# Patient Record
Sex: FEMALE | Race: WHITE | NOT HISPANIC OR LATINO | URBAN - METROPOLITAN AREA
[De-identification: names, ages, dates, MRNs, and addresses within clinical notes are randomized per-mention and may not be internally consistent; named-entity substitution may affect disease eponyms.]

---

## 2017-12-19 ENCOUNTER — APPOINTMENT (RX ONLY)
Dept: URBAN - METROPOLITAN AREA CLINIC 20 | Facility: CLINIC | Age: 77
Setting detail: DERMATOLOGY
End: 2017-12-19

## 2017-12-19 DIAGNOSIS — D18.0 HEMANGIOMA: ICD-10-CM

## 2017-12-19 DIAGNOSIS — L81.4 OTHER MELANIN HYPERPIGMENTATION: ICD-10-CM

## 2017-12-19 DIAGNOSIS — L57.8 OTHER SKIN CHANGES DUE TO CHRONIC EXPOSURE TO NONIONIZING RADIATION: ICD-10-CM

## 2017-12-19 DIAGNOSIS — L82.1 OTHER SEBORRHEIC KERATOSIS: ICD-10-CM

## 2017-12-19 DIAGNOSIS — Z85.828 PERSONAL HISTORY OF OTHER MALIGNANT NEOPLASM OF SKIN: ICD-10-CM

## 2017-12-19 DIAGNOSIS — L57.0 ACTINIC KERATOSIS: ICD-10-CM

## 2017-12-19 DIAGNOSIS — D22 MELANOCYTIC NEVI: ICD-10-CM

## 2017-12-19 PROBLEM — D18.01 HEMANGIOMA OF SKIN AND SUBCUTANEOUS TISSUE: Status: ACTIVE | Noted: 2017-12-19

## 2017-12-19 PROBLEM — D22.5 MELANOCYTIC NEVI OF TRUNK: Status: ACTIVE | Noted: 2017-12-19

## 2017-12-19 PROCEDURE — 17003 DESTRUCT PREMALG LES 2-14: CPT

## 2017-12-19 PROCEDURE — ? LIQUID NITROGEN

## 2017-12-19 PROCEDURE — 17000 DESTRUCT PREMALG LESION: CPT

## 2017-12-19 PROCEDURE — ? COUNSELING

## 2017-12-19 PROCEDURE — 99214 OFFICE O/P EST MOD 30 MIN: CPT | Mod: 25

## 2017-12-19 ASSESSMENT — LOCATION DETAILED DESCRIPTION DERM
LOCATION DETAILED: LEFT RADIAL DORSAL HAND
LOCATION DETAILED: RIGHT SUPERIOR UPPER BACK
LOCATION DETAILED: RIGHT INFERIOR FOREHEAD
LOCATION DETAILED: LEFT SUPERIOR UPPER BACK
LOCATION DETAILED: RIGHT INFERIOR CENTRAL MALAR CHEEK
LOCATION DETAILED: RIGHT LATERAL FOREHEAD
LOCATION DETAILED: LEFT CENTRAL EYEBROW
LOCATION DETAILED: LEFT INFERIOR FOREHEAD
LOCATION DETAILED: RIGHT LATERAL BUCCAL CHEEK
LOCATION DETAILED: RIGHT INFERIOR MEDIAL UPPER BACK
LOCATION DETAILED: RIGHT INFERIOR ANTERIOR NECK
LOCATION DETAILED: LEFT LATERAL EYEBROW
LOCATION DETAILED: RIGHT RADIAL DORSAL HAND
LOCATION DETAILED: RIGHT UPPER CUTANEOUS LIP
LOCATION DETAILED: RIGHT SUPERIOR LATERAL MALAR CHEEK
LOCATION DETAILED: RIGHT INFERIOR MEDIAL FOREHEAD
LOCATION DETAILED: LEFT CENTRAL MALAR CHEEK
LOCATION DETAILED: RIGHT CENTRAL MALAR CHEEK

## 2017-12-19 ASSESSMENT — LOCATION ZONE DERM
LOCATION ZONE: FACE
LOCATION ZONE: LIP
LOCATION ZONE: HAND
LOCATION ZONE: NECK
LOCATION ZONE: TRUNK

## 2017-12-19 ASSESSMENT — LOCATION SIMPLE DESCRIPTION DERM
LOCATION SIMPLE: RIGHT FOREHEAD
LOCATION SIMPLE: LEFT HAND
LOCATION SIMPLE: RIGHT UPPER BACK
LOCATION SIMPLE: LEFT UPPER BACK
LOCATION SIMPLE: RIGHT CHEEK
LOCATION SIMPLE: LEFT EYEBROW
LOCATION SIMPLE: RIGHT ANTERIOR NECK
LOCATION SIMPLE: RIGHT LIP
LOCATION SIMPLE: RIGHT HAND
LOCATION SIMPLE: LEFT FOREHEAD
LOCATION SIMPLE: LEFT CHEEK

## 2018-06-27 ENCOUNTER — APPOINTMENT (RX ONLY)
Dept: URBAN - METROPOLITAN AREA CLINIC 20 | Facility: CLINIC | Age: 78
Setting detail: DERMATOLOGY
End: 2018-06-27

## 2018-06-27 DIAGNOSIS — Z85.828 PERSONAL HISTORY OF OTHER MALIGNANT NEOPLASM OF SKIN: ICD-10-CM

## 2018-06-27 DIAGNOSIS — L57.0 ACTINIC KERATOSIS: ICD-10-CM

## 2018-06-27 DIAGNOSIS — L57.8 OTHER SKIN CHANGES DUE TO CHRONIC EXPOSURE TO NONIONIZING RADIATION: ICD-10-CM

## 2018-06-27 PROCEDURE — 17003 DESTRUCT PREMALG LES 2-14: CPT

## 2018-06-27 PROCEDURE — ? LIQUID NITROGEN

## 2018-06-27 PROCEDURE — 99213 OFFICE O/P EST LOW 20 MIN: CPT | Mod: 25

## 2018-06-27 PROCEDURE — ? COUNSELING

## 2018-06-27 PROCEDURE — 17000 DESTRUCT PREMALG LESION: CPT

## 2018-06-27 ASSESSMENT — LOCATION SIMPLE DESCRIPTION DERM
LOCATION SIMPLE: LEFT CHEEK
LOCATION SIMPLE: RIGHT FOREHEAD
LOCATION SIMPLE: LEFT EAR
LOCATION SIMPLE: RIGHT CHEEK

## 2018-06-27 ASSESSMENT — LOCATION ZONE DERM
LOCATION ZONE: EAR
LOCATION ZONE: FACE

## 2018-06-27 ASSESSMENT — LOCATION DETAILED DESCRIPTION DERM
LOCATION DETAILED: LEFT SUPERIOR HELIX
LOCATION DETAILED: RIGHT SUPERIOR FOREHEAD
LOCATION DETAILED: RIGHT FOREHEAD
LOCATION DETAILED: LEFT SUPERIOR MEDIAL BUCCAL CHEEK
LOCATION DETAILED: RIGHT CENTRAL MALAR CHEEK

## 2018-12-10 ENCOUNTER — APPOINTMENT (RX ONLY)
Dept: URBAN - METROPOLITAN AREA CLINIC 20 | Facility: CLINIC | Age: 78
Setting detail: DERMATOLOGY
End: 2018-12-10

## 2018-12-10 DIAGNOSIS — Z85.828 PERSONAL HISTORY OF OTHER MALIGNANT NEOPLASM OF SKIN: ICD-10-CM

## 2018-12-10 DIAGNOSIS — D22 MELANOCYTIC NEVI: ICD-10-CM

## 2018-12-10 DIAGNOSIS — L57.0 ACTINIC KERATOSIS: ICD-10-CM

## 2018-12-10 DIAGNOSIS — D18.0 HEMANGIOMA: ICD-10-CM

## 2018-12-10 DIAGNOSIS — L57.8 OTHER SKIN CHANGES DUE TO CHRONIC EXPOSURE TO NONIONIZING RADIATION: ICD-10-CM

## 2018-12-10 DIAGNOSIS — L82.1 OTHER SEBORRHEIC KERATOSIS: ICD-10-CM

## 2018-12-10 DIAGNOSIS — L81.4 OTHER MELANIN HYPERPIGMENTATION: ICD-10-CM

## 2018-12-10 PROBLEM — D18.01 HEMANGIOMA OF SKIN AND SUBCUTANEOUS TISSUE: Status: ACTIVE | Noted: 2018-12-10

## 2018-12-10 PROBLEM — D22.5 MELANOCYTIC NEVI OF TRUNK: Status: ACTIVE | Noted: 2018-12-10

## 2018-12-10 PROCEDURE — 17003 DESTRUCT PREMALG LES 2-14: CPT

## 2018-12-10 PROCEDURE — 17000 DESTRUCT PREMALG LESION: CPT

## 2018-12-10 PROCEDURE — 99214 OFFICE O/P EST MOD 30 MIN: CPT | Mod: 25

## 2018-12-10 PROCEDURE — ? COUNSELING

## 2018-12-10 PROCEDURE — ? LIQUID NITROGEN

## 2018-12-10 ASSESSMENT — LOCATION SIMPLE DESCRIPTION DERM
LOCATION SIMPLE: LEFT EAR
LOCATION SIMPLE: RIGHT CHEEK
LOCATION SIMPLE: LEFT CHEEK
LOCATION SIMPLE: LEFT EYEBROW
LOCATION SIMPLE: LEFT HAND
LOCATION SIMPLE: RIGHT HAND
LOCATION SIMPLE: RIGHT ANTERIOR NECK
LOCATION SIMPLE: RIGHT UPPER BACK
LOCATION SIMPLE: LEFT UPPER BACK

## 2018-12-10 ASSESSMENT — LOCATION ZONE DERM
LOCATION ZONE: TRUNK
LOCATION ZONE: HAND
LOCATION ZONE: EAR
LOCATION ZONE: FACE
LOCATION ZONE: NECK

## 2018-12-10 ASSESSMENT — LOCATION DETAILED DESCRIPTION DERM
LOCATION DETAILED: LEFT CENTRAL EYEBROW
LOCATION DETAILED: LEFT MEDIAL MALAR CHEEK
LOCATION DETAILED: RIGHT SUPERIOR UPPER BACK
LOCATION DETAILED: RIGHT RADIAL DORSAL HAND
LOCATION DETAILED: RIGHT INFERIOR CENTRAL MALAR CHEEK
LOCATION DETAILED: LEFT RADIAL DORSAL HAND
LOCATION DETAILED: LEFT SUPERIOR UPPER BACK
LOCATION DETAILED: LEFT LATERAL EYEBROW
LOCATION DETAILED: LEFT SUPERIOR HELIX
LOCATION DETAILED: RIGHT INFERIOR MEDIAL UPPER BACK
LOCATION DETAILED: RIGHT CENTRAL MALAR CHEEK
LOCATION DETAILED: RIGHT INFERIOR ANTERIOR NECK
LOCATION DETAILED: RIGHT LATERAL BUCCAL CHEEK

## 2019-02-07 ENCOUNTER — APPOINTMENT (RX ONLY)
Dept: URBAN - METROPOLITAN AREA CLINIC 20 | Facility: CLINIC | Age: 79
Setting detail: DERMATOLOGY
End: 2019-02-07

## 2019-02-07 DIAGNOSIS — L57.0 ACTINIC KERATOSIS: ICD-10-CM | Status: INADEQUATELY CONTROLLED

## 2019-02-07 PROCEDURE — ? FOLLOW UP ORDERS

## 2019-02-07 PROCEDURE — 99213 OFFICE O/P EST LOW 20 MIN: CPT

## 2019-02-07 PROCEDURE — ? COUNSELING

## 2019-02-07 ASSESSMENT — LOCATION SIMPLE DESCRIPTION DERM: LOCATION SIMPLE: INFERIOR FOREHEAD

## 2019-02-07 ASSESSMENT — LOCATION DETAILED DESCRIPTION DERM: LOCATION DETAILED: INFERIOR MID FOREHEAD

## 2019-02-07 ASSESSMENT — LOCATION ZONE DERM: LOCATION ZONE: FACE

## 2019-02-07 NOTE — PROCEDURE: FOLLOW UP ORDERS
Follow-Up (Free Text): Red light therapy, consent signed. \\n\\nAuthorization started \\n\\nInstructions partially explained, further guidance will be given once approved and scheduled.
Follow-Up Preamble: The following orders were made during the visit:
Detail Level: Zone

## 2019-03-18 ENCOUNTER — RX ONLY (OUTPATIENT)
Age: 79
Setting detail: RX ONLY
End: 2019-03-18

## 2019-03-18 RX ORDER — ACYCLOVIR 400 MG/1
400MG TABLET ORAL BID
Qty: 30 | Refills: 0 | Status: ERX | COMMUNITY
Start: 2019-03-18

## 2019-04-08 ENCOUNTER — APPOINTMENT (RX ONLY)
Dept: URBAN - METROPOLITAN AREA CLINIC 36 | Facility: CLINIC | Age: 79
Setting detail: DERMATOLOGY
End: 2019-04-08

## 2019-04-08 DIAGNOSIS — L57.0 ACTINIC KERATOSIS: ICD-10-CM

## 2019-04-08 PROCEDURE — 96574 DBRDMT PRMLG LES W/PDT: CPT

## 2019-04-08 PROCEDURE — ? PHOTODYNAMIC THERAPY COUNSELING

## 2019-04-08 PROCEDURE — ? PDT: RED

## 2019-04-08 ASSESSMENT — LOCATION SIMPLE DESCRIPTION DERM: LOCATION SIMPLE: SUPERIOR FOREHEAD

## 2019-04-08 ASSESSMENT — LOCATION ZONE DERM: LOCATION ZONE: FACE

## 2019-04-08 ASSESSMENT — LOCATION DETAILED DESCRIPTION DERM: LOCATION DETAILED: SUPERIOR MID FOREHEAD

## 2019-04-08 NOTE — PROCEDURE: PDT: RED
Consent: Written consent obtained.  The risks were reviewed with the patient including but not limited to: pigmentary changes, pain, blistering, scabbing, redness, and the remote possibility of scarring.
Number Of Kerasticks/Tubes Of Metvixia/Tubes Of Ameluz Used: 1
Photosensitizer: Ameluz
Application Method: under occlusion
Debridement Text (Will Only Render In Visit Note If You Select Debridement Option Under Who Performed The Pdt Field): Prior to application of the photodynamic medication the hyperkeratotic lesions were curetted to make them more amenable to therapy.
Post-Care Instructions: I reviewed with the patient in detail post-care instructions. Patient is to avoid sunlight for the next 2 days, and wear sun protection. Patients may expect sunburn like redness, discomfort and scabbing.
Anesthesia Type: 1% lidocaine with epinephrine
Incubation Time (Set To 00:00:00 If Not Wanted): 01:00:00
Ndc# (Optional): [id number]
Illumination Time: 7 min 07 sec
Detail Level: Zone
Lot # (Optional): [id number]
Expiration Date (Optional): 06.2020
Who Performed The Pdt?: Performed by MD YADI, ARON or PADDY with Pre-Procedure Debridement of Hyperkeratotic Lesions ([id number])
Total Number Of Aks Treated (Optional To Report): 0

## 2019-05-07 ENCOUNTER — APPOINTMENT (RX ONLY)
Dept: URBAN - METROPOLITAN AREA CLINIC 20 | Facility: CLINIC | Age: 79
Setting detail: DERMATOLOGY
End: 2019-05-07

## 2019-05-07 DIAGNOSIS — L57.0 ACTINIC KERATOSIS: ICD-10-CM | Status: INADEQUATELY CONTROLLED

## 2019-05-07 PROCEDURE — ? ADDITIONAL NOTES

## 2019-05-07 PROCEDURE — ? PRESCRIPTION

## 2019-05-07 PROCEDURE — 99214 OFFICE O/P EST MOD 30 MIN: CPT

## 2019-05-07 PROCEDURE — ? COUNSELING

## 2019-05-07 RX ORDER — FLUOROURACIL 50 MG/G
CREAM TOPICAL
Qty: 1 | Refills: 1 | Status: ERX | COMMUNITY
Start: 2019-05-07

## 2019-05-07 RX ADMIN — FLUOROURACIL PEA SIZE AMOUNT: 50 CREAM TOPICAL at 00:00

## 2019-05-07 ASSESSMENT — LOCATION ZONE DERM: LOCATION ZONE: FACE

## 2019-05-07 ASSESSMENT — LOCATION SIMPLE DESCRIPTION DERM
LOCATION SIMPLE: RIGHT FOREHEAD
LOCATION SIMPLE: LEFT TEMPLE
LOCATION SIMPLE: RIGHT TEMPLE

## 2019-05-07 ASSESSMENT — LOCATION DETAILED DESCRIPTION DERM
LOCATION DETAILED: LEFT MID TEMPLE
LOCATION DETAILED: RIGHT MEDIAL TEMPLE
LOCATION DETAILED: RIGHT INFERIOR MEDIAL FOREHEAD

## 2019-05-07 NOTE — PROCEDURE: ADDITIONAL NOTES
Additional Notes: Patient directed to apply 5FU to recurring AK’s\\n5/8/19 Start\\nApply once a day at bedtime 5 days a week (mon-fri) for 4 weeks\\n6/5/19 Stop\\nPatient to RTC in 1 months following treatment.
Detail Level: Simple

## 2019-05-07 NOTE — HPI: SKIN LESION
Is This A New Presentation, Or A Follow-Up?: Skin Lesion
What Type Of Note Output Would You Prefer (Optional)?: Bullet Format
How Severe Is Your Skin Lesion?: mild
Has Your Skin Lesion Been Treated?: been treated
When Was It Treated?: 4/8/2019

## 2019-07-23 ENCOUNTER — APPOINTMENT (RX ONLY)
Dept: URBAN - METROPOLITAN AREA CLINIC 20 | Facility: CLINIC | Age: 79
Setting detail: DERMATOLOGY
End: 2019-07-23

## 2019-07-23 DIAGNOSIS — L57.8 OTHER SKIN CHANGES DUE TO CHRONIC EXPOSURE TO NONIONIZING RADIATION: ICD-10-CM

## 2019-07-23 DIAGNOSIS — L57.0 ACTINIC KERATOSIS: ICD-10-CM

## 2019-07-23 PROCEDURE — 17000 DESTRUCT PREMALG LESION: CPT

## 2019-07-23 PROCEDURE — ? LIQUID NITROGEN

## 2019-07-23 PROCEDURE — ? COUNSELING

## 2019-07-23 PROCEDURE — 99213 OFFICE O/P EST LOW 20 MIN: CPT | Mod: 25

## 2019-07-23 ASSESSMENT — LOCATION DETAILED DESCRIPTION DERM
LOCATION DETAILED: INFERIOR MID FOREHEAD
LOCATION DETAILED: LEFT MEDIAL TEMPLE
LOCATION DETAILED: LEFT CENTRAL MALAR CHEEK
LOCATION DETAILED: RIGHT MEDIAL TEMPLE
LOCATION DETAILED: RIGHT SUPERIOR LATERAL MALAR CHEEK

## 2019-07-23 ASSESSMENT — LOCATION SIMPLE DESCRIPTION DERM
LOCATION SIMPLE: LEFT CHEEK
LOCATION SIMPLE: LEFT TEMPLE
LOCATION SIMPLE: RIGHT CHEEK
LOCATION SIMPLE: RIGHT TEMPLE
LOCATION SIMPLE: INFERIOR FOREHEAD

## 2019-07-23 ASSESSMENT — LOCATION ZONE DERM: LOCATION ZONE: FACE

## 2019-12-10 ENCOUNTER — APPOINTMENT (RX ONLY)
Dept: URBAN - METROPOLITAN AREA CLINIC 20 | Facility: CLINIC | Age: 79
Setting detail: DERMATOLOGY
End: 2019-12-10

## 2019-12-10 DIAGNOSIS — L82.1 OTHER SEBORRHEIC KERATOSIS: ICD-10-CM

## 2019-12-10 DIAGNOSIS — L57.0 ACTINIC KERATOSIS: ICD-10-CM

## 2019-12-10 DIAGNOSIS — L57.8 OTHER SKIN CHANGES DUE TO CHRONIC EXPOSURE TO NONIONIZING RADIATION: ICD-10-CM

## 2019-12-10 PROBLEM — D48.5 NEOPLASM OF UNCERTAIN BEHAVIOR OF SKIN: Status: ACTIVE | Noted: 2019-12-10

## 2019-12-10 PROCEDURE — 11103 TANGNTL BX SKIN EA SEP/ADDL: CPT

## 2019-12-10 PROCEDURE — ? COUNSELING

## 2019-12-10 PROCEDURE — ? LIQUID NITROGEN

## 2019-12-10 PROCEDURE — ? ADDITIONAL NOTES

## 2019-12-10 PROCEDURE — 17000 DESTRUCT PREMALG LESION: CPT | Mod: 59

## 2019-12-10 PROCEDURE — 17003 DESTRUCT PREMALG LES 2-14: CPT

## 2019-12-10 PROCEDURE — ? BIOPSY BY SHAVE METHOD

## 2019-12-10 PROCEDURE — 11102 TANGNTL BX SKIN SINGLE LES: CPT

## 2019-12-10 PROCEDURE — 99213 OFFICE O/P EST LOW 20 MIN: CPT | Mod: 25

## 2019-12-10 ASSESSMENT — LOCATION SIMPLE DESCRIPTION DERM
LOCATION SIMPLE: RIGHT CHEEK
LOCATION SIMPLE: RIGHT FOREHEAD
LOCATION SIMPLE: LEFT FOREHEAD
LOCATION SIMPLE: LEFT CHEEK

## 2019-12-10 ASSESSMENT — LOCATION DETAILED DESCRIPTION DERM
LOCATION DETAILED: RIGHT INFERIOR CENTRAL MALAR CHEEK
LOCATION DETAILED: LEFT CENTRAL MALAR CHEEK
LOCATION DETAILED: LEFT INFERIOR LATERAL FOREHEAD
LOCATION DETAILED: LEFT SUPERIOR MEDIAL FOREHEAD
LOCATION DETAILED: RIGHT FOREHEAD

## 2019-12-10 ASSESSMENT — LOCATION ZONE DERM: LOCATION ZONE: FACE

## 2019-12-10 NOTE — PROCEDURE: BIOPSY BY SHAVE METHOD
Lab: [id number]
Hide Anticipated Plan (Based On Presumed Biopsy Results)?: No
Billing Type: Third-Party Bill
Detail Level: Detailed
Biopsy Method: Personna blade
Hemostasis: Drysol and Electrocautery
Curettage Text: The wound bed was treated with curettage after the biopsy was performed.
Additional Anesthesia Volume In Cc (Will Not Render If 0): 0
Wound Care: Aquaphor
Cryotherapy Text: The wound bed was treated with cryotherapy after the biopsy was performed.
Post-Care Instructions: I reviewed with the patient in detail post-care instructions. Patient is to keep the biopsy site dry overnight, and then apply bacitracin twice daily until healed. Patient may apply hydrogen peroxide soaks to remove any crusting.
Anesthesia Type: 1% lidocaine with 1:100,000 epinephrine and a 1:6 solution of 8.4% sodium bicarbonate
Lab Facility: 
Type Of Destruction Used: Curettage
Consent: Written consent was obtained and risks were reviewed including but not limited to scarring, infection, bleeding, scabbing, incomplete removal, nerve damage and allergy to anesthesia.
Electrodesiccation And Curettage Text: The wound bed was treated with electrodesiccation and curettage after the biopsy was performed.
Anesthesia Volume In Cc: 0.2
Depth Of Biopsy: dermis
Electrodesiccation Text: The wound bed was treated with electrodesiccation after the biopsy was performed.
Notification Instructions: Patient will be notified of biopsy results. However, patient instructed to call the office if not contacted within 2 weeks.
Biopsy Type: H and E
Silver Nitrate Text: The wound bed was treated with silver nitrate after the biopsy was performed.
Was A Bandage Applied: Yes
Dressing: Band-Aid

## 2020-02-25 ENCOUNTER — RX ONLY (OUTPATIENT)
Age: 80
Setting detail: RX ONLY
End: 2020-02-25

## 2020-02-25 ENCOUNTER — APPOINTMENT (RX ONLY)
Dept: URBAN - METROPOLITAN AREA CLINIC 20 | Facility: CLINIC | Age: 80
Setting detail: DERMATOLOGY
End: 2020-02-25

## 2020-02-25 DIAGNOSIS — L57.0 ACTINIC KERATOSIS: ICD-10-CM

## 2020-02-25 PROCEDURE — ? PHOTODYNAMIC THERAPY COUNSELING

## 2020-02-25 PROCEDURE — ? PDT: RED

## 2020-02-25 PROCEDURE — 96567 PDT DSTR PRMLG LES SKN: CPT

## 2020-02-25 ASSESSMENT — LOCATION ZONE DERM: LOCATION ZONE: FACE

## 2020-02-25 ASSESSMENT — LOCATION SIMPLE DESCRIPTION DERM: LOCATION SIMPLE: SUPERIOR FOREHEAD

## 2020-02-25 ASSESSMENT — LOCATION DETAILED DESCRIPTION DERM: LOCATION DETAILED: SUPERIOR MID FOREHEAD

## 2020-02-25 NOTE — PROCEDURE: PDT: RED
Photosensitizer: Ameluz
Application Method: without occlusion
Detail Level: Zone
Ndc# (Optional): [id number]
Number Of Kerasticks/Tubes Of Metvixia/Tubes Of Ameluz Used: 1
Expiration Date (Optional): 04.2021
Total Number Of Aks Treated (Optional To Report): 0
Post-Care Instructions: I reviewed with the patient in detail post-care instructions. Patient is to avoid sunlight for the next 2 days, and wear sun protection. Patients may expect sunburn like redness, discomfort and scabbing.
Lot # (Optional): [id number]
Debridement Text (Will Only Render In Visit Note If You Select Debridement Option Under Who Performed The Pdt Field): Prior to application of the photodynamic medication the hyperkeratotic lesions were curetted to make them more amenable to therapy.
Consent: Written consent obtained.  The risks were reviewed with the patient including but not limited to: pigmentary changes, pain, blistering, scabbing, redness, and the remote possibility of scarring.
Illumination Time: 7 min 07 sec
Who Performed The Pdt?: Performed by Nurse, MA or Aesthetician ([id number])
Anesthesia Type: 1% lidocaine with epinephrine
Incubation Time (Set To 00:00:00 If Not Wanted): 01:30:00

## 2020-02-25 NOTE — HPI: PHOTODYNAMIC THERAPY (PDT)
Have You Had Previous Treatments With Pdt Before?: has had previous treatments
When Outside In The Sun, Do You...: mostly burns, rarely tans
Additional History: Patient was given one tablet of Acyclovir prior to treatment due to history of cold sores.

## 2020-06-18 ENCOUNTER — HOSPITAL ENCOUNTER (OUTPATIENT)
Dept: RADIOLOGY | Facility: MEDICAL CENTER | Age: 80
End: 2020-06-18
Attending: FAMILY MEDICINE
Payer: MEDICARE

## 2020-06-18 DIAGNOSIS — J01.90 ACUTE SINUSITIS, RECURRENCE NOT SPECIFIED, UNSPECIFIED LOCATION: ICD-10-CM

## 2020-06-18 PROCEDURE — 70486 CT MAXILLOFACIAL W/O DYE: CPT

## 2020-07-21 ENCOUNTER — APPOINTMENT (RX ONLY)
Dept: URBAN - METROPOLITAN AREA CLINIC 20 | Facility: CLINIC | Age: 80
Setting detail: DERMATOLOGY
End: 2020-07-21

## 2020-07-21 DIAGNOSIS — L57.0 ACTINIC KERATOSIS: ICD-10-CM | Status: INADEQUATELY CONTROLLED

## 2020-07-21 DIAGNOSIS — Z09 ENCOUNTER FOR FOLLOW-UP EXAMINATION AFTER COMPLETED TREATMENT FOR CONDITIONS OTHER THAN MALIGNANT NEOPLASM: ICD-10-CM

## 2020-07-21 DIAGNOSIS — L57.8 OTHER SKIN CHANGES DUE TO CHRONIC EXPOSURE TO NONIONIZING RADIATION: ICD-10-CM

## 2020-07-21 PROCEDURE — ? COUNSELING

## 2020-07-21 PROCEDURE — 17003 DESTRUCT PREMALG LES 2-14: CPT

## 2020-07-21 PROCEDURE — ? LIQUID NITROGEN

## 2020-07-21 PROCEDURE — 17000 DESTRUCT PREMALG LESION: CPT

## 2020-07-21 PROCEDURE — 99213 OFFICE O/P EST LOW 20 MIN: CPT | Mod: 25

## 2020-07-21 PROCEDURE — ? ADDITIONAL NOTES

## 2020-07-21 ASSESSMENT — LOCATION SIMPLE DESCRIPTION DERM
LOCATION SIMPLE: LEFT TEMPLE
LOCATION SIMPLE: LEFT CHEEK
LOCATION SIMPLE: LEFT ZYGOMA
LOCATION SIMPLE: RIGHT CHEEK

## 2020-07-21 ASSESSMENT — LOCATION DETAILED DESCRIPTION DERM
LOCATION DETAILED: LEFT CENTRAL MALAR CHEEK
LOCATION DETAILED: RIGHT INFERIOR CENTRAL MALAR CHEEK
LOCATION DETAILED: LEFT CENTRAL TEMPLE
LOCATION DETAILED: LEFT CENTRAL ZYGOMA

## 2020-07-21 ASSESSMENT — LOCATION ZONE DERM: LOCATION ZONE: FACE

## 2020-07-30 ENCOUNTER — APPOINTMENT (OUTPATIENT)
Dept: RADIOLOGY | Facility: MEDICAL CENTER | Age: 80
End: 2020-07-30
Attending: EMERGENCY MEDICINE
Payer: MEDICARE

## 2020-07-30 ENCOUNTER — HOSPITAL ENCOUNTER (EMERGENCY)
Facility: MEDICAL CENTER | Age: 80
End: 2020-07-30
Attending: EMERGENCY MEDICINE | Admitting: EMERGENCY MEDICINE
Payer: MEDICARE

## 2020-07-30 VITALS
RESPIRATION RATE: 16 BRPM | HEART RATE: 61 BPM | SYSTOLIC BLOOD PRESSURE: 179 MMHG | OXYGEN SATURATION: 97 % | DIASTOLIC BLOOD PRESSURE: 86 MMHG | WEIGHT: 106.04 LBS | BODY MASS INDEX: 20.02 KG/M2 | TEMPERATURE: 97.7 F | HEIGHT: 61 IN

## 2020-07-30 DIAGNOSIS — I10 ESSENTIAL HYPERTENSION: ICD-10-CM

## 2020-07-30 DIAGNOSIS — E87.1 HYPONATREMIA: ICD-10-CM

## 2020-07-30 LAB
ALBUMIN SERPL BCP-MCNC: 4.6 G/DL (ref 3.2–4.9)
ALBUMIN/GLOB SERPL: 1.6 G/DL
ALP SERPL-CCNC: 52 U/L (ref 30–99)
ALT SERPL-CCNC: 21 U/L (ref 2–50)
ANION GAP SERPL CALC-SCNC: 10 MMOL/L (ref 7–16)
APPEARANCE UR: CLEAR
AST SERPL-CCNC: 34 U/L (ref 12–45)
BASOPHILS # BLD AUTO: 0.6 % (ref 0–1.8)
BASOPHILS # BLD: 0.03 K/UL (ref 0–0.12)
BILIRUB SERPL-MCNC: 0.3 MG/DL (ref 0.1–1.5)
BILIRUB UR QL STRIP.AUTO: NEGATIVE
BUN SERPL-MCNC: 16 MG/DL (ref 8–22)
CALCIUM SERPL-MCNC: 9.6 MG/DL (ref 8.4–10.2)
CHLORIDE SERPL-SCNC: 87 MMOL/L (ref 96–112)
CO2 SERPL-SCNC: 28 MMOL/L (ref 20–33)
COLOR UR: YELLOW
CREAT SERPL-MCNC: 0.69 MG/DL (ref 0.5–1.4)
EKG IMPRESSION: NORMAL
EOSINOPHIL # BLD AUTO: 0.06 K/UL (ref 0–0.51)
EOSINOPHIL NFR BLD: 1.1 % (ref 0–6.9)
ERYTHROCYTE [DISTWIDTH] IN BLOOD BY AUTOMATED COUNT: 45.5 FL (ref 35.9–50)
GLOBULIN SER CALC-MCNC: 2.9 G/DL (ref 1.9–3.5)
GLUCOSE SERPL-MCNC: 101 MG/DL (ref 65–99)
GLUCOSE UR STRIP.AUTO-MCNC: NEGATIVE MG/DL
HCT VFR BLD AUTO: 38.5 % (ref 37–47)
HGB BLD-MCNC: 13.2 G/DL (ref 12–16)
IMM GRANULOCYTES # BLD AUTO: 0.02 K/UL (ref 0–0.11)
IMM GRANULOCYTES NFR BLD AUTO: 0.4 % (ref 0–0.9)
KETONES UR STRIP.AUTO-MCNC: NEGATIVE MG/DL
LEUKOCYTE ESTERASE UR QL STRIP.AUTO: NEGATIVE
LYMPHOCYTES # BLD AUTO: 2.44 K/UL (ref 1–4.8)
LYMPHOCYTES NFR BLD: 45.4 % (ref 22–41)
MCH RBC QN AUTO: 33.8 PG (ref 27–33)
MCHC RBC AUTO-ENTMCNC: 34.3 G/DL (ref 33.6–35)
MCV RBC AUTO: 98.7 FL (ref 81.4–97.8)
MICRO URNS: NORMAL
MONOCYTES # BLD AUTO: 0.7 K/UL (ref 0–0.85)
MONOCYTES NFR BLD AUTO: 13 % (ref 0–13.4)
NEUTROPHILS # BLD AUTO: 2.12 K/UL (ref 2–7.15)
NEUTROPHILS NFR BLD: 39.5 % (ref 44–72)
NITRITE UR QL STRIP.AUTO: NEGATIVE
NRBC # BLD AUTO: 0 K/UL
NRBC BLD-RTO: 0 /100 WBC
PH UR STRIP.AUTO: 6 [PH] (ref 5–8)
PLATELET # BLD AUTO: 258 K/UL (ref 164–446)
PMV BLD AUTO: 8.6 FL (ref 9–12.9)
POTASSIUM SERPL-SCNC: 4.2 MMOL/L (ref 3.6–5.5)
PROT SERPL-MCNC: 7.5 G/DL (ref 6–8.2)
PROT UR QL STRIP: NEGATIVE MG/DL
RBC # BLD AUTO: 3.9 M/UL (ref 4.2–5.4)
RBC UR QL AUTO: NEGATIVE
SODIUM SERPL-SCNC: 125 MMOL/L (ref 135–145)
SP GR UR STRIP.AUTO: 1.01
TROPONIN T SERPL-MCNC: 9 NG/L (ref 6–19)
WBC # BLD AUTO: 5.4 K/UL (ref 4.8–10.8)

## 2020-07-30 PROCEDURE — 80053 COMPREHEN METABOLIC PANEL: CPT

## 2020-07-30 PROCEDURE — 99283 EMERGENCY DEPT VISIT LOW MDM: CPT

## 2020-07-30 PROCEDURE — 36415 COLL VENOUS BLD VENIPUNCTURE: CPT

## 2020-07-30 PROCEDURE — 84484 ASSAY OF TROPONIN QUANT: CPT

## 2020-07-30 PROCEDURE — 93005 ELECTROCARDIOGRAM TRACING: CPT | Performed by: EMERGENCY MEDICINE

## 2020-07-30 PROCEDURE — 85025 COMPLETE CBC W/AUTO DIFF WBC: CPT

## 2020-07-30 PROCEDURE — 70450 CT HEAD/BRAIN W/O DYE: CPT

## 2020-07-30 PROCEDURE — 81003 URINALYSIS AUTO W/O SCOPE: CPT

## 2020-07-30 ASSESSMENT — ENCOUNTER SYMPTOMS
CHILLS: 0
BLURRED VISION: 0
FEVER: 0
VOMITING: 0
NAUSEA: 0
SHORTNESS OF BREATH: 0

## 2020-07-31 NOTE — ED NOTES
Assumed patient care. Pt assesement done.  Plan of care reviewed with patient. IV established. Blood sent to lab. Urine collected and sent to lab.

## 2020-07-31 NOTE — ED PROVIDER NOTES
"ED Provider Note    Primary care provider: KIRAN Hairston  Means of arrival: private vehicle  History obtained from: patient  History limited by: none    CHIEF COMPLAINT  Chief Complaint   Patient presents with   • Hypertension     Reports \"I take high blood pressure medication so I monitor my blood pressure every so often. I took my blood pressure tonight and it got as high as 190/70\". Denies CP, SOB, h/a, or vision changes.   • Lightheadedness     Reports feeling lightheaded over the past week. Denies numness/tingling or unilateral weakness.       HPI  Alyssa Juan is a 79 y.o. female who presents to the Emergency Department for hypertension.  Patient reports that she takes her blood pressure regularly every night at 7:30 PM she has a history of hypertension for which she is on lisinopril.  She has been taking her lisinopril as prescribed but when she took her blood pressure tonight it was in the 160 systolic.  This made her anxious and she kept taking it and her blood pressure increased and got as high as 190 systolic and therefore she came in for further evaluation.  She reports that she has had mild lightheadedness over the last week but denies any other symptoms.  Denies headache, visual disturbances, chest pain, flank pain or difficulty urinating.  She has not had any fevers or recent illness.  No sick contacts that she knows of.    REVIEW OF SYSTEMS  Review of Systems   Constitutional: Negative for chills and fever.   Eyes: Negative for blurred vision.   Respiratory: Negative for shortness of breath.    Cardiovascular: Negative for chest pain.   Gastrointestinal: Negative for nausea and vomiting.   Neurological:        + lightheadedness   All other systems reviewed and are negative.      PAST MEDICAL HISTORY   has a past medical history of Cold (4/24/15), Colitis, and Pain (4/24/15).    SURGICAL HISTORY   has a past surgical history that includes rotator cuff repair (2014); hysterectomy, " "vaginal (1978); other (1975); cataract extraction with iol (2013); mammoplasty augmentation (2001); and shoulder arthroplasty total (Right, 5/5/2015).    SOCIAL HISTORY  Social History     Tobacco Use   • Smoking status: Never Smoker   • Smokeless tobacco: Never Used   Substance Use Topics   • Alcohol use: Yes     Alcohol/week: 3.5 oz     Types: 7 Standard drinks or equivalent per week     Comment: \"a glass of wine every night\"   • Drug use: No      Social History     Substance and Sexual Activity   Drug Use No       FAMILY HISTORY  Family History   Problem Relation Age of Onset   • Diabetes Other    • Hypertension Other    • Stroke Other    • Cancer Other        CURRENT MEDICATIONS  Home Medications    Lisinopril, levothyroxine         ALLERGIES  Allergies   Allergen Reactions   • Codeine    • Sulfa Drugs        PHYSICAL EXAM  VITAL SIGNS: BP (!) 184/107   Pulse 76   Temp 36.5 °C (97.7 °F) (Temporal)   Resp 16   Ht 1.549 m (5' 1\")   Wt 48.1 kg (106 lb 0.7 oz)   SpO2 96%   BMI 20.04 kg/m²   Vitals reviewed by myself.  Physical Exam   Constitutional: She is oriented to person, place, and time and well-developed, well-nourished, and in no distress. No distress.   Eyes: Pupils are equal, round, and reactive to light.   Neck: Neck supple.   Cardiovascular: Normal rate and regular rhythm.   Pulmonary/Chest: Effort normal and breath sounds normal. No respiratory distress. She has no wheezes. She has no rales.   Musculoskeletal: Normal range of motion.         General: No edema.      Comments: Patient ambulates with a narrow-based steady gait   Neurological: She is alert and oriented to person, place, and time. No cranial nerve deficit.   Skin: Skin is warm.         DIAGNOSTIC STUDIES /  LABS  Labs Reviewed   CBC WITH DIFFERENTIAL - Abnormal; Notable for the following components:       Result Value    RBC 3.90 (*)     MCV 98.7 (*)     MCH 33.8 (*)     MPV 8.6 (*)     Neutrophils-Polys 39.50 (*)     Lymphocytes 45.40 " (*)     All other components within normal limits   COMP METABOLIC PANEL - Abnormal; Notable for the following components:    Sodium 125 (*)     Chloride 87 (*)     Glucose 101 (*)     All other components within normal limits   TROPONIN   URINALYSIS,CULTURE IF INDICATED   ESTIMATED GFR       All labs reviewed by me.    EKG Interpretation:  Interpreted by myself    12 Lead EKG interpreted by me to show:  EKG at 2240: Normal sinus rhythm, heart rate 61, normal axis, normal intervals, , QRS 80, QTc 444, no acute ST-T segment changes, no dynamic changes when compared to prior EKG from April 2015  My impression of this EKG: Does not indicate ischemia or arrhythmia at this time.    RADIOLOGY  CT-HEAD W/O   Final Result         1.  No acute intracranial abnormality is identified, there are nonspecific white matter changes, commonly associated with small vessel ischemic disease.  Associated mild cerebral atrophy is noted.   2.  Atherosclerosis.        The radiologist's interpretation of all radiological studies have been reviewed by me.        COURSE & MEDICAL DECISION MAKING  Nursing notes, VS, PMSFHx reviewed in chart.    Patient is a 79-year-old female who comes in for evaluation of hypertension and lightheadedness.  Differential diagnosis includes essential hypertension, hypertensive urgency, anxiety, acute coronary syndrome, arrhythmia, electrolyte disturbance, acute intracranial abnormality, urinary tract infection.  Diagnostic work-up includes labs, CT of the head, EKG and urinalysis.      Patient's initial vitals notable for hypertension with systolic blood pressure in the 180s.  She is currently asymptomatic with this blood pressure and therefore I will not aggressively treat it.  EKG returns demonstrates no evidence of acute arrhythmia or ischemia.  Labs returned and troponin is within normal limits as is patient's creatinine.  Urinalysis demonstrates no signs of infection.  CT of the head demonstrates no  acute intracranial abnormalities. Blood pressure improves to 120 systolic without intervention.  Of note patient's labs did demonstrate hyponatremia with a sodium of 125.  Patient is asymptomatic from this however I did notify her of this lab finding and the need to have it rechecked in the next couple of weeks by her primary care physician.  I also advised her of symptoms of hyponatremia to be monitoring for and to return to the emergency department if she has any acute or worsening symptoms.    I had a long discussion with patient regarding asymptomatic essential hypertension.  I advised her we did not aggressively change her medications from the emergency department but rather have her keep a blood pressure diary in order to monitor blood pressure trends.  Advised to take her blood pressure 3 times a day and to follow-up with her primary care physician in the next week to discuss potential medication changes.  Patient is agreeable to this plan and is given strict return precautions.  She is now discharged in stable condition.    FINAL IMPRESSION  1. Essential hypertension    2. Hyponatremia

## 2020-08-06 ENCOUNTER — HOSPITAL ENCOUNTER (OUTPATIENT)
Dept: LAB | Facility: MEDICAL CENTER | Age: 80
End: 2020-08-06
Attending: FAMILY MEDICINE
Payer: MEDICARE

## 2020-08-06 LAB
ALBUMIN SERPL BCP-MCNC: 4.3 G/DL (ref 3.2–4.9)
ALBUMIN/GLOB SERPL: 1.7 G/DL
ALP SERPL-CCNC: 47 U/L (ref 30–99)
ALT SERPL-CCNC: 20 U/L (ref 2–50)
ANION GAP SERPL CALC-SCNC: 10 MMOL/L (ref 7–16)
AST SERPL-CCNC: 25 U/L (ref 12–45)
BILIRUB SERPL-MCNC: 0.3 MG/DL (ref 0.1–1.5)
BUN SERPL-MCNC: 14 MG/DL (ref 8–22)
CALCIUM SERPL-MCNC: 9.4 MG/DL (ref 8.5–10.5)
CHLORIDE SERPL-SCNC: 93 MMOL/L (ref 96–112)
CO2 SERPL-SCNC: 27 MMOL/L (ref 20–33)
CREAT SERPL-MCNC: 0.68 MG/DL (ref 0.5–1.4)
GLOBULIN SER CALC-MCNC: 2.5 G/DL (ref 1.9–3.5)
GLUCOSE SERPL-MCNC: 92 MG/DL (ref 65–99)
POTASSIUM SERPL-SCNC: 5.1 MMOL/L (ref 3.6–5.5)
PROT SERPL-MCNC: 6.8 G/DL (ref 6–8.2)
SODIUM SERPL-SCNC: 130 MMOL/L (ref 135–145)

## 2020-08-06 PROCEDURE — 84443 ASSAY THYROID STIM HORMONE: CPT

## 2020-08-06 PROCEDURE — 82024 ASSAY OF ACTH: CPT

## 2020-08-06 PROCEDURE — 36415 COLL VENOUS BLD VENIPUNCTURE: CPT

## 2020-08-06 PROCEDURE — 80053 COMPREHEN METABOLIC PANEL: CPT

## 2020-08-06 PROCEDURE — 82088 ASSAY OF ALDOSTERONE: CPT

## 2020-08-07 LAB — TSH SERPL DL<=0.005 MIU/L-ACNC: 1.12 UIU/ML (ref 0.38–5.33)

## 2020-08-08 LAB
ACTH PLAS-MCNC: 13.8 PG/ML (ref 7.2–63.3)
ALDOST SERPL-MCNC: 10.8 NG/DL

## 2020-11-10 ENCOUNTER — APPOINTMENT (RX ONLY)
Dept: URBAN - METROPOLITAN AREA CLINIC 20 | Facility: CLINIC | Age: 80
Setting detail: DERMATOLOGY
End: 2020-11-10

## 2020-11-10 DIAGNOSIS — L57.0 ACTINIC KERATOSIS: ICD-10-CM

## 2020-11-10 DIAGNOSIS — L85.3 XEROSIS CUTIS: ICD-10-CM

## 2020-11-10 DIAGNOSIS — L82.1 OTHER SEBORRHEIC KERATOSIS: ICD-10-CM

## 2020-11-10 PROCEDURE — 17003 DESTRUCT PREMALG LES 2-14: CPT

## 2020-11-10 PROCEDURE — ? LIQUID NITROGEN

## 2020-11-10 PROCEDURE — 17000 DESTRUCT PREMALG LESION: CPT

## 2020-11-10 PROCEDURE — ? ADDITIONAL NOTES

## 2020-11-10 PROCEDURE — ? COUNSELING

## 2020-11-10 PROCEDURE — 99213 OFFICE O/P EST LOW 20 MIN: CPT | Mod: 25

## 2020-11-10 ASSESSMENT — LOCATION DETAILED DESCRIPTION DERM
LOCATION DETAILED: INFERIOR THORACIC SPINE
LOCATION DETAILED: LEFT FOREHEAD
LOCATION DETAILED: RIGHT DISTAL PRETIBIAL REGION
LOCATION DETAILED: LEFT SUPERIOR CENTRAL MALAR CHEEK
LOCATION DETAILED: RIGHT INFERIOR LATERAL FOREHEAD
LOCATION DETAILED: LEFT CENTRAL TEMPLE

## 2020-11-10 ASSESSMENT — LOCATION ZONE DERM
LOCATION ZONE: TRUNK
LOCATION ZONE: FACE
LOCATION ZONE: LEG

## 2020-11-10 ASSESSMENT — LOCATION SIMPLE DESCRIPTION DERM
LOCATION SIMPLE: LEFT TEMPLE
LOCATION SIMPLE: UPPER BACK
LOCATION SIMPLE: RIGHT FOREHEAD
LOCATION SIMPLE: LEFT CHEEK
LOCATION SIMPLE: LEFT FOREHEAD
LOCATION SIMPLE: RIGHT PRETIBIAL REGION

## 2021-01-14 DIAGNOSIS — Z23 NEED FOR VACCINATION: ICD-10-CM

## 2021-01-19 ENCOUNTER — HOSPITAL ENCOUNTER (OUTPATIENT)
Dept: LAB | Facility: MEDICAL CENTER | Age: 81
End: 2021-01-19
Attending: FAMILY MEDICINE
Payer: MEDICARE

## 2021-01-19 LAB
25(OH)D3 SERPL-MCNC: 51 NG/ML (ref 30–100)
ALBUMIN SERPL BCP-MCNC: 4 G/DL (ref 3.2–4.9)
ALBUMIN/GLOB SERPL: 1.4 G/DL
ALP SERPL-CCNC: 45 U/L (ref 30–99)
ALT SERPL-CCNC: 21 U/L (ref 2–50)
ANION GAP SERPL CALC-SCNC: 9 MMOL/L (ref 7–16)
AST SERPL-CCNC: 24 U/L (ref 12–45)
BASOPHILS # BLD AUTO: 0.7 % (ref 0–1.8)
BASOPHILS # BLD: 0.03 K/UL (ref 0–0.12)
BILIRUB SERPL-MCNC: 0.4 MG/DL (ref 0.1–1.5)
BUN SERPL-MCNC: 18 MG/DL (ref 8–22)
CALCIUM SERPL-MCNC: 9.4 MG/DL (ref 8.5–10.5)
CHLORIDE SERPL-SCNC: 94 MMOL/L (ref 96–112)
CHOLEST SERPL-MCNC: 241 MG/DL (ref 100–199)
CO2 SERPL-SCNC: 27 MMOL/L (ref 20–33)
CREAT SERPL-MCNC: 0.67 MG/DL (ref 0.5–1.4)
EOSINOPHIL # BLD AUTO: 0.07 K/UL (ref 0–0.51)
EOSINOPHIL NFR BLD: 1.6 % (ref 0–6.9)
ERYTHROCYTE [DISTWIDTH] IN BLOOD BY AUTOMATED COUNT: 48.7 FL (ref 35.9–50)
FASTING STATUS PATIENT QL REPORTED: NORMAL
GLOBULIN SER CALC-MCNC: 2.8 G/DL (ref 1.9–3.5)
GLUCOSE SERPL-MCNC: 102 MG/DL (ref 65–99)
HCT VFR BLD AUTO: 38.6 % (ref 37–47)
HDLC SERPL-MCNC: 147 MG/DL
HGB BLD-MCNC: 12.9 G/DL (ref 12–16)
IMM GRANULOCYTES # BLD AUTO: 0.01 K/UL (ref 0–0.11)
IMM GRANULOCYTES NFR BLD AUTO: 0.2 % (ref 0–0.9)
LDLC SERPL CALC-MCNC: 81 MG/DL
LYMPHOCYTES # BLD AUTO: 2.28 K/UL (ref 1–4.8)
LYMPHOCYTES NFR BLD: 52.5 % (ref 22–41)
MCH RBC QN AUTO: 34 PG (ref 27–33)
MCHC RBC AUTO-ENTMCNC: 33.4 G/DL (ref 33.6–35)
MCV RBC AUTO: 101.8 FL (ref 81.4–97.8)
MONOCYTES # BLD AUTO: 0.6 K/UL (ref 0–0.85)
MONOCYTES NFR BLD AUTO: 13.8 % (ref 0–13.4)
NEUTROPHILS # BLD AUTO: 1.35 K/UL (ref 2–7.15)
NEUTROPHILS NFR BLD: 31.2 % (ref 44–72)
NRBC # BLD AUTO: 0 K/UL
NRBC BLD-RTO: 0 /100 WBC
PLATELET # BLD AUTO: 198 K/UL (ref 164–446)
PMV BLD AUTO: 10.4 FL (ref 9–12.9)
POTASSIUM SERPL-SCNC: 4.3 MMOL/L (ref 3.6–5.5)
PROT SERPL-MCNC: 6.8 G/DL (ref 6–8.2)
RBC # BLD AUTO: 3.79 M/UL (ref 4.2–5.4)
SODIUM SERPL-SCNC: 130 MMOL/L (ref 135–145)
TRIGL SERPL-MCNC: 65 MG/DL (ref 0–149)
TSH SERPL DL<=0.005 MIU/L-ACNC: 2.67 UIU/ML (ref 0.38–5.33)
VIT B12 SERPL-MCNC: 1814 PG/ML (ref 211–911)
WBC # BLD AUTO: 4.3 K/UL (ref 4.8–10.8)

## 2021-01-19 PROCEDURE — 82306 VITAMIN D 25 HYDROXY: CPT | Mod: GA

## 2021-01-19 PROCEDURE — 80061 LIPID PANEL: CPT

## 2021-01-19 PROCEDURE — 85025 COMPLETE CBC W/AUTO DIFF WBC: CPT

## 2021-01-19 PROCEDURE — 80053 COMPREHEN METABOLIC PANEL: CPT

## 2021-01-19 PROCEDURE — 82607 VITAMIN B-12: CPT

## 2021-01-19 PROCEDURE — 36415 COLL VENOUS BLD VENIPUNCTURE: CPT

## 2021-01-19 PROCEDURE — 84443 ASSAY THYROID STIM HORMONE: CPT

## 2021-05-19 ENCOUNTER — APPOINTMENT (RX ONLY)
Dept: URBAN - METROPOLITAN AREA CLINIC 20 | Facility: CLINIC | Age: 81
Setting detail: DERMATOLOGY
End: 2021-05-19

## 2021-05-19 DIAGNOSIS — L81.4 OTHER MELANIN HYPERPIGMENTATION: ICD-10-CM

## 2021-05-19 DIAGNOSIS — D22 MELANOCYTIC NEVI: ICD-10-CM

## 2021-05-19 DIAGNOSIS — Z85.828 PERSONAL HISTORY OF OTHER MALIGNANT NEOPLASM OF SKIN: ICD-10-CM

## 2021-05-19 DIAGNOSIS — L57.0 ACTINIC KERATOSIS: ICD-10-CM

## 2021-05-19 DIAGNOSIS — D18.0 HEMANGIOMA: ICD-10-CM

## 2021-05-19 DIAGNOSIS — L82.1 OTHER SEBORRHEIC KERATOSIS: ICD-10-CM

## 2021-05-19 DIAGNOSIS — L57.8 OTHER SKIN CHANGES DUE TO CHRONIC EXPOSURE TO NONIONIZING RADIATION: ICD-10-CM

## 2021-05-19 PROBLEM — D18.01 HEMANGIOMA OF SKIN AND SUBCUTANEOUS TISSUE: Status: ACTIVE | Noted: 2021-05-19

## 2021-05-19 PROBLEM — D22.5 MELANOCYTIC NEVI OF TRUNK: Status: ACTIVE | Noted: 2021-05-19

## 2021-05-19 PROCEDURE — 99213 OFFICE O/P EST LOW 20 MIN: CPT | Mod: 25

## 2021-05-19 PROCEDURE — 17000 DESTRUCT PREMALG LESION: CPT

## 2021-05-19 PROCEDURE — 17003 DESTRUCT PREMALG LES 2-14: CPT

## 2021-05-19 PROCEDURE — ? LIQUID NITROGEN

## 2021-05-19 PROCEDURE — ? COUNSELING

## 2021-05-19 ASSESSMENT — LOCATION ZONE DERM
LOCATION ZONE: FACE
LOCATION ZONE: LEG
LOCATION ZONE: SCALP
LOCATION ZONE: TRUNK
LOCATION ZONE: ARM

## 2021-05-19 ASSESSMENT — LOCATION SIMPLE DESCRIPTION DERM
LOCATION SIMPLE: RIGHT SHOULDER
LOCATION SIMPLE: RIGHT FOREARM
LOCATION SIMPLE: RIGHT CLAVICULAR SKIN
LOCATION SIMPLE: LEFT FOREARM
LOCATION SIMPLE: RIGHT UPPER BACK
LOCATION SIMPLE: RIGHT FOREHEAD
LOCATION SIMPLE: CHEST
LOCATION SIMPLE: RIGHT CHEEK
LOCATION SIMPLE: LEFT UPPER ARM
LOCATION SIMPLE: RIGHT EYEBROW
LOCATION SIMPLE: LEFT THIGH
LOCATION SIMPLE: RIGHT UPPER ARM
LOCATION SIMPLE: POSTERIOR SCALP
LOCATION SIMPLE: LEFT CHEEK
LOCATION SIMPLE: RIGHT THIGH
LOCATION SIMPLE: UPPER BACK

## 2021-05-19 ASSESSMENT — LOCATION DETAILED DESCRIPTION DERM
LOCATION DETAILED: RIGHT LATERAL EYEBROW
LOCATION DETAILED: RIGHT CLAVICULAR SKIN
LOCATION DETAILED: LEFT INFERIOR CENTRAL MALAR CHEEK
LOCATION DETAILED: RIGHT DISTAL DORSAL FOREARM
LOCATION DETAILED: LEFT LATERAL SUPERIOR CHEST
LOCATION DETAILED: RIGHT ANTERIOR DISTAL THIGH
LOCATION DETAILED: LEFT ANTERIOR DISTAL THIGH
LOCATION DETAILED: LEFT ANTERIOR PROXIMAL UPPER ARM
LOCATION DETAILED: RIGHT SUPERIOR MEDIAL UPPER BACK
LOCATION DETAILED: RIGHT CENTRAL EYEBROW
LOCATION DETAILED: INFERIOR THORACIC SPINE
LOCATION DETAILED: LEFT SUPERIOR OCCIPITAL SCALP
LOCATION DETAILED: RIGHT ANTERIOR SHOULDER
LOCATION DETAILED: RIGHT LATERAL FOREHEAD
LOCATION DETAILED: LEFT MEDIAL SUPERIOR CHEST
LOCATION DETAILED: RIGHT INFERIOR UPPER BACK
LOCATION DETAILED: LEFT DISTAL DORSAL FOREARM
LOCATION DETAILED: RIGHT CENTRAL MALAR CHEEK
LOCATION DETAILED: RIGHT MID-UPPER BACK
LOCATION DETAILED: RIGHT ANTERIOR PROXIMAL UPPER ARM
LOCATION DETAILED: RIGHT ANTERIOR PROXIMAL THIGH

## 2021-05-19 NOTE — PROCEDURE: MIPS QUALITY
Quality 111:Pneumonia Vaccination Status For Older Adults: Pneumococcal Vaccination Previously Received
Quality 402: Tobacco Use And Help With Quitting Among Adolescents: Patient screened for tobacco and never smoked
Detail Level: Detailed
Quality 226: Preventive Care And Screening: Tobacco Use: Screening And Cessation Intervention: Patient screened for tobacco use and is an ex/non-smoker
Quality 431: Preventive Care And Screening: Unhealthy Alcohol Use - Screening: Patient screened for unhealthy alcohol use using a single question and scores less than 2 times per year
Quality 130: Documentation Of Current Medications In The Medical Record: Current Medications Documented

## 2021-11-16 ENCOUNTER — APPOINTMENT (RX ONLY)
Dept: URBAN - METROPOLITAN AREA CLINIC 20 | Facility: CLINIC | Age: 81
Setting detail: DERMATOLOGY
End: 2021-11-16

## 2021-11-16 DIAGNOSIS — L57.0 ACTINIC KERATOSIS: ICD-10-CM | Status: INADEQUATELY CONTROLLED

## 2021-11-16 DIAGNOSIS — L82.1 OTHER SEBORRHEIC KERATOSIS: ICD-10-CM

## 2021-11-16 PROBLEM — D48.5 NEOPLASM OF UNCERTAIN BEHAVIOR OF SKIN: Status: ACTIVE | Noted: 2021-11-16

## 2021-11-16 PROCEDURE — ? COUNSELING

## 2021-11-16 PROCEDURE — 11103 TANGNTL BX SKIN EA SEP/ADDL: CPT

## 2021-11-16 PROCEDURE — ? BIOPSY BY SHAVE METHOD

## 2021-11-16 PROCEDURE — ? PRESCRIPTION

## 2021-11-16 PROCEDURE — 99214 OFFICE O/P EST MOD 30 MIN: CPT | Mod: 25

## 2021-11-16 PROCEDURE — 11102 TANGNTL BX SKIN SINGLE LES: CPT

## 2021-11-16 RX ORDER — FLUOROURACIL 5 MG/G
CREAM TOPICAL
Qty: 40 | Refills: 3 | Status: ERX | COMMUNITY
Start: 2021-11-16

## 2021-11-16 RX ADMIN — FLUOROURACIL: 5 CREAM TOPICAL at 00:00

## 2021-11-16 ASSESSMENT — LOCATION DETAILED DESCRIPTION DERM
LOCATION DETAILED: LEFT INFERIOR LATERAL MIDBACK
LOCATION DETAILED: RIGHT LATERAL FOREHEAD
LOCATION DETAILED: RIGHT LATERAL BUCCAL CHEEK

## 2021-11-16 ASSESSMENT — LOCATION SIMPLE DESCRIPTION DERM
LOCATION SIMPLE: RIGHT CHEEK
LOCATION SIMPLE: RIGHT FOREHEAD
LOCATION SIMPLE: LEFT LOWER BACK

## 2021-11-16 ASSESSMENT — LOCATION ZONE DERM
LOCATION ZONE: TRUNK
LOCATION ZONE: FACE

## 2022-01-25 ENCOUNTER — APPOINTMENT (RX ONLY)
Dept: URBAN - METROPOLITAN AREA CLINIC 20 | Facility: CLINIC | Age: 82
Setting detail: DERMATOLOGY
End: 2022-01-25

## 2022-01-25 DIAGNOSIS — Z09 ENCOUNTER FOR FOLLOW-UP EXAMINATION AFTER COMPLETED TREATMENT FOR CONDITIONS OTHER THAN MALIGNANT NEOPLASM: ICD-10-CM

## 2022-01-25 DIAGNOSIS — L21.8 OTHER SEBORRHEIC DERMATITIS: ICD-10-CM

## 2022-01-25 DIAGNOSIS — L57.0 ACTINIC KERATOSIS: ICD-10-CM

## 2022-01-25 PROCEDURE — ? MEDICATION COUNSELING

## 2022-01-25 PROCEDURE — ? COUNSELING

## 2022-01-25 PROCEDURE — 99213 OFFICE O/P EST LOW 20 MIN: CPT

## 2022-01-25 PROCEDURE — ? PRESCRIPTION

## 2022-01-25 RX ORDER — KETOCONAZOLE 20 MG/G
CREAM TOPICAL
Qty: 30 | Refills: 3 | Status: ERX | COMMUNITY
Start: 2022-01-25

## 2022-01-25 RX ORDER — TRIAMCINOLONE ACETONIDE 1 MG/G
CREAM TOPICAL
Qty: 30 | Refills: 0 | Status: ERX | COMMUNITY
Start: 2022-01-25

## 2022-01-25 RX ADMIN — KETOCONAZOLE: 20 CREAM TOPICAL at 00:00

## 2022-01-25 RX ADMIN — TRIAMCINOLONE ACETONIDE: 1 CREAM TOPICAL at 00:00

## 2022-01-25 ASSESSMENT — LOCATION SIMPLE DESCRIPTION DERM
LOCATION SIMPLE: RIGHT CHEEK
LOCATION SIMPLE: RIGHT FOREHEAD
LOCATION SIMPLE: POSTERIOR NECK
LOCATION SIMPLE: FRONTAL SCALP

## 2022-01-25 ASSESSMENT — LOCATION ZONE DERM
LOCATION ZONE: FACE
LOCATION ZONE: SCALP
LOCATION ZONE: NECK

## 2022-01-25 ASSESSMENT — LOCATION DETAILED DESCRIPTION DERM
LOCATION DETAILED: RIGHT LATERAL MALAR CHEEK
LOCATION DETAILED: MEDIAL FRONTAL SCALP
LOCATION DETAILED: RIGHT FOREHEAD
LOCATION DETAILED: LEFT INFERIOR POSTERIOR NECK

## 2022-01-25 NOTE — PROCEDURE: MEDICATION COUNSELING
Taltz Pregnancy And Lactation Text: The risk during pregnancy and breastfeeding is uncertain with this medication.
Prednisone Counseling:  I discussed with the patient the risks of prolonged use of prednisone including but not limited to weight gain, insomnia, osteoporosis, mood changes, diabetes, susceptibility to infection, glaucoma and high blood pressure.  In cases where prednisone use is prolonged, patients should be monitored with blood pressure checks, serum glucose levels and an eye exam.  Additionally, the patient may need to be placed on GI prophylaxis, PCP prophylaxis, and calcium and vitamin D supplementation and/or a bisphosphonate.  The patient verbalized understanding of the proper use and the possible adverse effects of prednisone.  All of the patient's questions and concerns were addressed.
Azithromycin Counseling:  I discussed with the patient the risks of azithromycin including but not limited to GI upset, allergic reaction, drug rash, diarrhea, and yeast infections.
5-Fu Counseling: 5-Fluorouracil Counseling:  I discussed with the patient the risks of 5-fluorouracil including but not limited to erythema, scaling, itching, weeping, crusting, and pain.
Cosentyx Pregnancy And Lactation Text: This medication is Pregnancy Category B and is considered safe during pregnancy. It is unknown if this medication is excreted in breast milk.
Oral Minoxidil Pregnancy And Lactation Text: This medication should only be used when clearly needed if you are pregnant, attempting to become pregnant or breast feeding.
5-Fu Pregnancy And Lactation Text: This medication is Pregnancy Category X and contraindicated in pregnancy and in women who may become pregnant. It is unknown if this medication is excreted in breast milk.
Thalidomide Pregnancy And Lactation Text: This medication is Pregnancy Category X and is absolutely contraindicated during pregnancy. It is unknown if it is excreted in breast milk.
Otezla Counseling: The side effects of Otezla were discussed with the patient, including but not limited to worsening or new depression, weight loss, diarrhea, nausea, upper respiratory tract infection, and headache. Patient instructed to call the office should any adverse effect occur.  The patient verbalized understanding of the proper use and possible adverse effects of Otezla.  All the patient's questions and concerns were addressed.
Finasteride Male Counseling: Finasteride Counseling:  I discussed with the patient the risks of use of finasteride including but not limited to decreased libido, decreased ejaculate volume, gynecomastia, and depression. Women should not handle medication.  All of the patient's questions and concerns were addressed.
Zyclara Counseling:  I discussed with the patient the risks of imiquimod including but not limited to erythema, scaling, itching, weeping, crusting, and pain.  Patient understands that the inflammatory response to imiquimod is variable from person to person and was educated regarded proper titration schedule.  If flu-like symptoms develop, patient knows to discontinue the medication and contact us.
Protopic Counseling: Patient may experience a mild burning sensation during topical application. Protopic is not approved in children less than 2 years of age. There have been case reports of hematologic and skin malignancies in patients using topical calcineurin inhibitors although causality is questionable.
Quinolones Pregnancy And Lactation Text: This medication is Pregnancy Category C and it isn't know if it is safe during pregnancy. It is also excreted in breast milk.
Azithromycin Pregnancy And Lactation Text: This medication is considered safe during pregnancy and is also secreted in breast milk.
Protopic Pregnancy And Lactation Text: This medication is Pregnancy Category C. It is unknown if this medication is excreted in breast milk when applied topically.
Prednisone Pregnancy And Lactation Text: This medication is Pregnancy Category C and it isn't know if it is safe during pregnancy. This medication is excreted in breast milk.
Cimetidine Counseling:  I discussed with the patient the risks of Cimetidine including but not limited to gynecomastia, headache, diarrhea, nausea, drowsiness, arrhythmias, pancreatitis, skin rashes, psychosis, bone marrow suppression and kidney toxicity.
Tremfya Counseling: I discussed with the patient the risks of guselkumab including but not limited to immunosuppression, serious infections, worsening of inflammatory bowel disease and drug reactions.  The patient understands that monitoring is required including a PPD at baseline and must alert us or the primary physician if symptoms of infection or other concerning signs are noted.
Dupixent Counseling: I discussed with the patient the risks of dupilumab including but not limited to eye infection and irritation, cold sores, injection site reactions, worsening of asthma, allergic reactions and increased risk of parasitic infection.  Live vaccines should be avoided while taking dupilumab. Dupilumab will also interact with certain medications such as warfarin and cyclosporine. The patient understands that monitoring is required and they must alert us or the primary physician if symptoms of infection or other concerning signs are noted.
Tranexamic Acid Counseling:  Patient advised of the small risk of bleeding problems with tranexamic acid. They were also instructed to call if they developed any nausea, vomiting or diarrhea. All of the patient's questions and concerns were addressed.
Rifampin Counseling: I discussed with the patient the risks of rifampin including but not limited to liver damage, kidney damage, red-orange body fluids, nausea/vomiting and severe allergy.
Finasteride Pregnancy And Lactation Text: This medication is absolutely contraindicated during pregnancy. It is unknown if it is excreted in breast milk.
Zyclara Pregnancy And Lactation Text: This medication is Pregnancy Category C. It is unknown if this medication is excreted in breast milk.
Gabapentin Counseling: I discussed with the patient the risks of gabapentin including but not limited to dizziness, somnolence, fatigue and ataxia.
Cimetidine Pregnancy And Lactation Text: This medication is Pregnancy Category B and is considered safe during pregnancy. It is also excreted in breast milk and breast feeding isn't recommended.
Dupixent Pregnancy And Lactation Text: This medication likely crosses the placenta but the risk for the fetus is uncertain. This medication is excreted in breast milk.
Rhofade Counseling: Rhofade is a topical medication which can decrease superficial blood flow where applied. Side effects are uncommon and include stinging, redness and allergic reactions.
Drysol Counseling:  I discussed with the patient the risks of drysol/aluminum chloride including but not limited to skin rash, itching, irritation, burning.
Otezla Pregnancy And Lactation Text: This medication is Pregnancy Category C and it isn't known if it is safe during pregnancy. It is unknown if it is excreted in breast milk.
Oxybutynin Counseling:  I discussed with the patient the risks of oxybutynin including but not limited to skin rash, drowsiness, dry mouth, difficulty urinating, and blurred vision.
Bactrim Counseling:  I discussed with the patient the risks of sulfa antibiotics including but not limited to GI upset, allergic reaction, drug rash, diarrhea, dizziness, photosensitivity, and yeast infections.  Rarely, more serious reactions can occur including but not limited to aplastic anemia, agranulocytosis, methemoglobinemia, blood dyscrasias, liver or kidney failure, lung infiltrates or desquamative/blistering drug rashes.
Tranexamic Acid Pregnancy And Lactation Text: It is unknown if this medication is safe during pregnancy or breast feeding.
Rifampin Pregnancy And Lactation Text: This medication is Pregnancy Category C and it isn't know if it is safe during pregnancy. It is also excreted in breast milk and should not be used if you are breast feeding.
Rhofade Pregnancy And Lactation Text: This medication has not been assigned a Pregnancy Risk Category. It is unknown if the medication is excreted in breast milk.
Valtrex Counseling: I discussed with the patient the risks of valacyclovir including but not limited to kidney damage, nausea, vomiting and severe allergy.  The patient understands that if the infection seems to be worsening or is not improving, they are to call.
Xeljanz Counseling: I discussed with the patient the risks of Xeljanz therapy including increased risk of infection, liver issues, headache, diarrhea, or cold symptoms. Live vaccines should be avoided. They were instructed to call if they have any problems.
Acitretin Counseling:  I discussed with the patient the risks of acitretin including but not limited to hair loss, dry lips/skin/eyes, liver damage, hyperlipidemia, depression/suicidal ideation, photosensitivity.  Serious rare side effects can include but are not limited to pancreatitis, pseudotumor cerebri, bony changes, clot formation/stroke/heart attack.  Patient understands that alcohol is contraindicated since it can result in liver toxicity and significantly prolong the elimination of the drug by many years.
Gabapentin Pregnancy And Lactation Text: This medication is Pregnancy Category C and isn't considered safe during pregnancy. It is excreted in breast milk.
Opioid Counseling: I discussed with the patient the potential side effects of opioids including but not limited to addiction, altered mental status, and depression. I stressed avoiding alcohol, benzodiazepines, muscle relaxants and sleep aids unless specifically okayed by a physician. The patient verbalized understanding of the proper use and possible adverse effects of opioids. All of the patient's questions and concerns were addressed. They were instructed to flush the remaining pills down the toilet if they did not need them for pain.
Drysol Pregnancy And Lactation Text: This medication is considered safe during pregnancy and breast feeding.
Elidel Counseling: Patient may experience a mild burning sensation during topical application. Elidel is not approved in children less than 2 years of age. There have been case reports of hematologic and skin malignancies in patients using topical calcineurin inhibitors although causality is questionable.
Bactrim Pregnancy And Lactation Text: This medication is Pregnancy Category D and is known to cause fetal risk.  It is also excreted in breast milk.
Oxybutynin Pregnancy And Lactation Text: This medication is Pregnancy Category B and is considered safe during pregnancy. It is unknown if it is excreted in breast milk.
Sarecycline Counseling: Patient advised regarding possible photosensitivity and discoloration of the teeth, skin, lips, tongue and gums.  Patient instructed to avoid sunlight, if possible.  When exposed to sunlight, patients should wear protective clothing, sunglasses, and sunscreen.  The patient was instructed to call the office immediately if the following severe adverse effects occur:  hearing changes, easy bruising/bleeding, severe headache, or vision changes.  The patient verbalized understanding of the proper use and possible adverse effects of sarecycline.  All of the patient's questions and concerns were addressed.
Doxepin Counseling:  Patient advised that the medication is sedating and not to drive a car after taking this medication. Patient informed of potential adverse effects including but not limited to dry mouth, urinary retention, and blurry vision.  The patient verbalized understanding of the proper use and possible adverse effects of doxepin.  All of the patient's questions and concerns were addressed.
Enbrel Counseling:  I discussed with the patient the risks of etanercept including but not limited to myelosuppression, immunosuppression, autoimmune hepatitis, demyelinating diseases, lymphoma, and infections.  The patient understands that monitoring is required including a PPD at baseline and must alert us or the primary physician if symptoms of infection or other concerning signs are noted.
Glycopyrrolate Counseling:  I discussed with the patient the risks of glycopyrrolate including but not limited to skin rash, drowsiness, dry mouth, difficulty urinating, and blurred vision.
Solaraze Counseling:  I discussed with the patient the risks of Solaraze including but not limited to erythema, scaling, itching, weeping, crusting, and pain.
Valtrex Pregnancy And Lactation Text: this medication is Pregnancy Category B and is considered safe during pregnancy. This medication is not directly found in breast milk but it's metabolite acyclovir is present.
Acitretin Pregnancy And Lactation Text: This medication is Pregnancy Category X and should not be given to women who are pregnant or may become pregnant in the future. This medication is excreted in breast milk.
Tetracycline Counseling: Patient counseled regarding possible photosensitivity and increased risk for sunburn.  Patient instructed to avoid sunlight, if possible.  When exposed to sunlight, patients should wear protective clothing, sunglasses, and sunscreen.  The patient was instructed to call the office immediately if the following severe adverse effects occur:  hearing changes, easy bruising/bleeding, severe headache, or vision changes.  The patient verbalized understanding of the proper use and possible adverse effects of tetracycline.  All of the patient's questions and concerns were addressed. Patient understands to avoid pregnancy while on therapy due to potential birth defects.
Opioid Pregnancy And Lactation Text: These medications can lead to premature delivery and should be avoided during pregnancy. These medications are also present in breast milk in small amounts.
Xelgretaz Pregnancy And Lactation Text: This medication is Pregnancy Category D and is not considered safe during pregnancy.  The risk during breast feeding is also uncertain.
Xolair Counseling:  Patient informed of potential adverse effects including but not limited to fever, muscle aches, rash and allergic reactions.  The patient verbalized understanding of the proper use and possible adverse effects of Xolair.  All of the patient's questions and concerns were addressed.
Bexarotene Counseling:  I discussed with the patient the risks of bexarotene including but not limited to hair loss, dry lips/skin/eyes, liver abnormalities, hyperlipidemia, pancreatitis, depression/suicidal ideation, photosensitivity, drug rash/allergic reactions, hypothyroidism, anemia, leukopenia, infection, cataracts, and teratogenicity.  Patient understands that they will need regular blood tests to check lipid profile, liver function tests, white blood cell count, thyroid function tests and pregnancy test if applicable.
Sarecycline Pregnancy And Lactation Text: This medication is Pregnancy Category D and not consider safe during pregnancy. It is also excreted in breast milk.
Doxepin Pregnancy And Lactation Text: This medication is Pregnancy Category C and it isn't known if it is safe during pregnancy. It is also excreted in breast milk and breast feeding isn't recommended.
Infliximab Counseling:  I discussed with the patient the risks of infliximab including but not limited to myelosuppression, immunosuppression, autoimmune hepatitis, demyelinating diseases, lymphoma, and serious infections.  The patient understands that monitoring is required including a PPD at baseline and must alert us or the primary physician if symptoms of infection or other concerning signs are noted.
Cephalexin Counseling: I counseled the patient regarding use of cephalexin as an antibiotic for prophylactic and/or therapeutic purposes. Cephalexin (commonly prescribed under brand name Keflex) is a cephalosporin antibiotic which is active against numerous classes of bacteria, including most skin bacteria. Side effects may include nausea, diarrhea, gastrointestinal upset, rash, hives, yeast infections, and in rare cases, hepatitis, kidney disease, seizures, fever, confusion, neurologic symptoms, and others. Patients with severe allergies to penicillin medications are cautioned that there is about a 10% incidence of cross-reactivity with cephalosporins. When possible, patients with penicillin allergies should use alternatives to cephalosporins for antibiotic therapy.
Propranolol Counseling:  I discussed with the patient the risks of propranolol including but not limited to low heart rate, low blood pressure, low blood sugar, restlessness and increased cold sensitivity. They should call the office if they experience any of these side effects.
Propranolol Pregnancy And Lactation Text: This medication is Pregnancy Category C and it isn't known if it is safe during pregnancy. It is excreted in breast milk.
Glycopyrrolate Pregnancy And Lactation Text: This medication is Pregnancy Category B and is considered safe during pregnancy. It is unknown if it is excreted breast milk.
Solaraze Pregnancy And Lactation Text: This medication is Pregnancy Category B and is considered safe. There is some data to suggest avoiding during the third trimester. It is unknown if this medication is excreted in breast milk.
Use Enhanced Medication Counseling?: No
Hydroxyzine Counseling: Patient advised that the medication is sedating and not to drive a car after taking this medication.  Patient informed of potential adverse effects including but not limited to dry mouth, urinary retention, and blurry vision.  The patient verbalized understanding of the proper use and possible adverse effects of hydroxyzine.  All of the patient's questions and concerns were addressed.
Topical Retinoid counseling:  Patient advised to apply a pea-sized amount only at bedtime and wait 30 minutes after washing their face before applying.  If too drying, patient may add a non-comedogenic moisturizer. The patient verbalized understanding of the proper use and possible adverse effects of retinoids.  All of the patient's questions and concerns were addressed.
Humira Counseling:  I discussed with the patient the risks of adalimumab including but not limited to myelosuppression, immunosuppression, autoimmune hepatitis, demyelinating diseases, lymphoma, and serious infections.  The patient understands that monitoring is required including a PPD at baseline and must alert us or the primary physician if symptoms of infection or other concerning signs are noted.
Bexarotene Pregnancy And Lactation Text: This medication is Pregnancy Category X and should not be given to women who are pregnant or may become pregnant. This medication should not be used if you are breast feeding.
Cephalexin Pregnancy And Lactation Text: This medication is Pregnancy Category B and considered safe during pregnancy.  It is also excreted in breast milk but can be used safely for shorter doses.
Xolair Pregnancy And Lactation Text: This medication is Pregnancy Category B and is considered safe during pregnancy. This medication is excreted in breast milk.
Eucrisa Counseling: Patient may experience a mild burning sensation during topical application. Eucrisa is not approved in children less than 2 years of age.
Eucrisa Pregnancy And Lactation Text: This medication has not been assigned a Pregnancy Risk Category but animal studies failed to show danger with the topical medication. It is unknown if the medication is excreted in breast milk.
Hydroxychloroquine Counseling:  I discussed with the patient that a baseline ophthalmologic exam is needed at the start of therapy and every year thereafter while on therapy. A CBC may also be warranted for monitoring.  The side effects of this medication were discussed with the patient, including but not limited to agranulocytosis, aplastic anemia, seizures, rashes, retinopathy, and liver toxicity. Patient instructed to call the office should any adverse effect occur.  The patient verbalized understanding of the proper use and possible adverse effects of Plaquenil.  All the patient's questions and concerns were addressed.
Clindamycin Pregnancy And Lactation Text: This medication can be used in pregnancy if certain situations. Clindamycin is also present in breast milk.
Arava Counseling:  Patient counseled regarding adverse effects of Arava including but not limited to nausea, vomiting, abnormalities in liver function tests. Patients may develop mouth sores, rash, diarrhea, and abnormalities in blood counts. The patient understands that monitoring is required including LFTs and blood counts.  There is a rare possibility of scarring of the liver and lung problems that can occur when taking methotrexate. Persistent nausea, loss of appetite, pale stools, dark urine, cough, and shortness of breath should be reported immediately. Patient advised to discontinue Arava treatment and consult with a physician prior to attempting conception. The patient will have to undergo a treatment to eliminate Arava from the body prior to conception.
Rituxan Counseling:  I discussed with the patient the risks of Rituxan infusions. Side effects can include infusion reactions, severe drug rashes including mucocutaneous reactions, reactivation of latent hepatitis and other infections and rarely progressive multifocal leukoencephalopathy.  All of the patient's questions and concerns were addressed.
Hydroxychloroquine Pregnancy And Lactation Text: This medication has been shown to cause fetal harm but it isn't assigned a Pregnancy Risk Category. There are small amounts excreted in breast milk.
Clindamycin Counseling: I counseled the patient regarding use of clindamycin as an antibiotic for prophylactic and/or therapeutic purposes. Clindamycin is active against numerous classes of bacteria, including skin bacteria. Side effects may include nausea, diarrhea, gastrointestinal upset, rash, hives, yeast infections, and in rare cases, colitis.
Isotretinoin Counseling: Patient should get monthly blood tests, not donate blood, not drive at night if vision affected, not share medication, and not undergo elective surgery for 6 months after tx completed. Side effects reviewed, pt to contact office should one occur.
Hydroxyzine Pregnancy And Lactation Text: This medication is not safe during pregnancy and should not be taken. It is also excreted in breast milk and breast feeding isn't recommended.
Isotretinoin Pregnancy And Lactation Text: This medication is Pregnancy Category X and is considered extremely dangerous during pregnancy. It is unknown if it is excreted in breast milk.
Doxycycline Counseling:  Patient counseled regarding possible photosensitivity and increased risk for sunburn.  Patient instructed to avoid sunlight, if possible.  When exposed to sunlight, patients should wear protective clothing, sunglasses, and sunscreen.  The patient was instructed to call the office immediately if the following severe adverse effects occur:  hearing changes, easy bruising/bleeding, severe headache, or vision changes.  The patient verbalized understanding of the proper use and possible adverse effects of doxycycline.  All of the patient's questions and concerns were addressed.
Fluconazole Counseling:  Patient counseled regarding adverse effects of fluconazole including but not limited to headache, diarrhea, nausea, upset stomach, liver function test abnormalities, taste disturbance, and stomach pain.  There is a rare possibility of liver failure that can occur when taking fluconazole.  The patient understands that monitoring of LFTs and kidney function test may be required, especially at baseline. The patient verbalized understanding of the proper use and possible adverse effects of fluconazole.  All of the patient's questions and concerns were addressed.
Albendazole Counseling:  I discussed with the patient the risks of albendazole including but not limited to cytopenia, kidney damage, nausea/vomiting and severe allergy.  The patient understands that this medication is being used in an off-label manner.
Azathioprine Counseling:  I discussed with the patient the risks of azathioprine including but not limited to myelosuppression, immunosuppression, hepatotoxicity, lymphoma, and infections.  The patient understands that monitoring is required including baseline LFTs, Creatinine, possible TPMP genotyping and weekly CBCs for the first month and then every 2 weeks thereafter.  The patient verbalized understanding of the proper use and possible adverse effects of azathioprine.  All of the patient's questions and concerns were addressed.
Ilumya Counseling: I discussed with the patient the risks of tildrakizumab including but not limited to immunosuppression, malignancy, posterior leukoencephalopathy syndrome, and serious infections.  The patient understands that monitoring is required including a PPD at baseline and must alert us or the primary physician if symptoms of infection or other concerning signs are noted.
Libtayo Counseling- I discussed with the patient the risks of Libtayo including but not limited to nausea, vomiting, diarrhea, and bone or muscle pain.  The patient verbalized understanding of the proper use and possible adverse effects of Libtayo.  All of the patient's questions and concerns were addressed.
Clofazimine Counseling:  I discussed with the patient the risks of clofazimine including but not limited to skin and eye pigmentation, liver damage, nausea/vomiting, gastrointestinal bleeding and allergy.
Tazorac Counseling:  Patient advised that medication is irritating and drying.  Patient may need to apply sparingly and wash off after an hour before eventually leaving it on overnight.  The patient verbalized understanding of the proper use and possible adverse effects of tazorac.  All of the patient's questions and concerns were addressed.
Rituxan Pregnancy And Lactation Text: This medication is Pregnancy Category C and it isn't know if it is safe during pregnancy. It is unknown if this medication is excreted in breast milk but similar antibodies are known to be excreted.
Hydroquinone Counseling:  Patient advised that medication may result in skin irritation, lightening (hypopigmentation), dryness, and burning.  In the event of skin irritation, the patient was advised to reduce the amount of the drug applied or use it less frequently.  Rarely, spots that are treated with hydroquinone can become darker (pseudoochronosis).  Should this occur, patient instructed to stop medication and call the office. The patient verbalized understanding of the proper use and possible adverse effects of hydroquinone.  All of the patient's questions and concerns were addressed.
Tazorac Pregnancy And Lactation Text: This medication is not safe during pregnancy. It is unknown if this medication is excreted in breast milk.
Doxycycline Pregnancy And Lactation Text: This medication is Pregnancy Category D and not consider safe during pregnancy. It is also excreted in breast milk but is considered safe for shorter treatment courses.
High Dose Vitamin A Counseling: Side effects reviewed, pt to contact office should one occur.
Albendazole Pregnancy And Lactation Text: This medication is Pregnancy Category C and it isn't known if it is safe during pregnancy. It is also excreted in breast milk.
Siliq Counseling:  I discussed with the patient the risks of Siliq including but not limited to new or worsening depression, suicidal thoughts and behavior, immunosuppression, malignancy, posterior leukoencephalopathy syndrome, and serious infections.  The patient understands that monitoring is required including a PPD at baseline and must alert us or the primary physician if symptoms of infection or other concerning signs are noted. There is also a special program designed to monitor depression which is required with Siliq.
Libtayo Pregnancy And Lactation Text: This medication is contraindicated in pregnancy and when breast feeding.
Azathioprine Pregnancy And Lactation Text: This medication is Pregnancy Category D and isn't considered safe during pregnancy. It is unknown if this medication is excreted in breast milk.
Niacinamide Counseling: I recommended taking niacin or niacinamide, also know as vitamin B3, twice daily. Recent evidence suggests that taking vitamin B3 (500 mg twice daily) can reduce the risk of actinic keratoses and non-melanoma skin cancers. Side effects of vitamin B3 include flushing and headache.
Griseofulvin Counseling:  I discussed with the patient the risks of griseofulvin including but not limited to photosensitivity, cytopenia, liver damage, nausea/vomiting and severe allergy.  The patient understands that this medication is best absorbed when taken with a fatty meal (e.g., ice cream or french fries).
Cellcept Counseling:  I discussed with the patient the risks of mycophenolate mofetil including but not limited to infection/immunosuppression, GI upset, hypokalemia, hypercholesterolemia, bone marrow suppression, lymphoproliferative disorders, malignancy, GI ulceration/bleed/perforation, colitis, interstitial lung disease, kidney failure, progressive multifocal leukoencephalopathy, and birth defects.  The patient understands that monitoring is required including a baseline creatinine and regular CBC testing. In addition, patient must alert us immediately if symptoms of infection or other concerning signs are noted.
Ivermectin Counseling:  Patient instructed to take medication on an empty stomach with a full glass of water.  Patient informed of potential adverse effects including but not limited to nausea, diarrhea, dizziness, itching, and swelling of the extremities or lymph nodes.  The patient verbalized understanding of the proper use and possible adverse effects of ivermectin.  All of the patient's questions and concerns were addressed.
Colchicine Counseling:  Patient counseled regarding adverse effects including but not limited to stomach upset (nausea, vomiting, stomach pain, or diarrhea).  Patient instructed to limit alcohol consumption while taking this medication.  Colchicine may reduce blood counts especially with prolonged use.  The patient understands that monitoring of kidney function and blood counts may be required, especially at baseline. The patient verbalized understanding of the proper use and possible adverse effects of colchicine.  All of the patient's questions and concerns were addressed.
High Dose Vitamin A Pregnancy And Lactation Text: High dose vitamin A therapy is contraindicated during pregnancy and breast feeding.
Imiquimod Counseling:  I discussed with the patient the risks of imiquimod including but not limited to erythema, scaling, itching, weeping, crusting, and pain.  Patient understands that the inflammatory response to imiquimod is variable from person to person and was educated regarded proper titration schedule.  If flu-like symptoms develop, patient knows to discontinue the medication and contact us.
Topical Clindamycin Counseling: Patient counseled that this medication may cause skin irritation or allergic reactions.  In the event of skin irritation, the patient was advised to reduce the amount of the drug applied or use it less frequently.   The patient verbalized understanding of the proper use and possible adverse effects of clindamycin.  All of the patient's questions and concerns were addressed.
Erythromycin Counseling:  I discussed with the patient the risks of erythromycin including but not limited to GI upset, allergic reaction, drug rash, diarrhea, increase in liver enzymes, and yeast infections.
Birth Control Pills Counseling: Birth Control Pill Counseling: I discussed with the patient the potential side effects of OCPs including but not limited to increased risk of stroke, heart attack, thrombophlebitis, deep venous thrombosis, hepatic adenomas, breast changes, GI upset, headaches, and depression.  The patient verbalized understanding of the proper use and possible adverse effects of OCPs. All of the patient's questions and concerns were addressed.
Simponi Counseling:  I discussed with the patient the risks of golimumab including but not limited to myelosuppression, immunosuppression, autoimmune hepatitis, demyelinating diseases, lymphoma, and serious infections.  The patient understands that monitoring is required including a PPD at baseline and must alert us or the primary physician if symptoms of infection or other concerning signs are noted.
Erythromycin Pregnancy And Lactation Text: This medication is Pregnancy Category B and is considered safe during pregnancy. It is also excreted in breast milk.
Griseofulvin Pregnancy And Lactation Text: This medication is Pregnancy Category X and is known to cause serious birth defects. It is unknown if this medication is excreted in breast milk but breast feeding should be avoided.
Niacinamide Pregnancy And Lactation Text: These medications are considered safe during pregnancy.
Nsaids Counseling: NSAID Counseling: I discussed with the patient that NSAIDs should be taken with food. Prolonged use of NSAIDs can result in the development of stomach ulcers.  Patient advised to stop taking NSAIDs if abdominal pain occurs.  The patient verbalized understanding of the proper use and possible adverse effects of NSAIDs.  All of the patient's questions and concerns were addressed.
Dapsone Counseling: I discussed with the patient the risks of dapsone including but not limited to hemolytic anemia, agranulocytosis, rashes, methemoglobinemia, kidney failure, peripheral neuropathy, headaches, GI upset, and liver toxicity.  Patients who start dapsone require monitoring including baseline LFTs and weekly CBCs for the first month, then every month thereafter.  The patient verbalized understanding of the proper use and possible adverse effects of dapsone.  All of the patient's questions and concerns were addressed.
Minoxidil Counseling: Minoxidil is a topical medication which can increase blood flow where it is applied. It is uncertain how this medication increases hair growth. Side effects are uncommon and include stinging and allergic reactions.
Topical Sulfur Applications Counseling: Topical Sulfur Counseling: Patient counseled that this medication may cause skin irritation or allergic reactions.  In the event of skin irritation, the patient was advised to reduce the amount of the drug applied or use it less frequently.   The patient verbalized understanding of the proper use and possible adverse effects of topical sulfur application.  All of the patient's questions and concerns were addressed.
Benzoyl Peroxide Counseling: Patient counseled that medicine may cause skin irritation and bleach clothing.  In the event of skin irritation, the patient was advised to reduce the amount of the drug applied or use it less frequently.   The patient verbalized understanding of the proper use and possible adverse effects of benzoyl peroxide.  All of the patient's questions and concerns were addressed.
Birth Control Pills Pregnancy And Lactation Text: This medication should be avoided if pregnant and for the first 30 days post-partum.
Cyclophosphamide Counseling:  I discussed with the patient the risks of cyclophosphamide including but not limited to hair loss, hormonal abnormalities, decreased fertility, abdominal pain, diarrhea, nausea and vomiting, bone marrow suppression and infection. The patient understands that monitoring is required while taking this medication.
Cyclophosphamide Pregnancy And Lactation Text: This medication is Pregnancy Category D and it isn't considered safe during pregnancy. This medication is excreted in breast milk.
Itraconazole Counseling:  I discussed with the patient the risks of itraconazole including but not limited to liver damage, nausea/vomiting, neuropathy, and severe allergy.  The patient understands that this medication is best absorbed when taken with acidic beverages such as non-diet cola or ginger ale.  The patient understands that monitoring is required including baseline LFTs and repeat LFTs at intervals.  The patient understands that they are to contact us or the primary physician if concerning signs are noted.
Detail Level: Simple
Metronidazole Counseling:  I discussed with the patient the risks of metronidazole including but not limited to seizures, nausea/vomiting, a metallic taste in the mouth, nausea/vomiting and severe allergy.
Spironolactone Counseling: Patient advised regarding risks of diarrhea, abdominal pain, hyperkalemia, birth defects (for female patients), liver toxicity and renal toxicity. The patient may need blood work to monitor liver and kidney function and potassium levels while on therapy. The patient verbalized understanding of the proper use and possible adverse effects of spironolactone.  All of the patient's questions and concerns were addressed.
Nsaids Pregnancy And Lactation Text: These medications are considered safe up to 30 weeks gestation. It is excreted in breast milk.
Topical Sulfur Applications Pregnancy And Lactation Text: This medication is Pregnancy Category C and has an unknown safety profile during pregnancy. It is unknown if this topical medication is excreted in breast milk.
Dapsone Pregnancy And Lactation Text: This medication is Pregnancy Category C and is not considered safe during pregnancy or breast feeding.
Skyrizi Counseling: I discussed with the patient the risks of risankizumab-rzaa including but not limited to immunosuppression, and serious infections.  The patient understands that monitoring is required including a PPD at baseline and must alert us or the primary physician if symptoms of infection or other concerning signs are noted.
Benzoyl Peroxide Pregnancy And Lactation Text: This medication is Pregnancy Category C. It is unknown if benzoyl peroxide is excreted in breast milk.
Mirvaso Counseling: Mirvaso is a topical medication which can decrease superficial blood flow where applied. Side effects are uncommon and include stinging, redness and allergic reactions.
Wartpeel Counseling:  I discussed with the patient the risks of Wartpeel including but not limited to erythema, scaling, itching, weeping, crusting, and pain.
Carac Counseling:  I discussed with the patient the risks of Carac including but not limited to erythema, scaling, itching, weeping, crusting, and pain.
Metronidazole Pregnancy And Lactation Text: This medication is Pregnancy Category B and considered safe during pregnancy.  It is also excreted in breast milk.
Cyclosporine Counseling:  I discussed with the patient the risks of cyclosporine including but not limited to hypertension, gingival hyperplasia,myelosuppression, immunosuppression, liver damage, kidney damage, neurotoxicity, lymphoma, and serious infections. The patient understands that monitoring is required including baseline blood pressure, CBC, CMP, lipid panel and uric acid, and then 1-2 times monthly CMP and blood pressure.
Spironolactone Pregnancy And Lactation Text: This medication can cause feminization of the male fetus and should be avoided during pregnancy. The active metabolite is also found in breast milk.
Odomzo Counseling- I discussed with the patient the risks of Odomzo including but not limited to nausea, vomiting, diarrhea, constipation, weight loss, changes in the sense of taste, decreased appetite, muscle spasms, and hair loss.  The patient verbalized understanding of the proper use and possible adverse effects of Odomzo.  All of the patient's questions and concerns were addressed.
Dutasteride Male Counseling: Dustasteride Counseling:  I discussed with the patient the risks of use of dutasteride including but not limited to decreased libido, decreased ejaculate volume, and gynecomastia. Women who can become pregnant should not handle medication.  All of the patient's questions and concerns were addressed.
Minocycline Counseling: Patient advised regarding possible photosensitivity and discoloration of the teeth, skin, lips, tongue and gums.  Patient instructed to avoid sunlight, if possible.  When exposed to sunlight, patients should wear protective clothing, sunglasses, and sunscreen.  The patient was instructed to call the office immediately if the following severe adverse effects occur:  hearing changes, easy bruising/bleeding, severe headache, or vision changes.  The patient verbalized understanding of the proper use and possible adverse effects of minocycline.  All of the patient's questions and concerns were addressed.
Stelara Counseling:  I discussed with the patient the risks of ustekinumab including but not limited to immunosuppression, malignancy, posterior leukoencephalopathy syndrome, and serious infections.  The patient understands that monitoring is required including a PPD at baseline and must alert us or the primary physician if symptoms of infection or other concerning signs are noted.
Dutasteride Pregnancy And Lactation Text: This medication is absolutely contraindicated in women, especially during pregnancy and breast feeding. Feminization of male fetuses is possible if taking while pregnant.
Ketoconazole Counseling:   Patient counseled regarding improving absorption with orange juice.  Adverse effects include but are not limited to breast enlargement, headache, diarrhea, nausea, upset stomach, liver function test abnormalities, taste disturbance, and stomach pain.  There is a rare possibility of liver failure that can occur when taking ketoconazole. The patient understands that monitoring of LFTs may be required, especially at baseline. The patient verbalized understanding of the proper use and possible adverse effects of ketoconazole.  All of the patient's questions and concerns were addressed.
Calcipotriene Counseling:  I discussed with the patient the risks of calcipotriene including but not limited to erythema, scaling, itching, and irritation.
Terbinafine Counseling: Patient counseling regarding adverse effects of terbinafine including but not limited to headache, diarrhea, rash, upset stomach, liver function test abnormalities, itching, taste/smell disturbance, nausea, abdominal pain, and flatulence.  There is a rare possibility of liver failure that can occur when taking terbinafine.  The patient understands that a baseline LFT and kidney function test may be required. The patient verbalized understanding of the proper use and possible adverse effects of terbinafine.  All of the patient's questions and concerns were addressed.
SSKI Counseling:  I discussed with the patient the risks of SSKI including but not limited to thyroid abnormalities, metallic taste, GI upset, fever, headache, acne, arthralgias, paraesthesias, lymphadenopathy, easy bleeding, arrhythmias, and allergic reaction.
Cimzia Pregnancy And Lactation Text: This medication crosses the placenta but can be considered safe in certain situations. Cimzia may be excreted in breast milk.
Ketoconazole Pregnancy And Lactation Text: This medication is Pregnancy Category C and it isn't know if it is safe during pregnancy. It is also excreted in breast milk and breast feeding isn't recommended.
Cimzia Counseling:  I discussed with the patient the risks of Cimzia including but not limited to immunosuppression, allergic reactions and infections.  The patient understands that monitoring is required including a PPD at baseline and must alert us or the primary physician if symptoms of infection or other concerning signs are noted.
Sski Pregnancy And Lactation Text: This medication is Pregnancy Category D and isn't considered safe during pregnancy. It is excreted in breast milk.
Methotrexate Counseling:  Patient counseled regarding adverse effects of methotrexate including but not limited to nausea, vomiting, abnormalities in liver function tests. Patients may develop mouth sores, rash, diarrhea, and abnormalities in blood counts. The patient understands that monitoring is required including LFT's and blood counts.  There is a rare possibility of scarring of the liver and lung problems that can occur when taking methotrexate. Persistent nausea, loss of appetite, pale stools, dark urine, cough, and shortness of breath should be reported immediately. Patient advised to discontinue methotrexate treatment at least three months before attempting to become pregnant.  I discussed the need for folate supplements while taking methotrexate.  These supplements can decrease side effects during methotrexate treatment. The patient verbalized understanding of the proper use and possible adverse effects of methotrexate.  All of the patient's questions and concerns were addressed.
Erivedge Counseling- I discussed with the patient the risks of Erivedge including but not limited to nausea, vomiting, diarrhea, constipation, weight loss, changes in the sense of taste, decreased appetite, muscle spasms, and hair loss.  The patient verbalized understanding of the proper use and possible adverse effects of Erivedge.  All of the patient's questions and concerns were addressed.
Winlevi Counseling:  I discussed with the patient the risks of topical clascoterone including but not limited to erythema, scaling, itching, and stinging. Patient voiced their understanding.
Picato Counseling:  I discussed with the patient the risks of Picato including but not limited to erythema, scaling, itching, weeping, crusting, and pain.
Quinolones Counseling:  I discussed with the patient the risks of fluoroquinolones including but not limited to GI upset, allergic reaction, drug rash, diarrhea, dizziness, photosensitivity, yeast infections, liver function test abnormalities, tendonitis/tendon rupture.
Calcipotriene Pregnancy And Lactation Text: This medication has not been proven safe during pregnancy. It is unknown if this medication is excreted in breast milk.
Oral Minoxidil Counseling- I discussed with the patient the risks of oral minoxidil including but not limited to shortness of breath, swelling of the feet or ankles, dizziness, lightheadedness, unwanted hair growth and allergic reaction.  The patient verbalized understanding of the proper use and possible adverse effects of oral minoxidil.  All of the patient's questions and concerns were addressed.
Cosentyx Counseling:  I discussed with the patient the risks of Cosentyx including but not limited to worsening of Crohn's disease, immunosuppression, allergic reactions and infections.  The patient understands that monitoring is required including a PPD at baseline and must alert us or the primary physician if symptoms of infection or other concerning signs are noted.
Winlevi Pregnancy And Lactation Text: This medication is considered safe during pregnancy and breastfeeding.
Methotrexate Pregnancy And Lactation Text: This medication is Pregnancy Category X and is known to cause fetal harm. This medication is excreted in breast milk.
Thalidomide Counseling: I discussed with the patient the risks of thalidomide including but not limited to birth defects, anxiety, weakness, chest pain, dizziness, cough and severe allergy.
Taltz Counseling: I discussed with the patient the risks of ixekizumab including but not limited to immunosuppression, serious infections, worsening of inflammatory bowel disease and drug reactions.  The patient understands that monitoring is required including a PPD at baseline and must alert us or the primary physician if symptoms of infection or other concerning signs are noted.

## 2022-02-02 ENCOUNTER — HOSPITAL ENCOUNTER (OUTPATIENT)
Dept: LAB | Facility: MEDICAL CENTER | Age: 82
End: 2022-02-02
Attending: FAMILY MEDICINE
Payer: MEDICARE

## 2022-02-02 LAB
ALBUMIN SERPL BCP-MCNC: 4.3 G/DL (ref 3.2–4.9)
ALBUMIN/GLOB SERPL: 1.8 G/DL
ALP SERPL-CCNC: 63 U/L (ref 30–99)
ALT SERPL-CCNC: 19 U/L (ref 2–50)
ANION GAP SERPL CALC-SCNC: 10 MMOL/L (ref 7–16)
AST SERPL-CCNC: 29 U/L (ref 12–45)
BASOPHILS # BLD AUTO: 0.9 % (ref 0–1.8)
BASOPHILS # BLD: 0.03 K/UL (ref 0–0.12)
BILIRUB SERPL-MCNC: 0.3 MG/DL (ref 0.1–1.5)
BUN SERPL-MCNC: 18 MG/DL (ref 8–22)
CALCIUM SERPL-MCNC: 9.3 MG/DL (ref 8.5–10.5)
CHLORIDE SERPL-SCNC: 92 MMOL/L (ref 96–112)
CHOLEST SERPL-MCNC: 242 MG/DL (ref 100–199)
CO2 SERPL-SCNC: 29 MMOL/L (ref 20–33)
CREAT SERPL-MCNC: 0.74 MG/DL (ref 0.5–1.4)
EOSINOPHIL # BLD AUTO: 0.04 K/UL (ref 0–0.51)
EOSINOPHIL NFR BLD: 1.2 % (ref 0–6.9)
ERYTHROCYTE [DISTWIDTH] IN BLOOD BY AUTOMATED COUNT: 45.2 FL (ref 35.9–50)
EST. AVERAGE GLUCOSE BLD GHB EST-MCNC: 111 MG/DL
FASTING STATUS PATIENT QL REPORTED: NORMAL
GLOBULIN SER CALC-MCNC: 2.4 G/DL (ref 1.9–3.5)
GLUCOSE SERPL-MCNC: 87 MG/DL (ref 65–99)
HBA1C MFR BLD: 5.5 % (ref 4–5.6)
HCT VFR BLD AUTO: 38 % (ref 37–47)
HDLC SERPL-MCNC: 134 MG/DL
HGB BLD-MCNC: 12.7 G/DL (ref 12–16)
IMM GRANULOCYTES # BLD AUTO: 0.01 K/UL (ref 0–0.11)
IMM GRANULOCYTES NFR BLD AUTO: 0.3 % (ref 0–0.9)
LDLC SERPL CALC-MCNC: 98 MG/DL
LYMPHOCYTES # BLD AUTO: 1.46 K/UL (ref 1–4.8)
LYMPHOCYTES NFR BLD: 43.7 % (ref 22–41)
MCH RBC QN AUTO: 33.3 PG (ref 27–33)
MCHC RBC AUTO-ENTMCNC: 33.4 G/DL (ref 33.6–35)
MCV RBC AUTO: 99.7 FL (ref 81.4–97.8)
MONOCYTES # BLD AUTO: 0.53 K/UL (ref 0–0.85)
MONOCYTES NFR BLD AUTO: 15.9 % (ref 0–13.4)
NEUTROPHILS # BLD AUTO: 1.27 K/UL (ref 2–7.15)
NEUTROPHILS NFR BLD: 38 % (ref 44–72)
NRBC # BLD AUTO: 0 K/UL
NRBC BLD-RTO: 0 /100 WBC
PLATELET # BLD AUTO: 234 K/UL (ref 164–446)
PMV BLD AUTO: 10.9 FL (ref 9–12.9)
POTASSIUM SERPL-SCNC: 4.4 MMOL/L (ref 3.6–5.5)
PROT SERPL-MCNC: 6.7 G/DL (ref 6–8.2)
RBC # BLD AUTO: 3.81 M/UL (ref 4.2–5.4)
SODIUM SERPL-SCNC: 131 MMOL/L (ref 135–145)
T3FREE SERPL-MCNC: 2.11 PG/ML (ref 2–4.4)
T4 FREE SERPL-MCNC: 1.13 NG/DL (ref 0.93–1.7)
TRIGL SERPL-MCNC: 51 MG/DL (ref 0–149)
TSH SERPL DL<=0.005 MIU/L-ACNC: 3.19 UIU/ML (ref 0.38–5.33)
WBC # BLD AUTO: 3.3 K/UL (ref 4.8–10.8)

## 2022-02-02 PROCEDURE — 84443 ASSAY THYROID STIM HORMONE: CPT

## 2022-02-02 PROCEDURE — 84439 ASSAY OF FREE THYROXINE: CPT

## 2022-02-02 PROCEDURE — 83036 HEMOGLOBIN GLYCOSYLATED A1C: CPT | Mod: GA

## 2022-02-02 PROCEDURE — 80053 COMPREHEN METABOLIC PANEL: CPT

## 2022-02-02 PROCEDURE — 84481 FREE ASSAY (FT-3): CPT

## 2022-02-02 PROCEDURE — 80061 LIPID PANEL: CPT

## 2022-02-02 PROCEDURE — 36415 COLL VENOUS BLD VENIPUNCTURE: CPT

## 2022-02-02 PROCEDURE — 85025 COMPLETE CBC W/AUTO DIFF WBC: CPT

## 2022-03-08 ENCOUNTER — APPOINTMENT (RX ONLY)
Dept: URBAN - METROPOLITAN AREA CLINIC 20 | Facility: CLINIC | Age: 82
Setting detail: DERMATOLOGY
End: 2022-03-08

## 2022-03-08 DIAGNOSIS — L21.8 OTHER SEBORRHEIC DERMATITIS: ICD-10-CM | Status: WELL CONTROLLED

## 2022-03-08 DIAGNOSIS — L21.8 OTHER SEBORRHEIC DERMATITIS: ICD-10-CM

## 2022-03-08 PROCEDURE — 99213 OFFICE O/P EST LOW 20 MIN: CPT

## 2022-03-08 PROCEDURE — ? PRESCRIPTION

## 2022-03-08 PROCEDURE — ? COUNSELING

## 2022-03-08 PROCEDURE — ? ADDITIONAL NOTES

## 2022-03-08 RX ORDER — FLUOCINOLONE ACETONIDE 0.11 MG/ML
OIL TOPICAL
Qty: 1 | Refills: 5 | Status: ERX | COMMUNITY
Start: 2022-03-08

## 2022-03-08 RX ORDER — FLUOCINOLONE ACETONIDE 0.11 MG/ML
OIL TOPICAL
Qty: 1 | Refills: 5 | Status: ERX

## 2022-03-08 RX ADMIN — FLUOCINOLONE ACETONIDE: 0.11 OIL TOPICAL at 00:00

## 2022-03-08 ASSESSMENT — LOCATION DETAILED DESCRIPTION DERM
LOCATION DETAILED: MEDIAL FRONTAL SCALP
LOCATION DETAILED: MEDIAL FRONTAL SCALP

## 2022-03-08 ASSESSMENT — LOCATION SIMPLE DESCRIPTION DERM
LOCATION SIMPLE: FRONTAL SCALP
LOCATION SIMPLE: FRONTAL SCALP

## 2022-03-08 ASSESSMENT — LOCATION ZONE DERM
LOCATION ZONE: SCALP
LOCATION ZONE: SCALP

## 2022-03-08 NOTE — PROCEDURE: MIPS QUALITY
Quality 402: Tobacco Use And Help With Quitting Among Adolescents: Patient screened for tobacco and never smoked
Detail Level: Detailed
Quality 111:Pneumonia Vaccination Status For Older Adults: Pneumococcal Vaccination Previously Received
Quality 130: Documentation Of Current Medications In The Medical Record: Current Medications Documented
Quality 431: Preventive Care And Screening: Unhealthy Alcohol Use - Screening: Patient screened for unhealthy alcohol use using a single question and scores less than 2 times per year
Quality 226: Preventive Care And Screening: Tobacco Use: Screening And Cessation Intervention: Patient screened for tobacco use and is an ex/non-smoker

## 2022-03-08 NOTE — PROCEDURE: ADDITIONAL NOTES
Render Risk Assessment In Note?: no
Additional Notes: Pt had hair spray in noticed white flakes to frontal area.
Detail Level: Simple

## 2022-03-27 ENCOUNTER — HOSPITAL ENCOUNTER (OUTPATIENT)
Dept: RADIOLOGY | Facility: MEDICAL CENTER | Age: 82
End: 2022-03-27
Attending: INTERNAL MEDICINE
Payer: MEDICARE

## 2022-03-27 DIAGNOSIS — K59.00 CONSTIPATION, UNSPECIFIED CONSTIPATION TYPE: ICD-10-CM

## 2022-03-27 PROCEDURE — 74018 RADEX ABDOMEN 1 VIEW: CPT

## 2022-05-24 ENCOUNTER — APPOINTMENT (RX ONLY)
Dept: URBAN - METROPOLITAN AREA CLINIC 20 | Facility: CLINIC | Age: 82
Setting detail: DERMATOLOGY
End: 2022-05-24

## 2022-05-24 DIAGNOSIS — L57.8 OTHER SKIN CHANGES DUE TO CHRONIC EXPOSURE TO NONIONIZING RADIATION: ICD-10-CM

## 2022-05-24 DIAGNOSIS — D18.0 HEMANGIOMA: ICD-10-CM

## 2022-05-24 DIAGNOSIS — L81.4 OTHER MELANIN HYPERPIGMENTATION: ICD-10-CM

## 2022-05-24 DIAGNOSIS — L21.8 OTHER SEBORRHEIC DERMATITIS: ICD-10-CM

## 2022-05-24 DIAGNOSIS — Z85.828 PERSONAL HISTORY OF OTHER MALIGNANT NEOPLASM OF SKIN: ICD-10-CM

## 2022-05-24 DIAGNOSIS — L57.0 ACTINIC KERATOSIS: ICD-10-CM

## 2022-05-24 DIAGNOSIS — D22 MELANOCYTIC NEVI: ICD-10-CM

## 2022-05-24 DIAGNOSIS — L82.1 OTHER SEBORRHEIC KERATOSIS: ICD-10-CM

## 2022-05-24 PROBLEM — D22.9 MELANOCYTIC NEVI, UNSPECIFIED: Status: ACTIVE | Noted: 2022-05-24

## 2022-05-24 PROBLEM — D48.5 NEOPLASM OF UNCERTAIN BEHAVIOR OF SKIN: Status: ACTIVE | Noted: 2022-05-24

## 2022-05-24 PROBLEM — D18.01 HEMANGIOMA OF SKIN AND SUBCUTANEOUS TISSUE: Status: ACTIVE | Noted: 2022-05-24

## 2022-05-24 PROBLEM — D22.5 MELANOCYTIC NEVI OF TRUNK: Status: ACTIVE | Noted: 2022-05-24

## 2022-05-24 PROCEDURE — 99213 OFFICE O/P EST LOW 20 MIN: CPT | Mod: 25

## 2022-05-24 PROCEDURE — 11102 TANGNTL BX SKIN SINGLE LES: CPT

## 2022-05-24 PROCEDURE — 17000 DESTRUCT PREMALG LESION: CPT | Mod: 59

## 2022-05-24 PROCEDURE — ? BIOPSY BY SHAVE METHOD

## 2022-05-24 PROCEDURE — ? LIQUID NITROGEN

## 2022-05-24 PROCEDURE — ? COUNSELING

## 2022-05-24 ASSESSMENT — LOCATION DETAILED DESCRIPTION DERM
LOCATION DETAILED: LEFT DISTAL DORSAL FOREARM
LOCATION DETAILED: LEFT MEDIAL SUPERIOR CHEST
LOCATION DETAILED: INFERIOR THORACIC SPINE
LOCATION DETAILED: RIGHT MID-UPPER BACK
LOCATION DETAILED: LEFT ANTERIOR DISTAL THIGH
LOCATION DETAILED: RIGHT INFERIOR UPPER BACK
LOCATION DETAILED: RIGHT FOREHEAD
LOCATION DETAILED: RIGHT CENTRAL MALAR CHEEK
LOCATION DETAILED: RIGHT ANTERIOR PROXIMAL UPPER ARM
LOCATION DETAILED: RIGHT SUPERIOR MEDIAL UPPER BACK
LOCATION DETAILED: MEDIAL FRONTAL SCALP
LOCATION DETAILED: RIGHT DISTAL DORSAL FOREARM
LOCATION DETAILED: LEFT INFERIOR CENTRAL MALAR CHEEK
LOCATION DETAILED: LEFT LATERAL SUPERIOR CHEST
LOCATION DETAILED: RIGHT CLAVICULAR SKIN
LOCATION DETAILED: LEFT ANTERIOR PROXIMAL UPPER ARM
LOCATION DETAILED: RIGHT ANTERIOR SHOULDER
LOCATION DETAILED: RIGHT ANTERIOR DISTAL THIGH
LOCATION DETAILED: RIGHT ANTERIOR PROXIMAL THIGH

## 2022-05-24 ASSESSMENT — LOCATION SIMPLE DESCRIPTION DERM
LOCATION SIMPLE: LEFT UPPER ARM
LOCATION SIMPLE: RIGHT FOREHEAD
LOCATION SIMPLE: RIGHT CHEEK
LOCATION SIMPLE: RIGHT FOREARM
LOCATION SIMPLE: RIGHT THIGH
LOCATION SIMPLE: LEFT CHEEK
LOCATION SIMPLE: CHEST
LOCATION SIMPLE: RIGHT UPPER BACK
LOCATION SIMPLE: RIGHT UPPER ARM
LOCATION SIMPLE: RIGHT CLAVICULAR SKIN
LOCATION SIMPLE: FRONTAL SCALP
LOCATION SIMPLE: UPPER BACK
LOCATION SIMPLE: RIGHT SHOULDER
LOCATION SIMPLE: LEFT FOREARM
LOCATION SIMPLE: LEFT THIGH

## 2022-05-24 ASSESSMENT — LOCATION ZONE DERM
LOCATION ZONE: FACE
LOCATION ZONE: ARM
LOCATION ZONE: TRUNK
LOCATION ZONE: LEG
LOCATION ZONE: SCALP

## 2022-08-13 ENCOUNTER — HOSPITAL ENCOUNTER (OUTPATIENT)
Dept: LAB | Facility: MEDICAL CENTER | Age: 82
End: 2022-08-13
Attending: FAMILY MEDICINE
Payer: MEDICARE

## 2022-08-13 LAB
25(OH)D3 SERPL-MCNC: 80 NG/ML (ref 30–100)
ALBUMIN SERPL BCP-MCNC: 4.5 G/DL (ref 3.2–4.9)
ALBUMIN/GLOB SERPL: 1.9 G/DL
ALP SERPL-CCNC: 50 U/L (ref 30–99)
ALT SERPL-CCNC: 16 U/L (ref 2–50)
ANION GAP SERPL CALC-SCNC: 8 MMOL/L (ref 7–16)
AST SERPL-CCNC: 24 U/L (ref 12–45)
BASOPHILS # BLD AUTO: 0.7 % (ref 0–1.8)
BASOPHILS # BLD: 0.02 K/UL (ref 0–0.12)
BILIRUB SERPL-MCNC: 0.4 MG/DL (ref 0.1–1.5)
BUN SERPL-MCNC: 13 MG/DL (ref 8–22)
CALCIUM SERPL-MCNC: 9.4 MG/DL (ref 8.5–10.5)
CHLORIDE SERPL-SCNC: 92 MMOL/L (ref 96–112)
CHOLEST SERPL-MCNC: 262 MG/DL (ref 100–199)
CO2 SERPL-SCNC: 28 MMOL/L (ref 20–33)
CREAT SERPL-MCNC: 0.67 MG/DL (ref 0.5–1.4)
EOSINOPHIL # BLD AUTO: 0.05 K/UL (ref 0–0.51)
EOSINOPHIL NFR BLD: 1.7 % (ref 0–6.9)
ERYTHROCYTE [DISTWIDTH] IN BLOOD BY AUTOMATED COUNT: 46.8 FL (ref 35.9–50)
EST. AVERAGE GLUCOSE BLD GHB EST-MCNC: 100 MG/DL
FASTING STATUS PATIENT QL REPORTED: NORMAL
FERRITIN SERPL-MCNC: 67.7 NG/ML (ref 10–291)
GFR SERPLBLD CREATININE-BSD FMLA CKD-EPI: 87 ML/MIN/1.73 M 2
GLOBULIN SER CALC-MCNC: 2.4 G/DL (ref 1.9–3.5)
GLUCOSE SERPL-MCNC: 92 MG/DL (ref 65–99)
HBA1C MFR BLD: 5.1 % (ref 4–5.6)
HCT VFR BLD AUTO: 38.2 % (ref 37–47)
HDLC SERPL-MCNC: 145 MG/DL
HGB BLD-MCNC: 13 G/DL (ref 12–16)
IMM GRANULOCYTES # BLD AUTO: 0.01 K/UL (ref 0–0.11)
IMM GRANULOCYTES NFR BLD AUTO: 0.3 % (ref 0–0.9)
LDLC SERPL CALC-MCNC: 107 MG/DL
LYMPHOCYTES # BLD AUTO: 1.29 K/UL (ref 1–4.8)
LYMPHOCYTES NFR BLD: 44.9 % (ref 22–41)
MCH RBC QN AUTO: 33.9 PG (ref 27–33)
MCHC RBC AUTO-ENTMCNC: 34 G/DL (ref 33.6–35)
MCV RBC AUTO: 99.7 FL (ref 81.4–97.8)
MONOCYTES # BLD AUTO: 0.45 K/UL (ref 0–0.85)
MONOCYTES NFR BLD AUTO: 15.7 % (ref 0–13.4)
NEUTROPHILS # BLD AUTO: 1.05 K/UL (ref 2–7.15)
NEUTROPHILS NFR BLD: 36.7 % (ref 44–72)
NRBC # BLD AUTO: 0 K/UL
NRBC BLD-RTO: 0 /100 WBC
NT-PROBNP SERPL IA-MCNC: 434 PG/ML (ref 0–125)
PLATELET # BLD AUTO: 263 K/UL (ref 164–446)
PMV BLD AUTO: 9.5 FL (ref 9–12.9)
POTASSIUM SERPL-SCNC: 4.7 MMOL/L (ref 3.6–5.5)
PROT SERPL-MCNC: 6.9 G/DL (ref 6–8.2)
RBC # BLD AUTO: 3.83 M/UL (ref 4.2–5.4)
SODIUM SERPL-SCNC: 128 MMOL/L (ref 135–145)
TRIGL SERPL-MCNC: 49 MG/DL (ref 0–149)
TSH SERPL DL<=0.005 MIU/L-ACNC: 1.12 UIU/ML (ref 0.38–5.33)
VIT B12 SERPL-MCNC: 1563 PG/ML (ref 211–911)
WBC # BLD AUTO: 2.9 K/UL (ref 4.8–10.8)

## 2022-08-13 PROCEDURE — 85025 COMPLETE CBC W/AUTO DIFF WBC: CPT

## 2022-08-13 PROCEDURE — 80053 COMPREHEN METABOLIC PANEL: CPT

## 2022-08-13 PROCEDURE — 80061 LIPID PANEL: CPT

## 2022-08-13 PROCEDURE — 82728 ASSAY OF FERRITIN: CPT | Mod: GA

## 2022-08-13 PROCEDURE — 83036 HEMOGLOBIN GLYCOSYLATED A1C: CPT | Mod: GA

## 2022-08-13 PROCEDURE — 82607 VITAMIN B-12: CPT

## 2022-08-13 PROCEDURE — 83880 ASSAY OF NATRIURETIC PEPTIDE: CPT | Mod: GA

## 2022-08-13 PROCEDURE — 82306 VITAMIN D 25 HYDROXY: CPT | Mod: GA

## 2022-08-13 PROCEDURE — 36415 COLL VENOUS BLD VENIPUNCTURE: CPT

## 2022-08-13 PROCEDURE — 84443 ASSAY THYROID STIM HORMONE: CPT

## 2022-08-26 ENCOUNTER — HOSPITAL ENCOUNTER (OUTPATIENT)
Dept: LAB | Facility: MEDICAL CENTER | Age: 82
End: 2022-08-26
Attending: FAMILY MEDICINE
Payer: MEDICARE

## 2022-08-26 LAB
BASOPHILS # BLD AUTO: 0 % (ref 0–1.8)
BASOPHILS # BLD: 0 K/UL (ref 0–0.12)
EOSINOPHIL # BLD AUTO: 0.03 K/UL (ref 0–0.51)
EOSINOPHIL NFR BLD: 0.9 % (ref 0–6.9)
ERYTHROCYTE [DISTWIDTH] IN BLOOD BY AUTOMATED COUNT: 46.5 FL (ref 35.9–50)
HCT VFR BLD AUTO: 37.6 % (ref 37–47)
HGB BLD-MCNC: 12.6 G/DL (ref 12–16)
LYMPHOCYTES # BLD AUTO: 1.43 K/UL (ref 1–4.8)
LYMPHOCYTES NFR BLD: 44.7 % (ref 22–41)
MANUAL DIFF BLD: ABNORMAL
MCH RBC QN AUTO: 33.4 PG (ref 27–33)
MCHC RBC AUTO-ENTMCNC: 33.5 G/DL (ref 33.6–35)
MCV RBC AUTO: 99.7 FL (ref 81.4–97.8)
MONOCYTES # BLD AUTO: 0.51 K/UL (ref 0–0.85)
MONOCYTES NFR BLD AUTO: 15.8 % (ref 0–13.4)
NEUTROPHILS # BLD AUTO: 1.24 K/UL (ref 2–7.15)
NEUTROPHILS NFR BLD: 38.6 % (ref 44–72)
PLATELET # BLD AUTO: 220 K/UL (ref 164–446)
PMV BLD AUTO: 10.3 FL (ref 9–12.9)
RBC # BLD AUTO: 3.77 M/UL (ref 4.2–5.4)
WBC # BLD AUTO: 3.2 K/UL (ref 4.8–10.8)

## 2022-08-26 PROCEDURE — 85007 BL SMEAR W/DIFF WBC COUNT: CPT

## 2022-08-26 PROCEDURE — 85027 COMPLETE CBC AUTOMATED: CPT

## 2022-08-26 PROCEDURE — 36415 COLL VENOUS BLD VENIPUNCTURE: CPT

## 2022-09-20 ENCOUNTER — APPOINTMENT (RX ONLY)
Dept: URBAN - METROPOLITAN AREA CLINIC 20 | Facility: CLINIC | Age: 82
Setting detail: DERMATOLOGY
End: 2022-09-20

## 2022-09-20 DIAGNOSIS — L57.0 ACTINIC KERATOSIS: ICD-10-CM | Status: RESOLVED

## 2022-09-20 PROCEDURE — ? COUNSELING

## 2022-09-20 PROCEDURE — 99213 OFFICE O/P EST LOW 20 MIN: CPT

## 2022-09-20 ASSESSMENT — LOCATION ZONE DERM: LOCATION ZONE: FACE

## 2022-09-20 ASSESSMENT — LOCATION SIMPLE DESCRIPTION DERM: LOCATION SIMPLE: RIGHT FOREHEAD

## 2022-09-20 ASSESSMENT — LOCATION DETAILED DESCRIPTION DERM: LOCATION DETAILED: RIGHT FOREHEAD

## 2022-10-12 ENCOUNTER — HOSPITAL ENCOUNTER (OUTPATIENT)
Facility: MEDICAL CENTER | Age: 82
End: 2022-10-12
Attending: INTERNAL MEDICINE
Payer: MEDICARE

## 2022-10-12 PROCEDURE — 83993 ASSAY FOR CALPROTECTIN FECAL: CPT

## 2022-10-12 PROCEDURE — 83630 LACTOFERRIN FECAL (QUAL): CPT

## 2022-10-12 PROCEDURE — 82653 EL-1 FECAL QUANTITATIVE: CPT

## 2022-10-16 LAB — LACTOFERRIN STL QL IA: POSITIVE

## 2022-10-18 LAB
CALPROTECTIN STL-MCNT: 82 UG/G
ELASTASE PANC STL-MCNT: 271 UG/G

## 2022-11-09 ENCOUNTER — PATIENT MESSAGE (OUTPATIENT)
Dept: HEALTH INFORMATION MANAGEMENT | Facility: OTHER | Age: 82
End: 2022-11-09

## 2022-11-14 ENCOUNTER — HOSPITAL ENCOUNTER (OUTPATIENT)
Dept: RADIOLOGY | Facility: MEDICAL CENTER | Age: 82
End: 2022-11-14
Attending: FAMILY MEDICINE
Payer: MEDICARE

## 2022-11-14 DIAGNOSIS — M54.50 LOW BACK PAIN, UNSPECIFIED BACK PAIN LATERALITY, UNSPECIFIED CHRONICITY, UNSPECIFIED WHETHER SCIATICA PRESENT: ICD-10-CM

## 2022-11-14 PROCEDURE — 72070 X-RAY EXAM THORAC SPINE 2VWS: CPT

## 2022-11-14 PROCEDURE — 72100 X-RAY EXAM L-S SPINE 2/3 VWS: CPT

## 2022-12-20 ENCOUNTER — HOSPITAL ENCOUNTER (OUTPATIENT)
Facility: MEDICAL CENTER | Age: 82
End: 2022-12-20
Attending: PHYSICIAN ASSISTANT
Payer: MEDICARE

## 2022-12-20 PROCEDURE — 87329 GIARDIA AG IA: CPT

## 2022-12-20 PROCEDURE — 87045 FECES CULTURE AEROBIC BACT: CPT

## 2022-12-20 PROCEDURE — 87328 CRYPTOSPORIDIUM AG IA: CPT

## 2022-12-20 PROCEDURE — 87899 AGENT NOS ASSAY W/OPTIC: CPT | Mod: 91

## 2022-12-21 LAB
G LAMBLIA+C PARVUM AG STL QL RAPID: NORMAL
SIGNIFICANT IND 70042: NORMAL
SITE SITE: NORMAL
SOURCE SOURCE: NORMAL

## 2022-12-22 ENCOUNTER — HOSPITAL ENCOUNTER (OUTPATIENT)
Facility: MEDICAL CENTER | Age: 82
End: 2022-12-22
Attending: PHYSICIAN ASSISTANT
Payer: MEDICARE

## 2022-12-22 LAB
E COLI SXT1+2 STL IA: NORMAL
SIGNIFICANT IND 70042: NORMAL
SITE SITE: NORMAL
SOURCE SOURCE: NORMAL

## 2022-12-23 LAB
BACTERIA STL CULT: NORMAL
C JEJUNI+C COLI AG STL QL: NORMAL
E COLI SXT1+2 STL IA: NORMAL
SIGNIFICANT IND 70042: NORMAL
SITE SITE: NORMAL
SOURCE SOURCE: NORMAL

## 2023-01-12 ENCOUNTER — HOSPITAL ENCOUNTER (OUTPATIENT)
Facility: MEDICAL CENTER | Age: 83
End: 2023-01-12
Attending: FAMILY MEDICINE
Payer: MEDICARE

## 2023-01-12 LAB — H PYLORI AG STL QL IA: NOT DETECTED

## 2023-01-12 PROCEDURE — 87338 HPYLORI STOOL AG IA: CPT

## 2023-01-31 ENCOUNTER — APPOINTMENT (OUTPATIENT)
Dept: RADIOLOGY | Facility: MEDICAL CENTER | Age: 83
DRG: 522 | End: 2023-01-31
Attending: EMERGENCY MEDICINE
Payer: MEDICARE

## 2023-01-31 ENCOUNTER — APPOINTMENT (OUTPATIENT)
Dept: RADIOLOGY | Facility: MEDICAL CENTER | Age: 83
DRG: 522 | End: 2023-01-31
Attending: FAMILY MEDICINE
Payer: MEDICARE

## 2023-01-31 ENCOUNTER — APPOINTMENT (OUTPATIENT)
Dept: RADIOLOGY | Facility: MEDICAL CENTER | Age: 83
DRG: 522 | End: 2023-01-31
Attending: ORTHOPAEDIC SURGERY
Payer: MEDICARE

## 2023-01-31 ENCOUNTER — ANESTHESIA (OUTPATIENT)
Dept: SURGERY | Facility: MEDICAL CENTER | Age: 83
DRG: 522 | End: 2023-01-31
Payer: MEDICARE

## 2023-01-31 ENCOUNTER — HOSPITAL ENCOUNTER (INPATIENT)
Facility: MEDICAL CENTER | Age: 83
LOS: 1 days | DRG: 522 | End: 2023-02-01
Attending: EMERGENCY MEDICINE | Admitting: FAMILY MEDICINE
Payer: MEDICARE

## 2023-01-31 ENCOUNTER — ANESTHESIA EVENT (OUTPATIENT)
Dept: SURGERY | Facility: MEDICAL CENTER | Age: 83
DRG: 522 | End: 2023-01-31
Payer: MEDICARE

## 2023-01-31 DIAGNOSIS — S72.001A CLOSED FRACTURE OF RIGHT HIP, INITIAL ENCOUNTER (HCC): ICD-10-CM
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
## 2023-01-31 PROBLEM — E03.9 HYPOTHYROIDISM: Chronic | Status: ACTIVE | Noted: 2023-01-31

## 2023-01-31 PROBLEM — E87.1 HYPONATREMIA: Status: ACTIVE | Noted: 2023-01-31

## 2023-01-31 PROBLEM — K21.9 GASTROESOPHAGEAL REFLUX DISEASE: Chronic | Status: ACTIVE | Noted: 2023-01-31

## 2023-01-31 PROBLEM — I10 HTN (HYPERTENSION): Chronic | Status: ACTIVE | Noted: 2023-01-31
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
## 2023-01-31 LAB
ALBUMIN SERPL BCP-MCNC: 4.1 G/DL (ref 3.2–4.9)
ALBUMIN/GLOB SERPL: 1.5 G/DL
ALP SERPL-CCNC: 53 U/L (ref 30–99)
ALT SERPL-CCNC: 20 U/L (ref 2–50)
ANION GAP SERPL CALC-SCNC: 11 MMOL/L (ref 7–16)
APTT PPP: 25.6 SEC (ref 24.7–36)
AST SERPL-CCNC: 34 U/L (ref 12–45)
BASOPHILS # BLD AUTO: 0.4 % (ref 0–1.8)
BASOPHILS # BLD: 0.03 K/UL (ref 0–0.12)
BILIRUB SERPL-MCNC: 0.3 MG/DL (ref 0.1–1.5)
BUN SERPL-MCNC: 17 MG/DL (ref 8–22)
CALCIUM ALBUM COR SERPL-MCNC: 9 MG/DL (ref 8.5–10.5)
CALCIUM SERPL-MCNC: 9.1 MG/DL (ref 8.4–10.2)
CHLORIDE SERPL-SCNC: 95 MMOL/L (ref 96–112)
CO2 SERPL-SCNC: 26 MMOL/L (ref 20–33)
CREAT SERPL-MCNC: 0.69 MG/DL (ref 0.5–1.4)
EKG IMPRESSION: NORMAL
EOSINOPHIL # BLD AUTO: 0.04 K/UL (ref 0–0.51)
EOSINOPHIL NFR BLD: 0.5 % (ref 0–6.9)
ERYTHROCYTE [DISTWIDTH] IN BLOOD BY AUTOMATED COUNT: 46.1 FL (ref 35.9–50)
GFR SERPLBLD CREATININE-BSD FMLA CKD-EPI: 86 ML/MIN/1.73 M 2
GLOBULIN SER CALC-MCNC: 2.7 G/DL (ref 1.9–3.5)
GLUCOSE SERPL-MCNC: 95 MG/DL (ref 65–99)
HCT VFR BLD AUTO: 35.7 % (ref 37–47)
HGB BLD-MCNC: 12.2 G/DL (ref 12–16)
IMM GRANULOCYTES # BLD AUTO: 0.03 K/UL (ref 0–0.11)
IMM GRANULOCYTES NFR BLD AUTO: 0.4 % (ref 0–0.9)
INR PPP: 0.96 (ref 0.87–1.13)
LYMPHOCYTES # BLD AUTO: 1.06 K/UL (ref 1–4.8)
LYMPHOCYTES NFR BLD: 14.3 % (ref 22–41)
MCH RBC QN AUTO: 34.2 PG (ref 27–33)
MCHC RBC AUTO-ENTMCNC: 34.2 G/DL (ref 33.6–35)
MCV RBC AUTO: 100 FL (ref 81.4–97.8)
MONOCYTES # BLD AUTO: 0.5 K/UL (ref 0–0.85)
MONOCYTES NFR BLD AUTO: 6.8 % (ref 0–13.4)
NEUTROPHILS # BLD AUTO: 5.74 K/UL (ref 2–7.15)
NEUTROPHILS NFR BLD: 77.6 % (ref 44–72)
NRBC # BLD AUTO: 0 K/UL
NRBC BLD-RTO: 0 /100 WBC
PATHOLOGY CONSULT NOTE: NORMAL
PLATELET # BLD AUTO: 151 K/UL (ref 164–446)
PMV BLD AUTO: 9.7 FL (ref 9–12.9)
POTASSIUM SERPL-SCNC: 4.4 MMOL/L (ref 3.6–5.5)
PROT SERPL-MCNC: 6.8 G/DL (ref 6–8.2)
PROTHROMBIN TIME: 12.7 SEC (ref 12–14.6)
RBC # BLD AUTO: 3.57 M/UL (ref 4.2–5.4)
SODIUM SERPL-SCNC: 132 MMOL/L (ref 135–145)
WBC # BLD AUTO: 7.4 K/UL (ref 4.8–10.8)

## 2023-01-31 PROCEDURE — 0SRR0J9 REPLACEMENT OF RIGHT HIP JOINT, FEMORAL SURFACE WITH SYNTHETIC SUBSTITUTE, CEMENTED, OPEN APPROACH: ICD-10-PCS | Performed by: ORTHOPAEDIC SURGERY

## 2023-01-31 PROCEDURE — 700105 HCHG RX REV CODE 258: Performed by: ORTHOPAEDIC SURGERY

## 2023-01-31 PROCEDURE — 96374 THER/PROPH/DIAG INJ IV PUSH: CPT

## 2023-01-31 PROCEDURE — 700111 HCHG RX REV CODE 636 W/ 250 OVERRIDE (IP): Performed by: EMERGENCY MEDICINE

## 2023-01-31 PROCEDURE — 96376 TX/PRO/DX INJ SAME DRUG ADON: CPT

## 2023-01-31 PROCEDURE — 160042 HCHG SURGERY MINUTES - EA ADDL 1 MIN LEVEL 5: Performed by: ORTHOPAEDIC SURGERY

## 2023-01-31 PROCEDURE — 73552 X-RAY EXAM OF FEMUR 2/>: CPT | Mod: RT

## 2023-01-31 PROCEDURE — 85730 THROMBOPLASTIN TIME PARTIAL: CPT

## 2023-01-31 PROCEDURE — 160035 HCHG PACU - 1ST 60 MINS PHASE I: Performed by: ORTHOPAEDIC SURGERY

## 2023-01-31 PROCEDURE — 160009 HCHG ANES TIME/MIN: Performed by: ORTHOPAEDIC SURGERY

## 2023-01-31 PROCEDURE — 36415 COLL VENOUS BLD VENIPUNCTURE: CPT

## 2023-01-31 PROCEDURE — 700111 HCHG RX REV CODE 636 W/ 250 OVERRIDE (IP): Performed by: ANESTHESIOLOGY

## 2023-01-31 PROCEDURE — A9270 NON-COVERED ITEM OR SERVICE: HCPCS | Performed by: FAMILY MEDICINE

## 2023-01-31 PROCEDURE — 700101 HCHG RX REV CODE 250: Performed by: ANESTHESIOLOGY

## 2023-01-31 PROCEDURE — 99285 EMERGENCY DEPT VISIT HI MDM: CPT

## 2023-01-31 PROCEDURE — 88305 TISSUE EXAM BY PATHOLOGIST: CPT

## 2023-01-31 PROCEDURE — 770006 HCHG ROOM/CARE - MED/SURG/GYN SEMI*

## 2023-01-31 PROCEDURE — 88311 DECALCIFY TISSUE: CPT

## 2023-01-31 PROCEDURE — 160048 HCHG OR STATISTICAL LEVEL 1-5: Performed by: ORTHOPAEDIC SURGERY

## 2023-01-31 PROCEDURE — 502000 HCHG MISC OR IMPLANTS RC 0278: Performed by: ORTHOPAEDIC SURGERY

## 2023-01-31 PROCEDURE — 160002 HCHG RECOVERY MINUTES (STAT): Performed by: ORTHOPAEDIC SURGERY

## 2023-01-31 PROCEDURE — 96375 TX/PRO/DX INJ NEW DRUG ADDON: CPT

## 2023-01-31 PROCEDURE — 700102 HCHG RX REV CODE 250 W/ 637 OVERRIDE(OP): Performed by: FAMILY MEDICINE

## 2023-01-31 PROCEDURE — C1713 ANCHOR/SCREW BN/BN,TIS/BN: HCPCS | Performed by: ORTHOPAEDIC SURGERY

## 2023-01-31 PROCEDURE — 502240 HCHG MISC OR SUPPLY RC 0272: Performed by: ORTHOPAEDIC SURGERY

## 2023-01-31 PROCEDURE — 700101 HCHG RX REV CODE 250: Performed by: ORTHOPAEDIC SURGERY

## 2023-01-31 PROCEDURE — 73501 X-RAY EXAM HIP UNI 1 VIEW: CPT | Mod: RT

## 2023-01-31 PROCEDURE — 80053 COMPREHEN METABOLIC PANEL: CPT

## 2023-01-31 PROCEDURE — C1776 JOINT DEVICE (IMPLANTABLE): HCPCS | Performed by: ORTHOPAEDIC SURGERY

## 2023-01-31 PROCEDURE — 71045 X-RAY EXAM CHEST 1 VIEW: CPT

## 2023-01-31 PROCEDURE — 72170 X-RAY EXAM OF PELVIS: CPT

## 2023-01-31 PROCEDURE — 700111 HCHG RX REV CODE 636 W/ 250 OVERRIDE (IP): Performed by: ORTHOPAEDIC SURGERY

## 2023-01-31 PROCEDURE — 99100 ANES PT EXTEME AGE<1 YR&>70: CPT | Performed by: ANESTHESIOLOGY

## 2023-01-31 PROCEDURE — 160031 HCHG SURGERY MINUTES - 1ST 30 MINS LEVEL 5: Performed by: ORTHOPAEDIC SURGERY

## 2023-01-31 PROCEDURE — 85610 PROTHROMBIN TIME: CPT

## 2023-01-31 PROCEDURE — 01210 ANES OPEN PX HIP JOINT NOS: CPT | Performed by: ANESTHESIOLOGY

## 2023-01-31 PROCEDURE — 85025 COMPLETE CBC W/AUTO DIFF WBC: CPT

## 2023-01-31 PROCEDURE — 93005 ELECTROCARDIOGRAM TRACING: CPT | Performed by: EMERGENCY MEDICINE

## 2023-01-31 PROCEDURE — 99222 1ST HOSP IP/OBS MODERATE 55: CPT | Mod: AI | Performed by: FAMILY MEDICINE

## 2023-01-31 DEVICE — IMPLANTABLE DEVICE: Type: IMPLANTABLE DEVICE | Site: HIP | Status: FUNCTIONAL

## 2023-01-31 DEVICE — BONE CEMENT SIMPLEX FULL DOSE - (10EA/PK): Type: IMPLANTABLE DEVICE | Site: HIP | Status: FUNCTIONAL

## 2023-01-31 RX ORDER — LEVOTHYROXINE SODIUM 0.05 MG/1
50 TABLET ORAL
COMMUNITY

## 2023-01-31 RX ORDER — CEFAZOLIN SODIUM 1 G/3ML
INJECTION, POWDER, FOR SOLUTION INTRAMUSCULAR; INTRAVENOUS PRN
Status: DISCONTINUED | OUTPATIENT
Start: 2023-01-31 | End: 2023-01-31 | Stop reason: SURG

## 2023-01-31 RX ORDER — OXYCODONE HYDROCHLORIDE 10 MG/1
10 TABLET ORAL
Status: DISCONTINUED | OUTPATIENT
Start: 2023-01-31 | End: 2023-02-01 | Stop reason: HOSPADM

## 2023-01-31 RX ORDER — ACETAMINOPHEN 325 MG/1
650 TABLET ORAL EVERY 6 HOURS PRN
Status: DISCONTINUED | OUTPATIENT
Start: 2023-01-31 | End: 2023-02-01 | Stop reason: HOSPADM

## 2023-01-31 RX ORDER — LIDOCAINE HYDROCHLORIDE 20 MG/ML
INJECTION, SOLUTION EPIDURAL; INFILTRATION; INTRACAUDAL; PERINEURAL PRN
Status: DISCONTINUED | OUTPATIENT
Start: 2023-01-31 | End: 2023-02-01 | Stop reason: HOSPADM

## 2023-01-31 RX ORDER — HYDROMORPHONE HYDROCHLORIDE 1 MG/ML
0.4 INJECTION, SOLUTION INTRAMUSCULAR; INTRAVENOUS; SUBCUTANEOUS
Status: DISCONTINUED | OUTPATIENT
Start: 2023-01-31 | End: 2023-01-31 | Stop reason: HOSPADM

## 2023-01-31 RX ORDER — HYDROMORPHONE HYDROCHLORIDE 1 MG/ML
0.1 INJECTION, SOLUTION INTRAMUSCULAR; INTRAVENOUS; SUBCUTANEOUS
Status: DISCONTINUED | OUTPATIENT
Start: 2023-01-31 | End: 2023-01-31 | Stop reason: HOSPADM

## 2023-01-31 RX ORDER — ENOXAPARIN SODIUM 100 MG/ML
40 INJECTION SUBCUTANEOUS DAILY
Status: DISCONTINUED | OUTPATIENT
Start: 2023-02-01 | End: 2023-02-01 | Stop reason: HOSPADM

## 2023-01-31 RX ORDER — MORPHINE SULFATE 4 MG/ML
1 INJECTION INTRAVENOUS ONCE
Status: COMPLETED | OUTPATIENT
Start: 2023-01-31 | End: 2023-01-31

## 2023-01-31 RX ORDER — LISINOPRIL 20 MG/1
20 TABLET ORAL DAILY
Status: DISCONTINUED | OUTPATIENT
Start: 2023-01-31 | End: 2023-02-01 | Stop reason: HOSPADM

## 2023-01-31 RX ORDER — ONDANSETRON 2 MG/ML
4 INJECTION INTRAMUSCULAR; INTRAVENOUS EVERY 4 HOURS PRN
Status: DISCONTINUED | OUTPATIENT
Start: 2023-01-31 | End: 2023-02-01 | Stop reason: HOSPADM

## 2023-01-31 RX ORDER — LABETALOL HYDROCHLORIDE 5 MG/ML
10 INJECTION, SOLUTION INTRAVENOUS EVERY 4 HOURS PRN
Status: DISCONTINUED | OUTPATIENT
Start: 2023-01-31 | End: 2023-02-01 | Stop reason: HOSPADM

## 2023-01-31 RX ORDER — PANTOPRAZOLE SODIUM 40 MG/1
40 TABLET, DELAYED RELEASE ORAL DAILY
COMMUNITY

## 2023-01-31 RX ORDER — LISINOPRIL 20 MG/1
20 TABLET ORAL DAILY
COMMUNITY

## 2023-01-31 RX ORDER — LIDOCAINE HYDROCHLORIDE 40 MG/ML
SOLUTION TOPICAL PRN
Status: DISCONTINUED | OUTPATIENT
Start: 2023-01-31 | End: 2023-02-01 | Stop reason: HOSPADM

## 2023-01-31 RX ORDER — SUCRALFATE 1 G/1
1 TABLET ORAL 2 TIMES DAILY
Status: DISCONTINUED | OUTPATIENT
Start: 2023-01-31 | End: 2023-02-01 | Stop reason: HOSPADM

## 2023-01-31 RX ORDER — OXYCODONE HCL 5 MG/5 ML
5 SOLUTION, ORAL ORAL
Status: DISCONTINUED | OUTPATIENT
Start: 2023-01-31 | End: 2023-01-31 | Stop reason: HOSPADM

## 2023-01-31 RX ORDER — HYDROMORPHONE HYDROCHLORIDE 1 MG/ML
0.2 INJECTION, SOLUTION INTRAMUSCULAR; INTRAVENOUS; SUBCUTANEOUS
Status: DISCONTINUED | OUTPATIENT
Start: 2023-01-31 | End: 2023-01-31 | Stop reason: HOSPADM

## 2023-01-31 RX ORDER — AMOXICILLIN 250 MG
2 CAPSULE ORAL 2 TIMES DAILY
Status: DISCONTINUED | OUTPATIENT
Start: 2023-01-31 | End: 2023-02-01 | Stop reason: HOSPADM

## 2023-01-31 RX ORDER — SODIUM CHLORIDE, SODIUM LACTATE, POTASSIUM CHLORIDE, CALCIUM CHLORIDE 600; 310; 30; 20 MG/100ML; MG/100ML; MG/100ML; MG/100ML
INJECTION, SOLUTION INTRAVENOUS CONTINUOUS
Status: DISCONTINUED | OUTPATIENT
Start: 2023-01-31 | End: 2023-01-31 | Stop reason: HOSPADM

## 2023-01-31 RX ORDER — NAPROXEN SODIUM 220 MG
220 TABLET ORAL 2 TIMES DAILY PRN
Status: ON HOLD | COMMUNITY
End: 2023-02-07

## 2023-01-31 RX ORDER — SODIUM CHLORIDE, SODIUM LACTATE, POTASSIUM CHLORIDE, CALCIUM CHLORIDE 600; 310; 30; 20 MG/100ML; MG/100ML; MG/100ML; MG/100ML
INJECTION, SOLUTION INTRAVENOUS CONTINUOUS
Status: ACTIVE | OUTPATIENT
Start: 2023-01-31 | End: 2023-01-31

## 2023-01-31 RX ORDER — HALOPERIDOL 5 MG/ML
1 INJECTION INTRAMUSCULAR
Status: DISCONTINUED | OUTPATIENT
Start: 2023-01-31 | End: 2023-01-31 | Stop reason: HOSPADM

## 2023-01-31 RX ORDER — POLYETHYLENE GLYCOL 3350 17 G/17G
1 POWDER, FOR SOLUTION ORAL
Status: DISCONTINUED | OUTPATIENT
Start: 2023-01-31 | End: 2023-02-01 | Stop reason: HOSPADM

## 2023-01-31 RX ORDER — HYDROMORPHONE HYDROCHLORIDE 2 MG/ML
INJECTION, SOLUTION INTRAMUSCULAR; INTRAVENOUS; SUBCUTANEOUS PRN
Status: DISCONTINUED | OUTPATIENT
Start: 2023-01-31 | End: 2023-01-31 | Stop reason: SURG

## 2023-01-31 RX ORDER — HYDROCODONE BITARTRATE AND ACETAMINOPHEN 5; 325 MG/1; MG/1
1 TABLET ORAL 2 TIMES DAILY
Status: ON HOLD | COMMUNITY
End: 2023-02-07

## 2023-01-31 RX ORDER — OXYCODONE HCL 5 MG/5 ML
10 SOLUTION, ORAL ORAL
Status: DISCONTINUED | OUTPATIENT
Start: 2023-01-31 | End: 2023-01-31 | Stop reason: HOSPADM

## 2023-01-31 RX ORDER — BUPIVACAINE HYDROCHLORIDE AND EPINEPHRINE 2.5; 5 MG/ML; UG/ML
INJECTION, SOLUTION EPIDURAL; INFILTRATION; INTRACAUDAL; PERINEURAL
Status: DISCONTINUED | OUTPATIENT
Start: 2023-01-31 | End: 2023-01-31 | Stop reason: HOSPADM

## 2023-01-31 RX ORDER — DEXAMETHASONE SODIUM PHOSPHATE 4 MG/ML
INJECTION, SOLUTION INTRA-ARTICULAR; INTRALESIONAL; INTRAMUSCULAR; INTRAVENOUS; SOFT TISSUE PRN
Status: DISCONTINUED | OUTPATIENT
Start: 2023-01-31 | End: 2023-02-01 | Stop reason: HOSPADM

## 2023-01-31 RX ORDER — OMEPRAZOLE 20 MG/1
20 CAPSULE, DELAYED RELEASE ORAL DAILY
Status: DISCONTINUED | OUTPATIENT
Start: 2023-01-31 | End: 2023-02-01 | Stop reason: HOSPADM

## 2023-01-31 RX ORDER — OXYCODONE HYDROCHLORIDE 5 MG/1
5 TABLET ORAL
Status: DISCONTINUED | OUTPATIENT
Start: 2023-01-31 | End: 2023-02-01 | Stop reason: HOSPADM

## 2023-01-31 RX ORDER — PANTOPRAZOLE SODIUM 40 MG/1
40 TABLET, DELAYED RELEASE ORAL DAILY
Status: DISCONTINUED | OUTPATIENT
Start: 2023-02-01 | End: 2023-01-31

## 2023-01-31 RX ORDER — ONDANSETRON 2 MG/ML
4 INJECTION INTRAMUSCULAR; INTRAVENOUS
Status: DISCONTINUED | OUTPATIENT
Start: 2023-01-31 | End: 2023-01-31 | Stop reason: HOSPADM

## 2023-01-31 RX ORDER — LEVOTHYROXINE SODIUM 0.05 MG/1
50 TABLET ORAL
Status: DISCONTINUED | OUTPATIENT
Start: 2023-02-01 | End: 2023-02-01 | Stop reason: HOSPADM

## 2023-01-31 RX ORDER — MAGNESIUM SULFATE HEPTAHYDRATE 40 MG/ML
INJECTION, SOLUTION INTRAVENOUS PRN
Status: DISCONTINUED | OUTPATIENT
Start: 2023-01-31 | End: 2023-01-31 | Stop reason: SURG

## 2023-01-31 RX ORDER — ONDANSETRON 2 MG/ML
INJECTION INTRAMUSCULAR; INTRAVENOUS PRN
Status: DISCONTINUED | OUTPATIENT
Start: 2023-01-31 | End: 2023-01-31 | Stop reason: SURG

## 2023-01-31 RX ORDER — ONDANSETRON 4 MG/1
4 TABLET, ORALLY DISINTEGRATING ORAL EVERY 4 HOURS PRN
Status: DISCONTINUED | OUTPATIENT
Start: 2023-01-31 | End: 2023-02-01 | Stop reason: HOSPADM

## 2023-01-31 RX ORDER — BISACODYL 10 MG
10 SUPPOSITORY, RECTAL RECTAL
Status: DISCONTINUED | OUTPATIENT
Start: 2023-01-31 | End: 2023-02-01 | Stop reason: HOSPADM

## 2023-01-31 RX ORDER — MORPHINE SULFATE 4 MG/ML
4 INJECTION INTRAVENOUS
Status: DISCONTINUED | OUTPATIENT
Start: 2023-01-31 | End: 2023-02-01 | Stop reason: HOSPADM

## 2023-01-31 RX ORDER — ROCURONIUM BROMIDE 10 MG/ML
INJECTION, SOLUTION INTRAVENOUS PRN
Status: DISCONTINUED | OUTPATIENT
Start: 2023-01-31 | End: 2023-01-31 | Stop reason: SURG

## 2023-01-31 RX ORDER — ONDANSETRON 2 MG/ML
4 INJECTION INTRAMUSCULAR; INTRAVENOUS ONCE
Status: COMPLETED | OUTPATIENT
Start: 2023-01-31 | End: 2023-01-31

## 2023-01-31 RX ORDER — SUCRALFATE 1 G/1
1 TABLET ORAL 2 TIMES DAILY
Status: ON HOLD | COMMUNITY
End: 2023-02-07

## 2023-01-31 RX ADMIN — PROPOFOL 60 MG: 10 INJECTION, EMULSION INTRAVENOUS at 17:50

## 2023-01-31 RX ADMIN — SUGAMMADEX 200 MG: 100 INJECTION, SOLUTION INTRAVENOUS at 19:04

## 2023-01-31 RX ADMIN — EPHEDRINE SULFATE 5 MG: 50 INJECTION, SOLUTION INTRAVENOUS at 17:58

## 2023-01-31 RX ADMIN — LIDOCAINE HYDROCHLORIDE 3 ML: 40 SOLUTION TOPICAL at 17:52

## 2023-01-31 RX ADMIN — DEXAMETHASONE SODIUM PHOSPHATE 4 MG: 4 INJECTION, SOLUTION INTRA-ARTICULAR; INTRALESIONAL; INTRAMUSCULAR; INTRAVENOUS; SOFT TISSUE at 18:01

## 2023-01-31 RX ADMIN — HYDROMORPHONE HYDROCHLORIDE 0.5 MG: 2 INJECTION INTRAMUSCULAR; INTRAVENOUS; SUBCUTANEOUS at 18:18

## 2023-01-31 RX ADMIN — CEFAZOLIN 1.5 G: 330 INJECTION, POWDER, FOR SOLUTION INTRAMUSCULAR; INTRAVENOUS at 17:54

## 2023-01-31 RX ADMIN — OXYCODONE HYDROCHLORIDE 10 MG: 10 TABLET ORAL at 14:27

## 2023-01-31 RX ADMIN — EPHEDRINE SULFATE 5 MG: 50 INJECTION, SOLUTION INTRAVENOUS at 19:10

## 2023-01-31 RX ADMIN — SODIUM CHLORIDE, POTASSIUM CHLORIDE, SODIUM LACTATE AND CALCIUM CHLORIDE: 600; 310; 30; 20 INJECTION, SOLUTION INTRAVENOUS at 17:43

## 2023-01-31 RX ADMIN — MAGNESIUM SULFATE HEPTAHYDRATE 2 G: 40 INJECTION, SOLUTION INTRAVENOUS at 18:25

## 2023-01-31 RX ADMIN — LIDOCAINE HYDROCHLORIDE 20 MG: 20 INJECTION, SOLUTION EPIDURAL; INFILTRATION; INTRACAUDAL at 17:50

## 2023-01-31 RX ADMIN — CEFAZOLIN 2 G: 2 INJECTION, POWDER, FOR SOLUTION INTRAMUSCULAR; INTRAVENOUS at 21:25

## 2023-01-31 RX ADMIN — EPHEDRINE SULFATE 5 MG: 50 INJECTION, SOLUTION INTRAVENOUS at 18:41

## 2023-01-31 RX ADMIN — ONDANSETRON 4 MG: 2 INJECTION INTRAMUSCULAR; INTRAVENOUS at 19:04

## 2023-01-31 RX ADMIN — ROCURONIUM BROMIDE 50 MG: 10 INJECTION, SOLUTION INTRAVENOUS at 17:50

## 2023-01-31 RX ADMIN — MORPHINE SULFATE 1 MG: 4 INJECTION INTRAVENOUS at 11:49

## 2023-01-31 RX ADMIN — FENTANYL CITRATE 50 MCG: 50 INJECTION, SOLUTION INTRAMUSCULAR; INTRAVENOUS at 17:45

## 2023-01-31 RX ADMIN — LISINOPRIL 20 MG: 20 TABLET ORAL at 14:27

## 2023-01-31 RX ADMIN — OMEPRAZOLE 20 MG: 20 CAPSULE, DELAYED RELEASE ORAL at 14:27

## 2023-01-31 RX ADMIN — FENTANYL CITRATE 50 MCG: 50 INJECTION, SOLUTION INTRAMUSCULAR; INTRAVENOUS at 18:08

## 2023-01-31 RX ADMIN — ONDANSETRON 4 MG: 2 INJECTION INTRAMUSCULAR; INTRAVENOUS at 11:49

## 2023-01-31 RX ADMIN — MORPHINE SULFATE 1 MG: 4 INJECTION INTRAVENOUS at 13:08

## 2023-01-31 RX ADMIN — EPHEDRINE SULFATE 10 MG: 50 INJECTION, SOLUTION INTRAVENOUS at 18:53

## 2023-01-31 ASSESSMENT — COGNITIVE AND FUNCTIONAL STATUS - GENERAL
CLIMB 3 TO 5 STEPS WITH RAILING: TOTAL
DRESSING REGULAR LOWER BODY CLOTHING: TOTAL
DAILY ACTIVITIY SCORE: 17
SUGGESTED CMS G CODE MODIFIER DAILY ACTIVITY: CK
MOBILITY SCORE: 7
MOVING TO AND FROM BED TO CHAIR: UNABLE
DRESSING REGULAR UPPER BODY CLOTHING: A LITTLE
SUGGESTED CMS G CODE MODIFIER MOBILITY: CM
MOVING FROM LYING ON BACK TO SITTING ON SIDE OF FLAT BED: UNABLE
TURNING FROM BACK TO SIDE WHILE IN FLAT BAD: A LOT
WALKING IN HOSPITAL ROOM: TOTAL
STANDING UP FROM CHAIR USING ARMS: TOTAL
TOILETING: A LITTLE
HELP NEEDED FOR BATHING: A LOT

## 2023-01-31 ASSESSMENT — FIBROSIS 4 INDEX
FIB4 SCORE: 2.26
FIB4 SCORE: 4.18

## 2023-01-31 ASSESSMENT — LIFESTYLE VARIABLES
ALCOHOL_USE: YES
HAVE YOU EVER FELT YOU SHOULD CUT DOWN ON YOUR DRINKING: NO
CONSUMPTION TOTAL: NEGATIVE
TOTAL SCORE: 0
TOTAL SCORE: 0
EVER HAD A DRINK FIRST THING IN THE MORNING TO STEADY YOUR NERVES TO GET RID OF A HANGOVER: NO
EVER FELT BAD OR GUILTY ABOUT YOUR DRINKING: NO
AVERAGE NUMBER OF DAYS PER WEEK YOU HAVE A DRINK CONTAINING ALCOHOL: 3
HOW MANY TIMES IN THE PAST YEAR HAVE YOU HAD 5 OR MORE DRINKS IN A DAY: 0
HAVE PEOPLE ANNOYED YOU BY CRITICIZING YOUR DRINKING: NO
TOTAL SCORE: 0
ON A TYPICAL DAY WHEN YOU DRINK ALCOHOL HOW MANY DRINKS DO YOU HAVE: 0.5

## 2023-01-31 ASSESSMENT — PAIN DESCRIPTION - DESCRIPTORS: DESCRIPTORS: SHARP;THROBBING

## 2023-01-31 ASSESSMENT — PATIENT HEALTH QUESTIONNAIRE - PHQ9
SUM OF ALL RESPONSES TO PHQ9 QUESTIONS 1 AND 2: 0
1. LITTLE INTEREST OR PLEASURE IN DOING THINGS: NOT AT ALL
2. FEELING DOWN, DEPRESSED, IRRITABLE, OR HOPELESS: NOT AT ALL

## 2023-01-31 ASSESSMENT — ENCOUNTER SYMPTOMS
COUGH: 0
NAUSEA: 0
FEVER: 0
SHORTNESS OF BREATH: 0
VOMITING: 0

## 2023-01-31 ASSESSMENT — PAIN DESCRIPTION - PAIN TYPE: TYPE: ACUTE PAIN

## 2023-01-31 NOTE — ASSESSMENT & PLAN NOTE
- XR with minimally displaced transcervical right femoral neck fracture  - Dr Rasmussen planning on OR today  - PT/OT consult ordered  - Lovenox to start tomorrow

## 2023-01-31 NOTE — ED NOTES
Med rec updated and complete, per pt   Allergies reviewed, per pt  Interviewed pt with daughter at bedside with permission from pt.  Called Mirta @ [phone] to verify all strengths of pts medications

## 2023-01-31 NOTE — H&P
Hospital Medicine History & Physical Note    Date of Service  1/31/2023    Primary Care Physician  HAWK Hairston.    Consultants  Dr Rasmussen - Ortho    Specialist Names: Rasmussen    Code Status  Prior    Chief Complaint  Chief Complaint   Patient presents with    GLF    Hip Pain       History of Presenting Illness  Alyssa Juan is a 82 y.o. female who presented 1/31/2023 with hip pain after a slip and fall. This is a female with a history of hypothyroidism, GERD and hypertension who presented to hospital with a GLF after slipping when coming back inside from taking out trash.She was found to have a right femoral neck fracture in the OR, and Dr Rasmussen is planning OR later today. She denies hitting anything else during her fall and has no other pain. Of note, pt states she tripped and fell about a week ago trying to help her  ambulate to the bathroom in the middle of the night, and hit her head at that time. She denies subsequent headaches or neuro changes.    I discussed the plan of care with patient and family.    Review of Systems  Review of Systems   Constitutional:  Negative for fever and malaise/fatigue.   Respiratory:  Negative for cough and shortness of breath.    Cardiovascular:  Negative for chest pain and leg swelling.   Gastrointestinal:  Negative for nausea and vomiting.   Musculoskeletal:  Positive for joint pain.   All other systems reviewed and are negative.    Past Medical History   has a past medical history of Cold (4/24/15), Colitis, and Pain (4/24/15).    Surgical History   has a past surgical history that includes rotator cuff repair (2014); hysterectomy, vaginal (1978); other (1975); cataract extraction with iol (2013); mammoplasty augmentation (2001); and shoulder arthroplasty total (Right, 5/5/2015).     Family History  family history includes Cancer in an other family member; Diabetes in an other family member; Hypertension in an other family member; Stroke in an other family  member.   Family history reviewed with patient. There is no family history that is pertinent to the chief complaint.     Social History   reports that she has never smoked. She has never used smokeless tobacco. She reports current alcohol use of about 3.5 oz per week. She reports that she does not use drugs.    Allergies  Allergies   Allergen Reactions    Codeine Vomiting and Nausea    Sulfa Drugs Hives       Medications  Prior to Admission Medications   Prescriptions Last Dose Informant Patient Reported? Taking?   B Complex Vitamins (VITAMIN B COMPLEX PO) 1/30/2023 at 1000 Patient Yes No   Sig: Take 1 Tablet by mouth every day.   Cholecalciferol (D3 PO) 1/30/2023 at 1000 Patient Yes Yes   Sig: Take 1 Capsule by mouth every day.   FIBER PO 1/30/2023 at 1400 Patient Yes Yes   Sig: Take 1 Scoop by mouth every day.   HYDROcodone-acetaminophen (NORCO) 5-325 MG Tab per tablet 1/30/2023 at 2000 Patient's Home Pharmacy Yes Yes   Sig: Take 1 Tablet by mouth 2 times a day.   Multiple Vitamins-Minerals (MULTIVITAMIN PO) 1/30/2023 at 1000 Patient Yes No   Sig: Take 1 Tablet by mouth every day.   Omega-3 Fatty Acids (FISH OIL PO) 1/30/2023 at 1000 Patient Yes No   Sig: Take 1 Capsule by mouth every day.   TURMERIC PO 1/30/2023 at 1000 Patient Yes No   Sig: Take 1 Capsule by mouth every day.   levothyroxine (SYNTHROID) 50 MCG Tab 1/31/2023 at 0600 Patient's Home Pharmacy Yes Yes   Sig: Take 50 mcg by mouth every morning on an empty stomach.   lisinopril (PRINIVIL) 20 MG Tab 1/30/2023 at 1000 Patient's Home Pharmacy Yes Yes   Sig: Take 20 mg by mouth every day.   naproxen (ALEVE) 220 MG tablet 1/30/2023 at 1500 Patient Yes Yes   Sig: Take 220 mg by mouth 2 times a day as needed. Indications: Pain   pantoprazole (PROTONIX) 40 MG Tablet Delayed Response 1/31/2023 at 0800 Patient's Home Pharmacy Yes Yes   Sig: Take 40 mg by mouth every day.   polyethylene glycol/lytes (MIRALAX) PACK 1/30/2023 at 1500 Patient Yes No   Sig: Take 17 g  by mouth every day.   sucralfate (CARAFATE) 1 GM Tab 1/30/2023 at 2000 Patient's Home Pharmacy Yes Yes   Sig: Take 1 g by mouth 2 times a day. Pt reports she takes twice a day, not 4 times a day      Facility-Administered Medications: None       Physical Exam  Temp:  [36.3 °C (97.4 °F)] 36.3 °C (97.4 °F)  Pulse:  [59-69] 59  Resp:  [18-20] 18  BP: (121-164)/(69-79) 121/69  SpO2:  [94 %-95 %] 95 %  Blood Pressure : 121/69   Temperature: 36.3 °C (97.4 °F)   Pulse: (!) 59   Respiration: 18   Pulse Oximetry: 95 %       Physical Exam    Laboratory:  Recent Labs     01/31/23  1140   WBC 7.4   RBC 3.57*   HEMOGLOBIN 12.2   HEMATOCRIT 35.7*   .0*   MCH 34.2*   MCHC 34.2   RDW 46.1   PLATELETCT 151*   MPV 9.7     Recent Labs     01/31/23  1140   SODIUM 132*   POTASSIUM 4.4   CHLORIDE 95*   CO2 26   GLUCOSE 95   BUN 17   CREATININE 0.69   CALCIUM 9.1     Recent Labs     01/31/23  1140   ALTSGPT 20   ASTSGOT 34   ALKPHOSPHAT 53   TBILIRUBIN 0.3   GLUCOSE 95     Recent Labs     01/31/23  1140   APTT 25.6   INR 0.96     No results for input(s): NTPROBNP in the last 72 hours.      No results for input(s): TROPONINT in the last 72 hours.    Imaging:  DX-FEMUR-2+ RIGHT   Final Result         Minimally displaced transcervical right femoral neck fracture.      DX-PELVIS-1 OR 2 VIEWS   Final Result         Minimally displaced transcervical fracture right femoral neck.      DX-CHEST-PORTABLE (1 VIEW)   Final Result         No acute cardiopulmonary process.          X-Ray:  I have personally reviewed the images and compared with prior images. and My impression is: No infiltrate on CXR  EKG:  I have personally reviewed the images and compared with prior images. and My impression is: NSR, no ischemic changes    Assessment/Plan:  Justification for Admission Status  I anticipate this patient will require at least two midnights for appropriate medical management, necessitating inpatient admission because pt will require operative  intervention for her hip fracture, and likely SNF placement     Patient will need a  bed on EMERGENCY service .  The need is secondary to femoral fracture.    * Closed displaced fracture of right femoral neck (HCC)- (present on admission)  Assessment & Plan  - XR with minimally displaced transcervical right femoral neck fracture  - Dr Rasmussen planning on OR today  - PT/OT consult ordered  - Lovenox to start tomorrow     Fall  Assessment & Plan  - Pt states she also fell a week ago and hit her head - she denies headache or neuro symptoms, but given the likelihood of pt needing anticoagulation after her hip surgery, will perform CT brain    Hyponatremia  Assessment & Plan  - Mild  - Monitor     Fall from ground level  Assessment & Plan  - Pt denies syncope or hitting anything other than her hip    Hypothyroidism  Assessment & Plan  - Continue home Synthroid   - Normal TSH in August     Gastroesophageal reflux disease  Assessment & Plan  - Resume home PPI and Carafate     HTN (hypertension)  Assessment & Plan  - Resume home Lisinopril  - PRN as necessary         VTE prophylaxis: SCDs/TEDs and enoxaparin ppx - start Lovenox tomorrow

## 2023-01-31 NOTE — ED TRIAGE NOTES
Chief Complaint   Patient presents with    GLF    Hip Pain       Slipped and fell, at home, floor was wet. Complains of right hip pain, unable to bear weight. No LOC, no blood thinners. Pt AOx4. External rotation noted on right hip. Denies any  other injuries or pain.

## 2023-01-31 NOTE — ANESTHESIA PREPROCEDURE EVALUATION
Case: [id number] Date/Time: 01/31/23 1715    Procedure: HEMIARTHROPLASTY, HIP (Right)    Location:  OR 06 / SURGERY [facility]    Surgeons: Lv Rasmussen M.D.        83 yo F s/p GFL here for right hip hemiarthroplasty.  Denies problems with anesthesia in the past.  No current CP/SOB/N/V symptoms.    NPO after 0800  Relevant Problems   CARDIAC   (positive) HTN (hypertension)      GI   (positive) Gastroesophageal reflux disease      ENDO   (positive) Hypothyroidism      Other   (positive) Closed displaced fracture of right femoral neck (HCC)       Physical Exam    Airway   Mallampati: II  TM distance: >3 FB  Neck ROM: full    Comments: Small mouth opening.   Cardiovascular - normal exam  Rhythm: regular  Rate: normal  (-) murmur     Dental - normal exam           Pulmonary - normal exam  Breath sounds clear to auscultation     Abdominal    Neurological - normal exam               Anesthesia Plan    ASA 2- EMERGENT   ASA physical status emergent criteria: displaced fracture with possible neurovascular compromise    Plan - general       Airway plan will be ETT          Induction: intravenous    Postoperative Plan: Postoperative administration of opioids is intended.    Pertinent diagnostic labs and testing reviewed    Informed Consent:    Anesthetic plan and risks discussed with patient.    Use of blood products discussed with: patient whom consented to blood products.

## 2023-01-31 NOTE — ED PROVIDER NOTES
"ED Provider Note    CHIEF COMPLAINT  Chief Complaint   Patient presents with    GLF    Hip Pain       EXTERNAL RECORDS REVIEWED  none    HPI/ROS  LIMITATION TO HISTORY   Select: : None  OUTSIDE HISTORIAN(S):  History obtained from EMS report to the nurses.    Alyssa Juan is a 82 y.o. female who presents presents emergency department for evaluation of right hip pain.  She slipped when she was coming to get the garbage line on her right hip.  Did not hit her head or get knocked out.  No neck pain or back pain.  No extremity pain in her arms.  No left sided leg or hip pain.  No numbness or tingling to her arms or legs.  No blood thinner use.  Pain is severe.  Worse with movement and palpation.  No other injuries are noted.  No blood thinner use.    PAST MEDICAL HISTORY   has a past medical history of Cold (4/24/15), Colitis, and Pain (4/24/15).    SURGICAL HISTORY   has a past surgical history that includes rotator cuff repair (2014); hysterectomy, vaginal (1978); other (1975); cataract extraction with iol (2013); mammoplasty augmentation (2001); and shoulder arthroplasty total (Right, 5/5/2015).    FAMILY HISTORY  Family History   Problem Relation Age of Onset    Diabetes Other     Hypertension Other     Stroke Other     Cancer Other        SOCIAL HISTORY  Social History     Tobacco Use    Smoking status: Never    Smokeless tobacco: Never   Substance and Sexual Activity    Alcohol use: Yes     Alcohol/week: 3.5 oz     Types: 7 Standard drinks or equivalent per week     Comment: \"a glass of wine every night\"    Drug use: No    Sexual activity: Not on file       CURRENT MEDICATIONS  Home Medications    **Home medications have not yet been reviewed for this encounter**         ALLERGIES  Allergies   Allergen Reactions    Codeine     Sulfa Drugs        PHYSICAL EXAM  VITAL SIGNS: BP (!) 164/79   Pulse 66   Temp 36.3 °C (97.4 °F) (Temporal)   Resp 20   Wt 49 kg (108 lb)   SpO2 94%   BMI 20.41 kg/m²  "   Constitutional: Well developed, Well nourished, No acute distress, Non-toxic appearance.   HENT: Normocephalic, Atraumatic, Bilateral external ears normal, Oropharynx moist, No oral exudates, Nose normal.   Eyes: PERRL, EOMI, Conjunctiva normal, No discharge.   Neck: Normal range of motion, No tenderness, Supple, No stridor.   Cardiovascular: Normal heart rate, Normal rhythm, No murmurs, No rubs, No gallops.   Thorax & Lungs: Normal breath sounds, No respiratory distress, No wheezing, No chest tenderness.   Abdomen: Bowel sounds normal, Soft, No tenderness  Skin: Warm, Dry, No erythema, No rash.     Musculoskeletal: Good range of motion in all major joints.  Pain with palpation over the right anterior thigh and right hip.  Normal neurovascular exam.  Neurologic: Alert,  No focal deficits noted.   Psychiatric: Affect normal      DIAGNOSTIC STUDIES / PROCEDURES  Results for orders placed or performed during the hospital encounter of 01/31/23   CBC WITH DIFFERENTIAL   Result Value Ref Range    WBC 7.4 4.8 - 10.8 K/uL    RBC 3.57 (L) 4.20 - 5.40 M/uL    Hemoglobin 12.2 12.0 - 16.0 g/dL    Hematocrit 35.7 (L) 37.0 - 47.0 %    .0 (H) 81.4 - 97.8 fL    MCH 34.2 (H) 27.0 - 33.0 pg    MCHC 34.2 33.6 - 35.0 g/dL    RDW 46.1 35.9 - 50.0 fL    Platelet Count 151 (L) 164 - 446 K/uL    MPV 9.7 9.0 - 12.9 fL    Neutrophils-Polys 77.60 (H) 44.00 - 72.00 %    Lymphocytes 14.30 (L) 22.00 - 41.00 %    Monocytes 6.80 0.00 - 13.40 %    Eosinophils 0.50 0.00 - 6.90 %    Basophils 0.40 0.00 - 1.80 %    Immature Granulocytes 0.40 0.00 - 0.90 %    Nucleated RBC 0.00 /100 WBC    Neutrophils (Absolute) 5.74 2.00 - 7.15 K/uL    Lymphs (Absolute) 1.06 1.00 - 4.80 K/uL    Monos (Absolute) 0.50 0.00 - 0.85 K/uL    Eos (Absolute) 0.04 0.00 - 0.51 K/uL    Baso (Absolute) 0.03 0.00 - 0.12 K/uL    Immature Granulocytes (abs) 0.03 0.00 - 0.11 K/uL    NRBC (Absolute) 0.00 K/uL   COMP METABOLIC PANEL   Result Value Ref Range    Sodium 132 (L)  135 - 145 mmol/L    Potassium 4.4 3.6 - 5.5 mmol/L    Chloride 95 (L) 96 - 112 mmol/L    Co2 26 20 - 33 mmol/L    Anion Gap 11.0 7.0 - 16.0    Glucose 95 65 - 99 mg/dL    Bun 17 8 - 22 mg/dL    Creatinine 0.69 0.50 - 1.40 mg/dL    Calcium 9.1 8.4 - 10.2 mg/dL    AST(SGOT) 34 12 - 45 U/L    ALT(SGPT) 20 2 - 50 U/L    Alkaline Phosphatase 53 30 - 99 U/L    Total Bilirubin 0.3 0.1 - 1.5 mg/dL    Albumin 4.1 3.2 - 4.9 g/dL    Total Protein 6.8 6.0 - 8.2 g/dL    Globulin 2.7 1.9 - 3.5 g/dL    A-G Ratio 1.5 g/dL   APTT   Result Value Ref Range    APTT 25.6 24.7 - 36.0 sec   PROTHROMBIN TIME (INR)   Result Value Ref Range    PT 12.7 12.0 - 14.6 sec    INR 0.96 0.87 - 1.13   CORRECTED CALCIUM   Result Value Ref Range    Correct Calcium 9.0 8.5 - 10.5 mg/dL   ESTIMATED GFR   Result Value Ref Range    GFR (CKD-EPI) 86 >60 mL/min/1.73 m 2   EKG (NOW)   Result Value Ref Range    Report       [facility]    Test Date:  2023  Pt Name:    EDIL FUENTES              Department: [facility]  MRN:        [id number]                      [facility]  Gender:     Female                       Technician: INDER  :        [id number]                   Requested By:SEBLE CASAS  Order #:    [id number]                    Reading MD: SEBLE CASAS. [facility]    Measurements  Intervals                                Axis  Rate:       62                           P:          67  NJ:         164                          QRS:        41  QRSD:       78                           T:          34  QT:         436  QTc:        443    Interpretive Statements  SINUS RHYTHM  PROBABLE LEFT ATRIAL ABNORMALITY  LOW VOLTAGE IN FRONTAL LEADS  NONSPECIFIC T ABNORMALITIES, ANTERIOR LEADS  Compared to ECG 2020 22:40:43  Low QRS voltage now present  T-wave abnormality now present  Electronically Signed On 2023 13:33:38 PST by SEBLE CASAS. [facility]          RADIOLOGY  I have independently interpreted the  diagnostic imaging associated with this visit and am waiting the final reading from the radiologist.   My preliminary interpretation is a follows: I reviewed the images the patient has a hip fracture.  He is treated with IV narcotics.  Orthopedics will be consulted.  Radiologist interpretation:   DX-FEMUR-2+ RIGHT   Final Result         Minimally displaced transcervical right femoral neck fracture.      DX-PELVIS-1 OR 2 VIEWS   Final Result         Minimally displaced transcervical fracture right femoral neck.      DX-CHEST-PORTABLE (1 VIEW)   Final Result         No acute cardiopulmonary process.            COURSE & MEDICAL DECISION MAKING    ED Observation Status? No; Patient does not meet criteria for ED Observation.   The patient does not meet observation criteria she has a hip fracture clinically and will likely need hospitalization.    INITIAL ASSESSMENT, COURSE AND PLAN  Care Narrative: Patient presents with right hip pain after ground-level fall.  She seems to be otherwise uninjured.  She did not hit her head or get knocked out.  She has acute severe right hip pain.    Differential diagnosis includes pelvic fracture, hip fracture hip dislocation, hip contusion.    The patient is worked up for the above differential diagnosis labs and imaging.  Preoperative labs are ordered because I suspect the patient has a hip fracture clinically.    Patient is treated with IV narcotics morphine and Zofran for nausea.    X-ray shows a hip fracture.  Discussed with Dr. Rasmussen for orthopedics.  He will see the patient and request patient remain n.p.o. for emergent surgery today for hip fracture.    Spoke with the hospitalist.  The patient be hospitalized in the care of the hospital for further work-up and treatment pending orthopedic consultation.          ADDITIONAL PROBLEM LIST  Hypothyroidism    DISPOSITION AND DISCUSSIONS  I have discussed management of the patient with the following physicians and FLY's: Orthopedic  doctor, Dr. Rasmussen hospitalist Dr. Juan    Discussion of management with other [facility] or appropriate source(s): None     Escalation of care considered, and ultimately not performed: Considered advanced imaging like a CT but not indicated in the setting of the findings on her x-ray.        FINAL DIAGNOSIS  1. Fall, initial encounter    2. Closed fracture of right hip, initial encounter ([facility])    Acute right hip fracture       Electronically signed by: Fransico Dunbar M.D., 1/31/2023 11:26 AM

## 2023-01-31 NOTE — ED NOTES
Blood work collected & sent to lab, Pt medicated per orders, Pt denied having any other needs at this time, no distress noted;    Pt aware that she is waiting for imaging;

## 2023-02-01 ENCOUNTER — APPOINTMENT (OUTPATIENT)
Dept: RADIOLOGY | Facility: MEDICAL CENTER | Age: 83
DRG: 522 | End: 2023-02-01
Attending: FAMILY MEDICINE
Payer: MEDICARE

## 2023-02-01 ENCOUNTER — HOSPITAL ENCOUNTER (INPATIENT)
Facility: REHABILITATION | Age: 83
LOS: 7 days | DRG: 560 | End: 2023-02-08
Attending: PHYSICAL MEDICINE & REHABILITATION | Admitting: PHYSICAL MEDICINE & REHABILITATION
Payer: MEDICARE

## 2023-02-01 VITALS
DIASTOLIC BLOOD PRESSURE: 46 MMHG | BODY MASS INDEX: 18.4 KG/M2 | OXYGEN SATURATION: 89 % | WEIGHT: 97.44 LBS | TEMPERATURE: 98.1 F | RESPIRATION RATE: 18 BRPM | HEART RATE: 62 BPM | SYSTOLIC BLOOD PRESSURE: 121 MMHG | HEIGHT: 61 IN

## 2023-02-01 DIAGNOSIS — E87.1 HYPONATREMIA: ICD-10-CM

## 2023-02-01 DIAGNOSIS — G62.9 PERIPHERAL POLYNEUROPATHY: ICD-10-CM

## 2023-02-01 DIAGNOSIS — R10.9 CHRONIC ABDOMINAL PAIN: ICD-10-CM

## 2023-02-01 DIAGNOSIS — G89.29 CHRONIC ABDOMINAL PAIN: ICD-10-CM

## 2023-02-01 DIAGNOSIS — G47.09 OTHER INSOMNIA: ICD-10-CM

## 2023-02-01 DIAGNOSIS — S72.001S CLOSED RIGHT HIP FRACTURE, SEQUELA: ICD-10-CM

## 2023-02-01 LAB
ANION GAP SERPL CALC-SCNC: 10 MMOL/L (ref 7–16)
BUN SERPL-MCNC: 16 MG/DL (ref 8–22)
CALCIUM SERPL-MCNC: 8.2 MG/DL (ref 8.4–10.2)
CHLORIDE SERPL-SCNC: 95 MMOL/L (ref 96–112)
CO2 SERPL-SCNC: 24 MMOL/L (ref 20–33)
CREAT SERPL-MCNC: 0.61 MG/DL (ref 0.5–1.4)
ERYTHROCYTE [DISTWIDTH] IN BLOOD BY AUTOMATED COUNT: 46.3 FL (ref 35.9–50)
GFR SERPLBLD CREATININE-BSD FMLA CKD-EPI: 88 ML/MIN/1.73 M 2
GLUCOSE SERPL-MCNC: 145 MG/DL (ref 65–99)
HCT VFR BLD AUTO: 31.7 % (ref 37–47)
HGB BLD-MCNC: 10.6 G/DL (ref 12–16)
MCH RBC QN AUTO: 33.9 PG (ref 27–33)
MCHC RBC AUTO-ENTMCNC: 33.4 G/DL (ref 33.6–35)
MCV RBC AUTO: 101.3 FL (ref 81.4–97.8)
PLATELET # BLD AUTO: 224 K/UL (ref 164–446)
PMV BLD AUTO: 9.3 FL (ref 9–12.9)
POTASSIUM SERPL-SCNC: 4.2 MMOL/L (ref 3.6–5.5)
RBC # BLD AUTO: 3.13 M/UL (ref 4.2–5.4)
SODIUM SERPL-SCNC: 129 MMOL/L (ref 135–145)
WBC # BLD AUTO: 10.7 K/UL (ref 4.8–10.8)

## 2023-02-01 PROCEDURE — 700105 HCHG RX REV CODE 258: Performed by: ORTHOPAEDIC SURGERY

## 2023-02-01 PROCEDURE — 700102 HCHG RX REV CODE 250 W/ 637 OVERRIDE(OP): Performed by: PHYSICAL MEDICINE & REHABILITATION

## 2023-02-01 PROCEDURE — 99239 HOSP IP/OBS DSCHRG MGMT >30: CPT | Performed by: INTERNAL MEDICINE

## 2023-02-01 PROCEDURE — 700111 HCHG RX REV CODE 636 W/ 250 OVERRIDE (IP): Performed by: PHYSICAL MEDICINE & REHABILITATION

## 2023-02-01 PROCEDURE — 94760 N-INVAS EAR/PLS OXIMETRY 1: CPT

## 2023-02-01 PROCEDURE — 700102 HCHG RX REV CODE 250 W/ 637 OVERRIDE(OP): Performed by: FAMILY MEDICINE

## 2023-02-01 PROCEDURE — 97166 OT EVAL MOD COMPLEX 45 MIN: CPT

## 2023-02-01 PROCEDURE — 700111 HCHG RX REV CODE 636 W/ 250 OVERRIDE (IP): Performed by: FAMILY MEDICINE

## 2023-02-01 PROCEDURE — 770010 HCHG ROOM/CARE - REHAB SEMI PRIVAT*

## 2023-02-01 PROCEDURE — A9270 NON-COVERED ITEM OR SERVICE: HCPCS | Performed by: PHYSICAL MEDICINE & REHABILITATION

## 2023-02-01 PROCEDURE — 99358 PROLONG SERVICE W/O CONTACT: CPT | Performed by: PHYSICAL MEDICINE & REHABILITATION

## 2023-02-01 PROCEDURE — 700111 HCHG RX REV CODE 636 W/ 250 OVERRIDE (IP): Performed by: ORTHOPAEDIC SURGERY

## 2023-02-01 PROCEDURE — 70450 CT HEAD/BRAIN W/O DYE: CPT

## 2023-02-01 PROCEDURE — 80048 BASIC METABOLIC PNL TOTAL CA: CPT

## 2023-02-01 PROCEDURE — 97162 PT EVAL MOD COMPLEX 30 MIN: CPT

## 2023-02-01 PROCEDURE — A9270 NON-COVERED ITEM OR SERVICE: HCPCS | Performed by: FAMILY MEDICINE

## 2023-02-01 PROCEDURE — 85027 COMPLETE CBC AUTOMATED: CPT

## 2023-02-01 PROCEDURE — 36415 COLL VENOUS BLD VENIPUNCTURE: CPT

## 2023-02-01 PROCEDURE — 0241U HCHG SARS-COV-2 COVID-19 NFCT DS RESP RNA 4 TRGT MIC: CPT

## 2023-02-01 PROCEDURE — 97535 SELF CARE MNGMENT TRAINING: CPT

## 2023-02-01 RX ORDER — LANOLIN ALCOHOL/MO/W.PET/CERES
3 CREAM (GRAM) TOPICAL NIGHTLY PRN
Status: DISCONTINUED | OUTPATIENT
Start: 2023-02-01 | End: 2023-02-08 | Stop reason: HOSPADM

## 2023-02-01 RX ORDER — OXYCODONE HYDROCHLORIDE 10 MG/1
10 TABLET ORAL
Status: DISCONTINUED | OUTPATIENT
Start: 2023-02-01 | End: 2023-02-03

## 2023-02-01 RX ORDER — HYDROXYZINE HYDROCHLORIDE 25 MG/1
50 TABLET, FILM COATED ORAL EVERY 6 HOURS PRN
Status: DISCONTINUED | OUTPATIENT
Start: 2023-02-01 | End: 2023-02-08 | Stop reason: HOSPADM

## 2023-02-01 RX ORDER — MORPHINE SULFATE 4 MG/ML
4 INJECTION INTRAVENOUS
Status: CANCELLED | OUTPATIENT
Start: 2023-02-01

## 2023-02-01 RX ORDER — OXYCODONE HYDROCHLORIDE 10 MG/1
10 TABLET ORAL
Status: CANCELLED | OUTPATIENT
Start: 2023-02-01

## 2023-02-01 RX ORDER — ACETAMINOPHEN 500 MG
1000 TABLET ORAL 3 TIMES DAILY
Status: DISCONTINUED | OUTPATIENT
Start: 2023-02-01 | End: 2023-02-03

## 2023-02-01 RX ORDER — BISACODYL 10 MG
10 SUPPOSITORY, RECTAL RECTAL
Status: DISCONTINUED | OUTPATIENT
Start: 2023-02-01 | End: 2023-02-08 | Stop reason: HOSPADM

## 2023-02-01 RX ORDER — LACTULOSE 20 G/30ML
30 SOLUTION ORAL
Status: DISCONTINUED | OUTPATIENT
Start: 2023-02-01 | End: 2023-02-08 | Stop reason: HOSPADM

## 2023-02-01 RX ORDER — SUCRALFATE 1 G/1
1 TABLET ORAL 2 TIMES DAILY
Status: DISCONTINUED | OUTPATIENT
Start: 2023-02-01 | End: 2023-02-08 | Stop reason: HOSPADM

## 2023-02-01 RX ORDER — AMOXICILLIN 250 MG
2 CAPSULE ORAL 2 TIMES DAILY
Status: CANCELLED | OUTPATIENT
Start: 2023-02-01

## 2023-02-01 RX ORDER — MORPHINE SULFATE 4 MG/ML
4 INJECTION INTRAVENOUS
Status: DISCONTINUED | OUTPATIENT
Start: 2023-02-01 | End: 2023-02-01

## 2023-02-01 RX ORDER — OMEPRAZOLE 20 MG/1
20 CAPSULE, DELAYED RELEASE ORAL DAILY
Status: CANCELLED | OUTPATIENT
Start: 2023-02-02

## 2023-02-01 RX ORDER — ENOXAPARIN SODIUM 100 MG/ML
40 INJECTION SUBCUTANEOUS DAILY
Status: CANCELLED | OUTPATIENT
Start: 2023-02-01

## 2023-02-01 RX ORDER — LEVOTHYROXINE SODIUM 0.05 MG/1
50 TABLET ORAL
Status: DISCONTINUED | OUTPATIENT
Start: 2023-02-02 | End: 2023-02-08 | Stop reason: HOSPADM

## 2023-02-01 RX ORDER — POLYETHYLENE GLYCOL 3350 17 G/17G
1 POWDER, FOR SOLUTION ORAL
Status: DISCONTINUED | OUTPATIENT
Start: 2023-02-01 | End: 2023-02-08 | Stop reason: HOSPADM

## 2023-02-01 RX ORDER — LEVOTHYROXINE SODIUM 0.05 MG/1
50 TABLET ORAL
Status: CANCELLED | OUTPATIENT
Start: 2023-02-02

## 2023-02-01 RX ORDER — ACETAMINOPHEN 325 MG/1
650 TABLET ORAL EVERY 4 HOURS PRN
Status: DISCONTINUED | OUTPATIENT
Start: 2023-02-01 | End: 2023-02-01

## 2023-02-01 RX ORDER — BISACODYL 10 MG
10 SUPPOSITORY, RECTAL RECTAL
Status: CANCELLED | OUTPATIENT
Start: 2023-02-01

## 2023-02-01 RX ORDER — ALUMINA, MAGNESIA, AND SIMETHICONE 2400; 2400; 240 MG/30ML; MG/30ML; MG/30ML
20 SUSPENSION ORAL
Status: DISCONTINUED | OUTPATIENT
Start: 2023-02-01 | End: 2023-02-08 | Stop reason: HOSPADM

## 2023-02-01 RX ORDER — ONDANSETRON 2 MG/ML
4 INJECTION INTRAMUSCULAR; INTRAVENOUS 4 TIMES DAILY PRN
Status: DISCONTINUED | OUTPATIENT
Start: 2023-02-01 | End: 2023-02-08 | Stop reason: HOSPADM

## 2023-02-01 RX ORDER — ECHINACEA PURPUREA EXTRACT 125 MG
2 TABLET ORAL PRN
Status: DISCONTINUED | OUTPATIENT
Start: 2023-02-01 | End: 2023-02-08 | Stop reason: HOSPADM

## 2023-02-01 RX ORDER — SUCRALFATE 1 G/1
1 TABLET ORAL 2 TIMES DAILY
Status: CANCELLED | OUTPATIENT
Start: 2023-02-01

## 2023-02-01 RX ORDER — POLYETHYLENE GLYCOL 3350 17 G/17G
1 POWDER, FOR SOLUTION ORAL
Status: CANCELLED | OUTPATIENT
Start: 2023-02-01

## 2023-02-01 RX ORDER — OXYCODONE HYDROCHLORIDE 5 MG/1
5 TABLET ORAL
Status: DISCONTINUED | OUTPATIENT
Start: 2023-02-01 | End: 2023-02-03

## 2023-02-01 RX ORDER — LISINOPRIL 20 MG/1
20 TABLET ORAL DAILY
Status: CANCELLED | OUTPATIENT
Start: 2023-02-02

## 2023-02-01 RX ORDER — OMEPRAZOLE 20 MG/1
20 CAPSULE, DELAYED RELEASE ORAL DAILY
Status: DISCONTINUED | OUTPATIENT
Start: 2023-02-02 | End: 2023-02-08 | Stop reason: HOSPADM

## 2023-02-01 RX ORDER — OXYCODONE HYDROCHLORIDE 5 MG/1
5 TABLET ORAL
Status: CANCELLED | OUTPATIENT
Start: 2023-02-01

## 2023-02-01 RX ORDER — ONDANSETRON 4 MG/1
4 TABLET, ORALLY DISINTEGRATING ORAL 4 TIMES DAILY PRN
Status: DISCONTINUED | OUTPATIENT
Start: 2023-02-01 | End: 2023-02-08 | Stop reason: HOSPADM

## 2023-02-01 RX ORDER — ENOXAPARIN SODIUM 100 MG/ML
40 INJECTION SUBCUTANEOUS DAILY
Status: DISCONTINUED | OUTPATIENT
Start: 2023-02-01 | End: 2023-02-08 | Stop reason: HOSPADM

## 2023-02-01 RX ORDER — TRAZODONE HYDROCHLORIDE 50 MG/1
50 TABLET ORAL
Status: DISCONTINUED | OUTPATIENT
Start: 2023-02-01 | End: 2023-02-08 | Stop reason: HOSPADM

## 2023-02-01 RX ORDER — AMOXICILLIN 250 MG
2 CAPSULE ORAL 2 TIMES DAILY
Status: DISCONTINUED | OUTPATIENT
Start: 2023-02-01 | End: 2023-02-08 | Stop reason: HOSPADM

## 2023-02-01 RX ORDER — LISINOPRIL 20 MG/1
20 TABLET ORAL DAILY
Status: DISCONTINUED | OUTPATIENT
Start: 2023-02-02 | End: 2023-02-02

## 2023-02-01 RX ADMIN — OXYCODONE 5 MG: 5 TABLET ORAL at 18:41

## 2023-02-01 RX ADMIN — OXYCODONE HYDROCHLORIDE 5 MG: 5 TABLET ORAL at 15:29

## 2023-02-01 RX ADMIN — SUCRALFATE 1 G: 1 TABLET ORAL at 05:40

## 2023-02-01 RX ADMIN — SENNOSIDES AND DOCUSATE SODIUM 2 TABLET: 50; 8.6 TABLET ORAL at 21:40

## 2023-02-01 RX ADMIN — OXYCODONE HYDROCHLORIDE 10 MG: 10 TABLET ORAL at 22:47

## 2023-02-01 RX ADMIN — LEVOTHYROXINE SODIUM 50 MCG: 0.05 TABLET ORAL at 05:40

## 2023-02-01 RX ADMIN — CEFAZOLIN 2 G: 2 INJECTION, POWDER, FOR SOLUTION INTRAMUSCULAR; INTRAVENOUS at 05:42

## 2023-02-01 RX ADMIN — ENOXAPARIN SODIUM 40 MG: 40 INJECTION SUBCUTANEOUS at 17:41

## 2023-02-01 RX ADMIN — OMEPRAZOLE 20 MG: 20 CAPSULE, DELAYED RELEASE ORAL at 05:40

## 2023-02-01 RX ADMIN — ONDANSETRON 4 MG: 4 TABLET, ORALLY DISINTEGRATING ORAL at 08:25

## 2023-02-01 RX ADMIN — SUCRALFATE 1 G: 1 TABLET ORAL at 21:40

## 2023-02-01 RX ADMIN — OXYCODONE HYDROCHLORIDE 10 MG: 10 TABLET ORAL at 02:42

## 2023-02-01 RX ADMIN — OXYCODONE HYDROCHLORIDE 10 MG: 10 TABLET ORAL at 08:25

## 2023-02-01 ASSESSMENT — PATIENT HEALTH QUESTIONNAIRE - PHQ9
SUM OF ALL RESPONSES TO PHQ9 QUESTIONS 1 AND 2: 0
5. POOR APPETITE OR OVEREATING: NOT AT ALL
8. MOVING OR SPEAKING SO SLOWLY THAT OTHER PEOPLE COULD HAVE NOTICED. OR THE OPPOSITE, BEING SO FIGETY OR RESTLESS THAT YOU HAVE BEEN MOVING AROUND A LOT MORE THAN USUAL: NOT AT ALL
2. FEELING DOWN, DEPRESSED, IRRITABLE, OR HOPELESS: NOT AT ALL
9. THOUGHTS THAT YOU WOULD BE BETTER OFF DEAD, OR OF HURTING YOURSELF: NOT AT ALL
SUM OF ALL RESPONSES TO PHQ9 QUESTIONS 1 AND 2: 1
SUM OF ALL RESPONSES TO PHQ QUESTIONS 1-9: 0
SUM OF ALL RESPONSES TO PHQ QUESTIONS 1-9: 0
3. TROUBLE FALLING OR STAYING ASLEEP OR SLEEPING TOO MUCH: NOT AT ALL
3. TROUBLE FALLING OR STAYING ASLEEP OR SLEEPING TOO MUCH: NOT AT ALL
7. TROUBLE CONCENTRATING ON THINGS, SUCH AS READING THE NEWSPAPER OR WATCHING TELEVISION: NOT AT ALL
9. THOUGHTS THAT YOU WOULD BE BETTER OFF DEAD, OR OF HURTING YOURSELF: NOT AT ALL
2. FEELING DOWN, DEPRESSED, IRRITABLE, OR HOPELESS: NOT AT ALL
4. FEELING TIRED OR HAVING LITTLE ENERGY: NOT AT ALL
6. FEELING BAD ABOUT YOURSELF - OR THAT YOU ARE A FAILURE OR HAVE LET YOURSELF OR YOUR FAMILY DOWN: NOT AL ALL
4. FEELING TIRED OR HAVING LITTLE ENERGY: NOT AT ALL
5. POOR APPETITE OR OVEREATING: NOT AT ALL
SUM OF ALL RESPONSES TO PHQ QUESTIONS 1-9: 6
1. LITTLE INTEREST OR PLEASURE IN DOING THINGS: NOT AT ALL
2. FEELING DOWN, DEPRESSED, IRRITABLE, OR HOPELESS: NOT AT ALL
5. POOR APPETITE OR OVEREATING: SEVERAL DAYS
1. LITTLE INTEREST OR PLEASURE IN DOING THINGS: NOT AT ALL
9. THOUGHTS THAT YOU WOULD BE BETTER OFF DEAD, OR OF HURTING YOURSELF: NOT AT ALL
6. FEELING BAD ABOUT YOURSELF - OR THAT YOU ARE A FAILURE OR HAVE LET YOURSELF OR YOUR FAMILY DOWN: SEVERAL DAYS
8. MOVING OR SPEAKING SO SLOWLY THAT OTHER PEOPLE COULD HAVE NOTICED. OR THE OPPOSITE, BEING SO FIGETY OR RESTLESS THAT YOU HAVE BEEN MOVING AROUND A LOT MORE THAN USUAL: NOT AT ALL
3. TROUBLE FALLING OR STAYING ASLEEP OR SLEEPING TOO MUCH: MORE THAN HALF THE DAYS
8. MOVING OR SPEAKING SO SLOWLY THAT OTHER PEOPLE COULD HAVE NOTICED. OR THE OPPOSITE, BEING SO FIGETY OR RESTLESS THAT YOU HAVE BEEN MOVING AROUND A LOT MORE THAN USUAL: NOT AT ALL
6. FEELING BAD ABOUT YOURSELF - OR THAT YOU ARE A FAILURE OR HAVE LET YOURSELF OR YOUR FAMILY DOWN: NOT AL ALL
1. LITTLE INTEREST OR PLEASURE IN DOING THINGS: SEVERAL DAYS
7. TROUBLE CONCENTRATING ON THINGS, SUCH AS READING THE NEWSPAPER OR WATCHING TELEVISION: NOT AT ALL
4. FEELING TIRED OR HAVING LITTLE ENERGY: SEVERAL DAYS
7. TROUBLE CONCENTRATING ON THINGS, SUCH AS READING THE NEWSPAPER OR WATCHING TELEVISION: NOT AT ALL
SUM OF ALL RESPONSES TO PHQ9 QUESTIONS 1 AND 2: 0

## 2023-02-01 ASSESSMENT — GAIT ASSESSMENTS
DISTANCE (FEET): 15
DEVIATION: ANTALGIC;STEP TO
DISTANCE (FEET): 75
GAIT LEVEL OF ASSIST: STANDBY ASSIST
ASSISTIVE DEVICE: FRONT WHEEL WALKER

## 2023-02-01 ASSESSMENT — COGNITIVE AND FUNCTIONAL STATUS - GENERAL
MOVING FROM LYING ON BACK TO SITTING ON SIDE OF FLAT BED: A LITTLE
TURNING FROM BACK TO SIDE WHILE IN FLAT BAD: A LITTLE
SUGGESTED CMS G CODE MODIFIER DAILY ACTIVITY: CK
DRESSING REGULAR LOWER BODY CLOTHING: A LOT
TOILETING: A LITTLE
STANDING UP FROM CHAIR USING ARMS: A LITTLE
WALKING IN HOSPITAL ROOM: A LITTLE
PERSONAL GROOMING: A LITTLE
MOBILITY SCORE: 18
SUGGESTED CMS G CODE MODIFIER MOBILITY: CK
DAILY ACTIVITIY SCORE: 18
MOVING TO AND FROM BED TO CHAIR: A LITTLE
CLIMB 3 TO 5 STEPS WITH RAILING: A LITTLE
HELP NEEDED FOR BATHING: A LOT

## 2023-02-01 ASSESSMENT — LIFESTYLE VARIABLES
HAVE YOU EVER FELT YOU SHOULD CUT DOWN ON YOUR DRINKING: NO
TOTAL SCORE: 0
HOW MANY TIMES IN THE PAST YEAR HAVE YOU HAD 5 OR MORE DRINKS IN A DAY: 0
ON A TYPICAL DAY WHEN YOU DRINK ALCOHOL HOW MANY DRINKS DO YOU HAVE: 1
AVERAGE NUMBER OF DAYS PER WEEK YOU HAVE A DRINK CONTAINING ALCOHOL: 2
HAVE PEOPLE ANNOYED YOU BY CRITICIZING YOUR DRINKING: NO
TOTAL SCORE: 0
CONSUMPTION TOTAL: NEGATIVE
EVER FELT BAD OR GUILTY ABOUT YOUR DRINKING: NO
TOTAL SCORE: 0
EVER HAD A DRINK FIRST THING IN THE MORNING TO STEADY YOUR NERVES TO GET RID OF A HANGOVER: NO
ALCOHOL_USE: YES

## 2023-02-01 ASSESSMENT — PAIN DESCRIPTION - PAIN TYPE
TYPE: SURGICAL PAIN
TYPE: ACUTE PAIN;SURGICAL PAIN
TYPE: CHRONIC PAIN
TYPE: CHRONIC PAIN
TYPE: SURGICAL PAIN
TYPE: SURGICAL PAIN;CHRONIC PAIN

## 2023-02-01 ASSESSMENT — FIBROSIS 4 INDEX
FIB4 SCORE: 2.82
FIB4 SCORE: 2.82

## 2023-02-01 ASSESSMENT — PAIN SCALES - GENERAL: PAIN_LEVEL: 0

## 2023-02-01 ASSESSMENT — ACTIVITIES OF DAILY LIVING (ADL): TOILETING: INDEPENDENT

## 2023-02-01 NOTE — DISCHARGE PLANNING
Call ou to patient to discuss post acute options pending return call. Requested CM/BSN assistance.

## 2023-02-01 NOTE — DISCHARGE SUMMARY
"Discharge Summary    CHIEF COMPLAINT ON ADMISSION  Chief Complaint   Patient presents with    GLF    Hip Pain       Reason for Admission  EMS     Admission Date  1/31/2023    CODE STATUS  Full Code    HPI & HOSPITAL COURSE  Per notes, \"82 y.o. female who presented 1/31/2023 with hip pain after a slip and fall. This is a female with a history of hypothyroidism, GERD and hypertension who presented to hospital with a GLF after slipping when coming back inside from taking out trash.She was found to have a right femoral neck fracture in the OR, and Dr Rasmussen is planning OR later today. She denies hitting anything else during her fall and has no other pain. Of note, pt states she tripped and fell about a week ago trying to help her  ambulate to the bathroom in the middle of the night, and hit her head at that time. She denies subsequent headaches or neuro changes.\"    Patient was admitted and evaluated by Ortho surgery for right hip fracture.  She underwent a right hip hemiarthroplasty plasty on 1/31, without complications.  She was evaluated by physical therapy who recommended postacute care placement.  Patient was accepted to inpatient rehab which will discharge for further recovery.  Of note, she did have an episode of orthostatic hypotension this morning which quickly recovered.  She has well-controlled pain complaints throughout the day , and at this point is safe to be discharged to inpatient rehab.    Therefore, she is discharged in good and stable condition to an inpatient rehabilitation hospital.    The patient recovered much more quickly than anticipated on admission.    Discharge Date  2/1/2023    FOLLOW UP ITEMS POST DISCHARGE  FU with pcp   FU with orthopedic surgery     DISCHARGE DIAGNOSES  Principal Problem:    Closed displaced fracture of right femoral neck (HCC) POA: Yes  Active Problems:    HTN (hypertension) (Chronic) POA: Unknown    Gastroesophageal reflux disease (Chronic) POA: Unknown    " Hypothyroidism (Chronic) POA: Unknown    Fall from ground level POA: Unknown    Hyponatremia POA: Unknown    Fall POA: Unknown  Resolved Problems:    * No resolved hospital problems. *      FOLLOW UP  No future appointments.  No follow-up provider specified.    MEDICATIONS ON DISCHARGE     Medication List        ASK your doctor about these medications        Instructions   Aleve 220 MG tablet  Generic drug: naproxen   Take 220 mg by mouth 2 times a day as needed. Indications: Pain  Dose: 220 mg     D3 PO  Ask about: Which instructions should I use?   Take 1 Capsule by mouth every day.  Dose: 1 Capsule     FIBER PO   Take 1 Scoop by mouth every day.  Dose: 1 Scoop     FISH OIL PO   Take 1 Capsule by mouth every day.  Dose: 1 Capsule     HYDROcodone-acetaminophen 5-325 MG Tabs per tablet  Commonly known as: NORCO   Take 1 Tablet by mouth 2 times a day.  Dose: 1 Tablet     levothyroxine 50 MCG Tabs  Commonly known as: SYNTHROID   Take 50 mcg by mouth every morning on an empty stomach.  Dose: 50 mcg     lisinopril 20 MG Tabs  Commonly known as: PRINIVIL   Take 20 mg by mouth every day.  Dose: 20 mg     MULTIVITAMIN PO   Take 1 Tablet by mouth every day.  Dose: 1 Tablet     pantoprazole 40 MG Tbec  Commonly known as: PROTONIX   Take 40 mg by mouth every day.  Dose: 40 mg     polyethylene glycol/lytes 17 g Pack  Commonly known as: MIRALAX   Take 17 g by mouth every day.  Dose: 17 g     sucralfate 1 GM Tabs  Commonly known as: CARAFATE   Take 1 g by mouth 2 times a day. Pt reports she takes twice a day, not 4 times a day  Dose: 1 g     TURMERIC PO   Take 1 Capsule by mouth every day.  Dose: 1 Capsule     VITAMIN B COMPLEX PO   Take 1 Tablet by mouth every day.  Dose: 1 Tablet              Allergies  Allergies   Allergen Reactions    Codeine Vomiting and Nausea    Sulfa Drugs Hives       DIET  Orders Placed This Encounter   Procedures    Diet Order Diet: Regular     Standing Status:   Standing     Number of Occurrences:   1      Order Specific Question:   Diet:     Answer:   Regular [1]       ACTIVITY  As tolerated.  Weight bearing as tolerated    CONSULTATIONS  Orthopedic surgery     PROCEDURES  Right hip hemiarthroplasty    LABORATORY  Lab Results   Component Value Date    SODIUM 129 (L) 02/01/2023    POTASSIUM 4.2 02/01/2023    CHLORIDE 95 (L) 02/01/2023    CO2 24 02/01/2023    GLUCOSE 145 (H) 02/01/2023    BUN 16 02/01/2023    CREATININE 0.61 02/01/2023        Lab Results   Component Value Date    WBC 10.7 02/01/2023    HEMOGLOBIN 10.6 (L) 02/01/2023    HEMATOCRIT 31.7 (L) 02/01/2023    PLATELETCT 224 02/01/2023        Total time of the discharge process exceeds 39 minutes.

## 2023-02-01 NOTE — PROGRESS NOTES
4 Eyes Skin Assessment Completed by PEGGY Feliz and PEGGY Xavier.    Head WDL  Ears WDL  Nose WDL  Mouth WDL  Neck WDL  Breast/Chest WDL  Shoulder Blades WDL  Spine WDL  (R) Arm/Elbow/Hand WDL  (L) Arm/Elbow/Hand WDL  Abdomen WDL  Groin WDL  Scrotum/Coccyx/Buttocks WDL  (R) Leg Incision  (L) Leg WDL  (R) Heel/Foot/Toe WDL  (L) Heel/Foot/Toe WDL          Devices In Places Blood Pressure Cuff, Pulse Ox, and Petersen, Abduction Pillow      Interventions In Place NC W/Ear Foams, Pillows, and Q2 Turns    Possible Skin Injury No    Pictures Uploaded Into Epic N/A  Wound Consult Placed N/A  RN Wound Prevention Protocol Ordered No

## 2023-02-01 NOTE — DISCHARGE PLANNING
Case Management Discharge Planning    Admission Date: 1/31/2023  GMLOS: 4  ALOS: 1    6-Clicks ADL Score: 18  6-Clicks Mobility Score: 18      Anticipated Discharge Dispo:  Rehab vs SNF    DME Needed: No    Action(s) Taken: Discussed pt in IDT rounds. Per MD, pt is medically cleared for SNF vs rehab. Per MD, placed PMR and SNF referrals.     1120: LSW met with pt and pt's friends at bedside with permission from pt. LSW explained SNF vs rehab. Pt's [facility], pt states she is motivated and was previously independent and active. LSW left SNF list at bedside for pt to review for back up SNF choices. LSW explained that pt is cleared by MD to transfer once we have an accepting facility.     1132: LSW received VM from friend that pt has filled out SNF choice form. LSW to collect from bedside.     1228: [facility]    1315: Per notes, Todd able to speak with pt.     1330: LSW spoke with pt at bedside. Pt states she spoke with Todd and plan is to transfer to rehab today. LSW messaged Todd to confirm.     1420: Per Todd, waiting for Dr. Abad to review, hopeful for 1600 transfer. LSW updated bedside RN and MD.    1448: Todd confirmed Dr. Abad has [facility]. Per Todd, GMT  at 1600. LSW notified bedside RN and MD.    1510: LSW provided completed cobra to bedside RN.    Escalations Completed: None    Medically Clear: Yes    Next Steps: LSW to follow up with rehab and collect SNF choice.    Barriers to Discharge: Pending Placement

## 2023-02-01 NOTE — PROGRESS NOTES
Received bedside patient report from PEGGY Feliz. Patient resting comfortably in bed, no complaints at this time. Safety precautions in place. Will continue to monitor.

## 2023-02-01 NOTE — PROGRESS NOTES
Orthopedic surgery progress note      Assessment:  Status post right hip hemiarthroplasty, 1/31/2023      Plan:  -Continue medical management  -PT/OT for mobility, POSTERIOR HIP PRECAUTIONS  -Abduction pillow on while in bed  -Okay to remove Petersen when out of bed  -DVT prophylaxis x3 weeks unless medically contraindicated  -f/u with Dr. Rasmussen in 2 weeks

## 2023-02-01 NOTE — THERAPY
Occupational Therapy   Initial Evaluation     Patient Name: Alyssa Juan  Age:  82 y.o., Sex:  female  Medical Record #: [id number]  Today's Date: 2/1/2023     Precautions  Precautions: (P) Fall Risk, Posterior Hip Precautions, Weight Bearing As Tolerated Right Lower Extremity    Assessment  Patient is 82 y.o. female with a diagnosis of Right RITA secondary closed displaced fxof R femoral neck..  Additional factors influencing patient status / progress: Posterior hip precautions, WBAT RLE, impaired balance, right hip pain, limited activity tolerance.  Pt resides in a [facility] with her  in [place], [place].  Patient is the primary caretaker of her .   is not able to assist/Hospice.  PLOF Mod Indep to Indep for ADL's, transfers, functional mobility w/out a device and primary caretake of .  Therapist reviewed/educated pt on environmental/safety awareness, fall precautions, posterior hip precaustions, AE/DME (recommend BSC and hip kit), ADL's and transfers.  Recommend continued skilled occupational therapy while in acute care and IRF upon discharge.    Plan    Occupational Therapy Initial Treatment Plan   Treatment Interventions: (P) Self Care / Activities of Daily Living, Adaptive Equipment, Neuro Re-Education / Balance, Therapeutic Exercises, Therapeutic Activity  Treatment Frequency: (P) 3 Times per Week  Duration: (P) Until Therapy Goals Met    DC Equipment Recommendations: (P) Bed Side Commode, Dressing Stick, Reacher, Long Handled Shoehorn, Sock Aide  Discharge Recommendations: (P) Recommend post-acute placement for additional occupational therapy services prior to discharge home (Recommending IRF)     Subjective    Pt was alert and cooperative w/tx.     Objective       02/01/23 1000    Services   Is patient using  services for this encounter? No   Initial Contact Note    Initial Contact Note Order Received and Verified, Occupational Therapy Evaluation in Progress with Full  Report to Follow.   Prior Living Situation   Prior Services Home-Independent   Housing / Facility 1 Story House   Steps Into Home 2   Steps In Home 0   Rail None   Bathroom Set up Walk In Shower;Shower Glass Doors;Shower Chair   Equipment Owned Tub / Shower Seat   Lives with - Patient's Self Care Capacity Spouse   Comments Pt resides in a [facility] with her  in [place], [place].  Patient is the primary caretaker of her .   is not able to assist/Hospice.  PLOF Mod Indep to Indep for ADL's, transfers, functional mobility w/out a device and primary caretake of .   Prior Level of ADL Function   Self Feeding Independent   Grooming / Hygiene Independent   Bathing Independent   Dressing Independent   Toileting Independent   Prior Level of IADL Function   Medication Management Independent   Laundry Independent   Kitchen Mobility Independent   Finances Independent   Home Management Independent   Shopping Independent   Prior Level Of Mobility Independent Without Device in Community;Independent With Steps in Community;Independent Without Device in Home;Independent With Steps in Home   Driving / Transportation Driving Independent   Occupation (Pre-Hospital Vocational) Retired Due To Age   History of Falls   History of Falls Yes   Precautions   Precautions Fall Risk;Posterior Hip Precautions;Weight Bearing As Tolerated Right Lower Extremity   Pain   Intervention Rest;Repositioned   Pain 0 - 10 Group   Location Hip   Location Orientation Right   Description Aching   Comfort Goal Perform Activity   Therapist Pain Assessment 4;During Activity;Nurse Notified   Non Verbal Descriptors   Non Verbal Scale  Calm;Unlabored Breathing   Cognition    Cognition / Consciousness WDL   Passive ROM Upper Body   Passive ROM Upper Body WDL   Active ROM Upper Body   Active ROM Upper Body  WDL   Dominant Hand Right   Strength Upper Body   Upper Body Strength  WDL   Upper Body Muscle Tone   Upper Body Muscle Tone  WDL   Coordination  Upper Body   Coordination WDL   Balance Assessment   Sitting Balance (Static) Fair +   Sitting Balance (Dynamic) Fair   Standing Balance (Static) Fair   Standing Balance (Dynamic) Fair -   Weight Shift Sitting Fair   Weight Shift Standing Fair   Comments OOB FWW   Bed Mobility    Supine to Sit Minimal Assist   Sit to Supine Moderate Assist   ADL Assessment   Eating Modified Independent   Grooming Contact Guard Assist;Standing   Upper Body Dressing Modified Independent   Lower Body Dressing Maximal Assist   Toileting Minimal Assist   How much help from another person does the patient currently need...   Putting on and taking off regular lower body clothing? 2   Bathing (including washing, rinsing, and drying)? 2   Toileting, which includes using a toilet, bedpan, or urinal? 3   Putting on and taking off regular upper body clothing? 4   Taking care of personal grooming such as brushing teeth? 3   Eating meals? 4   6 Clicks Daily Activity Score 18   Functional Mobility   Sit to Stand Minimal Assist   Bed, Chair, Wheelchair Transfer Minimal Assist   Toilet Transfers Minimal Assist   Transfer Method Stand Step   Mobility CGA FWW, EOB<>commode + sink   Distance (Feet) 15   # of Times Distance was Traveled 2   Edema / Skin Assessment   Edema / Skin  Not Assessed   Activity Tolerance   Sitting in Chair 5   Sitting Edge of Bed 10   Standing 5x2   Comments sitting/commode 10+   Short Term Goals   Short Term Goal # 1 Mod I LB clothing management via AE/DME   Short Term Goal # 2 Mod I toileting task (BM)   Short Term Goal # 3 Mod I toilet transfer via DME   Education Group   Education Provided Hip Precautions;Home Safety;Transfers;Role of Occupational Therapist;Activities of Daily Living;Adaptive Equipment;Use of Call Light;Weight Bearing Precautions   Role of Occupational Therapist Patient Response Patient;Acceptance;Explanation;Verbal Demonstration   Hip Precautions Patient Response  Patient;Acceptance;Explanation;Demonstration;Handout;Verbal Demonstration;Action Demonstration   Home Safety Patient Response Patient;Acceptance;Explanation;Verbal Demonstration   Transfers Patient Response Patient;Acceptance;Explanation;Demonstration;Verbal Demonstration;Action Demonstration;Reinforcement Needed   ADL Patient Response Patient;Acceptance;Explanation;Demonstration;Verbal Demonstration;Action Demonstration;Reinforcement Needed   Adaptive Equipment Patient Response Patient;Acceptance;Explanation;Demonstration;Verbal Demonstration;Action Demonstration;Reinforcement Needed   Use of Call Light Patient Response Patient;Acceptance;Explanation;Verbal Demonstration   Occupational Therapy Initial Treatment Plan    Treatment Interventions Self Care / Activities of Daily Living;Adaptive Equipment;Neuro Re-Education / Balance;Therapeutic Exercises;Therapeutic Activity   Treatment Frequency 3 Times per Week   Duration Until Therapy Goals Met   Problem List   Problem List Decreased Active Daily Living Skills;Decreased Functional Mobility;Decreased Activity Tolerance;Safety Awareness Deficits / Cognition;Impaired Postural Control / Balance   Anticipated Discharge Equipment and Recommendations   DC Equipment Recommendations Bed Side Commode;Dressing Stick;Reacher;Long Handled Shoehorn;Sock Aide   Discharge Recommendations Recommend post-acute placement for additional occupational therapy services prior to discharge home  (Recommending IRF)

## 2023-02-01 NOTE — PROGRESS NOTES
Pt arrived from PACU at approx 20:25. Denies pain. VSS. Bed in low locked position. Call light in reach. All questions answered to patient's satisfaction.

## 2023-02-01 NOTE — ANESTHESIA POSTPROCEDURE EVALUATION
Patient: Alyssa Juan    Procedure Summary     Date: 01/31/23 Room / Location:  OR 06 / SURGERY [facility]    Anesthesia Start: 1743 Anesthesia Stop: 1935    Procedure: HEMIARTHROPLASTY, HIP (Right: Hip) Diagnosis: (RIGHT FEMORAL NECK FRACTURE )    Surgeons: Lv Rasmussen M.D. Responsible Provider: David Nunez M.D.    Anesthesia Type: general ASA Status: 2 - Emergent          Final Anesthesia Type: general  Last vitals  BP   Blood Pressure : 95/44    Temp   36.6 °C (97.9 °F)    Pulse   (!) 58   Resp   18    SpO2   95 %      Anesthesia Post Evaluation    Patient location during evaluation: PACU  Patient participation: complete - patient participated  Level of consciousness: awake and alert  Pain score: 0    Airway patency: patent  Anesthetic complications: no  Cardiovascular status: hemodynamically stable  Respiratory status: acceptable  Hydration status: euvolemic    PONV: none          No notable events documented.     Nurse Pain Score: 4 (NPRS)

## 2023-02-01 NOTE — ASSESSMENT & PLAN NOTE
- Pt states she also fell a week ago and hit her head - she denies headache or neuro symptoms, but given the likelihood of pt needing anticoagulation after her hip surgery, will perform CT brain

## 2023-02-01 NOTE — CONSULTS
Surgery Orthopedic Consultation    Date of Service  1/31/2023    Referring Physician  Zulay Cao M.D.    Consulting Physician  Lv Rasmussen M.D.    Reason for Consultation  Right femoral neck fracture    History of Presenting Illness  82-year-old female who presented to the hospital today after slip and fall.  X-ray significant for right femoral neck fracture.  Denies any antecedent hip pain, and usually walks unassisted.    Review of Systems  14 point review of systems conducted which is negative aside from HPI    Past Medical History   has a past medical history of Cold (4/24/15), Colitis, and Pain (4/24/15).    Surgical History   has a past surgical history that includes rotator cuff repair (2014); hysterectomy, vaginal (1978); other (1975); cataract extraction with iol (2013); mammoplasty augmentation (2001); and shoulder arthroplasty total (Right, 5/5/2015).    Family History  family history includes Cancer in an other family member; Diabetes in an other family member; Hypertension in an other family member; Stroke in an other family member.    Social History   reports that she has never smoked. She has never used smokeless tobacco. She reports current alcohol use of about 3.5 oz per week. She reports that she does not use drugs.    Medications  Prior to Admission Medications   Prescriptions Last Dose Informant Patient Reported? Taking?   B Complex Vitamins (VITAMIN B COMPLEX PO) 1/30/2023 at 1000 Patient Yes No   Sig: Take 1 Tablet by mouth every day.   Cholecalciferol (D3 PO) 1/30/2023 at 1000 Patient Yes Yes   Sig: Take 1 Capsule by mouth every day.   FIBER PO 1/30/2023 at 1400 Patient Yes Yes   Sig: Take 1 Scoop by mouth every day.   HYDROcodone-acetaminophen (NORCO) 5-325 MG Tab per tablet 1/30/2023 at 2000 Patient's Home Pharmacy Yes Yes   Sig: Take 1 Tablet by mouth 2 times a day.   Multiple Vitamins-Minerals (MULTIVITAMIN PO) 1/30/2023 at 1000 Patient Yes No   Sig: Take 1 Tablet by mouth every day.    Omega-3 Fatty Acids (FISH OIL PO) 1/30/2023 at 1000 Patient Yes No   Sig: Take 1 Capsule by mouth every day.   TURMERIC PO 1/30/2023 at 1000 Patient Yes No   Sig: Take 1 Capsule by mouth every day.   levothyroxine (SYNTHROID) 50 MCG Tab 1/31/2023 at 0600 Patient's Home Pharmacy Yes Yes   Sig: Take 50 mcg by mouth every morning on an empty stomach.   lisinopril (PRINIVIL) 20 MG Tab 1/30/2023 at 1000 Patient's Home Pharmacy Yes Yes   Sig: Take 20 mg by mouth every day.   naproxen (ALEVE) 220 MG tablet 1/30/2023 at 1500 Patient Yes Yes   Sig: Take 220 mg by mouth 2 times a day as needed. Indications: Pain   pantoprazole (PROTONIX) 40 MG Tablet Delayed Response 1/31/2023 at 0800 Patient's Home Pharmacy Yes Yes   Sig: Take 40 mg by mouth every day.   polyethylene glycol/lytes (MIRALAX) PACK 1/30/2023 at 1500 Patient Yes No   Sig: Take 17 g by mouth every day.   sucralfate (CARAFATE) 1 GM Tab 1/30/2023 at 2000 Patient's Home Pharmacy Yes Yes   Sig: Take 1 g by mouth 2 times a day. Pt reports she takes twice a day, not 4 times a day      Facility-Administered Medications: None       Allergies  Allergies   Allergen Reactions    Codeine Vomiting and Nausea    Sulfa Drugs Hives       Physical Exam  Temp:  [36.3 °C (97.4 °F)-36.9 °C (98.4 °F)] 36.9 °C (98.4 °F)  Pulse:  [57-69] 57  Resp:  [16-20] 16  BP: (121-164)/(62-81) 132/62  SpO2:  [92 %-96 %] 92 %    Physical Exam  Exam right hip, skin is intact, tenderness to palpation over the hip.  Able to move ankle and toes and sensation at baseline    Fluids      Laboratory  Recent Labs     01/31/23  1140   WBC 7.4   RBC 3.57*   HEMOGLOBIN 12.2   HEMATOCRIT 35.7*   .0*   MCH 34.2*   MCHC 34.2   RDW 46.1   PLATELETCT 151*   MPV 9.7     Recent Labs     01/31/23  1140   SODIUM 132*   POTASSIUM 4.4   CHLORIDE 95*   CO2 26   GLUCOSE 95   BUN 17   CREATININE 0.69   CALCIUM 9.1     Recent Labs     01/31/23  1140   APTT 25.6   INR 0.96                 Imaging  DX-FEMUR-2+ RIGHT    Final Result         Minimally displaced transcervical right femoral neck fracture.      DX-PELVIS-1 OR 2 VIEWS   Final Result         Minimally displaced transcervical fracture right femoral neck.      DX-CHEST-PORTABLE (1 VIEW)   Final Result         No acute cardiopulmonary process.      CT-HEAD W/O    (Results Pending)       Assessment/Plan  Right femoral neck fracture    Plan:  Discussed treatment options.  Would recommend surgery for partial hip replacement for early mobility and for pain control.  I discussed the risks of surgery including but not limited to bleeding, infection, damage to nerves and blood vessels, blood clots, leg length discrepancy, dislocation, need for further surgery, and problems with anesthesia.  She understands and wants to proceed.  To the OR today for right hip hemiarthroplasty

## 2023-02-01 NOTE — THERAPY
Physical Therapy   Initial Evaluation     Patient Name: Alyssa Juan  Age:  82 y.o., Sex:  female  Medical Record #: [id number]  Today's Date: 2/1/2023     Precautions  Precautions: (P) Posterior Hip Precautions;Weight Bearing As Tolerated Right Lower Extremity    Assessment  Patient is 82 y.o. female with a diagnosis of R steph hip replacement Post following Fx.Pt lives at home with ,she is primary caregiver for him.Pt is usually active.Acute PT needed to improve bed mob,transfers,ambulation and stairs     Plan    Physical Therapy Initial Treatment Plan   Treatment Plan : (P) Gait Training, Therapeutic Activities, Therapeutic Exercise, Stair Training, Bed Mobility  Treatment Frequency: (P) Daily    DC Equipment Recommendations: (P) Front-Wheel Walker   Post acute PT before home.        Objective       02/01/23 1100   Charge Group   PT Evaluation PT Evaluation Mod   Total Time Spent   PT Total Time Yes   PT Evaluation Time Spent (Mins) 45   Precautions   Precautions Posterior Hip Precautions;Weight Bearing As Tolerated Right Lower Extremity   Vitals   Pulse 69   Patient BP Position Sitting   Blood Pressure  104/48   Respiration 18   Pulse Oximetry 90 %   O2 Delivery Device None - Room Air   Prior Living Situation   Prior Services Home-Independent   Housing / Facility 1 Story House   Steps Into Home 2   Steps In Home 0   Equipment Owned None   Lives with - Patient's Self Care Capacity Spouse   Prior Level of Functional Mobility   Bed Mobility Independent   Transfer Status Independent   Ambulation Independent   Distance Ambulation (Feet)   (community amb)   Assistive Devices Used None   Stairs Independent   Passive ROM Lower Body   Passive ROM Lower Body X   Active ROM Lower Body    Active ROM Lower Body  X   Strength Lower Body   Lower Body Strength  X   Coordination Lower Body    Coordination Lower Body  WDL   Balance Assessment   Sitting Balance (Static) Fair +   Sitting Balance (Dynamic) Fair   Standing  Balance (Static) Fair   Standing Balance (Dynamic) Fair   Weight Shift Sitting Fair   Weight Shift Standing Fair   Bed Mobility    Supine to Sit Minimal Assist   Sit to Supine Minimal Assist   Scooting Modified Independent   Gait Analysis   Gait Level Of Assist Standby Assist   Assistive Device Front Wheel Walker   Distance (Feet) 75   # of Times Distance was Traveled 1   Deviation Antalgic;Step To   # of Stairs Climbed 0   Weight Bearing Status WBAT R   Functional Mobility   Sit to Stand Standby Assist   Bed, Chair, Wheelchair Transfer Standby Assist   Transfer Method Stand Step   How much difficulty does the patient currently have...   Turning over in bed (including adjusting bedclothes, sheets and blankets)? 3   Sitting down on and standing up from a chair with arms (e.g., wheelchair, bedside commode, etc.) 3   Moving from lying on back to sitting on the side of the bed? 3   How much help from another person does the patient currently need...   Moving to and from a bed to a chair (including a wheelchair)? 3   Need to walk in a hospital room? 3   Climbing 3-5 steps with a railing? 3   6 clicks Mobility Score 18   Activity Tolerance   Sitting Edge of Bed 10   Standing 10   Patient / Family Goals    Patient / Family Goal #1 acute Rehab   Short Term Goals    Short Term Goal # 1 Mod I with bed mob in 6 V   Short Term Goal # 2 Mod i transfers in 6 V   Short Term Goal # 3 Mod I amb x 200 feet in 6 V   Short Term Goal # 4 SBA x 2 stairs in 6 V   Physical Therapy Initial Treatment Plan    Treatment Plan  Gait Training;Therapeutic Activities;Therapeutic Exercise;Stair Training;Bed Mobility   Treatment Frequency Daily   Problem List    Problems Impaired Bed Mobility;Impaired Transfers;Impaired Ambulation;Functional ROM Deficit;Functional Strength Deficit;Impaired Balance   Anticipated Discharge Equipment and Recommendations   DC Equipment Recommendations Front-Wheel Walker   Interdisciplinary Plan of Care Collaboration    IDT Collaboration with  Nursing   Session Information   Date / Session Number  2/1-1 1/7 2/7   Priority 4

## 2023-02-01 NOTE — DISCHARGE PLANNING
[facility] Transitional Care Coordination    Referral from: Dr. Kennedy    Insurance Provider on Facesheet: M[employer]    Potential Rehab Diagnosis: Right femoral neck fracture    Chart review indicates patient may have on going medical management and may have therapy needs to possibly meet inpatient rehab facility criteria with the goal of returning to community.    D/C support will need to be verified: Friend    Physiatry consultation forwarded per protocol.      Thank you for the referral.     1239-Alyssa is medically cleared per Anel MCCLELLAND.

## 2023-02-01 NOTE — OP REPORT
OPERATIVE NOTE   Alyssa Juan   1/31/2023            PRE-OP DIAGNOSIS: Right femoral neck fracture            POST-OP DIAGNOSIS: Right femoral neck fracture            PROCEDURE:   Right hip hemiarthroplasty            SURGEON: Surgeon(s) and Role:     * Lv Rasmussen M.D. - Primary           ASSISTANT:  Dom Daniel PA-C (an assistant was necessary for positioning, retraction, and facilitation of all critical portions of the procedure including prosthesis implantation)     ANESTHESIA: general, Dr. Figueroa            ESTIMATED BLOOD LOSS: 100cc            DRAINS: none            TOTAL IV FLUIDS: see chart            SPECIMENS:   ID Type Source Tests Collected by Time Destination   A : femoral head  Other Other PATHOLOGY SPECIMEN Lv Rasmussen M.D. 1/31/2023  6:52 PM                IMPLANTS: Raffi Biomet echo fx 7 stem, 45 shell, standard head            COMPLICATIONS: none            DISPOSITION: stable            INDICATION:   Patient is a pleasant 82-year-old female who suffered a ground-level fall, fracturing her right femoral neck.  We discussed the risks of surgery and indications.  She understood elected to proceed with right hip hemiarthroplasty.            TECHNIQUE:   Patient was met in the preoperative area and the surgical site was marked.  Once again the procedure and the risk were discussed with her and consent was obtained.  She was brought to the operating room and underwent general anesthesia.  She received preoperative antibiotics as well.  She was placed in the lateral decubitus position and all bony prominences were well-padded.  She was secured by pegboard.  The right hip and lower extremity was then prepped and draped in usual sterile manner.  A timeout was performed confirming the correct patient, correct site, correct procedure.  We then began by making a standard curvilinear incision over the posterior border the greater trochanter.  A standard posterior dissection was undertaken to  the hip joint.  Dissection was to the subcutaneous tissue down to the IT band which was then incised, and then the trochanteric bursa was excised.  We identified the short external rotators, and the piriformis and the rotators were tagged.  I then released the rotators as well as the capsule 1 sleeve and contouring fracture hematoma.  We encountered a transcervical femoral neck fracture.  I then used a saw to perform a proximal femoral osteotomy at the level desired, just above the calcar.  I then remove the femoral head which measured 45 mm.  We then trialed a 45 Childers bipolar shell which fit quite nicely with a negative shuck test.  At this point appropriate retractors were placed on the femur and we used a box osteotome, canal finder, and subsequent reaming.  We then also sequentially broached up to a size 8 stem which had good rotational control.  We then trialed with a standard head and standard 45 mm bipolar shell and we reduced the hip.  The leg lengths were equal and there was good stability with subluxation only at 70 degrees of internal rotation.  At this point we removed the trial implants, and prepared the canal.  Copious irrigation was used as well as a cement restrictor.  The final implants were opened in the back table.  Cement was also mixed.  We then placed cement in the femoral canal after thorough drying, and we then placed our final implant, Raffi Biomet Echo fracture 7 stem, with a 1 mm cement mantle given our broaching up to size 8.  We held this in place until the cement hardened.  Of note, during the entire broaching and implant placement process, we matched the patient's native anteversion.  Once the cement hardened, I then trialed 1 more time with a standard head and 45 mm bipolar shell which once again had excellent fit and good stability.  At this point we removed our trial implants and then copious irrigation was used at the acetabulum.  We then placed our final implant, a 45 mm Raffi  Biomet bipolar shell, and standard head.  This was impacted in place and reduced for the final time.  We then repaired the posterior capsule using #1 Ethibond suture as well as repaired the short external rotators through bone tunnels in the greater trochanter.  We then closed the fascia layer using #1 Vicryl suture, 2-0 Vicryl for the deep dermal layer, and staples for the skin.  Sterile dressings were applied as well as an abduction pillow.  The patient was then awakened from general anesthesia without complications, and taken to the PACU in stable condition.

## 2023-02-01 NOTE — PROGRESS NOTES
Received bedside report from PEGGY Warner. Patient alert and oriented x4, resting comfortably in bed in no acute respiratory distress. Denies any complains at this time. Dressing over right hip clean, dry and intact. RLE CMS intact. Discussed plan of care and understood. Safety precautions in place. Call light placed within reach. Will continue to monitor.

## 2023-02-01 NOTE — PREADMISSION SCREENING NOTE
Pre-Admission Screening Form    Patient Information:   Name: Alyssa Juan     MRN: [id number]       : 1940      Age: 82 y.o.   Gender: female      Race: White [7]       Marital Status:  [2]  Family Contact: Vickie Juan,Anand EnnisVladislav        Relationship: Daughter [2]  Spouse [17]  Son [15]  Home Phone: [phone]             Cell Phone:     [phone]  Advanced Directives: Copy in Chart  Code Status:  FULL  Current Attending Provider: Sanjay Kennedy M.D.  Referring Physician: Dr. Kennedy       Physiatrist Consult: Dr. Abad        Referral Date:   Primary Payor Source:  MEDICARE  Secondary Payor Source:  [facility]    Medical Information:   Date of Admission to Acute Care [place]2023  Room Number: 3312/01  Rehabilitation Diagnosis: 0008.11 - Orthopaedic Disorders: Status Post Unilateral Hip Fracture  Immunization History   Administered Date(s) Administered    INFLUENZA TIV (IM) 2014    MODERNA BIVALENT BOOSTER SARS-COV-2 VACCINE (6+) 10/09/2022    MODERNA SARS-COV-2 VACCINE (12+) 2021, 2021, 10/28/2021, 04/15/2022    Pneumococcal Vaccine (UF) - HISTORICAL DATA 2010     Allergies   Allergen Reactions    Codeine Vomiting and Nausea    Sulfa Drugs Hives     Past Medical History:   Diagnosis Date    Cold 4/24/15    recent cold or allergies    Colitis     Pain 4/24/15    right shoulder     Past Surgical History:   Procedure Laterality Date    SHOULDER ARTHROPLASTY TOTAL Right 2015    Procedure: SHOULDER ARTHROPLASTY TOTAL;  Surgeon: Fernanda Mendosa M.D.;  Location: SURGERY [facility];  Service:     ROTATOR CUFF REPAIR      right    CATARACT EXTRACTION WITH IOL      b/l    MAMMOPLASTY AUGMENTATION      HYSTERECTOMY, VAGINAL  1978    OTHER      lumbar laminectomy       History Leading to Admission, Conditions that Caused the Need for Rehab (CMS):     Zulay Cao M.D.  [facility] Medicine  H&P      Addendum  Date of Service:  1/31/2023 12:48 PM    [facility] Medicine History & Physical Note     Date of Service  1/31/2023     Primary Care Physician  HAWK Hairston.     Consultants  Dr Rasmussen - Ortho     Specialist Names: Valery     Code Status  Prior     Chief Complaint    Chief Complaint  Patient presents with   GLF   Hip Pain        History of Presenting Illness  Alyssa Juan is a 82 y.o. female who presented 1/31/2023 with hip pain after a slip and fall. This is a female with a history of hypothyroidism, GERD and hypertension who presented to hospital with a GLF after slipping when coming back inside from taking out trash.She was found to have a right femoral neck fracture in the OR, and Dr Rasmussen is planning OR later today. She denies hitting anything else during her fall and has no other pain. Of note, pt states she tripped and fell about a week ago trying to help her  ambulate to the bathroom in the middle of the night, and hit her head at that time. She denies subsequent headaches or neuro changes.             Assessment/Plan:  Justification for Admission Status  I anticipate this patient will require at least two midnights for appropriate medical management, necessitating inpatient admission because pt will require operative intervention for her hip fracture, and likely SNF placement      Patient will need a  bed on EMERGENCY service .  The need is secondary to femoral fracture.     * Closed displaced fracture of right femoral neck (HCC)- (present on admission)  Assessment & Plan  - XR with minimally displaced transcervical right femoral neck fracture  - Dr Rasmussen planning on OR today  - PT/OT consult ordered  - Lovenox to start tomorrow      Fall  Assessment & Plan  - Pt states she also fell a week ago and hit her head - she denies headache or neuro symptoms, but given the likelihood of pt needing anticoagulation after her hip surgery, will perform CT brain    "  Hyponatremia  Assessment & Plan  - Mild  - Monitor      Fall from ground level  Assessment & Plan  - Pt denies syncope or hitting anything other than her hip     Hypothyroidism  Assessment & Plan  - Continue home Synthroid   - Normal TSH in August      Gastroesophageal reflux disease  Assessment & Plan  - Resume home PPI and Carafate      HTN (hypertension)  Assessment & Plan  - Resume home Lisinopril  - PRN as necessary            VTE prophylaxis: SCDs/TEDs and enoxaparin ppx - start Lovenox tomorrow     Lv Rasmussen M.D.  Physician  Surgery Orthopedic  OP Report     Signed  Date of Service:  1/31/2023  7:27 PM      OPERATIVE NOTE   Alyssa Juan   1/31/2023            PRE-OP DIAGNOSIS: Right femoral neck fracture            POST-OP DIAGNOSIS: Right femoral neck fracture            PROCEDURE:   Right hip hemiarthroplasty            SURGEON: Surgeon(s) and Role:     * Lv Rasmussen M.D. - Primary    Posterior Hip Precautions;Weight Bearing As Tolerated Right Lower Extremity      Co-morbidities:  as listed above and below   Potential Risk - Complications: Contractures, Deep Vein Thrombosis, Pain, Perceptual Impairment, Pneumonia, Pressure Ulcer, and bleedin risk wound risk healing risk   Level of Risk: High    Ongoing Medical Management Needed (Medical/Nursing Needs):   Patient Active Problem List    Diagnosis Date Noted    Closed displaced fracture of right femoral neck (HCC) 01/31/2023    HTN (hypertension) 01/31/2023    Gastroesophageal reflux disease 01/31/2023    Hypothyroidism 01/31/2023    Fall from ground level 01/31/2023    Hyponatremia 01/31/2023    Fall 01/31/2023    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/05/2015       Current Vital Signs:   Temperature: 36.5 °C (97.7 °F) Pulse: 69 Respiration: 18 Blood Pressure : 104/48  Weight: 44.2 kg (97 lb 7.1 oz) Height: 154.9 cm (5' 1\")  Pulse Oximetry: 90 % O2 (LPM): 0      Completed Laboratory Reports:  Recent Labs     01/31/23  1140 " 02/01/23  0237   WBC 7.4 10.7   HEMOGLOBIN 12.2 10.6*   HEMATOCRIT 35.7* 31.7*   PLATELETCT 151* 224   SODIUM 132* 129*   POTASSIUM 4.4 4.2   BUN 17 16   CREATININE 0.69 0.61   ALBUMIN 4.1  --    GLUCOSE 95 145*   INR 0.96  --      Additional Labs: Not Applicable    Prior Living Situation:   Housing / Facility: 1 Story House  Steps Into Home: 2  Steps In Home: 0  Lives with - Patient's Self Care Capacity: Spouse  Equipment Owned: None    Prior Level of Function / Living Situation:   Physical Therapy: Prior Services: Home-Independent  Housing / Facility: 1 Story House  Steps Into Home: 2  Steps In Home: 0  Rail: None  Bathroom Set up: Walk In Shower, Shower Glass Doors, Shower Chair  Equipment Owned: None  Lives with - Patient's Self Care Capacity: Spouse  Bed Mobility: Independent  Transfer Status: Independent  Ambulation: Independent  Distance Ambulation (Feet):  (community amb)  Assistive Devices Used: None  Stairs: Independent  Current Level of Function:   Gait Level Of Assist: Standby Assist  Assistive Device: Front Wheel Walker  Distance (Feet): 75  Deviation: Antalgic, Step To  # of Stairs Climbed: 0  Weight Bearing Status: WBAT R  Supine to Sit: Minimal Assist  Sit to Supine: Minimal Assist  Scooting: Modified Independent  Sit to Stand: Standby Assist  Bed, Chair, Wheelchair Transfer: Standby Assist  Toilet Transfers: Minimal Assist  Transfer Method: Stand Step  Sitting in Chair: 5  Sitting Edge of Bed: 10  Standing: 10  Occupational Therapy:   Self Feeding: Independent  Grooming / Hygiene: Independent  Bathing: Independent  Dressing: Independent  Toileting: Independent  Medication Management: Independent  Laundry: Independent  Kitchen Mobility: Independent  Finances: Independent  Home Management: Independent  Shopping: Independent  Prior Level Of Mobility: Independent Without Device in Community, Independent With Steps in Community, Independent Without Device in Home, Independent With Steps in Home  Driving  / Transportation: Driving Independent  Prior Services: Home-Independent  Housing / Facility: [address]  Occupation (Pre-Hospital Vocational): Retired Due To Age  Current Level of Function:   Eating: Modified Independent  Upper Body Dressing: Modified Independent  Lower Body Dressing: Maximal Assist  Toileting: Minimal Assist  Speech Language Pathology:      Rehabilitation Prognosis/Potential: Good  Estimated Length of Stay: 7-10 days    Nursing:      Continent    Scope/Intensity of Services Recommended:  Physical Therapy: 1.5 hr / day  5 days / week. Therapeutic Interventions Required: Maximize Endurance, Mobility, Strength, and Safety  Occupational Therapy: 1.5 hr / day 5 days / week. Therapeutic Interventions Required: Maximize Self Care  Rehabilitation Nursin/7. Therapeutic Interventions Required: Monitor Pain, Skin, Wound(s), Vital Signs, Intake and Output, Labs, Safety, and Family Training  Rehabilitation Physician: 3 - 5 days / week. Therapeutic Interventions Required: Medical Management  Respiratory Care: evaluate . Therapeutic Interventions Required: Pulmonary Toileting  Dietician: consult . Therapeutic Interventions Required: nutritional need     She requires 24-hour rehabilitation nursing to manage bowel and bladder function, skin care, surgical incision, nutrition and fluid intake, pulmonary hygiene, pain control, safety, medication management, and patient/family goals. In addition, rehabilitation nursing will reiterate and reinforce therapy skills and equipment use, including ADLs, as well as provide education to the patient and family. Alyssa Juan is willing to participate in and is able to tolerate the proposed plan of care.    Rehabilitation Goals and Plan (Expected frequency & duration of treatment in the IRF):   Return to the Community, Modified Independent Level of Care, and Family Able to Provide 24/7 Assistance initially Daughter to stay   Anticipated Date of Rehabilitation  Admission: 02/01/2023  Patient/Family oriented IRF level of care/facility/plan: Yes  Patient/Family willing to participate in IRF care/facility/plan: Yes  Patient able to tolerate IRF level of care proposed: Yes  Patient has potential to benefit IRF level of care proposed: Yes  Comments: Not Applicable    Special Needs or Precautions - Medical Necessity:  Safety Concerns/Precautions:  Fall Risk / High Risk for Falls and Balance  Complex Wound Care: post surgical   Pain Management  Total Hip Precaution: Posterior  Weight-bearing Status: As Tolerated  Current Medications:    Current Facility-Administered Medications Ordered in Epic   Medication Dose Route Frequency Provider Last Rate Last Admin    levothyroxine (SYNTHROID) tablet 50 mcg  50 mcg Oral AM ES Zulay Cao M.D.   50 mcg at 02/01/23 0540    lisinopril (PRINIVIL) tablet 20 mg  20 mg Oral DAILY Zulay Cao M.D.   20 mg at 01/31/23 1427    sucralfate (CARAFATE) tablet 1 g  1 g Oral BID Zulay Cao M.D.   1 g at 02/01/23 0540    senna-docusate (PERICOLACE or SENOKOT S) 8.6-50 MG per tablet 2 Tablet  2 Tablet Oral BID Zulay Cao M.D.        And    polyethylene glycol/lytes (MIRALAX) PACKET 1 Packet  1 Packet Oral QDAY PRN Zulay Cao M.D.        And    magnesium hydroxide (MILK OF MAGNESIA) suspension 30 mL  30 mL Oral QDAY PRN Zulay Cao M.D.        And    bisacodyl (DULCOLAX) suppository 10 mg  10 mg Rectal QDAY PRN Zulay Cao M.D.        enoxaparin (Lovenox) inj 40 mg  40 mg Subcutaneous DAILY AT 1800 Zulay Cao M.D.        acetaminophen (Tylenol) tablet 650 mg  650 mg Oral Q6HRS PRN Zulay Cao M.D.        Pharmacy Consult Request ...Pain Management Review 1 Each  1 Each Other PHARMACY TO DOSE Zulay Cao M.D.        oxyCODONE immediate-release (ROXICODONE) tablet 5 mg  5 mg Oral Q3HRS PRN Zulay Cao M.D.        Or    oxyCODONE immediate release (ROXICODONE) tablet 10 mg  10 mg Oral Q3HRS PRN Zulay Cao,  M.D.   10 mg at 02/01/23 0825    Or    morphine 4 MG/ML injection 4 mg  4 mg Intravenous Q3HRS PRN Zulay Cao M.D.        labetalol (NORMODYNE/TRANDATE) injection 10 mg  10 mg Intravenous Q4HRS PRN Zulay Cao M.D.        ondansetron (ZOFRAN) syringe/vial injection 4 mg  4 mg Intravenous Q4HRS PRN Zulay Cao M.D.        ondansetron (ZOFRAN ODT) dispertab 4 mg  4 mg Oral Q4HRS PRN Zulay Cao M.D.   4 mg at 02/01/23 0825    omeprazole (PRILOSEC) capsule 20 mg  20 mg Oral DAILY Zluay Cao M.D.   20 mg at 02/01/23 0540     No current Epic-ordered outpatient medications on file.     Diet:   DIET ORDERS (From admission to next 24h)       Start     Ordered    01/31/23 1927  Diet Order Diet: Regular  ALL MEALS        Question:  Diet:  Answer:  Regular    01/31/23 1926                    Anticipated Discharge Destination / Patient/Family Goal:  Destination: Home with Assistance Support System: Family   Anticipated home health services: OT, PT, Nursing, and Aide  Previously used HH service/ provider: Not Applicable  Anticipated DME Needs: Walker  Outpatient Services: OT and PT  Alternative resources to address additional identified needs:   No future appointments. Spoke with Alyssa about goals and expectation for IRF level of care. Discussed therapy plan for 3 hours per day 5 days a week. Discussed therapy schedules 30/45 or 60 minute sessions typically 1.5 hours between breakfast and lunch and another 1.5 hours between lunch and dinner. Discussed PT/OT and SLP roles. Discussed potential length of stay. Discussed insurance Medicare /AARP coverage for the program. Discussed Covid visitation and clothing requirements. Alyssa will be primary support for her  to return to the community. Discussed participation with therapy program when visiting.  Patient goal is for JACKI with mobility and self care  Home is a single story with 2 step to enter. Discussed discharge planner role and team conference.  In the  event of additional support need daughter in [place] will come up to stay to support the transition to home. Spouse is on hospice now at home family arranging 24/7 support for him .Alyssa say that she is not concerned about the care for her spouse at this time as she need to focus on her recovery. Alyssa says that her ' s son is providing the care needed for her . Friens to bring clothing to the hospital today for her.        Pre-Screen Completed: 2/1/2023 12:41 PM [place]

## 2023-02-01 NOTE — ANESTHESIA TIME REPORT
Anesthesia Start and Stop Event Times     Date Time Event    1/31/2023 1735 Ready for Procedure     1743 Anesthesia Start     1935 Anesthesia Stop        Responsible Staff  01/31/23    Name Role Begin End    Katerina Greene M.D. Anesth 1743 1849    David Nunez M.D. Anesth 1849 1935        Overtime Reason:  no overtime (within assigned shift)    Comments:

## 2023-02-01 NOTE — DISCHARGE PLANNING
Spoke with Alyssa about goals and expectation for IRF level of care. Discussed therapy plan for 3 hours per day 5 days a week. Discussed therapy schedules 30/45 or 60 minute sessions typically 1.5 hours between breakfast and lunch and another 1.5 hours between lunch and dinner. Discussed PT/OT and SLP roles. Discussed potential length of stay. Discussed insurance Medicare /AARP coverage for the program. Discussed Covid visitation and clothing requirements. Alyssa will be primary support for her  to return to the community. Discussed participation with therapy program when visiting.  Patient goal is for JACKI with mobility and self care  Home is a single story with 2 step to enter. Discussed discharge planner role and team conference.  In the event of additional support need daughter in [place] will come up to stay to support the transition to home. Spouse is on hospice now at home family arranging 24/7 support for him .Alyssa say that she is not concerned about the care for her spouse at this time as she need to focus on her recovery. Alyssa says that her ' s son is providing the care needed for her . Friens to bring clothing to the hospital today for her.

## 2023-02-01 NOTE — ANESTHESIA PROCEDURE NOTES
Airway    Date/Time: 1/31/2023 5:52 PM  Performed by: Katerina Greene M.D.  Authorized by: Katerina Greene M.D.     Location:  OR  Urgency:  Elective  Difficult Airway: No    Indications for Airway Management:  Anesthesia      Spontaneous Ventilation: absent    Sedation Level:  Deep  Preoxygenated: Yes    Patient Position:  Sniffing  Mask Difficulty Assessment:  0 - not attempted  Final Airway Type:  Endotracheal airway  Final Endotracheal Airway:  ETT  Cuffed: Yes    Technique Used for Successful ETT Placement:  Direct laryngoscopy  Devices/Methods Used in Placement:  Intubating stylet and anterior pressure/BURP    Insertion Site:  Oral  Blade Type:  Selene  Laryngoscope Blade/Videolaryngoscope Blade Size:  3  ETT Size (mm):  6.5  Measured from:  Teeth  ETT to Teeth (cm):  20  Placement Verified by: auscultation and capnometry    Cormack-Lehane Classification:  Grade IIa - partial view of glottis  Number of Attempts at Approach:  1

## 2023-02-01 NOTE — CARE PLAN
The patient is Stable - Low risk of patient condition declining or worsening         Progress made toward(s) clinical / shift goals:  OR for surgery today    Patient is not progressing towards the following goals:      Problem: Pain - Standard  Goal: Alleviation of pain or a reduction in pain to the patient’s comfort goal  Outcome: Progressing     Problem: Knowledge Deficit - Standard  Goal: Patient and family/care givers will demonstrate understanding of plan of care, disease process/condition, diagnostic tests and medications  Outcome: Progressing

## 2023-02-01 NOTE — CARE PLAN
The patient is Stable - Low risk of patient condition declining or worsening    Shift Goals  Clinical Goals: Free from falls or injuries, rest and sleep at least 4 hours, pain controlled at 3/10  Patient Goals: Free from falls or injuries, rest and sleep at least 4 hours, pain controlled at 3/10  Family Goals: JOVANI    Progress made toward(s) clinical / shift goals:  No falls or injuries, rested and slept at least 4 hours, chronic pain controlled at 3/10    Patient is not progressing towards the following goals:

## 2023-02-01 NOTE — OR NURSING
1932: Arrived to PACU. Report received from anesthesia/OR circulator. Patient care assumed. Sleeping, respirations spontaneous and non-labored via OAW.  Ice pack placed on CD&I dressing.    1955:   Awake. OAW removed. Good air exchange. Lungs clear. Dressings: Surgical site CDI. No redness, warmth or swelling noted. No c/o pain or nausea.    2000:  Tolerating sips of water.    0812:  xray here          8018:  Patient met criteria for transfer to G[facility] via bed with transport. Oxygen at 3 liters NC with FULL oxygen tank. Patient states good pain control. Denies any n/v, tolerating po well. Surgical dressing CDI. Pedal pulses palpable, cap refill less than 3 sec, VSS. A&Ox4. Report called to floor RN.

## 2023-02-02 PROBLEM — G89.29 CHRONIC ABDOMINAL PAIN: Status: ACTIVE | Noted: 2023-02-02

## 2023-02-02 PROBLEM — D64.9 ANEMIA: Status: ACTIVE | Noted: 2023-02-02

## 2023-02-02 PROBLEM — R10.9 CHRONIC ABDOMINAL PAIN: Status: ACTIVE | Noted: 2023-02-02

## 2023-02-02 LAB
ALBUMIN SERPL BCP-MCNC: 3.5 G/DL (ref 3.2–4.9)
ALBUMIN/GLOB SERPL: 1.5 G/DL
ALP SERPL-CCNC: 48 U/L (ref 30–99)
ALT SERPL-CCNC: 21 U/L (ref 2–50)
ANION GAP SERPL CALC-SCNC: 7 MMOL/L (ref 7–16)
AST SERPL-CCNC: 61 U/L (ref 12–45)
BASOPHILS # BLD AUTO: 0.3 % (ref 0–1.8)
BASOPHILS # BLD: 0.02 K/UL (ref 0–0.12)
BILIRUB SERPL-MCNC: 0.5 MG/DL (ref 0.1–1.5)
BUN SERPL-MCNC: 16 MG/DL (ref 8–22)
CALCIUM ALBUM COR SERPL-MCNC: 9.1 MG/DL (ref 8.5–10.5)
CALCIUM SERPL-MCNC: 8.7 MG/DL (ref 8.5–10.5)
CHLORIDE SERPL-SCNC: 90 MMOL/L (ref 96–112)
CO2 SERPL-SCNC: 27 MMOL/L (ref 20–33)
CREAT SERPL-MCNC: 0.65 MG/DL (ref 0.5–1.4)
EOSINOPHIL # BLD AUTO: 0.02 K/UL (ref 0–0.51)
EOSINOPHIL NFR BLD: 0.3 % (ref 0–6.9)
ERYTHROCYTE [DISTWIDTH] IN BLOOD BY AUTOMATED COUNT: 47.7 FL (ref 35.9–50)
FLUAV RNA SPEC QL NAA+PROBE: NEGATIVE
FLUBV RNA SPEC QL NAA+PROBE: NEGATIVE
GFR SERPLBLD CREATININE-BSD FMLA CKD-EPI: 87 ML/MIN/1.73 M 2
GLOBULIN SER CALC-MCNC: 2.3 G/DL (ref 1.9–3.5)
GLUCOSE SERPL-MCNC: 107 MG/DL (ref 65–99)
HCT VFR BLD AUTO: 28.9 % (ref 37–47)
HGB BLD-MCNC: 9.7 G/DL (ref 12–16)
IMM GRANULOCYTES # BLD AUTO: 0.02 K/UL (ref 0–0.11)
IMM GRANULOCYTES NFR BLD AUTO: 0.3 % (ref 0–0.9)
LYMPHOCYTES # BLD AUTO: 1.58 K/UL (ref 1–4.8)
LYMPHOCYTES NFR BLD: 25.9 % (ref 22–41)
MAGNESIUM SERPL-MCNC: 1.9 MG/DL (ref 1.5–2.5)
MCH RBC QN AUTO: 34 PG (ref 27–33)
MCHC RBC AUTO-ENTMCNC: 33.6 G/DL (ref 33.6–35)
MCV RBC AUTO: 101.4 FL (ref 81.4–97.8)
MONOCYTES # BLD AUTO: 0.8 K/UL (ref 0–0.85)
MONOCYTES NFR BLD AUTO: 13.1 % (ref 0–13.4)
NEUTROPHILS # BLD AUTO: 3.65 K/UL (ref 2–7.15)
NEUTROPHILS NFR BLD: 60.1 % (ref 44–72)
NRBC # BLD AUTO: 0 K/UL
NRBC BLD-RTO: 0 /100 WBC
PLATELET # BLD AUTO: 218 K/UL (ref 164–446)
PMV BLD AUTO: 9.7 FL (ref 9–12.9)
POTASSIUM SERPL-SCNC: 4.9 MMOL/L (ref 3.6–5.5)
PROT SERPL-MCNC: 5.8 G/DL (ref 6–8.2)
RBC # BLD AUTO: 2.85 M/UL (ref 4.2–5.4)
RSV RNA SPEC QL NAA+PROBE: NEGATIVE
SARS-COV-2 RNA RESP QL NAA+PROBE: NOTDETECTED
SODIUM SERPL-SCNC: 124 MMOL/L (ref 135–145)
SPECIMEN SOURCE: NORMAL
TSH SERPL DL<=0.005 MIU/L-ACNC: 1.53 UIU/ML (ref 0.38–5.33)
VIT B12 SERPL-MCNC: 1673 PG/ML (ref 211–911)
WBC # BLD AUTO: 6.1 K/UL (ref 4.8–10.8)

## 2023-02-02 PROCEDURE — 97535 SELF CARE MNGMENT TRAINING: CPT

## 2023-02-02 PROCEDURE — 84443 ASSAY THYROID STIM HORMONE: CPT

## 2023-02-02 PROCEDURE — 97161 PT EVAL LOW COMPLEX 20 MIN: CPT

## 2023-02-02 PROCEDURE — 97116 GAIT TRAINING THERAPY: CPT

## 2023-02-02 PROCEDURE — 97530 THERAPEUTIC ACTIVITIES: CPT

## 2023-02-02 PROCEDURE — 700102 HCHG RX REV CODE 250 W/ 637 OVERRIDE(OP): Performed by: PHYSICAL MEDICINE & REHABILITATION

## 2023-02-02 PROCEDURE — 85025 COMPLETE CBC W/AUTO DIFF WBC: CPT

## 2023-02-02 PROCEDURE — A9270 NON-COVERED ITEM OR SERVICE: HCPCS | Performed by: PHYSICAL MEDICINE & REHABILITATION

## 2023-02-02 PROCEDURE — 700111 HCHG RX REV CODE 636 W/ 250 OVERRIDE (IP): Performed by: PHYSICAL MEDICINE & REHABILITATION

## 2023-02-02 PROCEDURE — 82607 VITAMIN B-12: CPT

## 2023-02-02 PROCEDURE — 83735 ASSAY OF MAGNESIUM: CPT

## 2023-02-02 PROCEDURE — 770010 HCHG ROOM/CARE - REHAB SEMI PRIVAT*

## 2023-02-02 PROCEDURE — 80053 COMPREHEN METABOLIC PANEL: CPT

## 2023-02-02 PROCEDURE — 97166 OT EVAL MOD COMPLEX 45 MIN: CPT

## 2023-02-02 PROCEDURE — 99221 1ST HOSP IP/OBS SF/LOW 40: CPT | Performed by: HOSPITALIST

## 2023-02-02 PROCEDURE — 36415 COLL VENOUS BLD VENIPUNCTURE: CPT

## 2023-02-02 PROCEDURE — 99223 1ST HOSP IP/OBS HIGH 75: CPT | Performed by: PHYSICAL MEDICINE & REHABILITATION

## 2023-02-02 RX ORDER — SODIUM CHLORIDE 1 G/1
1 TABLET ORAL
Status: DISCONTINUED | OUTPATIENT
Start: 2023-02-02 | End: 2023-02-08 | Stop reason: HOSPADM

## 2023-02-02 RX ORDER — LISINOPRIL 20 MG/1
20 TABLET ORAL
Status: DISCONTINUED | OUTPATIENT
Start: 2023-02-03 | End: 2023-02-06

## 2023-02-02 RX ADMIN — ACETAMINOPHEN 1000 MG: 500 TABLET, FILM COATED ORAL at 14:33

## 2023-02-02 RX ADMIN — SUCRALFATE 1 G: 1 TABLET ORAL at 20:55

## 2023-02-02 RX ADMIN — OMEPRAZOLE 20 MG: 20 CAPSULE, DELAYED RELEASE ORAL at 08:41

## 2023-02-02 RX ADMIN — LEVOTHYROXINE SODIUM 50 MCG: 0.05 TABLET ORAL at 05:57

## 2023-02-02 RX ADMIN — SODIUM CHLORIDE 1 G: 1 TABLET ORAL at 17:29

## 2023-02-02 RX ADMIN — SENNOSIDES AND DOCUSATE SODIUM 2 TABLET: 50; 8.6 TABLET ORAL at 08:41

## 2023-02-02 RX ADMIN — SUCRALFATE 1 G: 1 TABLET ORAL at 08:41

## 2023-02-02 RX ADMIN — SODIUM CHLORIDE 1 G: 1 TABLET ORAL at 11:35

## 2023-02-02 RX ADMIN — OXYCODONE HYDROCHLORIDE 10 MG: 10 TABLET ORAL at 05:56

## 2023-02-02 RX ADMIN — SENNOSIDES AND DOCUSATE SODIUM 2 TABLET: 50; 8.6 TABLET ORAL at 20:55

## 2023-02-02 RX ADMIN — ACETAMINOPHEN 1000 MG: 500 TABLET, FILM COATED ORAL at 08:40

## 2023-02-02 RX ADMIN — ENOXAPARIN SODIUM 40 MG: 40 INJECTION SUBCUTANEOUS at 17:29

## 2023-02-02 RX ADMIN — ACETAMINOPHEN 1000 MG: 500 TABLET, FILM COATED ORAL at 20:55

## 2023-02-02 SDOH — ECONOMIC STABILITY: TRANSPORTATION INSECURITY
IN THE PAST 12 MONTHS, HAS LACK OF RELIABLE TRANSPORTATION KEPT YOU FROM MEDICAL APPOINTMENTS, MEETINGS, WORK OR FROM GETTING THINGS NEEDED FOR DAILY LIVING?: NO

## 2023-02-02 SDOH — ECONOMIC STABILITY: TRANSPORTATION INSECURITY
IN THE PAST 12 MONTHS, HAS THE LACK OF TRANSPORTATION KEPT YOU FROM MEDICAL APPOINTMENTS OR FROM GETTING MEDICATIONS?: NO

## 2023-02-02 ASSESSMENT — ENCOUNTER SYMPTOMS
PALPITATIONS: 0
SHORTNESS OF BREATH: 0
COUGH: 0
BRUISES/BLEEDS EASILY: 0
VOMITING: 0
CHILLS: 0
EYES NEGATIVE: 1
FEVER: 0
NAUSEA: 0
POLYDIPSIA: 0
ABDOMINAL PAIN: 1

## 2023-02-02 ASSESSMENT — ACTIVITIES OF DAILY LIVING (ADL)
TOILETING: INDEPENDENT
TOILET_TRANSFER_DESCRIPTION: GRAB BAR;INCREASED TIME;SUPERVISION FOR SAFETY;SET-UP OF EQUIPMENT
BED_CHAIR_WHEELCHAIR_TRANSFER_DESCRIPTION: INCREASED TIME;INITIAL PREPARATION FOR TASK;SUPERVISION FOR SAFETY;VERBAL CUEING

## 2023-02-02 ASSESSMENT — BRIEF INTERVIEW FOR MENTAL STATUS (BIMS)
INITIAL REPETITION OF BED BLUE SOCK - FIRST ATTEMPT: 3
WHAT DAY OF THE WEEK IS IT: CORRECT
WHAT MONTH IS IT: ACCURATE WITHIN 5 DAYS
ASKED TO RECALL BED: YES, NO CUE REQUIRED
WHAT YEAR IS IT: CORRECT
ASKED TO RECALL BLUE: YES, NO CUE REQUIRED
ASKED TO RECALL SOCK: YES, NO CUE REQUIRED
BIMS SUMMARY SCORE: 15

## 2023-02-02 ASSESSMENT — GAIT ASSESSMENTS
ASSISTIVE DEVICE: FRONT WHEEL WALKER
GAIT LEVEL OF ASSIST: CONTACT GUARD ASSIST
DISTANCE (FEET): 200
ASSISTIVE DEVICE: FRONT WHEEL WALKER
GAIT LEVEL OF ASSIST: STANDBY ASSIST
DISTANCE (FEET): 75
DEVIATION: ANTALGIC;BRADYKINETIC
DEVIATION: ANTALGIC;STEP TO;BRADYKINETIC

## 2023-02-02 NOTE — CARE PLAN
The patient is Stable - Low risk of patient condition declining or worsening         Progress made toward(s) clinical / shift goals:    Problem: Knowledge Deficit - Standard  Goal: Patient and family/care givers will demonstrate understanding of plan of care, disease process/condition, diagnostic tests and medications  Outcome: Progressing. Patient understands to not get out of bed on own and to use call light for needs.      Problem: Pain - Standard  Goal: Alleviation of pain or a reduction in pain to the patient’s comfort goal  Outcome: Progressing. Patient received prn oxycodone 10 mg at bedtime for 7/10 pain to right hip. Patient stated adequate relief after receiving pain medication and ice pack to right hip.      Problem: VTE Prevention  Goal: Patient will remain free from venous thromboembolism (VTE)  Outcome: Progressing. Patient on scheduled Lovenox for vte prevention.

## 2023-02-02 NOTE — PROGRESS NOTES
Discharge instructions, home medication and follow up appointment discussed with patient and understood. Denies any question at this time. All belongings sent with. Discharged alert and oriented x4 in stable condition. Transported by T transport.

## 2023-02-02 NOTE — CARE PLAN
Problem: Pain - Standard  Goal: Alleviation of pain or a reduction in pain to the patient’s comfort goal  Flowsheets (Taken 2/1/2023 1800)  Pain Rating Scale (NPRS): 5     Problem: Skin Integrity  Goal: Skin integrity is maintained or improved  Note: Incision cleansed with normal saline and island dressing placed. Scant sanguineous drainage present, no s/s of infection noted.    The patient is Stable - Low risk of patient condition declining or worsening

## 2023-02-02 NOTE — DIETARY
"Nutrition services: Day 1 of admit.  Alyssa Juan is a 82 y.o. female with admitting DX of Closed displaced fracture of right femoral neck.    Consult received for MST of 2 on nutrition screen due to report of 2-13 lb wt loss x 3 months and poor PO PTA      Assessment:  Height: 154.9 cm (5' 1\")  Weight: 44.9 kg (98 lb 14.4 oz)  Body mass index is 18.69 kg/m²., BMI classification: low based on pt's age  Diet/Intake: regular/ <25%    Evaluation:   History of hypertension, GERD.   RD visited pt in her room. She said that she thinks she lost ~8 to 10 lb x 1 month because she was eating < 75% of normal. Reduced PO was due to caring for her ailing . She said that her meal schedules were altered and he requires encouragement to eat. She was so busy caring for him that her intake was less. UBW prior to wt loss was 105-106 lb. Based on UBW, pt lost ~ 7 lb (6.7%) x 1 month. Wt loss is significant.   She said poor PO here has been due to concern for GI problems. She said that if she eats too much she may have either constipation or diarrhea. She agreed to Boost Plus supplements for additional nutrition. PO encouraged to maintain strength to work w/ therapies and encourage healing.    Based on visual observation, pt noted to have moderate muscle loss in temple region. This is most evident when her current facial structure is compared to the photo on [place]  Labs: 2/2/23: sodium=124  Meds: Prilosec, senna-docusate, Carafate    Malnutrition Risk: Pt meets ASPEN criteria for moderate malnutrition in the context of social circumstances, starvation related due to diminished PO intake caused by self neglect while caring for her ailing  AEB wt loss of >/=6.7% x 1 month and moderate muscle loss noted in temple region.    Recommendations/Plan:  Add Boost Plus to meals TID   Encourage intake of ~50%  Document intake of all meals and supplements  as % taken in ADL's to provide interdisciplinary " communication across all shifts.   Monitor weight.  Nutrition rep will continue to see patient for ongoing meal and snack preferences.         RD will follow.

## 2023-02-02 NOTE — CONSULTS
HOSPITAL MEDICINE CONSULTATION    Requesting Physician:  Dr. Abad    Reason for Consult:  Hyponatremia, Hypertension    History of Present Illness:  The patient is an 82-year-old  female with past medical history significant for hypertension, gastroesophageal reflux disease, and hypothyroidism.  She was admitted to [facility] on 1/31/23 after a mechanical ground level fall where she sustained a right hip fracture.  The patient underwent right hemiarthroplasty by Dr. Rasmussen on the day of admission.  Due to her ongoing functional debility, the patient was transferred to [facility] on 2/1/23.  Hospital Medicine consultation is requested to assist in the management of this patient's hypertension and hyponatremia.  She is also noted to have anemia.  Review of systems is notable for chronic intermittent abdominal pain, for which she is followed by [facility].    Review of Systems:  Review of Systems   Constitutional:  Negative for chills and fever.   HENT: Negative.     Eyes: Negative.    Respiratory:  Negative for cough and shortness of breath.    Cardiovascular:  Negative for chest pain and palpitations.   Gastrointestinal:  Positive for abdominal pain. Negative for nausea and vomiting.   Musculoskeletal:  Positive for joint pain.        Wound pain   Skin:  Negative for itching and rash.   Endo/Heme/Allergies:  Negative for polydipsia. Does not bruise/bleed easily.   All other systems reviewed and are negative.    Allergies:  Allergies   Allergen Reactions    Codeine Vomiting and Nausea    Sulfa Drugs Hives       Medications:    Current Facility-Administered Medications:     sodium chloride (SALT) tablet 1 g, 1 g, Oral, TID WITH MEALS, Anu Abad M.D., 1 g at 02/02/23 1135    [START ON 2/3/2023] lisinopril (PRINIVIL) tablet 20 mg, 20 mg, Oral, QAM , Anu Abad M.D.    hydrOXYzine HCl (ATARAX) tablet 50 mg, 50 mg, Oral, Q6HRS PRN,  Anu Abad M.D.    melatonin tablet 3 mg, 3 mg, Oral, HS PRN, Anu Abad M.D.    Respiratory Therapy Consult, , Nebulization, Continuous RT, Anu Abad M.D.    lactulose 20 GM/30ML solution 30 mL, 30 mL, Oral, QDAY PRN, Anu Abad M.D.    docusate sodium (ENEMEEZ) enema 283 mg, 283 mg, Rectal, QDAY PRN, Anu Abad M.D.    mag hydrox-al hydrox-simeth (MAALOX PLUS ES or MYLANTA DS) suspension 20 mL, 20 mL, Oral, Q2HRS PRN, Anu Abad M.D.    ondansetron (ZOFRAN ODT) dispertab 4 mg, 4 mg, Oral, 4X/DAY PRN **OR** ondansetron (ZOFRAN) syringe/vial injection 4 mg, 4 mg, Intramuscular, 4X/DAY PRN, Anu Abad M.D.    traZODone (DESYREL) tablet 50 mg, 50 mg, Oral, QHS PRN, Anu Abad M.D.    sodium chloride (OCEAN) 0.65 % nasal spray 2 Spray, 2 Spray, Nasal, PRN, Anu Abad M.D.    acetaminophen (TYLENOL) tablet 1,000 mg, 1,000 mg, Oral, TID, Anu Abad M.D., 1,000 mg at 02/02/23 1433    enoxaparin (Lovenox) inj 40 mg, 40 mg, Subcutaneous, DAILY AT 1800, Anu Abad M.D., 40 mg at 02/01/23 1741    levothyroxine (SYNTHROID) tablet 50 mcg, 50 mcg, Oral, AM ES, Anu Abad M.D., 50 mcg at 02/02/23 0557    omeprazole (PRILOSEC) capsule 20 mg, 20 mg, Oral, DAILY, Anu Abad M.D., 20 mg at 02/02/23 0841    oxyCODONE immediate-release (ROXICODONE) tablet 5 mg, 5 mg, Oral, Q3HRS PRN, 5 mg at 02/01/23 1841 **OR** oxyCODONE immediate release (ROXICODONE) tablet 10 mg, 10 mg, Oral, Q3HRS PRN, 10 mg at 02/02/23 0556 **OR** [DISCONTINUED] morphine 4 MG/ML injection 4 mg, 4 mg, Intravenous, Q3HRS PRN, Anu Abad M.D.    senna-docusate (PERICOLACE or SENOKOT S) 8.6-50 MG per tablet 2 Tablet, 2 Tablet, Oral, BID, 2 Tablet at 02/02/23 0841 **AND** polyethylene glycol/lytes (MIRALAX) PACKET 1 Packet, 1 Packet, Oral, QDAY PRN **AND** magnesium hydroxide (MILK OF MAGNESIA) suspension 30 mL, 30 mL, Oral, QDAY PRN **AND** bisacodyl  "(DULCOLAX) suppository 10 mg, 10 mg, Rectal, QDAY PRN, Anu Abad M.D.    sucralfate (CARAFATE) tablet 1 g, 1 g, Oral, BID, Anu Abad M.D., 1 g at 02/02/23 0841    Past Medical/Surgical History:  Past Medical History:   Diagnosis Date    Cold 4/24/15    recent cold or allergies    Colitis     Pain 4/24/15    right shoulder     Past Surgical History:   Procedure Laterality Date    PB PARTIAL HIP REPLACEMENT Right 1/31/2023    Procedure: HEMIARTHROPLASTY, HIP;  Surgeon: Lv Rasmussen M.D.;  Location: SURGERY [facility];  Service: Orthopedics    SHOULDER ARTHROPLASTY TOTAL Right 5/5/2015    Procedure: SHOULDER ARTHROPLASTY TOTAL;  Surgeon: Fernanda Mendosa M.D.;  Location: SURGERY [facility];  Service:     ROTATOR CUFF REPAIR  2014    right    CATARACT EXTRACTION WITH IOL  2013    b/l    MAMMOPLASTY AUGMENTATION  2001    HYSTERECTOMY, VAGINAL  1978    OTHER  1975    lumbar laminectomy       Social History:  Social History     Socioeconomic History    Marital status:      Spouse name: Not on file    Number of children: Not on file    Years of education: Not on file    Highest education level: Not on file   Occupational History    Not on file   Tobacco Use    Smoking status: Never    Smokeless tobacco: Never   Substance and Sexual Activity    Alcohol use: Yes     Alcohol/week: 3.5 oz     Types: 7 Standard drinks or equivalent per week     Comment: \"a glass of wine every night\"    Drug use: No    Sexual activity: Not on file   Other Topics Concern    Not on file   Social History Narrative    Not on file     Social Determinants of Health     Financial Resource Strain: Not on file   Food Insecurity: Not on file   Transportation Needs: No Transportation Needs    Lack of Transportation (Medical): No    Lack of Transportation (Non-Medical): No   Physical Activity: Not on file   Stress: Not on file   Social Connections: Not on file   Intimate Partner Violence: Not on file   Housing Stability: " Not on file       Family History:  Family History   Problem Relation Age of Onset    Diabetes Other     Hypertension Other     Stroke Other     Cancer Other        Physical Examination:   Vitals:    02/01/23 1915 02/02/23 0632 02/02/23 1030 02/02/23 1455   BP: 112/56 119/61 116/60 114/46   Pulse: 69 66 66 (!) 56   Resp: 18 18  17   Temp: 36.9 °C (98.4 °F) 36.9 °C (98.5 °F)     TempSrc: Oral Oral     SpO2: 91% 95%  98%   Weight:       Height:           Physical Exam  Vitals reviewed.   Constitutional:       General: She is not in acute distress.     Appearance: Normal appearance. She is not ill-appearing.   HENT:      Head: Normocephalic and atraumatic.      Right Ear: External ear normal.      Left Ear: External ear normal.      Nose: Nose normal.      Mouth/Throat:      Pharynx: Oropharynx is clear.   Eyes:      General:         Right eye: No discharge.         Left eye: No discharge.      Extraocular Movements: Extraocular movements intact.      Conjunctiva/sclera: Conjunctivae normal.   Cardiovascular:      Rate and Rhythm: Normal rate and regular rhythm.   Pulmonary:      Effort: Pulmonary effort is normal. No respiratory distress.      Breath sounds: Normal breath sounds. No wheezing.   Abdominal:      General: Bowel sounds are normal. There is no distension.      Palpations: Abdomen is soft.      Tenderness: There is no abdominal tenderness.   Musculoskeletal:      Cervical back: Normal range of motion and neck supple.      Right lower leg: No edema.      Left lower leg: No edema.   Skin:     General: Skin is warm and dry.   Neurological:      Mental Status: She is alert and oriented to person, place, and time.       Laboratory Data:  Recent Labs     01/31/23  1140 02/01/23  0237 02/02/23  0602   WBC 7.4 10.7 6.1   RBC 3.57* 3.13* 2.85*   HEMOGLOBIN 12.2 10.6* 9.7*   HEMATOCRIT 35.7* 31.7* 28.9*   .0* 101.3* 101.4*   MCH 34.2* 33.9* 34.0*   MCHC 34.2 33.4* 33.6   RDW 46.1 46.3 47.7   PLATELETCT 151* 224  218   MPV 9.7 9.3 9.7     Recent Labs     01/31/23  1140 02/01/23  0237 02/02/23  0602   SODIUM 132* 129* 124*   POTASSIUM 4.4 4.2 4.9   CHLORIDE 95* 95* 90*   CO2 26 24 27   GLUCOSE 95 145* 107*   BUN 17 16 16   CREATININE 0.69 0.61 0.65   CALCIUM 9.1 8.2* 8.7       Imaging:  No orders to display       Impressions/Recommendations:  Hypothyroidism  Euthyroid on Synthroid    Chronic abdominal pain  Currently being worked up as outpt  GI F/U    Closed right hip fracture, sequela  2/2 mechanical GLF  S/P R hemiarthroplasty on 1/31/23 by Dr. Rasmussen  Wound care and pain control per Physiatry    Gastroesophageal reflux disease  On Omeprazole    HTN (hypertension)  Observe blood pressure trends on Lisinopril    Hyponatremia  Appears chronic w/ baseline Na+ 130  Agree w/ NaCl for Na+ <130  Follow electrolytes    Anemia   mL  Has macrocytic indices  Vit B12 and TSH levels normal  Folate testing no longer performed at this facility  Follow H/H    Full Code    Thank you for the opportunity to assist in this patient's care.  We will continue to follow along with you.

## 2023-02-02 NOTE — PROGRESS NOTES
Patient admitted to facility at 1625 via wheelchair; accompanied by hospital transport.  Patient assisted to room and positioned in bed for comfort and safety; call light within reach.  Patient assisted with stowing belongings and oriented to room and facility.  Admission assessment performed and documented in computer. Patient received and reviewed education binder. Admission paperwork completed; signed copies placed in chart.  Will continue to monitor.

## 2023-02-02 NOTE — DISCHARGE PLANNING
CASE MANAGEMENT INITIAL ASSESSMENT    Admit Date:  2/1/2023     Case Management has reviewed the medical chart and will meet with patient to discuss role of case management / discharge planning / team conference.     Patient is a  82 y.o. female transferred from [facility].    Attending physician: Dr. Abad   PCP: [facility]  Orthopedic: Dr. 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     Prior Living Situation:  Housing / Facility: [address]  Lives with - Patient's Self Care Capacity: Spouse    Prior Level of Function:  Medication Management: Independent  Finances: Independent  Home Management: Independent  Shopping: Independent  Prior Level Of Mobility: Independent Without Device in Community, Independent Without Device in Home, Independent With Steps in Community  Driving / Transportation: Driving Independent    Support Systems:  Primary : Vickie Juan (daughter) [phone], Anand Ennis (Spouse) [phone]     Previous Services Utilized:   Equipment Owned: Tub / Shower Seat  Prior Services: Home-Independent    Other Information:  Occupation (Pre-Hospital Vocational): Retired Due To Age  Primary Payor Source: Medicare A, Medicare B  Secondary Payor Source: Other (Comments) (aarp)  Primary Care Practitioner : [facility]  Other MDs: Dr. Rasmussen (orthopedic)    Patient / Family Goal:  Patient / Family Goal: primary  will meet with patient to discuss  goals    Plan:  1. Continue to follow patient through hospitalization and provide discharge planning in collaboration with patient, family, physicians and ancillary services.     2. Utilize community resources to ensure a safe discharge.

## 2023-02-02 NOTE — FLOWSHEET NOTE
02/01/23 1715   Events/Summary/Plan   Events/Summary/Plan o2 spot check   Vital Signs   Pulse 70   Respiration 16   Pulse Oximetry 97 %   $ Pulse Oximetry (Spot Check) Yes   Respiratory Assessment   Level of Consciousness Alert   Chest Exam   Work Of Breathing / Effort Within Normal Limits   Breath Sounds   RUL Breath Sounds Clear   RML Breath Sounds Clear   RLL Breath Sounds Clear   ANA Breath Sounds Clear   LLL Breath Sounds Clear   Oxygen   O2 Delivery Device None - Room Air

## 2023-02-02 NOTE — PROGRESS NOTES
Brief HPI. Full H&P to be completed in the morning.    82-year-old female with history of hypertension, GERD, hypothyroidism, admitted to hospital on 1/31/2023 with right hip pain after ground-level fall at home.  Imaging showed right femoral neck displaced fracture for which she underwent a right hemiarthroplasty with Dr. Rasmussen on 3 1.  She is weightbearing as tolerated with posterior hip precautions.  Her hemoglobin dropped postoperatively from 12.2 to 10.6.    Patient lives in a one-story home with 2 steps to enter her house and was previously independent.  She lives with her spouse who she is the primary caretaker for and he is on hospice.     She was seen and evaluated by physical and Occupational Therapy.  She is currently requiring min assist for bed mobility, standby assist for ambulation 75 feet with a front wheel walker with antalgic gait.  She is currently requiring min assist for toileting and max assist for lower body dressing.    She was deemed an appropriate candidate for an acute inpatient rehabilitation program and was admitted on 2/1/2023.    Anu Abad MD  Physical Medicine and Rehabilitation

## 2023-02-02 NOTE — PROGRESS NOTES
-----------Non-Face-to-Face------------    Prolonged non-face-to-face time was spent on 2/1/2023 from 9:41 am to 10:15 am by this reviewer.  This time included medical chart review, medication review, and decision making for the appropriateness of transfer to inpatient rehabilitation.  In addition to the above, time was spent coordinating care with case management as well as transitional care coordination team in the acceptance and transfer of the patient to the inpatient rehabilitation facility setting.    82-year-old female with history of hypertension, GERD, hypothyroidism, admitted to hospital on 1/31/2023 with right hip pain after ground-level fall at home.  Imaging showed right femoral neck displaced fracture for which she underwent a right hemiarthroplasty with Dr. Rasmussen on 1/31.  She is weightbearing as tolerated with posterior hip precautions.  Her hemoglobin dropped postoperatively from 12.2 to 10.6.            Patient lives in a one-story home with 2 steps to enter her house and was previously independent.  She lives with her spouse who she is the primary caretaker for and he is on hospice.      She was seen and evaluated by physical and Occupational Therapy.  She is currently requiring min assist for bed mobility, standby assist for ambulation 75 feet with a front wheel walker with antalgic gait.  She is currently requiring min assist for toileting and max assist for lower body dressing.     Patient was determined to be a good candidate for an acute inpatient rehab program using a coordinated team approach with the expectation of a significant practical recovery in a reasonable amount of time. Plan to discharge home with support from her step-son who is currently assisting in the care of her , with hired caregiver help.     She was admitted to acute inpatient rehabilitation on 1/31/23.          Anu Abad M.D.  Physical Medicine and Rehabilitation

## 2023-02-02 NOTE — CARE PLAN
Problem: Pain - Standard  Goal: Alleviation of pain or a reduction in pain to the patient’s comfort goal  Flowsheets (Taken 2/2/2023 0800)  Pain Rating Scale (NPRS): 0     Problem: Hemodynamics  Goal: Patient's hemodynamics, fluid balance and neurologic status will be stable or improve  Note: Na of 124 reported to Dr. Abad.    The patient is Stable - Low risk of patient condition declining or worsening

## 2023-02-02 NOTE — CARE PLAN
The patient is Watcher - Medium risk of patient condition declining or worsening         Progress made toward(s) clinical / shift goals:  pt admitted and plan of care discussed with patient. She was able to participate in development of her care plan. She is very pleasant and wanting to get back home to her  who is on hospice.

## 2023-02-02 NOTE — FLOWSHEET NOTE
02/01/23 1716   Protocol Assessment   Initial Assessment Yes   Patient History   Pulmonary Diagnosis none   Procedures Relevant to Respiratory Status none   Home O2 No   Nocturnal CPAP No   Home Treatments/Frequency No   Sleep Apnea Screening   Have you had a sleep study? No   Have you been diagnosed with sleep apnea? No   S - Have you been told that you SNORE? 0

## 2023-02-02 NOTE — H&P
REHABILITATION HISTORY AND PHYSICAL/POST ADMISSION EVALUATION    2/2/2023  12:32 PM  Alyssa Juan  [id number]/02  Admission  2/1/2023  4:33 PM  [facility] Code/Reason for admission: 0008.11 - Orthopaedic Disorders: Status Post Unilateral Hip Fracture   Etiologic diagnosis/problem: Closed right hip fracture, sequela  Chief Complaint: right hip pain    HPI:  82-year-old female with history of hypertension, GERD, hypothyroidism, admitted to hospital on 1/31/2023 with right hip pain after ground-level fall at home.  Imaging showed right femoral neck displaced fracture for which she underwent a right hemiarthroplasty with Dr. Rasmussen on 1/31.  She is weightbearing as tolerated with posterior hip precautions.  Her hemoglobin dropped postoperatively from 12.2 to 10.6.    Patient currently reports intermittent right hip pain, ranges from 4-5/10 at rest, and higher with movement. She moved her bowels this morning and denies any issues with urination.  Patient reports history of chronic postprandial abdominal pain for which she is being worked up as an outpatient with gastroenterology.  She has a history of a right reverse total shoulder replacement and is right hand dominant.     Her sodium level this morning is much lower at 124, so the hospitalist has been consulted.     Patient was evaluated by Rehab Medicine physician and Physical Therapy and Occupational Therapy and determined to be appropriate for acute inpatient rehab and was transferred to [facility] on 2/1/2023  4:33 PM.    With this acute therapeutic intervention, this patient hopes to improve her functional status, and return to independent living with the supportive care of family.    REVIEW OF SYSTEMS:     A complete review of systems was performed and was negative in detail with the exception of items mentioned elsewhere in this document.    PMH:  Past Medical History:   Diagnosis Date    Cold 4/24/15    recent cold or allergies    Colitis     Pain  4/24/15    right shoulder       PSH:  Past Surgical History:   Procedure Laterality Date    SHOULDER ARTHROPLASTY TOTAL Right 5/5/2015    Procedure: SHOULDER ARTHROPLASTY TOTAL;  Surgeon: Fernanda Mendosa M.D.;  Location: SURGERY [facility];  Service:     ROTATOR CUFF REPAIR  2014    right    CATARACT EXTRACTION WITH IOL  2013    b/l    MAMMOPLASTY AUGMENTATION  2001    HYSTERECTOMY, VAGINAL  1978    OTHER  1975    lumbar laminectomy       Family History   Problem Relation Age of Onset    Diabetes Other     Hypertension Other     Stroke Other     Cancer Other         MEDICATIONS:  Scheduled Medications   Medication Dose Frequency    sodium chloride  1 g TID WITH MEALS    acetaminophen  1,000 mg TID    enoxaparin (LOVENOX) injection  40 mg DAILY AT 1800    levothyroxine  50 mcg AM ES    lisinopril  20 mg DAILY    omeprazole  20 mg DAILY    senna-docusate  2 Tablet BID    sucralfate  1 g BID       ALLERGIES:  Codeine and Sulfa drugs    PSYCHOSOCIAL HISTORY:  Patient lives in a one-story home with 2 steps to enter her house and was previously independent.  She lives with her spouse who she is the primary caretaker for and he is on hospice.     Plan to discharge home with support from her step-son who is currently assisting in the care of her , with hired caregiver help.     No tobacco, occasional half glass of wine.    LEVEL OF FUNCTION PRIOR TO DISABILTY:  Independent     LEVEL OF FUNCTION PRIOR TO ADMISSION to [facility]:  PT 2/1:    Balance Assessment   Sitting Balance (Static) Fair +   Sitting Balance (Dynamic) Fair   Standing Balance (Static) Fair   Standing Balance (Dynamic) Fair   Weight Shift Sitting Fair   Weight Shift Standing Fair   Bed Mobility    Supine to Sit Minimal Assist   Sit to Supine Minimal Assist   Scooting Modified Independent   Gait Analysis   Gait Level Of Assist Standby Assist   Assistive Device Front Wheel Walker   Distance (Feet) 75   # of Times Distance  "was Traveled 1   Deviation Antalgic;Step To   # of Stairs Climbed 0   Weight Bearing Status WBAT R   Functional Mobility   Sit to Stand Standby Assist   Bed, Chair, Wheelchair Transfer Standby Assist   Transfer Method Stand Step       OT 2/1:    Balance Assessment   Sitting Balance (Static) Fair +   Sitting Balance (Dynamic) Fair   Standing Balance (Static) Fair   Standing Balance (Dynamic) Fair -   Weight Shift Sitting Fair   Weight Shift Standing Fair   Comments OOB FWW   Bed Mobility    Supine to Sit Minimal Assist   Sit to Supine Moderate Assist   ADL Assessment   Eating Modified Independent   Grooming Contact Guard Assist;Standing   Upper Body Dressing Modified Independent   Lower Body Dressing Maximal Assist   Toileting Minimal Assist       CURRENT LEVEL OF FUNCTION:   Same as level of function prior to admission to [facility]    PHYSICAL EXAM:     VITAL SIGNS:   height is 1.549 m (5' 1\") and weight is 44.9 kg (98 lb 14.4 oz). Her oral temperature is 36.9 °C (98.5 °F). Her blood pressure is 119/61 and her pulse is 66. Her respiration is 18 and oxygen saturation is 95%.     GENERAL: No apparent distress  HEENT: Normocephalic/atraumatic  CARDIAC: Regular rate and rhythm, normal S1, S2, no murmurs  LUNGS: Clear to auscultation, normal respiratory effort, on room air   ABDOMINAL: bowel sounds present, soft, nontender, and nondistended    EXTREMITIES: no edema    NEURO:    Mental status: alert    Motor:  Shoulder flexors:  Right -  4/5, Left -  5/5  Elbow flexors:  Right -  5/5, Left -  5/5  Elbow extensors:  Right -  5/5, Left -  5/5  Symmetrical   Hip flexors:  Right -  4/5, Left -  5/5  EHL:  Right -  5/5, Left - 5/5    Sensory:   intact to light touch through out    RADIOLOGY:               Imaging personally reviewed and interpreted by me.                                                                         LABS:  Recent Labs     01/31/23  1140 02/01/23  0237 02/02/23  0602   SODIUM " 132* 129* 124*   POTASSIUM 4.4 4.2 4.9   CHLORIDE 95* 95* 90*   CO2 26 24 27   GLUCOSE 95 145* 107*   BUN 17 16 16   CREATININE 0.69 0.61 0.65   CALCIUM 9.1 8.2* 8.7     Recent Labs     01/31/23  1140 02/01/23  0237 02/02/23  0602   WBC 7.4 10.7 6.1   RBC 3.57* 3.13* 2.85*   HEMOGLOBIN 12.2 10.6* 9.7*   HEMATOCRIT 35.7* 31.7* 28.9*   .0* 101.3* 101.4*   MCH 34.2* 33.9* 34.0*   MCHC 34.2 33.4* 33.6   RDW 46.1 46.3 47.7   PLATELETCT 151* 224 218   MPV 9.7 9.3 9.7     Recent Labs     01/31/23  1140   APTT 25.6   INR 0.96       PRIMARY REHAB DIAGNOSIS:    This patient is a 82 y.o. female admitted for acute inpatient rehabilitation with Closed right hip fracture, sequela.    IMPAIRMENTS:   ADLs/IADLs  Mobility    SECONDARY DIAGNOSIS/MEDICAL CO-MORBIDITIES AFFECTING FUNCTION:    Essential hypertension  Chronic abdominal pain  GERD  Hypothyroidism  Hyponatremia  Macrocytic anemia      RELEVANT CHANGES SINCE PREADMISSION EVALUATION:    Status unchanged    The patient's rehabilitation potential is Excellent  The patient's medical prognosis is excellent    PLAN:   Discussion and Recommendations, discussed with the patient and/or family:   1. The patient requires an acute inpatient rehabilitation program with a coordinated program of care at an intensity and frequency not available at a lower level of care. This recommendation is substantiated by the patient's medical physicians who recommend that the patient's intervention and assessment of medical issues needs to be done at an acute level of care for patient's safety and maximum outcome.     2. A coordinated program of care will be supplied by an interdisciplinary team of physical therapy, occupational therapy, rehab physician, rehab nursing, and, if needed, speech therapy and rehab psychology. Rehab team presents a patient-specific rehabilitation and education program concentrating on prevention of future problems related to accessibility, mobility, skin, bowel,  bladder, sexuality, and psychosocial and medical/surgical problems.     3. Need for Rehabilitation Physician: The rehab physician will be evaluating the patient on a multi-weekly basis to help coordinate the program of care. The rehab physician communicates between medical physicians, therapists, and nurses to maximize the patient's potential outcome. Specific areas in which the rehab physician will be providing daily assessment include the following:   A. Assessing the patient's heart rate and blood pressure response (vitals monitoring) to activity and making adjustments in medications or conservative measures as needed.   B. The rehab physician will be assessing the frequency at which the program can be increased to allow the patient to reach optimal functional outcome.   C. The rehab physician will also provide assessments in daily skin care, especially in light of patient's impairments in mobility.   D. The rehab physician will provide special expertise in understanding how to work with functional impairment and recommend appropriate interventions, compensatory techniques, and education that will facilitate the patient's outcome.     4. Rehab R.N.   The rehab RN will be working with patient to carry over in room mobility and activities of daily living when the patient is not in 3 hours of skilled therapy. Rehab nursing will be working in conjunction with rehab physician to address all the medical issues above and continue to assess laboratory work and discuss abnormalities with the treating physicians, assess vitals, and response to activity, and discuss and report abnormalities with the rehab physician. Rehab RN will also continue daily skin care, supervise bladder/bowel program, instruct in medication administration, and ensure patient safety.     5. Therapies to treat at intensity and frequency of (may change after completion of evaluation by all therapeutic disciplines):       PT:  Physical therapy to  address mobility, transfer, gait training and evaluation for adaptive equipment needs 1hour/day at least 5 days/week for the duration of the ELOS (see below)       OT:  Occupational therapy to address ADLs, self-care, home management training, functional mobility/transfers and assistive device evaluation, and community re-integration 1hour/day at least 5 days/week for the duration of the ELOS (see below).        ST/Dysphagia:  Speech therapy to address speech, language, and cognitive deficits as well as swallowing difficulties with retraining/dysphagia management and community re-integration with comprehension, expression, cognitive training 1hour/day at least 5 days/week for the duration of the ELOS (see below).     6. Medical management / Rehabilitation Issues/Adverse Potential affecting function as part of rehabilitation plan.    Essential hypertension  Hypertension    Chronic abdominal pain  Sucralfate  Outpatient follow-up with GI    GERD  Omeprazole    Hypothyroidism  Levothyroxine    Hyponatremia  Appears to be acute on chronic  Start salt tabs  Check urine osmole's and sodium  Consult hospitalist    Macrocytic anemia  Postoperative  Has dropped 3 hemoglobin point since surgery, continue to monitor with labs, transfuse for less than 7    I performed a complete drug regimen review and did not identify any potential clinically significant medication issues.    The patient's CODE STATUS was confirmed as FULL CODE on admission, with the patient and/or family at bedside.    REHABILITATION ISSUES/ADVERSE POTENTIAL:  1.  Hip fracture: Patient demonstrates functional deficits in strength, balance, coordination, and ADL's. Patient is admitted to [facility] for comprehensive rehabilitation therapy as described below.   Rehabilitation nursing monitors bowel and bladder control, educates on medication administration, co-morbidities and monitors patient safety.    2.  DVT prophylaxis:  Patient is on  Lovenox for anticoagulation upon transfer. Encourage OOB. Monitor daily for signs and symptoms of DVT including but not limited to swelling and pain to prevent the development of DVT that may interfere with therapies.    3.  Pain: Scheduled Tylenol.  As needed oxycodone.    4.  Nutrition/Dysphagia: Dietician monitors nutrient intake, recommend supplements prn and provide nutrition education to pt/family to promote optimal nutrition for wound healing/recovery.     5.  Bladder/bowel:  Start bowel and bladder program, to prevent constipation, urinary retention (which may lead to UTI), and urinary incontinence (which will impact upon pt's functional independence).   - TV Q3h while awake with post void bladder scans, I&O cath for PVRs >400  - up to commode after meal     6.  Skin/dermal ulcer prophylaxis: Monitor for new skin conditions with q.2 h. turns as required to prevent the development of skin breakdown.     7.  GI prophylaxis: Omeprazole to prevent gastritis or GI bleed that would interfere with therapies.    8. Respiratory therapy: RT performs O2 management prn, breathing retraining, pulmonary hygiene and bronchospasm management prn to optimize participation in therapies.    Pt was seen today for 75 min, and entire time spent in face-to-face contact was >50% in counseling and coordination of care as detailed in A/P above.        GOALS/EXPECTED LEVEL OF FUNCTION BASED ON CURRENT MEDICAL AND FUNCTIONAL STATUS (may change based on patient's medical status and rate of impairment recovery):  Transfers:   Modified Independent  Mobility/Gait:   Modified Independent  ADL's:   Modified Independent    DISPOSITION: Discharge to pre-morbid independent living setting with the supportive care of patient's family.      ELOS: 10-12 days    Anu Abad M.D.  Physical Medicine and Rehabilitation

## 2023-02-02 NOTE — PROGRESS NOTES
Patient admitted to facility at 1630 via wheelchair; accompanied by hospital transport.  Patient assisted to room and positioned in bed for comfort and safety; call light within reach.  Patient assisted with stowing belongings and oriented to room and facility.  Admission assessment performed and documented in computer.  Admission paperwork completed; signed copies placed in chart.  Will continue to monitor.

## 2023-02-02 NOTE — PROGRESS NOTES
4 Eyes Skin Assessment Completed by PEGGY ho and PEGGY Bryan.    Head WDL  Ears WDL  Nose WDL  Mouth WDL  Neck WDL  Breast/Chest WDL  Shoulder Blades WDL  Spine WDL  (R) Arm/Elbow/Hand WDL  (L) Arm/Elbow/Hand Scab  Abdomen WDL  Groin WDL  Scrotum/Coccyx/Buttocks WDL  (R) Leg Incision  (L) Leg WDL  (R) Heel/Foot/Toe WDL  (L) Heel/Foot/Toe WDL          Devices In Places N/A      Interventions In Place Pillows and Pressure Redistribution Mattress    Possible Skin Injury No    Pictures Uploaded Into Epic Yes  Wound Consult Placed N/A  RN Wound Prevention Protocol Ordered No

## 2023-02-02 NOTE — THERAPY
Occupational Therapy   Initial Evaluation     Patient Name: Alyssa Juan  Age:  82 y.o., Sex:  female  Medical Record #: [id number]  Today's Date: 2/2/2023     Subjective    Pt agreeable to OT eval and OT session     Objective       02/02/23 0701   OT Charge Group   Charges Yes   OT Self Care / ADL (Units) 2   OT Therapy Activity (Units) 1   OT Evaluation OT Evaluation Mod   OT Total Time Spent   OT Individual Total Time Spent (Mins) 60   Prior Living Situation   Prior Services Home-Independent   Housing / Facility 1 Story House   Steps Into Home 2   Steps In Home 0   Bathroom Set up Walk In Shower;Shower Glass Doors;Shower Chair   Equipment Owned Tub / Shower Seat   Lives with - Patient's Self Care Capacity Spouse   Comments Main caregiver for spouse who is on hospice   Prior Level of ADL Function   Self Feeding Independent   Grooming / Hygiene Independent   Bathing Independent   Dressing Independent   Toileting Independent   Prior Level of IADL Function   Medication Management Independent   Laundry Independent   Kitchen Mobility Independent   Finances Independent   Home Management Independent   Shopping Independent   Prior Level Of Mobility Independent Without Device in Community;Independent Without Device in Home;Independent With Steps in Community   Driving / Transportation Driving Independent   Occupation (Pre-Hospital Vocational) Retired Due To Age   Prior Functioning: Everyday Activities   Self Care Independent   Indoor Mobility (Ambulation) Independent   Stairs Independent   Functional Cognition Independent   Prior Device Use None of the given options   Cognition    Level of Consciousness Alert   ABS (Agitated Behavior Scale)   Agitated Behavior Scale Performed No   Cognitive Pattern Assessment   Cognitive Pattern Assessment Used BIMS   Brief Interview for Mental Status (BIMS)   Repetition of Three Words (First Attempt) 3   Temporal Orientation: Year Correct   Temporal Orientation: Month Accurate within  "5 days   Temporal Orientation: Day Correct   Recall: \"Sock\" Yes, no cue required   Recall: \"Blue\" Yes, no cue required   Recall: \"Bed\" Yes, no cue required   BIMS Summary Score 15   Confusion Assessment Method (CAM)   Is there evidence of an acute change in mental status from the patient's baseline? No   Inattention Behavior not present   Disorganized thinking Behavior not present   Altered level of consciousness Behavior not present   Vision Screen   Vision Not tested   Passive ROM Upper Body   Passive ROM Upper Body WDL   Active ROM Upper Body   Active ROM Upper Body  WDL   Dominant Hand Right   Strength Upper Body   Upper Body Strength  WDL   Sensation Upper Body   Upper Extremity Sensation  WDL   Upper Body Muscle Tone   Upper Body Muscle Tone  WDL   Balance Assessment   Sitting Balance (Static) Fair +   Sitting Balance (Dynamic) Fair   Standing Balance (Static) Fair   Standing Balance (Dynamic) Fair -   Weight Shift Sitting Fair   Weight Shift Standing Poor   Bed Mobility    Supine to Sit Moderate Assist   Sit to Stand Contact Guard Assist   Scooting Standby Assist   Coordination Upper Body   Coordination WDL   Eating   Assistance Needed Independent   Physical Assistance Level No physical assistance   CARE Score - Eating 6   Eating Discharge Goal   Discharge Goal 6   Oral Hygiene   Assistance Needed Set-up / clean-up   CARE Score - Oral Hygiene 5   Oral Hygiene Discharge Goal   Discharge Goal 6   Shower/Bathe Self   Assistance Needed Physical assistance   Physical Assistance Level 26%-50%   CARE Score - Shower/Bathe Self 3   Shower/Bathe Self Discharge Goal   Discharge Goal 6   Upper Body Dressing   Assistance Needed Set-up / clean-up   CARE Score - Upper Body Dressing 5   Upper Body Dressing Discharge Goal   Discharge Goal 6   Lower Body Dressing   Assistance Needed Physical assistance   Physical Assistance Level 51%-75%   CARE Score - Lower Body Dressing 2   Lower Body Dressing Discharge Goal   Discharge Goal " 6   Putting On/Taking Off Footwear   Assistance Needed Physical assistance   Physical Assistance Level Total assistance   CARE Score - Putting On/Taking Off Footwear 1   Putting On/Taking Off Footwear Discharge Goal   Discharge Goal 6   Toileting Hygiene   Assistance Needed Physical assistance   Physical Assistance Level 51%-75%   CARE Score - Toileting Hygiene 2   Toileting Hygiene Discharge Goal   Discharge Goal 6   Toilet Transfer   Assistance Needed Incidental touching   Physical Assistance Level No physical assistance   CARE Score - Toilet Transfer 4   Toilet Transfer Discharge Goal   Discharge Goal 6   Hearing, Speech, and Vision   Ability to Hear Adequate   Ability to See in Adequate Light Adequate   Expression of Ideas and Wants Without difficulty   Understanding Verbal and Non-Verbal Content Understands   Functional Level of Assist   Eating Independent   Grooming Supervision;Seated   Grooming Description Set-up of equipment   Bathing Moderate Assist   Bathing Description Grab bar;Hand held shower;Tub bench;Assit with back;Assit wtih lower extremities;Increased time;Set up for wound protection;Supervision for safety;Assit with perineal   Upper Body Dressing Supervision   Upper Body Dressing Description Set-up of equipment   Lower Body Dressing Maximal Assist   Lower Body Dressing Description Grab bar;Assist with closures;Assist with threading into pant leg;Increased time;Initial preparation for task;Set-up of equipment;Supervision for safety   Toileting Maximal Assist   Toileting Description Assist to pull pants up;Assist to pull pants down;Assist for standing balance;Grab bar;Increased time;Set-up of equipment;Supervision for safety   Bed, Chair, Wheelchair Transfer Minimal Assist   Bed Chair Wheelchair Transfer Description Increased time;Initial preparation for task;Supervision for safety;Verbal cueing   Toilet Transfers Contact Guard Assist   Toilet Transfer Description Grab bar;Increased time;Supervision  for safety;Set-up of equipment   Tub / Shower Transfers Contact Guard Assist   Tub Shower Transfer Description Grab bar;Adaptive equipment;Increased time;Initial preparation for task;Set-up of equipment;Supervision for safety   Comprehension Modified Independent   Comprehension Description Glasses   Expression Independent   Problem Solving Independent   Memory Independent   Problem List   Problem List Decreased Active Daily Living Skills;Decreased Homemaking Skills;Decreased Activity Tolerance;Impaired Postural Control / Balance   Precautions   Precautions Fall Risk;Anterior Hip Precautions   Current Discharge Plan   Current Discharge Plan Return to Prior Living Situation   Benefit    Therapy Benefit Patient Would Benefit from Inpatient Rehab Occupational Therapy to Maximize [place] with ADLs, IADLs and Functional Mobility.   Interdisciplinary Plan of Care Collaboration   IDT Collaboration with  Physical Therapist   Patient Position at End of Therapy Seated;Tray Table within Reach;Call Light within Reach;Phone within Reach   Collaboration Comments CLOF/POC   Equipment Needs   Assistive Device / DME Grab Bars In Shower / Tub;Grab Bars By Toilet;Shower Chair   Adaptive Equipment Reacher;Sock Aide;Dressing Stick;Long Handled Shoe Horn   Strengths & Barriers   Strengths Able to follow instructions;Adequate strength;Independent prior level of function;Supportive family;Pleasant and cooperative;Willingly participates in therapeutic activities;Motivated for self care and independence;Good insight into deficits/needs   Barriers Decreased endurance;Impaired balance;Limited mobility;Pain   2nd session at 9:30-10:00  Session focused on education, toileting, and bed transfer. Pt was educated on LB dressing AE and passport to independence/OT goals. Pt completed toileting w/ mod A and reported lightheadedness and nausea during transfer. Pt completed w/c>bed transfer w/ CGA. While in bed pt's BP was  113/56.    Assessment  Patient is 82 y.o. female with a diagnosis of R hemiarthroplasty. Pt has past medical history of of hypertension, GERD, hypothyroidism. Pt is the main caregiver for her  who is currently on hospice. Pt reports she was I with all ADLs and IADLs prior to fall. Pt has support from husbands family who live in the area and her family but they do not live close. Pt is planning on using hired caregivers for her and her  post d/c.     During eval pt present pain, impaired balance, decreased mobility, lightheadedness, and decreased endurance. Pt reported he had a back injury in October that cont to cause pain in her lower back. Pt is motivated to increase her independence and mobility prior to d/c. Pt will benefit from OT sessions focused on ADLs, IADLs, and functional mobility.     Plan  Recommend Occupational Therapy  minutes per day 5-7 days per week for 1-2 weeks for the following treatments:  OT Orthotics Training, OT E Stim Attended, OT Group Therapy, OT Self Care/ADL, OT Cognitive Skill Dev, OT Community Reintegration, OT Manual Ther Technique, OT Neuro Re-Ed/Balance, OT Sensory Int Techniques, OT Therapeutic Activity, OT Aquatic Therapy, OT Evaluation, and OT Therapeutic Exercise.    Passport items to be completed:  Perform bathroom transfers, complete dressing, complete feeding, get ready for the day, prepare a simple meal, participate in household tasks, adapt home for safety needs, demonstrate home exercise program, complete caregiver training     Goals:  Long term and short term goals have been discussed with patient and they are in agreement.    Occupational Therapy Goals (Active)       Problem: Bathing       Dates: Start: 02/02/23         Goal: STG-Within one week, patient will bathe w/ min A using DME and AE as needed.        Dates: Start: 02/02/23               Problem: Dressing       Dates: Start: 02/02/23         Goal: STG-Within one week, patient will dress LB w/  min A using AE as needed.        Dates: Start: 02/02/23               Problem: Functional Transfers       Dates: Start: 02/02/23         Goal: STG-Within one week, patient will transfer to toilet w/ supervision.        Dates: Start: 02/02/23            Goal: STG-Within one week, patient will transfer to step in shower w/ SBA.        Dates: Start: 02/02/23               Problem: IADL's       Dates: Start: 02/02/23         Goal: STG-Within one week, patient will access kitchen area w/ CGA and LRD.       Dates: Start: 02/02/23               Problem: OT Long Term Goals       Dates: Start: 02/02/23         Goal: LTG-By discharge, patient will complete basic self care tasks w/mod I- supervision.        Dates: Start: 02/02/23            Goal: LTG-By discharge, patient will perform bathroom transfers w/ mod I - supervision.        Dates: Start: 02/02/23            Goal: LTG-By discharge, patient will complete basic home management w/ supervision- min A.       Dates: Start: 02/02/23               Problem: Toileting       Dates: Start: 02/02/23         Goal: STG-Within one week, patient will complete toileting tasks w/ min A.        Dates: Start: 02/02/23

## 2023-02-03 LAB
ERYTHROCYTE [DISTWIDTH] IN BLOOD BY AUTOMATED COUNT: 45.7 FL (ref 35.9–50)
HCT VFR BLD AUTO: 26 % (ref 37–47)
HGB BLD-MCNC: 8.9 G/DL (ref 12–16)
MCH RBC QN AUTO: 34.4 PG (ref 27–33)
MCHC RBC AUTO-ENTMCNC: 34.2 G/DL (ref 33.6–35)
MCV RBC AUTO: 100.4 FL (ref 81.4–97.8)
PLATELET # BLD AUTO: 198 K/UL (ref 164–446)
PMV BLD AUTO: 9.6 FL (ref 9–12.9)
RBC # BLD AUTO: 2.59 M/UL (ref 4.2–5.4)
SODIUM SERPL-SCNC: 127 MMOL/L (ref 135–145)
WBC # BLD AUTO: 5.2 K/UL (ref 4.8–10.8)

## 2023-02-03 PROCEDURE — 97535 SELF CARE MNGMENT TRAINING: CPT

## 2023-02-03 PROCEDURE — 97116 GAIT TRAINING THERAPY: CPT

## 2023-02-03 PROCEDURE — 85027 COMPLETE CBC AUTOMATED: CPT

## 2023-02-03 PROCEDURE — 700111 HCHG RX REV CODE 636 W/ 250 OVERRIDE (IP): Performed by: PHYSICAL MEDICINE & REHABILITATION

## 2023-02-03 PROCEDURE — 97530 THERAPEUTIC ACTIVITIES: CPT

## 2023-02-03 PROCEDURE — 84295 ASSAY OF SERUM SODIUM: CPT

## 2023-02-03 PROCEDURE — 97112 NEUROMUSCULAR REEDUCATION: CPT

## 2023-02-03 PROCEDURE — 700102 HCHG RX REV CODE 250 W/ 637 OVERRIDE(OP): Performed by: PHYSICAL MEDICINE & REHABILITATION

## 2023-02-03 PROCEDURE — A9270 NON-COVERED ITEM OR SERVICE: HCPCS | Performed by: PHYSICAL MEDICINE & REHABILITATION

## 2023-02-03 PROCEDURE — 99232 SBSQ HOSP IP/OBS MODERATE 35: CPT | Performed by: PHYSICAL MEDICINE & REHABILITATION

## 2023-02-03 PROCEDURE — 97110 THERAPEUTIC EXERCISES: CPT

## 2023-02-03 PROCEDURE — 36415 COLL VENOUS BLD VENIPUNCTURE: CPT

## 2023-02-03 PROCEDURE — 99231 SBSQ HOSP IP/OBS SF/LOW 25: CPT | Performed by: HOSPITALIST

## 2023-02-03 PROCEDURE — 770010 HCHG ROOM/CARE - REHAB SEMI PRIVAT*

## 2023-02-03 RX ORDER — GABAPENTIN 100 MG/1
100 CAPSULE ORAL EVERY EVENING
Status: DISCONTINUED | OUTPATIENT
Start: 2023-02-03 | End: 2023-02-08 | Stop reason: HOSPADM

## 2023-02-03 RX ORDER — HYDROCODONE BITARTRATE AND ACETAMINOPHEN 5; 325 MG/1; MG/1
1 TABLET ORAL 2 TIMES DAILY
Status: DISCONTINUED | OUTPATIENT
Start: 2023-02-04 | End: 2023-02-08 | Stop reason: HOSPADM

## 2023-02-03 RX ORDER — HYDROCODONE BITARTRATE AND ACETAMINOPHEN 5; 325 MG/1; MG/1
1 TABLET ORAL EVERY 4 HOURS PRN
Status: DISCONTINUED | OUTPATIENT
Start: 2023-02-03 | End: 2023-02-08 | Stop reason: HOSPADM

## 2023-02-03 RX ADMIN — SENNOSIDES AND DOCUSATE SODIUM 2 TABLET: 50; 8.6 TABLET ORAL at 08:59

## 2023-02-03 RX ADMIN — LEVOTHYROXINE SODIUM 50 MCG: 0.05 TABLET ORAL at 05:28

## 2023-02-03 RX ADMIN — SODIUM CHLORIDE 1 G: 1 TABLET ORAL at 08:59

## 2023-02-03 RX ADMIN — ACETAMINOPHEN 1000 MG: 500 TABLET, FILM COATED ORAL at 08:59

## 2023-02-03 RX ADMIN — OMEPRAZOLE 20 MG: 20 CAPSULE, DELAYED RELEASE ORAL at 09:00

## 2023-02-03 RX ADMIN — GABAPENTIN 100 MG: 100 CAPSULE ORAL at 20:43

## 2023-02-03 RX ADMIN — SUCRALFATE 1 G: 1 TABLET ORAL at 08:59

## 2023-02-03 RX ADMIN — SODIUM CHLORIDE 1 G: 1 TABLET ORAL at 12:38

## 2023-02-03 RX ADMIN — SUCRALFATE 1 G: 1 TABLET ORAL at 20:43

## 2023-02-03 RX ADMIN — ENOXAPARIN SODIUM 40 MG: 40 INJECTION SUBCUTANEOUS at 17:33

## 2023-02-03 RX ADMIN — Medication 3 MG: at 20:43

## 2023-02-03 RX ADMIN — SODIUM CHLORIDE 1 G: 1 TABLET ORAL at 17:33

## 2023-02-03 ASSESSMENT — GAIT ASSESSMENTS
DEVIATION: ANTALGIC;BRADYKINETIC
DISTANCE (FEET): 200
GAIT LEVEL OF ASSIST: SUPERVISED
ASSISTIVE DEVICE: FRONT WHEEL WALKER

## 2023-02-03 ASSESSMENT — PATIENT HEALTH QUESTIONNAIRE - PHQ9
1. LITTLE INTEREST OR PLEASURE IN DOING THINGS: NOT AT ALL
SUM OF ALL RESPONSES TO PHQ9 QUESTIONS 1 AND 2: 0
2. FEELING DOWN, DEPRESSED, IRRITABLE, OR HOPELESS: NOT AT ALL

## 2023-02-03 ASSESSMENT — ENCOUNTER SYMPTOMS
PALPITATIONS: 0
ABDOMINAL PAIN: 0
SHORTNESS OF BREATH: 0
VOMITING: 0
CHILLS: 0
EYES NEGATIVE: 1
COUGH: 0
FEVER: 0
NAUSEA: 0
BRUISES/BLEEDS EASILY: 0
POLYDIPSIA: 0

## 2023-02-03 NOTE — PROGRESS NOTES
"Rehab Progress Note     Date of Service: 2/3/2023  Chief Complaint: follow up hip fracture    Interval Events (Subjective)    Patient seen and examined today in the therapy gym.   She reports 5-6/10 pain in her hip.  She chronically takes 5 mg hydrocodone at home twice daily for chronic back pain, as prescribed by a pain provider.  Her sodium is slightly improved today.  She reports burning pain in her feet at night from her neuropathy.  She has never tried gabapentin previously, but her  took it for shingles.    Patient has no other new questions, concerns, or complaints today.     ROS: No changes to bowel, bladder, mood, or sleep.         Objective:  VITAL SIGNS: /68   Pulse 63   Temp 36.7 °C (98 °F) (Oral)   Resp 18   Ht 1.549 m (5' 1\")   Wt 44.9 kg (98 lb 14.4 oz)   SpO2 95%   BMI 18.69 kg/m²   Gen: alert, no apparent distress  CV: Regular rate, regular rhythm  Resp: Clear to auscultation bilaterally  Msk: antalgic gait    Recent Results (from the past 72 hour(s))   Histology Request    Collection Time: 01/31/23  6:52 PM   Result Value Ref Range    Pathology Request Sent to Histo    Basic Metabolic Panel (BMP)    Collection Time: 02/01/23  2:37 AM   Result Value Ref Range    Sodium 129 (L) 135 - 145 mmol/L    Potassium 4.2 3.6 - 5.5 mmol/L    Chloride 95 (L) 96 - 112 mmol/L    Co2 24 20 - 33 mmol/L    Glucose 145 (H) 65 - 99 mg/dL    Bun 16 8 - 22 mg/dL    Creatinine 0.61 0.50 - 1.40 mg/dL    Calcium 8.2 (L) 8.4 - 10.2 mg/dL    Anion Gap 10.0 7.0 - 16.0   CBC without Differential    Collection Time: 02/01/23  2:37 AM   Result Value Ref Range    WBC 10.7 4.8 - 10.8 K/uL    RBC 3.13 (L) 4.20 - 5.40 M/uL    Hemoglobin 10.6 (L) 12.0 - 16.0 g/dL    Hematocrit 31.7 (L) 37.0 - 47.0 %    .3 (H) 81.4 - 97.8 fL    MCH 33.9 (H) 27.0 - 33.0 pg    MCHC 33.4 (L) 33.6 - 35.0 g/dL    RDW 46.3 35.9 - 50.0 fL    Platelet Count 224 164 - 446 K/uL    MPV 9.3 9.0 - 12.9 fL   ESTIMATED GFR    Collection " Time: 02/01/23  2:37 AM   Result Value Ref Range    GFR (CKD-EPI) 88 >60 mL/min/1.73 m 2   COV-2, FLU A/B, AND RSV BY PCR (2-4 HOURS CEPHEID): Collect NP swab in VTM    Collection Time: 02/01/23  5:20 PM    Specimen: Respirate   Result Value Ref Range    Influenza virus A RNA Negative Negative    Influenza virus B, PCR Negative Negative    RSV, PCR Negative Negative    SARS-CoV-2 by PCR NotDetected     SARS-CoV-2 Source NP Swab    CBC with Differential    Collection Time: 02/02/23  6:02 AM   Result Value Ref Range    WBC 6.1 4.8 - 10.8 K/uL    RBC 2.85 (L) 4.20 - 5.40 M/uL    Hemoglobin 9.7 (L) 12.0 - 16.0 g/dL    Hematocrit 28.9 (L) 37.0 - 47.0 %    .4 (H) 81.4 - 97.8 fL    MCH 34.0 (H) 27.0 - 33.0 pg    MCHC 33.6 33.6 - 35.0 g/dL    RDW 47.7 35.9 - 50.0 fL    Platelet Count 218 164 - 446 K/uL    MPV 9.7 9.0 - 12.9 fL    Neutrophils-Polys 60.10 44.00 - 72.00 %    Lymphocytes 25.90 22.00 - 41.00 %    Monocytes 13.10 0.00 - 13.40 %    Eosinophils 0.30 0.00 - 6.90 %    Basophils 0.30 0.00 - 1.80 %    Immature Granulocytes 0.30 0.00 - 0.90 %    Nucleated RBC 0.00 /100 WBC    Neutrophils (Absolute) 3.65 2.00 - 7.15 K/uL    Lymphs (Absolute) 1.58 1.00 - 4.80 K/uL    Monos (Absolute) 0.80 0.00 - 0.85 K/uL    Eos (Absolute) 0.02 0.00 - 0.51 K/uL    Baso (Absolute) 0.02 0.00 - 0.12 K/uL    Immature Granulocytes (abs) 0.02 0.00 - 0.11 K/uL    NRBC (Absolute) 0.00 K/uL   Comp Metabolic Panel (CMP)    Collection Time: 02/02/23  6:02 AM   Result Value Ref Range    Sodium 124 (L) 135 - 145 mmol/L    Potassium 4.9 3.6 - 5.5 mmol/L    Chloride 90 (L) 96 - 112 mmol/L    Co2 27 20 - 33 mmol/L    Anion Gap 7.0 7.0 - 16.0    Glucose 107 (H) 65 - 99 mg/dL    Bun 16 8 - 22 mg/dL    Creatinine 0.65 0.50 - 1.40 mg/dL    Calcium 8.7 8.5 - 10.5 mg/dL    AST(SGOT) 61 (H) 12 - 45 U/L    ALT(SGPT) 21 2 - 50 U/L    Alkaline Phosphatase 48 30 - 99 U/L    Total Bilirubin 0.5 0.1 - 1.5 mg/dL    Albumin 3.5 3.2 - 4.9 g/dL    Total Protein  5.8 (L) 6.0 - 8.2 g/dL    Globulin 2.3 1.9 - 3.5 g/dL    A-G Ratio 1.5 g/dL   Magnesium    Collection Time: 02/02/23  6:02 AM   Result Value Ref Range    Magnesium 1.9 1.5 - 2.5 mg/dL   VITAMIN B12    Collection Time: 02/02/23  6:02 AM   Result Value Ref Range    Vitamin B12 -True Cobalamin 1673 (H) 211 - 911 pg/mL   TSH WITH REFLEX TO FT4    Collection Time: 02/02/23  6:02 AM   Result Value Ref Range    TSH 1.530 0.380 - 5.330 uIU/mL   CORRECTED CALCIUM    Collection Time: 02/02/23  6:02 AM   Result Value Ref Range    Correct Calcium 9.1 8.5 - 10.5 mg/dL   ESTIMATED GFR    Collection Time: 02/02/23  6:02 AM   Result Value Ref Range    GFR (CKD-EPI) 87 >60 mL/min/1.73 m 2   CBC WITHOUT DIFFERENTIAL    Collection Time: 02/03/23  6:02 AM   Result Value Ref Range    WBC 5.2 4.8 - 10.8 K/uL    RBC 2.59 (L) 4.20 - 5.40 M/uL    Hemoglobin 8.9 (L) 12.0 - 16.0 g/dL    Hematocrit 26.0 (L) 37.0 - 47.0 %    .4 (H) 81.4 - 97.8 fL    MCH 34.4 (H) 27.0 - 33.0 pg    MCHC 34.2 33.6 - 35.0 g/dL    RDW 45.7 35.9 - 50.0 fL    Platelet Count 198 164 - 446 K/uL    MPV 9.6 9.0 - 12.9 fL   SODIUM SERUM (NA)    Collection Time: 02/03/23  6:02 AM   Result Value Ref Range    Sodium 127 (L) 135 - 145 mmol/L       Scheduled Medications   Medication Dose Frequency    [START ON 2/4/2023] HYDROcodone-acetaminophen  1 Tablet BID    gabapentin  100 mg Q EVENING    sodium chloride  1 g TID WITH MEALS    lisinopril  20 mg QAM AC    acetaminophen  1,000 mg TID    enoxaparin (LOVENOX) injection  40 mg DAILY AT 1800    levothyroxine  50 mcg AM ES    omeprazole  20 mg DAILY    senna-docusate  2 Tablet BID    sucralfate  1 g BID       Current Diet Order   Procedures    Diet Order Diet: Regular       Assessment:    This patient is a 82 y.o. female admitted for acute inpatient rehabilitation with Closed right hip fracture, sequela.    Problem List/Medical Decision Making and Plan:    Right femoral neck fracture  S/p Right hemiparthroplasty 1/31   [place]  WBAT  Posterior hip precautions  PT/OT, 1.5 hr each discipline, 5 days per week    Outpatient follow up with surgery  Staples out 14 days post-op    Pain management  Change Scheduled tylenol and PRN oxycodone to her home pain medication, 5 mg hydrocodone-APAP - schedule at 8/noon prior to therapies, as well as Q4 PRN  PRN ice  Lidoderm patch    OME in last 24 hrs - 10    Analgesics (last 3 days) for Alyssa Juan as of 02/03/23 1429     Medications 02/01/23 02/02/23 02/03/23      HYDROcodone-acetaminophen (NORCO) 5-325 MG per tablet 1 Tablet  Dose: 1 Tablet  Freq: EVERY 4 HOURS PRN Route: PO  PRN Reason: Severe Pain: NRS/WBF/FLACC Pain Scale 7-10, CPOT 6-8  Start: 02/03/23 1340          HYDROcodone-acetaminophen (NORCO) 5-325 MG per tablet 1 Tablet  Dose: 1 Tablet  Freq: 2 TIMES DAILY Route: PO  Start: 02/04/23 0800         Discontinued Medications  Medications 02/01/23 02/02/23 02/03/23       acetaminophen (TYLENOL) tablet 1,000 mg  Dose: 1,000 mg  Freq: 3 times daily Route: PO  Start: 02/01/23 2100   End: 02/03/23 1427    (2100)-Refused    0840-Given   1433-Given   2055-Given    0859-Given       acetaminophen (Tylenol) tablet 650 mg  Dose: 650 mg  Freq: EVERY 4 HOURS PRN Route: PO  PRN Reasons: Fever,Mild Pain: NRS/WBF/FLACC Pain Scale 1-3, CPOT 1-2,Headache  Start: 02/01/23 1641   End: 02/01/23 1647          oxyCODONE immediate-release (ROXICODONE) tablet 5 mg  Dose: 5 mg  Freq: EVERY 3 HOURS PRN Route: PO  PRN Reason: Moderate Pain: NRS/WBF/FLACC Pain Scale 4-6, CPOT 3-5  Start: 02/01/23 1641   End: 02/03/23 1340   Order specific questions:   Opioid Tolerance STANDARD - Opioid Naive      1841-Given   (2122)-Refused   2247-See Alt    0556-See Alt       Or  oxyCODONE immediate release (ROXICODONE) tablet 10 mg  Dose: 10 mg  Freq: EVERY 3 HOURS PRN Route: PO  PRN Reason: Severe Pain: NRS/WBF/FLACC Pain Scale 7-10, CPOT 6-8  Start: 02/01/23 1641   End: 02/03/23 1340   Order specific questions:   Opioid  Tolerance STANDARD - Opioid Naive      1841-See Alt   2122-See Alt   2247-Given    0556-Given       Or  morphine 4 MG/ML injection 4 mg  Dose: 4 mg  Freq: EVERY 3 HOURS PRN Route: IV  PRN Comment: If pain persists one hour post oral dose OR for pain if patient is NPO  Start: 02/01/23 1641   End: 02/01/23 1648   Order specific questions:   Opioid Tolerance STANDARD - Opioid Naive      1841-See Alt   2122-See Alt   2247-See Alt    0556-See Alt           Peripheral neuropathy  With pain in feet at night  Start low dose gabapentin 100 mg QHS    Essential hypertension  Hypertension  Appreciate hospitalist assistance     Chronic abdominal pain  Sucralfate  Outpatient follow-up with GI     GERD  Omeprazole     Hypothyroidism  Levothyroxine     Hyponatremia  Appears to be acute on chronic  Started salt tabs  Appreciate hospitalist assistance     Macrocytic anemia  Postoperative  Has dropped 3 hemoglobin point since surgery, continue to monitor with labs, transfuse for less than 7    DVT prophylaxis  Loverayx    Anu Abad M.D.  Physical Medicine and Rehabilitation

## 2023-02-03 NOTE — CARE PLAN
The patient is Watcher - Medium risk of patient condition declining or worsening         Progress made toward(s) clinical / shift goals:    Problem: Pain - Standard  Goal: Alleviation of pain or a reduction in pain to the patient’s comfort goal  Outcome: Progressing  Note: Patient able to verbalize pain level and verbalize an acceptable level of pain.      Problem: Skin Integrity  Goal: Patient's skin integrity will be maintained or improve  Note: Patient's skin remains intact and free from new or accidental injury this shift.  Will continue to monitor.

## 2023-02-03 NOTE — THERAPY
Occupational Therapy  Daily Treatment     Patient Name: Alyssa Juan  Age:  82 y.o., Sex:  female  Medical Record #: [id number]  Today's Date: 2/3/2023     Precautions  Precautions: (P) Fall Risk, Posterior Hip Precautions, Full Weight Bearing Right Lower Extremity         Subjective    Pt reports her hobbies are reading and sowing.      Objective       02/03/23 1031   OT Charge Group   OT Therapy Activity (Units) 1   OT Therapeutic Exercise (Units) 1   OT Total Time Spent   OT Individual Total Time Spent (Mins) 30   Precautions   Precautions Fall Risk;Posterior Hip Precautions;Full Weight Bearing Right Lower Extremity   Functional Level of Assist   Grooming Supervision;Seated   Grooming Description Set-up of equipment   Sitting Upper Body Exercises   Sitting Upper Body Exercises Yes   Bilateral Row 3 sets of 15;Bilateral;Weight (See Comments for lbs)  (15#, equalizer)   Upper Extremity Bike Level 2 Resistance  (fluido bike 5 min, 1 rest break)   Interdisciplinary Plan of Care Collaboration   IDT Collaboration with  Physical Therapist   Patient Position at End of Therapy Seated  (transfer to PT)   Collaboration Comments transition of care     Pt was oriented to ADLs suite including kitchen, transfer bathroom, and standard bed. Pt reports her family is currently making adjustments to the house to increase safety for her and her .     Assessment    Pt tolerted session well focused on increasing endurance, strength, and d/c planning. Pt cont to be motivated to increase independence and d/c home to her . Pt's family is very supportive and are setting up caregivers for both the pt and her  post d/c. Pt will benefit from cont therapy to increase her independence and ADLs and IADLs prior to d/c home.   Strengths: Able to follow instructions, Adequate strength, Independent prior level of function, Supportive family, Pleasant and cooperative, Willingly participates in therapeutic activities,  Motivated for self care and independence, Good insight into deficits/needs  Barriers: Decreased endurance, Impaired balance, Limited mobility, Pain    Plan     ADLs w/ AE, IADLs, endurance, strength, balance,    Occupational Therapy Goals (Active)       Problem: Bathing       Dates: Start: 02/02/23         Goal: STG-Within one week, patient will bathe w/ min A using DME and AE as needed.        Dates: Start: 02/02/23               Problem: Dressing       Dates: Start: 02/02/23         Goal: STG-Within one week, patient will dress LB w/ min A using AE as needed.        Dates: Start: 02/02/23               Problem: Functional Transfers       Dates: Start: 02/02/23         Goal: STG-Within one week, patient will transfer to toilet w/ supervision.        Dates: Start: 02/02/23            Goal: STG-Within one week, patient will transfer to step in shower w/ SBA.        Dates: Start: 02/02/23               Problem: IADL's       Dates: Start: 02/02/23         Goal: STG-Within one week, patient will access kitchen area w/ CGA and LRD.       Dates: Start: 02/02/23               Problem: OT Long Term Goals       Dates: Start: 02/02/23         Goal: LTG-By discharge, patient will complete basic self care tasks w/mod I- supervision.        Dates: Start: 02/02/23            Goal: LTG-By discharge, patient will perform bathroom transfers w/ mod I - supervision.        Dates: Start: 02/02/23            Goal: LTG-By discharge, patient will complete basic home management w/ supervision- min A.       Dates: Start: 02/02/23               Problem: Toileting       Dates: Start: 02/02/23         Goal: STG-Within one week, patient will complete toileting tasks w/ min A.        Dates: Start: 02/02/23

## 2023-02-03 NOTE — THERAPY
Physical Therapy   Initial Evaluation     Patient Name: Alyssa Juan  Age:  82 y.o., Sex:  female  Medical Record #: [id number]  Today's Date: 2/2/2023     Subjective    Pt pleasant and agreeable. She reported feeling lightheaded after sitting up for 45 min after breakfast. She did not report dizziness or lightheadedness through evaluation      Objective       02/02/23 1030   PT Charge Group   PT Therapeutic Activities (Units) 1   PT Evaluation PT Evaluation Low   PT Total Time Spent   PT Individual Total Time Spent (Mins) 60   Prior Living Situation   Prior Services Home-Independent   Housing / Facility 1 Story House   Steps Into Home 2   Steps In Home 0   Rail None   Bathroom Set up Walk In Shower;Shower Chair   Equipment Owned Tub / Shower Seat   Lives with - Patient's Self Care Capacity Spouse   Comments Pt is primary CGer for her spouse, she reports her family is looking into hired care to assist them both upon DC   Prior Level of Functional Mobility   Bed Mobility Independent   Transfer Status Independent   Ambulation Independent   Distance Ambulation (Feet)   (community)   Assistive Devices Used None   Wheelchair   (n/a)   Stairs Independent   Vitals   Pulse 66   Patient BP Position Supine   Blood Pressure  116/60   Pain 0 - 10 Group   Location Hip   Location Orientation Right   Pain Rating Scale (NPRS) 5   Description Aching   Comfort Goal Comfort with Movement;Perform Activity   Therapist Pain Assessment During Activity  (4/10 pain at rest)   Passive ROM Lower Body   Passive ROM Lower Body WDL   Comments R hip flexion limited to 90deg due to postrior precautions   Strength Lower Body   Lower Body Strength  WDL   Comments R LE MMT deferred due to pain, grossly 3-/5   Sensation Lower Body   Lower Extremity Sensation   WDL   Balance Assessment   Sitting Balance (Static) Good   Sitting Balance (Dynamic) Good   Standing Balance (Static) Fair -   Standing Balance (Dynamic) Fair -   Weight Shift Sitting Good    Weight Shift Standing Fair   Comments standing balance assessed with B UE support on FWW   Bed Mobility    Supine to Sit Minimal Assist   Sit to Supine Moderate Assist   Sit to Stand Minimal Assist   Scooting Standby Assist   Rolling Minimal Assist to Rt.;Minimum Assist to Lt.   Roll Left and Right   Assistance Needed Physical assistance   Physical Assistance Level 25% or less   CARE Score - Roll Left and Right 3   Roll Left and Right Discharge Goal   Discharge Goal 6   Sit to Lying   Assistance Needed Physical assistance   Physical Assistance Level 26%-50%   CARE Score - Sit to Lying 3   Sit to Lying Discharge Goal   Discharge Goal 6   Lying to Sitting on Side of Bed   Assistance Needed Physical assistance   Physical Assistance Level 25% or less   CARE Score - Lying to Sitting on Side of Bed 3   Lying to Sitting on Side of Bed Discharge Goal   Discharge Goal 6   Sit to Stand   Assistance Needed Physical assistance   Physical Assistance Level 25% or less   CARE Score - Sit to Stand 3   Sit to Stand Discharge Goal   Discharge Goal 6   Chair/Bed-to-Chair Transfer   Assistance Needed Physical assistance   Physical Assistance Level 25% or less   CARE Score - Chair/Bed-to-Chair Transfer 3   Chair/Bed-to-Chair Transfer Discharge Goal   Discharge Goal 6   Toilet Transfer   Reason if not Attempted Refused to perform   CARE Score - Toilet Transfer 7   Toilet Transfer Discharge Goal   Discharge Goal 6   Car Transfer   Reason if not Attempted Safety concerns   CARE Score - Car Transfer 88   Car Transfer Discharge Goal   Discharge Goal 3   Walk 10 Feet   Assistance Needed Incidental touching   CARE Score - Walk 10 Feet 4   Walk 10 Feet Discharge Goal   Discharge Goal 6   Walk 50 Feet with Two Turns   Assistance Needed Incidental touching   CARE Score - Walk 50 Feet with Two Turns 4   Walk 50 Feet with Two Turns Discharge Goal   Discharge Goal 6   Walk 150 Feet   Reason if not Attempted Safety concerns   CARE Score - Walk 150  Feet 88   Walk 150 Feet Discharge Goal   Discharge Goal 6   Walking 10 Feet on Uneven Surfaces   Reason if not Attempted Safety concerns   CARE Score - Walking 10 Feet on Uneven Surfaces 88   Walking 10 Feet on Uneven Surfaces Discharge Goal   Discharge Goal 6   1 Step (Curb)   Reason if not Attempted Safety concerns   CARE Score - 1 Step (Curb) 88   1 Step (Curb) Discharge Goal   Discharge Goal 4   4 Steps   Reason if not Attempted Safety concerns   CARE Score - 4 Steps 88   4 Steps Discharge Goal   Discharge Goal 4   12 Steps   Reason if not Attempted Safety concerns   CARE Score - 12 Steps 88   12 Steps Discharge Goal   Discharge Goal 4   Picking Up Object   Reason if not Attempted Safety concerns   CARE Score - Picking Up Object 88   Picking Up Object Discharge Goal   Discharge Goal 6   Wheel 50 Feet with Two Turns   Assistance Needed Supervision   CARE Score - Wheel 50 Feet with Two Turns 4   Type of Wheelchair/Scooter Manual   Wheel 50 Feet with Two Turns Discharge Goal   Discharge Goal 6   Wheel 150 Feet   Assistance Needed Physical assistance   Physical Assistance Level 25% or less   CARE Score - Wheel 150 Feet 3   Type of Wheelchair/Scooter Manual   Wheel 150 Feet Discharge Goal   Discharge Goal 6   Gait Functional Level of Assist    Gait Level Of Assist Contact Guard Assist   Assistive Device Front Wheel Walker   Distance (Feet) 75   # of Times Distance was Traveled 1   Deviation Antalgic;Step To;Bradykinetic  (progressed to step through with VCs)   Wheelchair Functional Level of Assist   Wheelchair Assist Minimal Assist   Distance Wheelchair (Feet or Distance) 150   Wheelchair Description Extra time;Leg rest management;Supervision for safety  (SBA 100ft, Georgette for remainder due to fatigue)   Stairs Functional Level of Assist   Level of Assist with Stairs Unable to Participate   Transfer Functional Level of Assist   Bed, Chair, Wheelchair Transfer Minimal Assist   Bed Chair Wheelchair Transfer Description    (stand step FWW)   Toilet Transfers Refused   Problem List    Problems Pain;Impaired Bed Mobility;Impaired Transfers;Impaired Ambulation;Functional Strength Deficit;Decreased Activity Tolerance;Limited Knowledge of Post-Op Precautions   Precautions   Precautions Fall Risk;Posterior Hip Precautions;Weight Bearing As Tolerated Right Lower Extremity   Current Discharge Plan   Current Discharge Plan Return to Prior Living Situation   Interdisciplinary Plan of Care Collaboration   IDT Collaboration with  Occupational Therapist   Patient Position at End of Therapy Call Light within Reach;Tray Table within Reach;Seated;Chair Alarm On;Phone within Reach   Collaboration Comments CLOF/POC   Benefit   Therapy Benefit Patient Would Benefit from Inpatient Rehabilitation Physical Therapy to Maximize Functional [place] with ADLs, IADLs and Mobility.   Strengths & Barriers   Strengths Able to follow instructions;Alert and oriented;Effective communication skills;Good carryover of learning;Independent prior level of function;Making steady progress towards goals;Motivated for self care and independence;Pleasant and cooperative;Supportive family;Willingly participates in therapeutic activities   Barriers Decreased endurance;Generalized weakness;Home accessibility;Pain     Oriented pt to unit, 3 hr rule, use of call light, and passport. Participated in collaborative goal setting  Pt able to recall 2/3 posterior precautions (forgot no adduction)    Assessment  Patient is 82 y.o. female with a diagnosis of R hip hemiarthroplasty with posterior hip precautions WBAT after ground level fall.  Additional factors influencing patient status / progress (ie: cognitive factors, co-morbidities, social support, etc): pt is primary CG for her spouse, home accessibility 2 [employer] no rail, independent PLOF, supportive stepson who lives locally and very motivated. Pt is limited by R hip pain, bradykinesia, and decreased activity  tolerance.    Plan  Recommend Physical Therapy  minutes per day 5-6 days per week for 10-14 days for the following treatments:  PT Gait Training, PT Therapeutic Exercises, PT Neuro Re-Ed/Balance, PT Therapeutic Activity, and PT Evaluation.    Passport items to be completed:  Get in/out of bed safely, in/out of a vehicle, safely use mobility device, walk or wheel around home/community, navigate up and down stairs, show how to get up/down from the ground, ensure home is accessible, demonstrate HEP, complete caregiver training    Goals:  Long term and short term goals have been discussed with patient and they are in agreement.    Physical Therapy Problems (Active)       Problem: Mobility       Dates: Start: 02/02/23         Goal: STG-Within one week, patient will ambulate 200ft FWW with SPV       Dates: Start: 02/02/23            Goal: STG-Within one week, patient will ascend and descend two stairs with Georgette       Dates: Start: 02/02/23               Problem: Mobility Transfers       Dates: Start: 02/02/23         Goal: STG-Within one week, patient will perform supine<>sit with SBA       Dates: Start: 02/02/23            Goal: STG-Within one week, patient will transfer bed to chair with set up assist       Dates: Start: 02/02/23               Problem: PT-Long Term Goals       Dates: Start: 02/02/23         Goal: LTG-By discharge, patient will ambulate 200ft Noah FWW       Dates: Start: 02/02/23            Goal: LTG-By discharge, patient will transfer one surface to another Noah       Dates: Start: 02/02/23            Goal: LTG-By discharge, patient will ambulate up/down 2 stairs with SBA       Dates: Start: 02/02/23            Goal: LTG-By discharge, patient will perform bed mobility independently       Dates: Start: 02/02/23

## 2023-02-03 NOTE — ASSESSMENT & PLAN NOTE
Has a chronic hx with baseline  Na: 124 --> 127 --> 128 -> 133 (2/7)  Has a diminished appetite  On salt tabs: 1g tid  Monitor

## 2023-02-03 NOTE — THERAPY
"Physical Therapy   Daily Treatment     Patient Name: Alyssa Juan  Age:  82 y.o., Sex:  female  Medical Record #: [id number]  Today's Date: 2/3/2023     Precautions  Precautions: Fall Risk, Posterior Hip Precautions, Full Weight Bearing Right Lower Extremity    Subjective    \"I want to get back to my . I feel bad because he gets confused and so many people have been in and out of the house to help him\"  \"The xu is going to come by to work on some of the things we need\"     Objective       02/03/23 0901 02/03/23 1101   PT Charge Group   PT Gait Training (Units) 1  --    PT Therapeutic Exercise (Units)  --  2   PT Therapeutic Activities (Units) 3  --    PT Total Time Spent   PT Individual Total Time Spent (Mins) 60 30   Pain 0 - 10 Group   Location Hip  --    Location Orientation Right  --    Pain Rating Scale (NPRS) 6  --    Description Aching  --    Therapist Pain Assessment Prior to Activity  (improved to 4/10 within tx)  --    Gait Functional Level of Assist    Gait Level Of Assist Supervised  --    Assistive Device Front Wheel Walker  --    Distance (Feet) 200  --    # of Times Distance was Traveled 1  --    Deviation Antalgic;Bradykinetic  --    Wheelchair Functional Level of Assist   Wheelchair Assist  --  Supervised   Distance Wheelchair (Feet or Distance)  --  200   Wheelchair Description  --  Extra time;Leg rest management   Stairs Functional Level of Assist   Level of Assist with Stairs Contact Guard Assist  --    # of Stairs Climbed 4  --    Stairs Description Hand rails;Extra time  (6\" steps, step to pattern)  --    Transfer Functional Level of Assist   Bed, Chair, Wheelchair Transfer Standby Assist  --    Bed Chair Wheelchair Transfer Description   (stand step FWW)  --    Supine Lower Body Exercise   Short Arc Quad 1 set of 10;Right   --    Sitting Lower Body Exercises   Long Arc Quad 1 set of 10;Right  --    Marching 1 set of 10;Reciprocal  --    Standing Lower Body Exercises "   Hamstring Curl  --  2 sets of 10;Right    Hip Extension  --  2 sets of 10;Right    Hip Abduction  --  2 sets of 10;Right    Marching  --  2 sets of 10   Heel Rise  --  2 sets of 10;Bilateral   Comments  --  all standing therex performed in // bars with B UE support   Bed Mobility    Supine to Sit Supervised  (flat bed no bedrails extra time)  --    Sit to Stand Standby Assist Standby Assist   Interdisciplinary Plan of Care Collaboration   IDT Collaboration with    (SPT [place] present for session for observation) Occupational Therapist   Patient Position at End of Therapy Seated;Chair Alarm On;Self Releasing Lap Belt Applied;Call Light within Reach;Tray Table within Reach;Phone within Reach Seated;Chair Alarm On;Self Releasing Lap Belt Applied  (in dining room)   Collaboration Comments  --  handoff of care     1st tx- assisted pt in dressing (maxA for sake of time) prior to gait training    Assessment    Pt is making good progress in her endurance and independence with fxl mobility. She only requires min verbal cueing for increased L step length and posture with ambulation using FWW. Pt initiated stair training with CGA and reports that she will have a  install railings prior to DC.  Strengths: Able to follow instructions, Alert and oriented, Effective communication skills, Good carryover of learning, Independent prior level of function, Making steady progress towards goals, Motivated for self care and independence, Pleasant and cooperative, Supportive family, Willingly participates in therapeutic activities  Barriers: Decreased endurance, Generalized weakness, Home accessibility, Pain    Plan    R LE therex, gait training, stair training, NuStep for endurance, sit>supine (leg  if needed)    Passport items to be completed:  Get in/out of bed safely, in/out of a vehicle, safely use mobility device, walk or wheel around home/community, navigate up and down stairs, show how to get up/down from the  ground, ensure home is accessible, demonstrate HEP, complete caregiver training    Physical Therapy Problems (Active)       Problem: Mobility       Dates: Start: 02/02/23         Goal: STG-Within one week, patient will ambulate 200ft FWW with SPV       Dates: Start: 02/02/23            Goal: STG-Within one week, patient will ascend and descend two stairs with Georgette       Dates: Start: 02/02/23               Problem: Mobility Transfers       Dates: Start: 02/02/23         Goal: STG-Within one week, patient will perform supine<>sit with SBA       Dates: Start: 02/02/23            Goal: STG-Within one week, patient will transfer bed to chair with set up assist       Dates: Start: 02/02/23               Problem: PT-Long Term Goals       Dates: Start: 02/02/23         Goal: LTG-By discharge, patient will ambulate 200ft Noah FWW       Dates: Start: 02/02/23            Goal: LTG-By discharge, patient will transfer one surface to another Noah       Dates: Start: 02/02/23            Goal: LTG-By discharge, patient will ambulate up/down 2 stairs with SBA       Dates: Start: 02/02/23            Goal: LTG-By discharge, patient will perform bed mobility independently       Dates: Start: 02/02/23

## 2023-02-03 NOTE — CARE PLAN
Problem: Pain - Standard  Goal: Alleviation of pain or a reduction in pain to the patient’s comfort goal  Outcome: Progressing  Patient on scheduled Tylenol with good relief.     Problem: Skin Integrity  Goal: Skin integrity is maintained or improved  Outcome: Progressing   The patient is Stable - Low risk of patient condition declining or worsening

## 2023-02-03 NOTE — THERAPY
"Physical Therapy   Daily Treatment     Patient Name: Alysas Juan  Age:  82 y.o., Sex:  female  Medical Record #: [id number]  Today's Date: 2/2/2023     Precautions  Precautions: Fall Risk, Posterior Hip Precautions, Weight Bearing As Tolerated Right Lower Extremity    Subjective    Pt reported sitting up in WC for 90 min  \"I'm very determined\"     Objective       02/02/23 1301   PT Charge Group   PT Gait Training (Units) 2   PT Total Time Spent   PT Individual Total Time Spent (Mins) 30   Pain 0 - 10 Group   Location Hip   Location Orientation Right   Pain Rating Scale (NPRS) 3   Therapist Pain Assessment Post Activity Pain Same as Prior to Activity   Gait Functional Level of Assist    Gait Level Of Assist Standby Assist   Assistive Device Front Wheel Walker   Distance (Feet) 200   # of Times Distance was Traveled 1   Deviation Antalgic;Bradykinetic  (very slow pace)   Wheelchair Functional Level of Assist   Wheelchair Assist Supervised   Distance Wheelchair (Feet or Distance) 50   Wheelchair Description Extra time;Leg rest management;Limited by fatigue   Transfer Functional Level of Assist   Bed, Chair, Wheelchair Transfer Contact Guard Assist   Bed Chair Wheelchair Transfer Description   (stand step FWW)   Bed Mobility    Sit to Supine Moderate Assist   Sit to Stand Contact Guard Assist   Interdisciplinary Plan of Care Collaboration   IDT Collaboration with  Occupational Therapist   Patient Position at End of Therapy In Bed;Bed Alarm On;Tray Table within Reach;Call Light within Reach;Phone within Reach   Collaboration Comments CLOF         Assessment    Pt progressed her ambulation distance to 200ft FWW with SBA and step through pattern. She moves very slowly and cautiously- this ambulation distance took 20 min. Pt has good potential to reach Noah with basic fxl mobility and SBA for stair negotiation prior to DC.  Strengths: Able to follow instructions, Alert and oriented, Effective communication skills, " Good carryover of learning, Independent prior level of function, Making steady progress towards goals, Motivated for self care and independence, Pleasant and cooperative, Supportive family, Willingly participates in therapeutic activities  Barriers: Decreased endurance, Generalized weakness, Home accessibility, Pain    Plan    Therex, gait training FWW with improved rico, education regarding posterior hip precautions in function    Passport items to be completed:  Get in/out of bed safely, in/out of a vehicle, safely use mobility device, walk or wheel around home/community, navigate up and down stairs, show how to get up/down from the ground, ensure home is accessible, demonstrate HEP, complete caregiver training    Physical Therapy Problems (Active)       Problem: Mobility       Dates: Start: 02/02/23         Goal: STG-Within one week, patient will ambulate 200ft FWW with SPV       Dates: Start: 02/02/23            Goal: STG-Within one week, patient will ascend and descend two stairs with Georgette       Dates: Start: 02/02/23               Problem: Mobility Transfers       Dates: Start: 02/02/23         Goal: STG-Within one week, patient will perform supine<>sit with SBA       Dates: Start: 02/02/23            Goal: STG-Within one week, patient will transfer bed to chair with set up assist       Dates: Start: 02/02/23               Problem: PT-Long Term Goals       Dates: Start: 02/02/23         Goal: LTG-By discharge, patient will ambulate 200ft Noah FWW       Dates: Start: 02/02/23            Goal: LTG-By discharge, patient will transfer one surface to another Noah       Dates: Start: 02/02/23            Goal: LTG-By discharge, patient will ambulate up/down 2 stairs with SBA       Dates: Start: 02/02/23            Goal: LTG-By discharge, patient will perform bed mobility independently       Dates: Start: 02/02/23

## 2023-02-03 NOTE — THERAPY
Occupational Therapy  Daily Treatment     Patient Name: Alyssa Juan  Age:  82 y.o., Sex:  female  Medical Record #: [id number]  Today's Date: 2/3/2023     Precautions  Precautions: (P) Fall Risk, Posterior Hip Precautions, Full Weight Bearing Right Lower Extremity         Subjective    Pt agreeable to OT session after meeting w/ [employer].   Pt's grand daughter reports that pt's family will be assisting post d/c as well as paid caregivers.      Objective       02/03/23 1301   Therapy Missed   Missed Therapy (Minutes) 15   Reason For Missed Therapy Non-Medical - Other (Please Comment)  (pt in meeting about setting up caregiver for )   OT Charge Group   OT Self Care / ADL (Units) 1   OT Neuromuscular Re-education / Balance (Units) 1   OT Therapy Activity (Units) 1   OT Total Time Spent   OT Individual Total Time Spent (Mins) 45   Precautions   Precautions Fall Risk;Posterior Hip Precautions;Full Weight Bearing Right Lower Extremity   Functional Level of Assist   Lower Body Dressing Minimal Assist   Lower Body Dressing Description Assistive devices;Reacher;Shoe horn;Sock aid;Increased time;Initial preparation for task  (see note for details)   Balance   Comments Pt participated in ladder ball activity in // bars. Pt used RUE to reach and toss ladder balls. while LUE held onto // bar for balance. Pt ambulated down // bars w/ supervision and BUE on // bars. Pt completed 6 rounds w/ 1 seated rest break.   Interdisciplinary Plan of Care Collaboration   IDT Collaboration with  Family / Caregiver;Physician   Patient Position at End of Therapy Seated;Phone within Reach;Tray Table within Reach;Call Light within Reach   Collaboration Comments grand daughter and rep from [employer] present; MD rounds       Pt was educated on LB dressing AE and demonstrated using sock aid, dressing stick, and reacher to don/doff socks, shoes, and pants. Pt reports she would like a reacher and dressing stick, but does not see the sock aid  being necessary.     Assessment    Pt tolerated session well focused on balance, mobility, and LB dressing. Pt demonstrated increased independence with LE dressing while using AE. Pt able to correct demonstrate how to use LB dressing AE, but would benefit from cont practice prior to d/c.   Strengths: Able to follow instructions, Adequate strength, Independent prior level of function, Supportive family, Pleasant and cooperative, Willingly participates in therapeutic activities, Motivated for self care and independence, Good insight into deficits/needs  Barriers: Decreased endurance, Impaired balance, Limited mobility, Pain    Plan    ADLs w/ AE, IADLs, endurance, strength, balance,    Give hand out of what to buy from [employer]  -reacher  -dressing stick    Occupational Therapy Goals (Active)       Problem: Bathing       Dates: Start: 02/02/23         Goal: STG-Within one week, patient will bathe w/ min A using DME and AE as needed.        Dates: Start: 02/02/23               Problem: Dressing       Dates: Start: 02/02/23         Goal: STG-Within one week, patient will dress LB w/ min A using AE as needed.        Dates: Start: 02/02/23               Problem: Functional Transfers       Dates: Start: 02/02/23         Goal: STG-Within one week, patient will transfer to toilet w/ supervision.        Dates: Start: 02/02/23            Goal: STG-Within one week, patient will transfer to step in shower w/ SBA.        Dates: Start: 02/02/23               Problem: IADL's       Dates: Start: 02/02/23         Goal: STG-Within one week, patient will access kitchen area w/ CGA and LRD.       Dates: Start: 02/02/23               Problem: OT Long Term Goals       Dates: Start: 02/02/23         Goal: LTG-By discharge, patient will complete basic self care tasks w/mod I- supervision.        Dates: Start: 02/02/23            Goal: LTG-By discharge, patient will perform bathroom transfers w/ mod I - supervision.        Dates: Start:  02/02/23            Goal: LTG-By discharge, patient will complete basic home management w/ supervision- min A.       Dates: Start: 02/02/23               Problem: Toileting       Dates: Start: 02/02/23         Goal: STG-Within one week, patient will complete toileting tasks w/ min A.        Dates: Start: 02/02/23

## 2023-02-03 NOTE — PROGRESS NOTES
Hospital Medicine Daily Progress Note        Chief Complaint  Hyponatremia  Hypertension    Interval Problem Update  Pt denies new complaints.  Labs reviewed.    Review of Systems  Review of Systems   Constitutional:  Negative for chills and fever.   HENT: Negative.     Eyes: Negative.    Respiratory:  Negative for cough and shortness of breath.    Cardiovascular:  Negative for chest pain and palpitations.   Gastrointestinal:  Negative for abdominal pain, nausea and vomiting.   Genitourinary: Negative.    Musculoskeletal:  Positive for joint pain.        Wound pain   Skin:  Negative for itching and rash.   Endo/Heme/Allergies:  Negative for polydipsia. Does not bruise/bleed easily.      Physical Exam  Temp:  [36.6 °C (97.9 °F)-36.9 °C (98.4 °F)] 36.9 °C (98.4 °F)  Pulse:  [63-72] 72  Resp:  [18-20] 20  BP: (110-134)/(52-68) 134/54  SpO2:  [95 %] 95 %    Physical Exam  Vitals reviewed.   Constitutional:       General: She is not in acute distress.     Appearance: Normal appearance. She is not ill-appearing.   HENT:      Head: Normocephalic and atraumatic.      Right Ear: External ear normal.      Left Ear: External ear normal.      Nose: Nose normal.      Mouth/Throat:      Pharynx: Oropharynx is clear.   Eyes:      General:         Right eye: No discharge.         Left eye: No discharge.      Extraocular Movements: Extraocular movements intact.      Conjunctiva/sclera: Conjunctivae normal.   Cardiovascular:      Rate and Rhythm: Normal rate and regular rhythm.   Pulmonary:      Effort: Pulmonary effort is normal. No respiratory distress.      Breath sounds: Normal breath sounds. No wheezing.   Abdominal:      General: Bowel sounds are normal. There is no distension.      Palpations: Abdomen is soft.      Tenderness: There is no abdominal tenderness.   Musculoskeletal:      Cervical back: Normal range of motion and neck supple.      Right lower leg: No edema.      Left lower leg: No edema.   Skin:     General: Skin  is warm and dry.   Neurological:      Mental Status: She is alert and oriented to person, place, and time.       Fluids    Intake/Output Summary (Last 24 hours) at 2/3/2023 1545  Last data filed at 2/3/2023 0900  Gross per 24 hour   Intake 480 ml   Output 700 ml   Net -220 ml       Laboratory  Recent Labs     02/01/23 0237 02/02/23  0602 02/03/23  0602   WBC 10.7 6.1 5.2   RBC 3.13* 2.85* 2.59*   HEMOGLOBIN 10.6* 9.7* 8.9*   HEMATOCRIT 31.7* 28.9* 26.0*   .3* 101.4* 100.4*   MCH 33.9* 34.0* 34.4*   MCHC 33.4* 33.6 34.2   RDW 46.3 47.7 45.7   PLATELETCT 224 218 198   MPV 9.3 9.7 9.6     Recent Labs     02/01/23 0237 02/02/23  0602 02/03/23  0602   SODIUM 129* 124* 127*   POTASSIUM 4.2 4.9  --    CHLORIDE 95* 90*  --    CO2 24 27  --    GLUCOSE 145* 107*  --    BUN 16 16  --    CREATININE 0.61 0.65  --    CALCIUM 8.2* 8.7  --                    Assessment/Plan  * Closed right hip fracture, sequela- (present on admission)  Assessment & Plan  2/2 mechanical GLF  S/P R hemiarthroplasty on 1/31/23 by Dr. Rasmussen  Wound care and pain control per Physiatry    Anemia  Assessment & Plan   mL  Has macrocytic indices  Vit B12 and TSH levels normal  Folate testing no longer performed at this facility  Follow H/H    Chronic abdominal pain- (present on admission)  Assessment & Plan  Currently being worked up as outpt  GI F/U    Hyponatremia- (present on admission)  Assessment & Plan  Appears chronic w/ baseline Na+ 130  Na+ levels improving  Follow electrolytes    Hypothyroidism- (present on admission)  Assessment & Plan  Euthyroid on Synthroid    Gastroesophageal reflux disease- (present on admission)  Assessment & Plan  On Omeprazole    HTN (hypertension)- (present on admission)  Assessment & Plan  Observe blood pressure trends on Lisinopril       Full Code

## 2023-02-04 LAB
OSMOLALITY UR: 645 MOSM/KG H2O (ref 300–900)
SODIUM UR-SCNC: 22 MMOL/L

## 2023-02-04 PROCEDURE — 97530 THERAPEUTIC ACTIVITIES: CPT

## 2023-02-04 PROCEDURE — 99231 SBSQ HOSP IP/OBS SF/LOW 25: CPT | Performed by: HOSPITALIST

## 2023-02-04 PROCEDURE — 84300 ASSAY OF URINE SODIUM: CPT

## 2023-02-04 PROCEDURE — A9270 NON-COVERED ITEM OR SERVICE: HCPCS | Performed by: PHYSICAL MEDICINE & REHABILITATION

## 2023-02-04 PROCEDURE — 99232 SBSQ HOSP IP/OBS MODERATE 35: CPT | Performed by: PHYSICAL MEDICINE & REHABILITATION

## 2023-02-04 PROCEDURE — 83935 ASSAY OF URINE OSMOLALITY: CPT

## 2023-02-04 PROCEDURE — 700111 HCHG RX REV CODE 636 W/ 250 OVERRIDE (IP): Performed by: PHYSICAL MEDICINE & REHABILITATION

## 2023-02-04 PROCEDURE — 97116 GAIT TRAINING THERAPY: CPT

## 2023-02-04 PROCEDURE — 97535 SELF CARE MNGMENT TRAINING: CPT

## 2023-02-04 PROCEDURE — 770010 HCHG ROOM/CARE - REHAB SEMI PRIVAT*

## 2023-02-04 PROCEDURE — 700102 HCHG RX REV CODE 250 W/ 637 OVERRIDE(OP): Performed by: PHYSICAL MEDICINE & REHABILITATION

## 2023-02-04 PROCEDURE — 97110 THERAPEUTIC EXERCISES: CPT

## 2023-02-04 RX ADMIN — HYDROCODONE BITARTRATE AND ACETAMINOPHEN 1 TABLET: 5; 325 TABLET ORAL at 12:53

## 2023-02-04 RX ADMIN — HYDROCODONE BITARTRATE AND ACETAMINOPHEN 1 TABLET: 5; 325 TABLET ORAL at 23:44

## 2023-02-04 RX ADMIN — GABAPENTIN 100 MG: 100 CAPSULE ORAL at 21:15

## 2023-02-04 RX ADMIN — LEVOTHYROXINE SODIUM 50 MCG: 0.05 TABLET ORAL at 06:19

## 2023-02-04 RX ADMIN — HYDROCODONE BITARTRATE AND ACETAMINOPHEN 1 TABLET: 5; 325 TABLET ORAL at 16:49

## 2023-02-04 RX ADMIN — SUCRALFATE 1 G: 1 TABLET ORAL at 09:00

## 2023-02-04 RX ADMIN — OMEPRAZOLE 20 MG: 20 CAPSULE, DELAYED RELEASE ORAL at 09:00

## 2023-02-04 RX ADMIN — ENOXAPARIN SODIUM 40 MG: 40 INJECTION SUBCUTANEOUS at 16:50

## 2023-02-04 RX ADMIN — SODIUM CHLORIDE 1 G: 1 TABLET ORAL at 16:50

## 2023-02-04 RX ADMIN — SODIUM CHLORIDE 1 G: 1 TABLET ORAL at 12:54

## 2023-02-04 RX ADMIN — SUCRALFATE 1 G: 1 TABLET ORAL at 21:15

## 2023-02-04 RX ADMIN — SODIUM CHLORIDE 1 G: 1 TABLET ORAL at 09:00

## 2023-02-04 RX ADMIN — HYDROCODONE BITARTRATE AND ACETAMINOPHEN 1 TABLET: 5; 325 TABLET ORAL at 09:00

## 2023-02-04 RX ADMIN — LISINOPRIL 20 MG: 20 TABLET ORAL at 09:00

## 2023-02-04 ASSESSMENT — ENCOUNTER SYMPTOMS
CHILLS: 0
SHORTNESS OF BREATH: 0
COUGH: 0
PALPITATIONS: 0
VOMITING: 0
NAUSEA: 0
EYES NEGATIVE: 1
ABDOMINAL PAIN: 0
FEVER: 0
POLYDIPSIA: 0
BRUISES/BLEEDS EASILY: 0

## 2023-02-04 ASSESSMENT — GAIT ASSESSMENTS
DISTANCE (FEET): 250
GAIT LEVEL OF ASSIST: SUPERVISED
ASSISTIVE DEVICE: FRONT WHEEL WALKER
DEVIATION: ANTALGIC

## 2023-02-04 ASSESSMENT — PAIN DESCRIPTION - PAIN TYPE: TYPE: ACUTE PAIN

## 2023-02-04 ASSESSMENT — ACTIVITIES OF DAILY LIVING (ADL): BED_CHAIR_WHEELCHAIR_TRANSFER_DESCRIPTION: INCREASED TIME;SUPERVISION FOR SAFETY;VERBAL CUEING

## 2023-02-04 NOTE — PROGRESS NOTES
"Rehab Progress Note     Date of Service: 2/4/2023  Chief Complaint: follow up hip fracture    Interval Events (Subjective)    Patient was seen at beside.  She reports that she slept well.  Taking gabapentin 100mg at bedtime seemed to help with burning pain in the feet.    Reports some hip pain continues with therapies, but stable.  Her home hydrocodone dose was resumed.  No clear worsening of pain control.    Patient has no other new questions, concerns, or complaints today.     ROS: No changes to bowel, bladder, mood, or sleep. No chest pain, no shortness of breath.        Objective:  VITAL SIGNS: BP (!) 142/78   Pulse 80   Temp 36.7 °C (98.1 °F) (Temporal)   Resp 18   Ht 1.549 m (5' 1\")   Wt 44.9 kg (98 lb 14.4 oz)   SpO2 95%   BMI 18.69 kg/m²   Gen: alert, no apparent distress  HEENT: NCAT  Neck: supple  CV: Regular rate, regular rhythm  Resp: Clear to auscultation bilaterally  Ext: no calf tenderness, no edema    Recent Results (from the past 72 hour(s))   COV-2, FLU A/B, AND RSV BY PCR (2-4 HOURS [employer]): Collect NP swab in [facility]    Collection Time: 02/01/23  5:20 PM    Specimen: Respirate   Result Value Ref Range    Influenza virus A RNA Negative Negative    Influenza virus B, PCR Negative Negative    RSV, PCR Negative Negative    SARS-CoV-2 by PCR NotDetected     SARS-CoV-2 Source NP Swab    CBC with Differential    Collection Time: 02/02/23  6:02 AM   Result Value Ref Range    WBC 6.1 4.8 - 10.8 K/uL    RBC 2.85 (L) 4.20 - 5.40 M/uL    Hemoglobin 9.7 (L) 12.0 - 16.0 g/dL    Hematocrit 28.9 (L) 37.0 - 47.0 %    .4 (H) 81.4 - 97.8 fL    MCH 34.0 (H) 27.0 - 33.0 pg    MCHC 33.6 33.6 - 35.0 g/dL    RDW 47.7 35.9 - 50.0 fL    Platelet Count 218 164 - 446 K/uL    MPV 9.7 9.0 - 12.9 fL    Neutrophils-Polys 60.10 44.00 - 72.00 %    Lymphocytes 25.90 22.00 - 41.00 %    Monocytes 13.10 0.00 - 13.40 %    Eosinophils 0.30 0.00 - 6.90 %    Basophils 0.30 0.00 - 1.80 %    Immature Granulocytes 0.30 0.00 - " 0.90 %    Nucleated RBC 0.00 /100 WBC    Neutrophils (Absolute) 3.65 2.00 - 7.15 K/uL    Lymphs (Absolute) 1.58 1.00 - 4.80 K/uL    Monos (Absolute) 0.80 0.00 - 0.85 K/uL    Eos (Absolute) 0.02 0.00 - 0.51 K/uL    Baso (Absolute) 0.02 0.00 - 0.12 K/uL    Immature Granulocytes (abs) 0.02 0.00 - 0.11 K/uL    NRBC (Absolute) 0.00 K/uL   Comp Metabolic Panel (CMP)    Collection Time: 02/02/23  6:02 AM   Result Value Ref Range    Sodium 124 (L) 135 - 145 mmol/L    Potassium 4.9 3.6 - 5.5 mmol/L    Chloride 90 (L) 96 - 112 mmol/L    Co2 27 20 - 33 mmol/L    Anion Gap 7.0 7.0 - 16.0    Glucose 107 (H) 65 - 99 mg/dL    Bun 16 8 - 22 mg/dL    Creatinine 0.65 0.50 - 1.40 mg/dL    Calcium 8.7 8.5 - 10.5 mg/dL    AST(SGOT) 61 (H) 12 - 45 U/L    ALT(SGPT) 21 2 - 50 U/L    Alkaline Phosphatase 48 30 - 99 U/L    Total Bilirubin 0.5 0.1 - 1.5 mg/dL    Albumin 3.5 3.2 - 4.9 g/dL    Total Protein 5.8 (L) 6.0 - 8.2 g/dL    Globulin 2.3 1.9 - 3.5 g/dL    A-G Ratio 1.5 g/dL   Magnesium    Collection Time: 02/02/23  6:02 AM   Result Value Ref Range    Magnesium 1.9 1.5 - 2.5 mg/dL   VITAMIN B12    Collection Time: 02/02/23  6:02 AM   Result Value Ref Range    Vitamin B12 -True Cobalamin 1673 (H) 211 - 911 pg/mL   TSH WITH REFLEX TO FT4    Collection Time: 02/02/23  6:02 AM   Result Value Ref Range    TSH 1.530 0.380 - 5.330 uIU/mL   CORRECTED CALCIUM    Collection Time: 02/02/23  6:02 AM   Result Value Ref Range    Correct Calcium 9.1 8.5 - 10.5 mg/dL   ESTIMATED GFR    Collection Time: 02/02/23  6:02 AM   Result Value Ref Range    GFR (CKD-EPI) 87 >60 mL/min/1.73 m 2   CBC WITHOUT DIFFERENTIAL    Collection Time: 02/03/23  6:02 AM   Result Value Ref Range    WBC 5.2 4.8 - 10.8 K/uL    RBC 2.59 (L) 4.20 - 5.40 M/uL    Hemoglobin 8.9 (L) 12.0 - 16.0 g/dL    Hematocrit 26.0 (L) 37.0 - 47.0 %    .4 (H) 81.4 - 97.8 fL    MCH 34.4 (H) 27.0 - 33.0 pg    MCHC 34.2 33.6 - 35.0 g/dL    RDW 45.7 35.9 - 50.0 fL    Platelet Count 198 164 -  446 K/uL    MPV 9.6 9.0 - 12.9 fL   SODIUM SERUM (NA)    Collection Time: 02/03/23  6:02 AM   Result Value Ref Range    Sodium 127 (L) 135 - 145 mmol/L       Scheduled Medications   Medication Dose Frequency    HYDROcodone-acetaminophen  1 Tablet BID    gabapentin  100 mg Q EVENING    sodium chloride  1 g TID WITH MEALS    lisinopril  20 mg QAM AC    enoxaparin (LOVENOX) injection  40 mg DAILY AT 1800    levothyroxine  50 mcg AM ES    omeprazole  20 mg DAILY    senna-docusate  2 Tablet BID    sucralfate  1 g BID       Current Diet Order   Procedures    Diet Order Diet: Regular       Assessment:    This patient is a 82 y.o. female admitted for acute inpatient rehabilitation with Closed right hip fracture, sequela.    Problem List/Medical Decision Making and Plan:    Right femoral neck fracture  S/p Right hemiparthroplasty 1/31 Dr. Valery EDWARDS  Posterior hip precautions  PT/OT, 1.5 hr each discipline, 5 days per week  Continue rehab program    Outpatient follow up with surgery  Staples out 14 days post-op    Pain management  Change Scheduled tylenol and PRN oxycodone to her home pain medication, 5 mg hydrocodone-APAP - schedule at 8/noon prior to therapies, as well as Q4 PRN  PRN ice  Lidoderm patch  Stable with return to hydrocodone    Peripheral neuropathy  With pain in feet at night  Start low dose gabapentin 100 mg QHS  Discussed that this dose was helpful, will hold at this dose    Essential hypertension  Hypertension  Appreciate hospitalist assistance     Chronic abdominal pain  Sucralfate  Outpatient follow-up with GI     GERD  Omeprazole     Hypothyroidism  Levothyroxine     Hyponatremia  Appears to be acute on chronic  Started salt tabs  Appreciate hospitalist assistance     Macrocytic anemia  Postoperative  Has dropped 3 hemoglobin point since surgery, continue to monitor with labs, transfuse for less than 7    DVT prophylaxis  Lovenox    Zeb Reed M.D.  Physical Medicine and Rehabilitation

## 2023-02-04 NOTE — THERAPY
"Occupational Therapy  Daily Treatment     Patient Name: Alyssa Juan  Age:  82 y.o., Sex:  female  Medical Record #: [id number]  Today's Date: 2/4/2023     Precautions  Precautions: Fall Risk, Posterior Hip Precautions, Full Weight Bearing Right Lower Extremity         Subjective    \"Yes, this would be good for my .\" OT encouraging pt to think of what would be helpful for her instead of thinking as a caregiver.     Objective       02/04/23 1301   OT Charge Group   OT Self Care / ADL (Units) 2   OT Therapeutic Exercise (Units) 2   OT Total Time Spent   OT Individual Total Time Spent (Mins) 60   Cognition    Level of Consciousness Alert   Functional Level of Assist   Grooming Supervision;Standing   Toileting Contact Guard Assist   Bed, Chair, Wheelchair Transfer Standby Assist  (blocked practice with and without leg . Completed 5x in session)   Toilet Transfers Standby Assist  (FWW)   Sitting Lower Body Exercises   Nustep Resistance Level 1  (10 minutes)   Balance   Comments walked to from session with FWW and CGA/SBA   Interdisciplinary Plan of Care Collaboration   IDT Collaboration with  Family / Caregiver   Patient Position at End of Therapy   (standing with CNA completing oral hygiene)   Collaboration Comments granddaughter ordered AE         Assessment    Pt making steady progress. Pt able to complete standard bed transfer w and w/out leg . Pt is getting a new mattress to make her bed the same height as the practice bed 25 inches. She plans on ordering leg , arm bike, bed rail, hip kit, and toilet frame. Pt has a glass shower on one side of her toilet and a far away sink on the other so toilet frame is more doable than grab bar.    Strengths: Able to follow instructions, Adequate strength, Independent prior level of function, Supportive family, Pleasant and cooperative, Willingly participates in therapeutic activities, Motivated for self care and independence, Good insight into " deficits/needs  Barriers: Decreased endurance, Impaired balance, Limited mobility, Pain    Plan    ADLs w/ AE, IADLs, endurance, strength, balance,    Occupational Therapy Goals (Active)       Problem: Bathing       Dates: Start: 02/02/23         Goal: STG-Within one week, patient will bathe w/ min A using DME and AE as needed.        Dates: Start: 02/02/23               Problem: Dressing       Dates: Start: 02/02/23         Goal: STG-Within one week, patient will dress LB w/ min A using AE as needed.        Dates: Start: 02/02/23               Problem: Functional Transfers       Dates: Start: 02/02/23         Goal: STG-Within one week, patient will transfer to toilet w/ supervision.        Dates: Start: 02/02/23            Goal: STG-Within one week, patient will transfer to step in shower w/ SBA.        Dates: Start: 02/02/23               Problem: IADL's       Dates: Start: 02/02/23         Goal: STG-Within one week, patient will access kitchen area w/ CGA and LRD.       Dates: Start: 02/02/23               Problem: OT Long Term Goals       Dates: Start: 02/02/23         Goal: LTG-By discharge, patient will complete basic self care tasks w/mod I- supervision.        Dates: Start: 02/02/23            Goal: LTG-By discharge, patient will perform bathroom transfers w/ mod I - supervision.        Dates: Start: 02/02/23            Goal: LTG-By discharge, patient will complete basic home management w/ supervision- min A.       Dates: Start: 02/02/23               Problem: Toileting       Dates: Start: 02/02/23         Goal: STG-Within one week, patient will complete toileting tasks w/ min A.        Dates: Start: 02/02/23

## 2023-02-04 NOTE — CARE PLAN
The patient is Stable - Low risk of patient condition declining or worsening         Progress made toward(s) clinical / shift goals:    Problem: Knowledge Deficit - Standard  Goal: Patient and family/care givers will demonstrate understanding of plan of care, disease process/condition, diagnostic tests and medications  Outcome: Progressing     Problem: Pain - Standard  Goal: Alleviation of pain or a reduction in pain to the patient’s comfort goal  Outcome: Progressing. Patient states pain is tolerable over shift. Requested heat packs to lower left back area and scheduled pain medication given. Is starting scheduled hydrocodone today scheduled at 0800 and 1200 during therapy.

## 2023-02-04 NOTE — PROGRESS NOTES
Hospital Medicine Daily Progress Note        Chief Complaint  Hyponatremia  Hypertension    Interval Problem Update  Pt frustrated because she feels that her stepson is not engaged in helping her have a smooth transition home.  Otherwise, no physical complaints.    Review of Systems  Review of Systems   Constitutional:  Negative for chills and fever.   HENT: Negative.     Eyes: Negative.    Respiratory:  Negative for cough and shortness of breath.    Cardiovascular:  Negative for chest pain and palpitations.   Gastrointestinal:  Negative for abdominal pain, nausea and vomiting.   Genitourinary: Negative.    Musculoskeletal:  Positive for joint pain.        Wound pain   Skin:  Negative for itching and rash.   Endo/Heme/Allergies:  Negative for polydipsia. Does not bruise/bleed easily.      Physical Exam  Temp:  [36.6 °C (97.8 °F)-36.9 °C (98.4 °F)] 36.7 °C (98.1 °F)  Pulse:  [72-80] 80  Resp:  [18-20] 18  BP: (134-145)/(54-78) 142/78  SpO2:  [94 %-95 %] 95 %    Physical Exam  Vitals reviewed.   Constitutional:       General: She is not in acute distress.     Appearance: Normal appearance. She is not ill-appearing.   HENT:      Head: Normocephalic and atraumatic.      Right Ear: External ear normal.      Left Ear: External ear normal.      Nose: Nose normal.      Mouth/Throat:      Pharynx: Oropharynx is clear.   Eyes:      General:         Right eye: No discharge.         Left eye: No discharge.      Extraocular Movements: Extraocular movements intact.      Conjunctiva/sclera: Conjunctivae normal.   Cardiovascular:      Rate and Rhythm: Normal rate and regular rhythm.   Pulmonary:      Effort: Pulmonary effort is normal. No respiratory distress.      Breath sounds: Normal breath sounds. No wheezing.   Abdominal:      General: Bowel sounds are normal. There is no distension.      Palpations: Abdomen is soft.      Tenderness: There is no abdominal tenderness.   Musculoskeletal:      Cervical back: Normal range of  motion and neck supple.      Right lower leg: No edema.      Left lower leg: No edema.   Skin:     General: Skin is warm and dry.   Neurological:      Mental Status: She is alert and oriented to person, place, and time.       Fluids  No intake or output data in the 24 hours ending 02/04/23 1256      Laboratory  Recent Labs     02/02/23  0602 02/03/23  0602   WBC 6.1 5.2   RBC 2.85* 2.59*   HEMOGLOBIN 9.7* 8.9*   HEMATOCRIT 28.9* 26.0*   .4* 100.4*   MCH 34.0* 34.4*   MCHC 33.6 34.2   RDW 47.7 45.7   PLATELETCT 218 198   MPV 9.7 9.6     Recent Labs     02/02/23  0602 02/03/23  0602   SODIUM 124* 127*   POTASSIUM 4.9  --    CHLORIDE 90*  --    CO2 27  --    GLUCOSE 107*  --    BUN 16  --    CREATININE 0.65  --    CALCIUM 8.7  --                    Assessment/Plan  * Closed right hip fracture, sequela- (present on admission)  Assessment & Plan  2/2 mechanical GLF  S/P R hemiarthroplasty on 1/31/23 by Dr. Rasmussen  Wound care and pain control per Physiatry    Anemia  Assessment & Plan   mL  Has macrocytic indices  Vit B12 and TSH levels normal  Folate testing no longer performed at this facility  Follow H/H    Chronic abdominal pain- (present on admission)  Assessment & Plan  Currently being worked up as outpt  GI F/U    Hyponatremia- (present on admission)  Assessment & Plan  Appears chronic w/ baseline Na+ 130  Na+ levels improving  Follow electrolytes    Hypothyroidism- (present on admission)  Assessment & Plan  Euthyroid on Synthroid    Gastroesophageal reflux disease- (present on admission)  Assessment & Plan  On Omeprazole    HTN (hypertension)- (present on admission)  Assessment & Plan  Observe blood pressure trends on increased Lisinopril       Full Code

## 2023-02-04 NOTE — THERAPY
Physical Therapy   Daily Treatment     Patient Name: Alyssa Juan  Age:  82 y.o., Sex:  female  Medical Record #: [id number]  Today's Date: 2/4/2023     Precautions  Precautions: Fall Risk, Posterior Hip Precautions, Full Weight Bearing Right Lower Extremity    Subjective    Patient is agreeable to participate, is still dressed in her hospital gown she slept in. Agreeable to get dressed first, willing to use adaptive equipment from OT to accomplish the task.     Objective       02/04/23 0931   PT Charge Group   PT Gait Training (Units) 1   PT Therapeutic Activities (Units) 1   PT Total Time Spent   PT Individual Total Time Spent (Mins) 30   Gait Functional Level of Assist    Gait Level Of Assist Supervised   Assistive Device Front Wheel Walker   Distance (Feet) 250   # of Times Distance was Traveled 1   Deviation Antalgic  (slowed speed)   Transfer Functional Level of Assist   Bed, Chair, Wheelchair Transfer Standby Assist   Bed Chair Wheelchair Transfer Description Increased time;Supervision for safety;Verbal cueing  (cues for hand placement)   Interdisciplinary Plan of Care Collaboration   IDT Collaboration with  Family / Caregiver   Patient Position at End of Therapy Seated;Tray Table within Reach;Phone within Reach;Family / Friend in Room;Call Light within Reach   Collaboration Comments left seated in wheelchair with family in room         Assessment    Patient requires some cuing for hand placement during sit to stand as she attempts to come to standing with both hands on walker. She was able to demonstrate/ teach back proper use of dressing stick, reacher, sock aid, although needed some cuing to put her R leg of pants on first. She ambulates slowly, slows her speed with head turns and to walk around obstacles. Is still a fall risk, is making gains towards goals.    Strengths: Able to follow instructions, Alert and oriented, Effective communication skills, Good carryover of learning, Independent prior  level of function, Making steady progress towards goals, Motivated for self care and independence, Pleasant and cooperative, Supportive family, Willingly participates in therapeutic activities  Barriers: Decreased endurance, Generalized weakness, Home accessibility, Pain    Plan    R LE therex, gait training, stair training, NuStep for endurance, sit>supine (leg  if needed)    Passport items to be completed:  Get in/out of bed safely, in/out of a vehicle, safely use mobility device, walk or wheel around home/community, navigate up and down stairs, show how to get up/down from the ground, ensure home is accessible, demonstrate HEP, complete caregiver training    Physical Therapy Problems (Active)       Problem: Mobility       Dates: Start: 02/02/23         Goal: STG-Within one week, patient will ambulate 200ft FWW with SPV       Dates: Start: 02/02/23            Goal: STG-Within one week, patient will ascend and descend two stairs with Georgette       Dates: Start: 02/02/23               Problem: Mobility Transfers       Dates: Start: 02/02/23         Goal: STG-Within one week, patient will perform supine<>sit with SBA       Dates: Start: 02/02/23            Goal: STG-Within one week, patient will transfer bed to chair with set up assist       Dates: Start: 02/02/23               Problem: PT-Long Term Goals       Dates: Start: 02/02/23         Goal: LTG-By discharge, patient will ambulate 200ft Noah FWW       Dates: Start: 02/02/23            Goal: LTG-By discharge, patient will transfer one surface to another Noah       Dates: Start: 02/02/23            Goal: LTG-By discharge, patient will ambulate up/down 2 stairs with SBA       Dates: Start: 02/02/23            Goal: LTG-By discharge, patient will perform bed mobility independently       Dates: Start: 02/02/23

## 2023-02-05 PROCEDURE — 770010 HCHG ROOM/CARE - REHAB SEMI PRIVAT*

## 2023-02-05 PROCEDURE — 97116 GAIT TRAINING THERAPY: CPT

## 2023-02-05 PROCEDURE — 700111 HCHG RX REV CODE 636 W/ 250 OVERRIDE (IP): Performed by: PHYSICAL MEDICINE & REHABILITATION

## 2023-02-05 PROCEDURE — 97110 THERAPEUTIC EXERCISES: CPT

## 2023-02-05 PROCEDURE — 97530 THERAPEUTIC ACTIVITIES: CPT

## 2023-02-05 PROCEDURE — 97535 SELF CARE MNGMENT TRAINING: CPT

## 2023-02-05 PROCEDURE — A9270 NON-COVERED ITEM OR SERVICE: HCPCS | Performed by: PHYSICAL MEDICINE & REHABILITATION

## 2023-02-05 PROCEDURE — 700102 HCHG RX REV CODE 250 W/ 637 OVERRIDE(OP): Performed by: PHYSICAL MEDICINE & REHABILITATION

## 2023-02-05 PROCEDURE — 99232 SBSQ HOSP IP/OBS MODERATE 35: CPT | Performed by: HOSPITALIST

## 2023-02-05 RX ADMIN — LISINOPRIL 20 MG: 20 TABLET ORAL at 08:13

## 2023-02-05 RX ADMIN — GABAPENTIN 100 MG: 100 CAPSULE ORAL at 21:25

## 2023-02-05 RX ADMIN — SENNOSIDES AND DOCUSATE SODIUM 2 TABLET: 50; 8.6 TABLET ORAL at 08:13

## 2023-02-05 RX ADMIN — LEVOTHYROXINE SODIUM 50 MCG: 0.05 TABLET ORAL at 05:39

## 2023-02-05 RX ADMIN — HYDROCODONE BITARTRATE AND ACETAMINOPHEN 1 TABLET: 5; 325 TABLET ORAL at 12:09

## 2023-02-05 RX ADMIN — ENOXAPARIN SODIUM 40 MG: 40 INJECTION SUBCUTANEOUS at 18:06

## 2023-02-05 RX ADMIN — SUCRALFATE 1 G: 1 TABLET ORAL at 21:25

## 2023-02-05 RX ADMIN — HYDROCODONE BITARTRATE AND ACETAMINOPHEN 1 TABLET: 5; 325 TABLET ORAL at 21:24

## 2023-02-05 RX ADMIN — TRAZODONE HYDROCHLORIDE 50 MG: 50 TABLET ORAL at 21:24

## 2023-02-05 RX ADMIN — OMEPRAZOLE 20 MG: 20 CAPSULE, DELAYED RELEASE ORAL at 10:07

## 2023-02-05 RX ADMIN — HYDROCODONE BITARTRATE AND ACETAMINOPHEN 1 TABLET: 5; 325 TABLET ORAL at 07:01

## 2023-02-05 RX ADMIN — SODIUM CHLORIDE 1 G: 1 TABLET ORAL at 08:13

## 2023-02-05 RX ADMIN — SODIUM CHLORIDE 1 G: 1 TABLET ORAL at 18:06

## 2023-02-05 RX ADMIN — SUCRALFATE 1 G: 1 TABLET ORAL at 08:13

## 2023-02-05 RX ADMIN — SENNOSIDES AND DOCUSATE SODIUM 2 TABLET: 50; 8.6 TABLET ORAL at 21:24

## 2023-02-05 RX ADMIN — SODIUM CHLORIDE 1 G: 1 TABLET ORAL at 12:09

## 2023-02-05 ASSESSMENT — GAIT ASSESSMENTS
DEVIATION: BRADYKINETIC
ASSISTIVE DEVICE: FRONT WHEEL WALKER
DISTANCE (FEET): 150
GAIT LEVEL OF ASSIST: SUPERVISED

## 2023-02-05 ASSESSMENT — ENCOUNTER SYMPTOMS
POLYDIPSIA: 0
BRUISES/BLEEDS EASILY: 0
VOMITING: 0
CHILLS: 0
NAUSEA: 0
SHORTNESS OF BREATH: 0
ABDOMINAL PAIN: 0
PALPITATIONS: 0
COUGH: 0
FEVER: 0
EYES NEGATIVE: 1

## 2023-02-05 ASSESSMENT — PAIN DESCRIPTION - PAIN TYPE
TYPE: ACUTE PAIN

## 2023-02-05 ASSESSMENT — FIBROSIS 4 INDEX: FIB4 SCORE: 5.58

## 2023-02-05 NOTE — PROGRESS NOTES
Hospital Medicine Daily Progress Note        Chief Complaint  Hyponatremia  Hypertension    Interval Problem Update  Pt c/o pain--defer to Physiatry.  Blood pressures better.    Review of Systems  Review of Systems   Constitutional:  Negative for chills and fever.   HENT: Negative.     Eyes: Negative.    Respiratory:  Negative for cough and shortness of breath.    Cardiovascular:  Negative for chest pain and palpitations.   Gastrointestinal:  Negative for abdominal pain, nausea and vomiting.   Genitourinary: Negative.    Musculoskeletal:  Positive for joint pain.        Wound pain   Skin:  Negative for itching and rash.   Endo/Heme/Allergies:  Negative for polydipsia. Does not bruise/bleed easily.      Physical Exam  Temp:  [36.7 °C (98 °F)-36.7 °C (98.1 °F)] 36.7 °C (98 °F)  Pulse:  [64-71] 64  Resp:  [18] 18  BP: (103-131)/(49-57) 131/57  SpO2:  [92 %-96 %] 96 %    Physical Exam  Vitals reviewed.   Constitutional:       General: She is not in acute distress.     Appearance: Normal appearance. She is not ill-appearing.   HENT:      Head: Normocephalic and atraumatic.      Right Ear: External ear normal.      Left Ear: External ear normal.      Nose: Nose normal.      Mouth/Throat:      Pharynx: Oropharynx is clear.   Eyes:      General:         Right eye: No discharge.         Left eye: No discharge.      Extraocular Movements: Extraocular movements intact.      Conjunctiva/sclera: Conjunctivae normal.   Cardiovascular:      Rate and Rhythm: Normal rate and regular rhythm.   Pulmonary:      Effort: Pulmonary effort is normal. No respiratory distress.      Breath sounds: Normal breath sounds. No wheezing.   Abdominal:      General: Bowel sounds are normal. There is no distension.      Palpations: Abdomen is soft.      Tenderness: There is no abdominal tenderness.   Musculoskeletal:      Cervical back: Normal range of motion and neck supple.      Right lower leg: No edema.      Left lower leg: No edema.   Skin:      General: Skin is warm and dry.   Neurological:      Mental Status: She is alert and oriented to person, place, and time.       Fluids    Intake/Output Summary (Last 24 hours) at 2/5/2023 1243  Last data filed at 2/5/2023 0800  Gross per 24 hour   Intake 720 ml   Output --   Net 720 ml         Laboratory  Recent Labs     02/03/23  0602   WBC 5.2   RBC 2.59*   HEMOGLOBIN 8.9*   HEMATOCRIT 26.0*   .4*   MCH 34.4*   MCHC 34.2   RDW 45.7   PLATELETCT 198   MPV 9.6     Recent Labs     02/03/23  0602   SODIUM 127*                   Assessment/Plan  * Closed right hip fracture, sequela- (present on admission)  Assessment & Plan  2/2 mechanical GLF  S/P R hemiarthroplasty on 1/31/23 by Dr. Rasmussen  Wound care and pain control per Physiatry    Anemia  Assessment & Plan   mL  Has macrocytic indices  Vit B12 and TSH levels normal  Folate testing no longer performed at this facility  Follow H/H  Check F/U labs in AM     Chronic abdominal pain- (present on admission)  Assessment & Plan  Currently being worked up as outpt  GI F/U    Hyponatremia- (present on admission)  Assessment & Plan  Appears chronic w/ baseline Na+ 130  Na+ levels improving  Follow electrolytes  Check F/U labs in AM    Hypothyroidism- (present on admission)  Assessment & Plan  Euthyroid on Synthroid    Gastroesophageal reflux disease- (present on admission)  Assessment & Plan  On Omeprazole    HTN (hypertension)- (present on admission)  Assessment & Plan  Elevated blood pressures improving on increased Lisinopril       Full Code

## 2023-02-05 NOTE — CARE PLAN
The patient is Stable - Low risk of patient condition declining or worsening    Shift Goals  Clinical Goals: safety, pain mgt    Problem: Pain - Standard  Goal: Alleviation of pain or a reduction in pain to the patient’s comfort goal  Outcome: Progressing: Schedule hydrocodone 5-325mg administered per MAR. PRN hydrocodone given one. Pt reports acceptable decrease in pain upon reassessments.      Problem: Fall Risk - Rehab  Goal: Patient will remain free from falls  Outcome: Progressing: using call light appropriately. Does not attempt self transfer. Remains free from falls.

## 2023-02-05 NOTE — THERAPY
Physical Therapy   Daily Treatment     Patient Name: Alyssa Juan  Age:  82 y.o., Sex:  female  Medical Record #: [id number]  Today's Date: 2/5/2023     Precautions  Precautions: (P) Fall Risk, Posterior Hip Precautions, Full Weight Bearing Right Lower Extremity    Subjective    Pt reports that she had significant pain last night 8/10. She is feeling better today and is agreeable to therapy      Objective       02/05/23 0959   PT Charge Group   PT Gait Training (Units) 1   PT Therapeutic Exercise (Units) 2   PT Therapeutic Activities (Units) 1   Precautions   Precautions Fall Risk;Posterior Hip Precautions;Full Weight Bearing Right Lower Extremity   Gait Functional Level of Assist    Gait Level Of Assist Supervised   Assistive Device Front Wheel Walker   Distance (Feet) 150   # of Times Distance was Traveled 2   Deviation Bradykinetic   Transfer Functional Level of Assist   Bed, Chair, Wheelchair Transfer Standby Assist   Supine Lower Body Exercise   Supine Lower Body Exercises Yes   Bridges Two Legged;2 sets of 10   Hip Abduction Right ;1 set of 10   Hip Adduction  Right;1 set of 10   Heel Slide 1 set of 10;Bilateral   Ankle Pumps 1 set of 10   Sitting Lower Body Exercises   Long Arc Quad 1 set of 10;Right   Nustep Resistance Level 1  (12 minutes)   Bed Mobility    Supine to Sit Supervised   Sit to Supine Supervised   Sit to Stand Standby Assist  (insturction for hand placement use of AD)   Interdisciplinary Plan of Care Collaboration   Patient Position at End of Therapy Seated;Family / Friend in Room;Call Light within Reach;Tray Table within Reach       Assessment    Patient was able to manage getting covers off and starting session with supine exercises.requires extra time to dress and needs reminders to use her AD for reaching.     Strengths: Able to follow instructions, Alert and oriented, Effective communication skills, Good carryover of learning, Independent prior level of function, Making steady  progress towards goals, Motivated for self care and independence, Pleasant and cooperative, Supportive family, Willingly participates in therapeutic activities  Barriers: Decreased endurance, Generalized weakness, Home accessibility, Pain    Plan    R LE therex, gait training, stair training, NuStep for endurance, sit>supine (leg  if needed)     Passport items to be completed:  Get in/out of bed safely, in/out of a vehicle, safely use mobility device, walk or wheel around home/community, navigate up and down stairs, show how to get up/down from the ground, ensure home is accessible, demonstrate HEP, complete caregiver training    Physical Therapy Problems (Active)       Problem: Mobility       Dates: Start: 02/02/23         Goal: STG-Within one week, patient will ambulate 200ft FWW with SPV       Dates: Start: 02/02/23            Goal: STG-Within one week, patient will ascend and descend two stairs with Georgette       Dates: Start: 02/02/23               Problem: Mobility Transfers       Dates: Start: 02/02/23         Goal: STG-Within one week, patient will perform supine<>sit with SBA       Dates: Start: 02/02/23            Goal: STG-Within one week, patient will transfer bed to chair with set up assist       Dates: Start: 02/02/23               Problem: PT-Long Term Goals       Dates: Start: 02/02/23         Goal: LTG-By discharge, patient will ambulate 200ft Noah FWW       Dates: Start: 02/02/23            Goal: LTG-By discharge, patient will transfer one surface to another Noah       Dates: Start: 02/02/23            Goal: LTG-By discharge, patient will ambulate up/down 2 stairs with SBA       Dates: Start: 02/02/23            Goal: LTG-By discharge, patient will perform bed mobility independently       Dates: Start: 02/02/23

## 2023-02-05 NOTE — THERAPY
Occupational Therapy  Daily Treatment     Patient Name: Alyssa Juan  Age:  82 y.o., Sex:  female  Medical Record #: [id number]  Today's Date: 2/5/2023     Precautions  Precautions: (P) Fall Risk, Posterior Hip Precautions, Full Weight Bearing Right Lower Extremity         Subjective    Pt in bed upon arrival, pleasant and agreeable to OT session.  Pt stating that the bandage got wet during her shower today, therefore bandage was removed and needs a new one prior to getting up out of bed.     Objective       02/05/23 1401   OT Charge Group   OT Self Care / ADL (Units) 1   OT Therapeutic Exercise (Units) 1   OT Total Time Spent   OT Individual Total Time Spent (Mins) 30   Precautions   Precautions Fall Risk;Posterior Hip Precautions;Full Weight Bearing Right Lower Extremity   Functional Level of Assist   Lower Body Dressing Contact Guard Assist  (pt able to complete pants over hips pursuit while in bed utilizing bridging, completed donnign socks with sock-aid)   Lower Body Dressing Description Assistive devices;Sock aid;Increased time;Initial preparation for task;Supervision for safety   Bed, Chair, Wheelchair Transfer Contact Guard Assist  (stand pivot to wc)   Sitting Upper Body Exercises   Upper Extremity Bike Level 3 Resistance  (motomed, 10 min overall, 0 RB)   Interdisciplinary Plan of Care Collaboration   IDT Collaboration with  Family / Caregiver;Nursing   Patient Position at End of Therapy Seated;Self Releasing Lap Belt Applied;Tray Table within Reach;Family / Friend in Room   Collaboration Comments granddaughter and daughter present, RN re: island dressing applied by this therapist         Assessment    Pt tolerated session well with reinforcement of use of AE for dressing when seated EOB. Pt able to recall use of reacher and dressing stick, though re edu on use of sock-aid to don socks. Pt able to utilize with only VC following instructions.    Strengths: Able to follow instructions, Adequate  strength, Independent prior level of function, Supportive family, Pleasant and cooperative, Willingly participates in therapeutic activities, Motivated for self care and independence, Good insight into deficits/needs  Barriers: Decreased endurance, Impaired balance, Limited mobility, Pain    Plan    ADLs w/ AE, IADLs, endurance, strength, balance,      Occupational Therapy Goals (Active)       Problem: Bathing       Dates: Start: 02/02/23         Goal: STG-Within one week, patient will bathe w/ min A using DME and AE as needed.        Dates: Start: 02/02/23               Problem: Dressing       Dates: Start: 02/02/23         Goal: STG-Within one week, patient will dress LB w/ min A using AE as needed.        Dates: Start: 02/02/23               Problem: Functional Transfers       Dates: Start: 02/02/23         Goal: STG-Within one week, patient will transfer to toilet w/ supervision.        Dates: Start: 02/02/23            Goal: STG-Within one week, patient will transfer to step in shower w/ SBA.        Dates: Start: 02/02/23               Problem: IADL's       Dates: Start: 02/02/23         Goal: STG-Within one week, patient will access kitchen area w/ CGA and LRD.       Dates: Start: 02/02/23               Problem: OT Long Term Goals       Dates: Start: 02/02/23         Goal: LTG-By discharge, patient will complete basic self care tasks w/mod I- supervision.        Dates: Start: 02/02/23            Goal: LTG-By discharge, patient will perform bathroom transfers w/ mod I - supervision.        Dates: Start: 02/02/23            Goal: LTG-By discharge, patient will complete basic home management w/ supervision- min A.       Dates: Start: 02/02/23               Problem: Toileting       Dates: Start: 02/02/23         Goal: STG-Within one week, patient will complete toileting tasks w/ min A.        Dates: Start: 02/02/23

## 2023-02-06 LAB
ANION GAP SERPL CALC-SCNC: 6 MMOL/L (ref 7–16)
BUN SERPL-MCNC: 14 MG/DL (ref 8–22)
CALCIUM SERPL-MCNC: 8.6 MG/DL (ref 8.5–10.5)
CHLORIDE SERPL-SCNC: 94 MMOL/L (ref 96–112)
CO2 SERPL-SCNC: 28 MMOL/L (ref 20–33)
CREAT SERPL-MCNC: 0.42 MG/DL (ref 0.5–1.4)
ERYTHROCYTE [DISTWIDTH] IN BLOOD BY AUTOMATED COUNT: 46.7 FL (ref 35.9–50)
GFR SERPLBLD CREATININE-BSD FMLA CKD-EPI: 97 ML/MIN/1.73 M 2
GLUCOSE SERPL-MCNC: 107 MG/DL (ref 65–99)
HCT VFR BLD AUTO: 23.3 % (ref 37–47)
HGB BLD-MCNC: 7.9 G/DL (ref 12–16)
MCH RBC QN AUTO: 34.1 PG (ref 27–33)
MCHC RBC AUTO-ENTMCNC: 33.9 G/DL (ref 33.6–35)
MCV RBC AUTO: 100.4 FL (ref 81.4–97.8)
PHOSPHATE SERPL-MCNC: 3.3 MG/DL (ref 2.5–4.5)
PLATELET # BLD AUTO: 248 K/UL (ref 164–446)
PMV BLD AUTO: 9.6 FL (ref 9–12.9)
POTASSIUM SERPL-SCNC: 4.2 MMOL/L (ref 3.6–5.5)
RBC # BLD AUTO: 2.32 M/UL (ref 4.2–5.4)
SODIUM SERPL-SCNC: 128 MMOL/L (ref 135–145)
WBC # BLD AUTO: 7.3 K/UL (ref 4.8–10.8)

## 2023-02-06 PROCEDURE — 97530 THERAPEUTIC ACTIVITIES: CPT

## 2023-02-06 PROCEDURE — 97535 SELF CARE MNGMENT TRAINING: CPT

## 2023-02-06 PROCEDURE — 97110 THERAPEUTIC EXERCISES: CPT

## 2023-02-06 PROCEDURE — 84100 ASSAY OF PHOSPHORUS: CPT

## 2023-02-06 PROCEDURE — A9270 NON-COVERED ITEM OR SERVICE: HCPCS | Performed by: PHYSICAL MEDICINE & REHABILITATION

## 2023-02-06 PROCEDURE — 97116 GAIT TRAINING THERAPY: CPT

## 2023-02-06 PROCEDURE — 99232 SBSQ HOSP IP/OBS MODERATE 35: CPT | Performed by: PHYSICAL MEDICINE & REHABILITATION

## 2023-02-06 PROCEDURE — 770010 HCHG ROOM/CARE - REHAB SEMI PRIVAT*

## 2023-02-06 PROCEDURE — 36415 COLL VENOUS BLD VENIPUNCTURE: CPT

## 2023-02-06 PROCEDURE — 700102 HCHG RX REV CODE 250 W/ 637 OVERRIDE(OP): Performed by: PHYSICAL MEDICINE & REHABILITATION

## 2023-02-06 PROCEDURE — 700111 HCHG RX REV CODE 636 W/ 250 OVERRIDE (IP): Performed by: PHYSICAL MEDICINE & REHABILITATION

## 2023-02-06 PROCEDURE — 97112 NEUROMUSCULAR REEDUCATION: CPT

## 2023-02-06 PROCEDURE — 85027 COMPLETE CBC AUTOMATED: CPT

## 2023-02-06 PROCEDURE — 99232 SBSQ HOSP IP/OBS MODERATE 35: CPT | Performed by: HOSPITALIST

## 2023-02-06 PROCEDURE — 80048 BASIC METABOLIC PNL TOTAL CA: CPT

## 2023-02-06 RX ORDER — LISINOPRIL 20 MG/1
20 TABLET ORAL
Status: DISCONTINUED | OUTPATIENT
Start: 2023-02-07 | End: 2023-02-08 | Stop reason: HOSPADM

## 2023-02-06 RX ORDER — LISINOPRIL 5 MG/1
10 TABLET ORAL
Status: DISCONTINUED | OUTPATIENT
Start: 2023-02-07 | End: 2023-02-06

## 2023-02-06 RX ADMIN — HYDROCODONE BITARTRATE AND ACETAMINOPHEN 1 TABLET: 5; 325 TABLET ORAL at 20:58

## 2023-02-06 RX ADMIN — TRAZODONE HYDROCHLORIDE 50 MG: 50 TABLET ORAL at 20:59

## 2023-02-06 RX ADMIN — GABAPENTIN 100 MG: 100 CAPSULE ORAL at 20:58

## 2023-02-06 RX ADMIN — LEVOTHYROXINE SODIUM 50 MCG: 0.05 TABLET ORAL at 04:36

## 2023-02-06 RX ADMIN — SENNOSIDES AND DOCUSATE SODIUM 2 TABLET: 50; 8.6 TABLET ORAL at 20:59

## 2023-02-06 RX ADMIN — OMEPRAZOLE 20 MG: 20 CAPSULE, DELAYED RELEASE ORAL at 08:03

## 2023-02-06 RX ADMIN — HYDROCODONE BITARTRATE AND ACETAMINOPHEN 1 TABLET: 5; 325 TABLET ORAL at 04:36

## 2023-02-06 RX ADMIN — SUCRALFATE 1 G: 1 TABLET ORAL at 20:58

## 2023-02-06 RX ADMIN — HYDROCODONE BITARTRATE AND ACETAMINOPHEN 1 TABLET: 5; 325 TABLET ORAL at 08:03

## 2023-02-06 RX ADMIN — SENNOSIDES AND DOCUSATE SODIUM 2 TABLET: 50; 8.6 TABLET ORAL at 08:03

## 2023-02-06 RX ADMIN — SODIUM CHLORIDE 1 G: 1 TABLET ORAL at 17:43

## 2023-02-06 RX ADMIN — SUCRALFATE 1 G: 1 TABLET ORAL at 08:03

## 2023-02-06 RX ADMIN — SODIUM CHLORIDE 1 G: 1 TABLET ORAL at 12:13

## 2023-02-06 RX ADMIN — ENOXAPARIN SODIUM 40 MG: 40 INJECTION SUBCUTANEOUS at 17:43

## 2023-02-06 RX ADMIN — SODIUM CHLORIDE 1 G: 1 TABLET ORAL at 08:03

## 2023-02-06 RX ADMIN — HYDROCODONE BITARTRATE AND ACETAMINOPHEN 1 TABLET: 5; 325 TABLET ORAL at 12:12

## 2023-02-06 ASSESSMENT — GAIT ASSESSMENTS
DISTANCE (FEET): 250
GAIT LEVEL OF ASSIST: SUPERVISED
GAIT LEVEL OF ASSIST: SUPERVISED
DEVIATION: BRADYKINETIC
DISTANCE (FEET): 300
ASSISTIVE DEVICE: FRONT WHEEL WALKER
ASSISTIVE DEVICE: FRONT WHEEL WALKER

## 2023-02-06 ASSESSMENT — PAIN DESCRIPTION - PAIN TYPE
TYPE: ACUTE PAIN

## 2023-02-06 ASSESSMENT — ENCOUNTER SYMPTOMS
CHILLS: 0
EYES NEGATIVE: 1
POLYDIPSIA: 0
VOMITING: 0
BRUISES/BLEEDS EASILY: 0
PALPITATIONS: 0
COUGH: 0
NAUSEA: 0
ABDOMINAL PAIN: 0
SHORTNESS OF BREATH: 0
FEVER: 0

## 2023-02-06 ASSESSMENT — ACTIVITIES OF DAILY LIVING (ADL)
BED_CHAIR_WHEELCHAIR_TRANSFER_DESCRIPTION: INCREASED TIME;SET-UP OF EQUIPMENT;SUPERVISION FOR SAFETY
TOILETING_LEVEL_OF_ASSIST_DESCRIPTION: GRAB BAR;INCREASED TIME;ADAPTIVE EQUIPMENT;SUPERVISION FOR SAFETY
TOILET_TRANSFER_DESCRIPTION: ADAPTIVE EQUIPMENT;INCREASED TIME

## 2023-02-06 NOTE — DISCHARGE PLANNING
CM spoke with patients daughter to update on IDT and DC date of 2/8/23.  Answered questions.  CM will continue to monitor for DC needs.

## 2023-02-06 NOTE — CARE PLAN
Problem: Bathing  Goal: STG-Within one week, patient will bathe w/ min A using DME and AE as needed.   Outcome: Met     Problem: Dressing  Goal: STG-Within one week, patient will dress LB w/ min A using AE as needed.   Outcome: Met     Problem: Toileting  Goal: STG-Within one week, patient will complete toileting tasks w/ min A.   Outcome: Met     Problem: Functional Transfers  Goal: STG-Within one week, patient will transfer to toilet w/ supervision.   Outcome: Met  Goal: STG-Within one week, patient will transfer to step in shower w/ SBA.   Outcome: Met     Problem: IADL's  Goal: STG-Within one week, patient will access kitchen area w/ CGA and LRD.  Outcome: Met

## 2023-02-06 NOTE — THERAPY
Occupational Therapy  Daily Treatment     Patient Name: Alyssa Juan  Age:  82 y.o., Sex:  female  Medical Record #: [id number]  Today's Date: 2/6/2023     Precautions  Precautions: (P) Fall Risk, Posterior Hip Precautions, Full Weight Bearing Right Lower Extremity         Subjective    Pt reported feeling lightheaded at end of session. Nurse and CNA notified that pt's BP was 78/44     Objective       02/06/23 0701   OT Charge Group   OT Self Care / ADL (Units) 3   OT Therapy Activity (Units) 1   OT Total Time Spent   OT Individual Total Time Spent (Mins) 60   Precautions   Precautions Fall Risk;Posterior Hip Precautions;Full Weight Bearing Right Lower Extremity   Vitals   Pulse 65   Patient BP Position Sitting   Blood Pressure  (!) 78/44   Vitals Comments After ambulating for ~15 minutes. Reported lightheadedness   Functional Level of Assist   Grooming Supervision;Standing   Grooming Description Standing at sink;Increased time;Initial preparation for task;Supervision for safety   Bathing Minimal Assist  (per nursing report)   Upper Body Dressing Supervision   Upper Body Dressing Description Set-up of equipment   Lower Body Dressing Supervision   Lower Body Dressing Description Reacher;Assistive devices;Dressing stick;Set-up of equipment;Increased time;Initial preparation for task;Verbal cueing   Toileting Supervision   Toileting Description Grab bar;Increased time;Adaptive equipment;Supervision for safety   Bed, Chair, Wheelchair Transfer Supervised  (bed>FWW)   Bed Chair Wheelchair Transfer Description Increased time;Set-up of equipment;Supervision for safety   Toilet Transfers Supervised   Toilet Transfer Description Adaptive equipment;Increased time  (FWW)   Tub / Shower Transfers Supervised  (threshold shower transfer to simulate home setup)   Tub Shower Transfer Description Grab bar;Increased time;Supervision for safety;Verbal cueing;Shower bench   IADL Treatments   IADL Treatments Kitchen mobility  education   Kitchen Mobility Education Pt completed kitchen mobility with FWW and supervision. Pt collected items in cabinets, drawers, and appliances. Pt demonstrated good safety awareness and balance throughout activity.         Assessment    Pt tolerated session well focused on ADLs and kitchen mobility. Pt demonstrated increased independence with ADLs and functional mobility throughout session. Pt cont to demonstrate good safety awareness during ambulation and transfers. Pt reports she will have caregiver support post and new GBs in bathroom prior to d/c.   Strengths: Able to follow instructions, Adequate strength, Independent prior level of function, Supportive family, Pleasant and cooperative, Willingly participates in therapeutic activities, Motivated for self care and independence, Good insight into deficits/needs  Barriers: Decreased endurance, Impaired balance, Limited mobility, Pain    Plan    ADLs w/ AE, IADLs, endurance, strength, balance,    Occupational Therapy Goals (Active)       Problem: Bathing       Dates: Start: 02/02/23         Goal: STG-Within one week, patient will bathe w/ min A using DME and AE as needed.        Dates: Start: 02/02/23               Problem: Dressing       Dates: Start: 02/02/23         Goal: STG-Within one week, patient will dress LB w/ min A using AE as needed.        Dates: Start: 02/02/23               Problem: Functional Transfers       Dates: Start: 02/02/23         Goal: STG-Within one week, patient will transfer to toilet w/ supervision.        Dates: Start: 02/02/23            Goal: STG-Within one week, patient will transfer to step in shower w/ SBA.        Dates: Start: 02/02/23               Problem: IADL's       Dates: Start: 02/02/23         Goal: STG-Within one week, patient will access kitchen area w/ CGA and LRD.       Dates: Start: 02/02/23               Problem: OT Long Term Goals       Dates: Start: 02/02/23         Goal: LTG-By discharge, patient will  complete basic self care tasks w/mod I- supervision.        Dates: Start: 02/02/23            Goal: LTG-By discharge, patient will perform bathroom transfers w/ mod I - supervision.        Dates: Start: 02/02/23            Goal: LTG-By discharge, patient will complete basic home management w/ supervision- min A.       Dates: Start: 02/02/23               Problem: Toileting       Dates: Start: 02/02/23         Goal: STG-Within one week, patient will complete toileting tasks w/ min A.        Dates: Start: 02/02/23

## 2023-02-06 NOTE — THERAPY
"Physical Therapy   Daily Treatment     Patient Name: Alyssa Juan  Age:  82 y.o., Sex:  female  Medical Record #: [id number]  Today's Date: 2/6/2023     Precautions  Precautions: Fall Risk, Posterior Hip Precautions, Full Weight Bearing Right Lower Extremity    Subjective    \"Im doing okay\".     Pt denied dizziness/lightheadedness throughout session. RN cleared pt prior to start of session, aware of decreased BP this morning with OT.     BP recorded in sitting at 117/43mmhg start of session.      Objective     02/06/23 0831   PT Charge Group   PT Gait Training (Units) 1   PT Therapeutic Activities (Units) 1   PT Total Time Spent   PT Individual Total Time Spent (Mins) 30   Precautions   Precautions Fall Risk;Posterior Hip Precautions;Full Weight Bearing Right Lower Extremity   Vitals   Pulse 70   Blood Pressure  (!) 177/43   Pain   Intervention Rest   Pain 0 - 10 Group   Location Hip;Groin   Location Orientation Right   Pain Rating Scale (NPRS) 5   Description Aching   Cognition    Level of Consciousness Alert   Gait Functional Level of Assist    Gait Level Of Assist Supervised   Assistive Device Front Wheel Walker   Distance (Feet) 250   # of Times Distance was Traveled 2   Deviation Bradykinetic   Bed Mobility    Supine to Sit Supervised   Sit to Supine Supervised   Sit to Stand Standby Assist   Rolling Supervised   Interdisciplinary Plan of Care Collaboration   IDT Collaboration with  Family / Caregiver;Nursing   Patient Position at End of Therapy Seated;Chair Alarm On   Strengths & Barriers   Strengths Able to follow instructions;Alert and oriented;Effective communication skills;Good carryover of learning   Barriers Generalized weakness;Home accessibility;Pain       Start of session, pt performed stand pivot transfer w/c to bed with RW and supervision. Sit to supine with supervision, increased time/effort required. Supine to sit with supervision, increased time/effort required. Stand pivot transfer back to " w/c with RW and supervision.     Pt performed standing therex with BUE support and supervision as follows  Alternating hip flexion x 1 min   Hip abduction x 10 B  Hip extension x 10 B  Knee flexion x 10 B  Mini squat x 15      Assessment      Strengths: Able to follow instructions, Alert and oriented, Effective communication skills, Good carryover of learning  Barriers: Generalized weakness, Home accessibility, Pain    Plan      Passport items to be completed:  Get in/out of bed safely, in/out of a vehicle, safely use mobility device, walk or wheel around home/community, navigate up and down stairs, show how to get up/down from the ground, ensure home is accessible, demonstrate HEP, complete caregiver training    Physical Therapy Problems (Active)       Problem: Mobility       Dates: Start: 02/02/23         Goal: STG-Within one week, patient will ambulate 200ft FWW with SPV       Dates: Start: 02/02/23            Goal: STG-Within one week, patient will ascend and descend two stairs with Georgette       Dates: Start: 02/02/23               Problem: Mobility Transfers       Dates: Start: 02/02/23         Goal: STG-Within one week, patient will perform supine<>sit with SBA       Dates: Start: 02/02/23            Goal: STG-Within one week, patient will transfer bed to chair with set up assist       Dates: Start: 02/02/23               Problem: PT-Long Term Goals       Dates: Start: 02/02/23         Goal: LTG-By discharge, patient will ambulate 200ft Noah FWW       Dates: Start: 02/02/23            Goal: LTG-By discharge, patient will transfer one surface to another Noah       Dates: Start: 02/02/23            Goal: LTG-By discharge, patient will ambulate up/down 2 stairs with SBA       Dates: Start: 02/02/23            Goal: LTG-By discharge, patient will perform bed mobility independently       Dates: Start: 02/02/23

## 2023-02-06 NOTE — CARE PLAN
Problem: Self Care  Goal: Patient will have the ability to perform ADLs independently or with assistance  Outcome: Progressing  Note: Patient able to perform regular activities this shift.  Pain controlled this shift.  Pain management includes PRN pain meds as well as non-pharmacological measures such as emotional support, rest, and repositioning.  Will continue to monitor.    The patient is Stable - Low risk of patient condition declining or worsening    Shift Goals  Clinical Goals: Safety  Patient Goals: Sleep well    Progress made toward(s) clinical / shift goals:  pt participates with therapy and is cooperative with nursing    Patient is not progressing towards the following goals:

## 2023-02-06 NOTE — CARE PLAN
"  Problem: Knowledge Deficit - Standard  Goal: Patient and family/care givers will demonstrate understanding of plan of care, disease process/condition, diagnostic tests and medications  Outcome: Progressing  Note: Pt agrees with plan of care tonight regarding medications and safety.  Will continue to monitor patient.      Problem: Pain - Standard  Goal: Alleviation of pain or a reduction in pain to the patient’s comfort goal  Outcome: Progressing  Note: C/o pain to right hip, medicated with scheduled Gabapentin and prn Norco.  Has + relief.  See MAR and doc flow sheet.  Will continue to monitor patient.     Problem: Fall Risk - Rehab  Goal: Patient will remain free from falls  Outcome: Progressing  Note: Blanka Pendleton Fall risk Assessment Score:  11    Moderate fall risk Interventions  - Bed and strip alarm   - Yellow sign by the door   - Yellow wrist band \"Fall risk\"  - Room near to the nurse station  - Do not leave patient unattended in the bathroom  - Fall risk education provided         The patient is Stable - Low risk of patient condition declining or worsening    Shift Goals  Clinical Goals: Safety  Patient Goals: Sleep well    Progress made toward(s) clinical / shift goals:  progressing          "
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
## 2023-02-06 NOTE — CARE PLAN
The patient is Stable - Low risk of patient condition declining or worsening    Shift Goals  Clinical Goals: pain mgt  Patient Goals: pain mgt    Problem: Pain - Standard  Goal: Alleviation of pain or a reduction in pain to the patient’s comfort goal  Outcome: Progressing: scheduled norco 5-325 mg given per MAR. Pt participating n therapies. Ice pack to right hip for comfort, pt repositions self.      Problem: Skin Integrity  Goal: Skin integrity is maintained or improved  Outcome: Progressing: Surgical incision to right hip with staples is CDI, approximated. No s/s infection noted. Island dressing changed after pt shower.

## 2023-02-06 NOTE — CARE PLAN
Problem: Mobility Transfers  Goal: STG-Within one week, patient will transfer bed to chair with set up assist  Outcome: Progressing     Problem: Mobility  Goal: STG-Within one week, patient will ambulate 200ft FWW with SPV  Outcome: Met  Goal: STG-Within one week, patient will ascend and descend two stairs with Georgette  Outcome: Met     Problem: Mobility Transfers  Goal: STG-Within one week, patient will perform supine<>sit with SBA  Outcome: Met

## 2023-02-06 NOTE — PROGRESS NOTES
Received bedside shift report from Lissy MOJICA RN regarding patient and assumed care. Patient awake, calm and stable, currently positioned in bed for comfort and safety; call light within reach. Denies pain or discomfort at this time. Will continue to monitor.
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
## 2023-02-06 NOTE — PROGRESS NOTES
"Rehab Progress Note     Date of Service: 2/6/2023  Chief Complaint: follow up hip fracture    Interval Events (Subjective)    Patient seen and examined today in the therapy gym.   She reports pain is currently well controlled.  Weekly conference held this afternoon to discuss discharge date.  Patient reports she won't be ready until Weds due to home modifications being made by her family.  Her blood pressure was low this morning and she felt light headed. Her BP medication was held.     Patient has no other new questions, concerns, or complaints today.         ROS: No changes to bowel, bladder, pain, mood, or sleep.           Objective:  VITAL SIGNS: BP (!) 177/43   Pulse 70   Temp 36.2 °C (97.2 °F) (Oral)   Resp 18   Ht 1.549 m (5' 1\")   Wt 44.9 kg (99 lb)   SpO2 95%   BMI 18.71 kg/m²   Gen: alert, no apparent distress  CV: Regular rate, regular rhythm  Resp: Clear to auscultation bilaterally  Msk: ambulates cautiously and antalgic with FWW    Recent Results (from the past 72 hour(s))   OSMOLALITY URINE    Collection Time: 02/04/23  7:32 AM   Result Value Ref Range    Osmolality Urine 645 300 - 900 mOsm/kg H2O   URINE SODIUM RANDOM    Collection Time: 02/04/23  7:32 AM   Result Value Ref Range    Sodium, Urine -per volume 22 mmol/L   PHOSPHORUS    Collection Time: 02/06/23  6:00 AM   Result Value Ref Range    Phosphorus 3.3 2.5 - 4.5 mg/dL   CBC WITHOUT DIFFERENTIAL    Collection Time: 02/06/23  6:00 AM   Result Value Ref Range    WBC 7.3 4.8 - 10.8 K/uL    RBC 2.32 (L) 4.20 - 5.40 M/uL    Hemoglobin 7.9 (L) 12.0 - 16.0 g/dL    Hematocrit 23.3 (L) 37.0 - 47.0 %    .4 (H) 81.4 - 97.8 fL    MCH 34.1 (H) 27.0 - 33.0 pg    MCHC 33.9 33.6 - 35.0 g/dL    RDW 46.7 35.9 - 50.0 fL    Platelet Count 248 164 - 446 K/uL    MPV 9.6 9.0 - 12.9 fL   Basic Metabolic Panel    Collection Time: 02/06/23  6:00 AM   Result Value Ref Range    Sodium 128 (L) 135 - 145 mmol/L    Potassium 4.2 3.6 - 5.5 mmol/L    Chloride 94 " (L) 96 - 112 mmol/L    Co2 28 20 - 33 mmol/L    Glucose 107 (H) 65 - 99 mg/dL    Bun 14 8 - 22 mg/dL    Creatinine 0.42 (L) 0.50 - 1.40 mg/dL    Calcium 8.6 8.5 - 10.5 mg/dL    Anion Gap 6.0 (L) 7.0 - 16.0   ESTIMATED GFR    Collection Time: 02/06/23  6:00 AM   Result Value Ref Range    GFR (CKD-EPI) 97 >60 mL/min/1.73 m 2       Scheduled Medications   Medication Dose Frequency    [START ON 2/7/2023] lisinopril  20 mg QAM AC    HYDROcodone-acetaminophen  1 Tablet BID    gabapentin  100 mg Q EVENING    sodium chloride  1 g TID WITH MEALS    enoxaparin (LOVENOX) injection  40 mg DAILY AT 1800    levothyroxine  50 mcg AM ES    omeprazole  20 mg DAILY    senna-docusate  2 Tablet BID    sucralfate  1 g BID       Current Diet Order   Procedures    Diet Order Diet: Regular (Requesting for prune juice with breakfast every morning.)       Assessment:    This patient is a 82 y.o. female admitted for acute inpatient rehabilitation with Closed right hip fracture, sequela.    I led and attended the weekly conference today, and agree with the IDT conference documentation and plan of care as noted below.    Date of conference: 2/6/2023    Goals and barriers: See IDT note.    CM/social support: family supportive, hired caregivers    Anticipated DC date: 2/8    Home health: PT/OT    Equip: FWW    Follow up: PCP, surgery       Problem List/Medical Decision Making and Plan:    Right femoral neck fracture  S/p Right hemiparthroplasty 1/31 Dr. Rasmussen  WBAT  Posterior hip precautions  PT/OT, 1.5 hr each discipline, 5 days per week    Outpatient follow up with surgery  Staples out 14 days post-op, 2/14 - can be done by home health nursing or surgeon    Pain management, controlled  Scheduled and PRN 5 mg hydrocodone-APAP, home medication  PRN ice  Lidoderm patch    Analgesics (last 3 days) for Alyssa Juan as of 02/07/23 0855     Medications 02/05/23 02/06/23 02/07/23      HYDROcodone-acetaminophen (NORCO) 5-325 MG per tablet 1  Tablet  Dose: 1 Tablet  Freq: EVERY 4 HOURS PRN Route: PO  PRN Reason: Severe Pain: NRS/WBF/FLACC Pain Scale 7-10, CPOT 6-8  Start: 02/03/23 1340    0701-Given   2124-Given    0436-Given   2058-Given        HYDROcodone-acetaminophen (NORCO) 5-325 MG per tablet 1 Tablet  Dose: 1 Tablet  Freq: 2 TIMES DAILY Route: PO  Start: 02/04/23 0800    0800-Held [C]   1209-Given    0803-Given   1212-Given    0730-Given   1200          Peripheral neuropathy  With pain in feet at night, improved  Continue low dose gabapentin 100 mg QHS    Essential hypertension  Lisinopril, dose increased  Appreciate hospitalist assistance     Chronic abdominal pain  Sucralfate, home medication  Outpatient follow-up with GI     GERD  Omeprazole     Hypothyroidism  Levothyroxine     Hyponatremia  Appears to be acute on chronic  Continue salt tabs  Appreciate hospitalist assistance     Macrocytic anemia, stable  Postoperative  Has dropped 3 hemoglobin point since surgery, continue to monitor with labs, transfuse for less than 7    DVT prophylaxis  Lovenox    Anu Abad M.D.  Physical Medicine and Rehabilitation

## 2023-02-06 NOTE — THERAPY
Physical Therapy   Daily Treatment     Patient Name: Alyssa Juan  Age:  82 y.o., Sex:  female  Medical Record #: [id number]  Today's Date: 2/6/2023     Precautions  Precautions: Fall Risk, Posterior Hip Precautions, Full Weight Bearing Right Lower Extremity    Subjective    Pt with no complaints at start of session     Objective       02/06/23 1431   PT Charge Group   Charges Yes   PT Gait Training (Units) 2   PT Therapeutic Exercise (Units) 1   PT Neuromuscular Re-Education / Balance (Units) 1   PT Total Time Spent   PT Individual Total Time Spent (Mins) 60   Vitals   Patient BP Position Sitting   Blood Pressure  116/66   Pain 0 - 10 Group   Location Hip   Location Orientation Right   Pain Rating Scale (NPRS) 6   Description Aching   Gait Functional Level of Assist    Gait Level Of Assist Supervised   Assistive Device Front Wheel Walker   Distance (Feet) 300   # of Times Distance was Traveled 3   Stairs Functional Level of Assist   Level of Assist with Stairs Standby Assist   # of Stairs Climbed 8   Interdisciplinary Plan of Care Collaboration   Patient Position at End of Therapy In Bed;Bed Alarm On;Family / Friend in Room   Collaboration Comments Pt's daughter present in room.     Pt ambulated 3 bouts of 250' with RW and supervision, mild antalgic presentation of RLE however able to consistently ambulate with step through gait pattern.     Pt performed 8 stairs with R ascending/L descending handrail with step to step gait pattern. She requires increased time for sequencing proper step to mechanics however was able to self correct without cueing.     Neuro-   Pt performed static standing on airex pad without UE support x several minutes. This was progressed to vertical/horizontal head turns and eyes closed. Pt initially with significant fear of falling however improved performance as activity progressed.     Alternating toe taps to small cone initially with UE support, able to progress to no UE support with  mild losses of balance.     Pt was provided with CS-CGA throughout all balance activities today.     Therex-  Standing mini squat x 15 with BUE support of RW    Motomed BLE G2 10 mins.     At end of session, pt was brought back to room via w/c. Stand pivot transfer from w/c to bed with RW and supervision. Sit to supine with supervision, increased time required. She remained supine in bed with all needs in reach, bed alarm on, and daughter present in room.     Assessment    Pt tolerated session well with reports of mild fatigue and mild pain.   Strengths: Able to follow instructions, Alert and oriented, Effective communication skills, Good carryover of learning  Barriers: Generalized weakness, Home accessibility, Pain    Plan    Continue to progress LE strength and endurance. Anticipating discharge home on Wednesday    Passport items to be completed:  Get in/out of bed safely, in/out of a vehicle, safely use mobility device, walk or wheel around home/community, navigate up and down stairs, show how to get up/down from the ground, ensure home is accessible, demonstrate HEP, complete caregiver training    Physical Therapy Problems (Active)       Problem: Mobility Transfers       Dates: Start: 02/02/23         Goal: STG-Within one week, patient will transfer bed to chair with set up assist       Dates: Start: 02/02/23               Problem: PT-Long Term Goals       Dates: Start: 02/02/23         Goal: LTG-By discharge, patient will ambulate 200ft Noah FWW       Dates: Start: 02/02/23            Goal: LTG-By discharge, patient will transfer one surface to another Noah       Dates: Start: 02/02/23            Goal: LTG-By discharge, patient will ambulate up/down 2 stairs with SBA       Dates: Start: 02/02/23            Goal: LTG-By discharge, patient will perform bed mobility independently       Dates: Start: 02/02/23

## 2023-02-06 NOTE — PROGRESS NOTES
Received shift report and assumed care of patient.  Patient awake, calm and stable, currently positioned in bed for comfort and safety; call light within reach. Pt c/o 5/10 pain at this time.  Will continue to monitor.

## 2023-02-06 NOTE — THERAPY
Occupational Therapy  Daily Treatment     Patient Name: Alyssa Juan  Age:  82 y.o., Sex:  female  Medical Record #: [id number]  Today's Date: 2/6/2023     Precautions  Precautions: (P) Fall Risk, Posterior Hip Precautions, Full Weight Bearing Right Lower Extremity         Subjective    Pt participated in discussion about adding GBs and railings to home to increase safety.      Objective       02/06/23 0931   OT Charge Group   OT Therapy Activity (Units) 2   OT Therapeutic Exercise (Units) 1   OT Total Time Spent   OT Individual Total Time Spent (Mins) 45   Precautions   Precautions Fall Risk;Posterior Hip Precautions;Full Weight Bearing Right Lower Extremity   Sitting Upper Body Exercises   Upper Extremity Bike Level 4 Resistance  (10 min, 1 RB)   Interdisciplinary Plan of Care Collaboration   IDT Collaboration with  Family / Caregiver;Physician   Patient Position at End of Therapy Seated;Phone within Reach;Tray Table within Reach;Call Light within Reach   Collaboration Comments Pt's daughter present for session, MD lima     Pt and pt's daughter were present for session and participated in conversation about d/c planning and safety. Pt's daughter requested for therapist to email neighbor about GB and railing suggestions. Therapist completed emailed recommendations based on family's description of house, but unable to be highly specific due to not having photos of bathroom or home entrances. Pt's daughter was also educated on home safety and d/c plan.     Pt ambulated from room<>gym w/ FWW and supervision.   Assessment    Pt completed session focused on d/c planning and endurance. Pt cont to demonstrate increased endurance and efficiency of ambulation. Pt and pt's daughter were very involved in discussion on how to increase safety of pt's home prior to d/c. Pt will cont to benefit from therapy focused on increasing her balance and independence with ADLs prior to d/c.   Strengths: Able to follow instructions,  Adequate strength, Independent prior level of function, Supportive family, Pleasant and cooperative, Willingly participates in therapeutic activities, Motivated for self care and independence, Good insight into deficits/needs  Barriers: Decreased endurance, Impaired balance, Limited mobility, Pain    Plan    ADLs w/ AE, IADLs, endurance, strength, balance,    Occupational Therapy Goals (Active)       Problem: OT Long Term Goals       Dates: Start: 02/02/23         Goal: LTG-By discharge, patient will complete basic self care tasks w/mod I- supervision.        Dates: Start: 02/02/23            Goal: LTG-By discharge, patient will perform bathroom transfers w/ mod I - supervision.        Dates: Start: 02/02/23            Goal: LTG-By discharge, patient will complete basic home management w/ supervision- min A.       Dates: Start: 02/02/23

## 2023-02-06 NOTE — DIETARY
Nutrition Services Brief Update:    Day 5 of admit.  Alyssa Juan is a 82 y.o. female with admitting DX of Closed displaced fracture of right femoral neck (HCC) [S72.001A]  Closed right hip fracture, sequela [S72.001S]    Current Diet: regular/ Only 3 PO records x 72 hours. PO 30% to % w/ avg of 56%.  PO intake has improved. Per NR note in [employer], pt was not drinking the Boost supplements. Chobani Smoothie added to breakfast trays and Magic Cup added to lunch and dinner per pt's request.     Problem: Nutritional:  Goal: Achieve adequate nutritional intake  Description: Patient will consume ~50% of meals  Outcome: progressing.     RD following.

## 2023-02-07 PROBLEM — G47.09 OTHER INSOMNIA: Status: ACTIVE | Noted: 2023-02-07

## 2023-02-07 PROBLEM — G62.9 PERIPHERAL POLYNEUROPATHY: Status: ACTIVE | Noted: 2023-02-07

## 2023-02-07 LAB
ANION GAP SERPL CALC-SCNC: 6 MMOL/L (ref 7–16)
BUN SERPL-MCNC: 13 MG/DL (ref 8–22)
CALCIUM SERPL-MCNC: 8.6 MG/DL (ref 8.5–10.5)
CHLORIDE SERPL-SCNC: 98 MMOL/L (ref 96–112)
CO2 SERPL-SCNC: 29 MMOL/L (ref 20–33)
CREAT SERPL-MCNC: 0.45 MG/DL (ref 0.5–1.4)
ERYTHROCYTE [DISTWIDTH] IN BLOOD BY AUTOMATED COUNT: 48.2 FL (ref 35.9–50)
GFR SERPLBLD CREATININE-BSD FMLA CKD-EPI: 95 ML/MIN/1.73 M 2
GLUCOSE SERPL-MCNC: 95 MG/DL (ref 65–99)
HCT VFR BLD AUTO: 24.9 % (ref 37–47)
HGB BLD-MCNC: 8.2 G/DL (ref 12–16)
MCH RBC QN AUTO: 33.6 PG (ref 27–33)
MCHC RBC AUTO-ENTMCNC: 32.9 G/DL (ref 33.6–35)
MCV RBC AUTO: 102 FL (ref 81.4–97.8)
PLATELET # BLD AUTO: 292 K/UL (ref 164–446)
PMV BLD AUTO: 9.6 FL (ref 9–12.9)
POTASSIUM SERPL-SCNC: 4.1 MMOL/L (ref 3.6–5.5)
RBC # BLD AUTO: 2.44 M/UL (ref 4.2–5.4)
SODIUM SERPL-SCNC: 133 MMOL/L (ref 135–145)
WBC # BLD AUTO: 4.7 K/UL (ref 4.8–10.8)

## 2023-02-07 PROCEDURE — 770010 HCHG ROOM/CARE - REHAB SEMI PRIVAT*

## 2023-02-07 PROCEDURE — 700111 HCHG RX REV CODE 636 W/ 250 OVERRIDE (IP): Performed by: PHYSICAL MEDICINE & REHABILITATION

## 2023-02-07 PROCEDURE — 97530 THERAPEUTIC ACTIVITIES: CPT

## 2023-02-07 PROCEDURE — 97116 GAIT TRAINING THERAPY: CPT

## 2023-02-07 PROCEDURE — 36415 COLL VENOUS BLD VENIPUNCTURE: CPT

## 2023-02-07 PROCEDURE — 97110 THERAPEUTIC EXERCISES: CPT

## 2023-02-07 PROCEDURE — 85027 COMPLETE CBC AUTOMATED: CPT

## 2023-02-07 PROCEDURE — 97535 SELF CARE MNGMENT TRAINING: CPT

## 2023-02-07 PROCEDURE — RXMED WILLOW AMBULATORY MEDICATION CHARGE: Performed by: PHYSICAL MEDICINE & REHABILITATION

## 2023-02-07 PROCEDURE — A9270 NON-COVERED ITEM OR SERVICE: HCPCS | Performed by: PHYSICAL MEDICINE & REHABILITATION

## 2023-02-07 PROCEDURE — 99232 SBSQ HOSP IP/OBS MODERATE 35: CPT | Performed by: HOSPITALIST

## 2023-02-07 PROCEDURE — 99232 SBSQ HOSP IP/OBS MODERATE 35: CPT | Performed by: PHYSICAL MEDICINE & REHABILITATION

## 2023-02-07 PROCEDURE — 700102 HCHG RX REV CODE 250 W/ 637 OVERRIDE(OP): Performed by: PHYSICAL MEDICINE & REHABILITATION

## 2023-02-07 PROCEDURE — 80048 BASIC METABOLIC PNL TOTAL CA: CPT

## 2023-02-07 RX ORDER — TRAZODONE HYDROCHLORIDE 50 MG/1
50 TABLET ORAL
Qty: 30 TABLET | Refills: 0 | Status: SHIPPED | OUTPATIENT
Start: 2023-02-07 | End: 2024-03-19

## 2023-02-07 RX ORDER — GABAPENTIN 100 MG/1
100 CAPSULE ORAL EVERY EVENING
Qty: 30 CAPSULE | Refills: 0 | Status: SHIPPED | OUTPATIENT
Start: 2023-02-07 | End: 2024-03-19

## 2023-02-07 RX ORDER — HYDROCODONE BITARTRATE AND ACETAMINOPHEN 5; 325 MG/1; MG/1
1 TABLET ORAL 2 TIMES DAILY
Qty: 14 TABLET | Refills: 0 | Status: SHIPPED | OUTPATIENT
Start: 2023-02-07 | End: 2023-02-14

## 2023-02-07 RX ORDER — SUCRALFATE 1 G/1
1 TABLET ORAL 2 TIMES DAILY
Qty: 60 TABLET | Refills: 0 | Status: SHIPPED | OUTPATIENT
Start: 2023-02-07 | End: 2024-03-19

## 2023-02-07 RX ORDER — SODIUM CHLORIDE 1 G/1
TABLET ORAL
Qty: 42 TABLET | Refills: 0 | Status: SHIPPED | OUTPATIENT
Start: 2023-02-07 | End: 2023-03-01

## 2023-02-07 RX ADMIN — SUCRALFATE 1 G: 1 TABLET ORAL at 20:43

## 2023-02-07 RX ADMIN — SODIUM CHLORIDE 1 G: 1 TABLET ORAL at 17:51

## 2023-02-07 RX ADMIN — OMEPRAZOLE 20 MG: 20 CAPSULE, DELAYED RELEASE ORAL at 07:30

## 2023-02-07 RX ADMIN — SENNOSIDES AND DOCUSATE SODIUM 2 TABLET: 50; 8.6 TABLET ORAL at 20:42

## 2023-02-07 RX ADMIN — TRAZODONE HYDROCHLORIDE 50 MG: 50 TABLET ORAL at 20:42

## 2023-02-07 RX ADMIN — SUCRALFATE 1 G: 1 TABLET ORAL at 07:30

## 2023-02-07 RX ADMIN — SENNOSIDES AND DOCUSATE SODIUM 2 TABLET: 50; 8.6 TABLET ORAL at 07:30

## 2023-02-07 RX ADMIN — HYDROCODONE BITARTRATE AND ACETAMINOPHEN 1 TABLET: 5; 325 TABLET ORAL at 07:30

## 2023-02-07 RX ADMIN — LISINOPRIL 20 MG: 20 TABLET ORAL at 07:30

## 2023-02-07 RX ADMIN — LEVOTHYROXINE SODIUM 50 MCG: 0.05 TABLET ORAL at 05:33

## 2023-02-07 RX ADMIN — GABAPENTIN 100 MG: 100 CAPSULE ORAL at 20:44

## 2023-02-07 RX ADMIN — HYDROCODONE BITARTRATE AND ACETAMINOPHEN 1 TABLET: 5; 325 TABLET ORAL at 12:30

## 2023-02-07 RX ADMIN — SODIUM CHLORIDE 1 G: 1 TABLET ORAL at 12:30

## 2023-02-07 RX ADMIN — SODIUM CHLORIDE 1 G: 1 TABLET ORAL at 07:30

## 2023-02-07 RX ADMIN — ENOXAPARIN SODIUM 40 MG: 40 INJECTION SUBCUTANEOUS at 17:51

## 2023-02-07 RX ADMIN — HYDROCODONE BITARTRATE AND ACETAMINOPHEN 1 TABLET: 5; 325 TABLET ORAL at 20:44

## 2023-02-07 ASSESSMENT — ENCOUNTER SYMPTOMS
ABDOMINAL PAIN: 0
VOMITING: 0
SHORTNESS OF BREATH: 0
NERVOUS/ANXIOUS: 0
DIARRHEA: 0
FEVER: 0
CHILLS: 0
NAUSEA: 0

## 2023-02-07 ASSESSMENT — ACTIVITIES OF DAILY LIVING (ADL)
SHOWER_TRANSFER_LEVEL_OF_ASSIST: REQUIRES SUPERVISION WITH SHOWER TRANSFER
TOILET_TRANSFER_DESCRIPTION: GRAB BAR;INCREASED TIME
TOILETING_LEVEL_OF_ASSIST_DESCRIPTION: SUPERVISION FOR SAFETY;GRAB BAR;INCREASED TIME
TOILETING_LEVEL_OF_ASSIST_DESCRIPTION: GRAB BAR;INCREASED TIME
TOILETING_LEVEL_OF_ASSIST: ABLE TO COMPLETE TOILETING WITHOUT ASSIST
TOILET_TRANSFER_DESCRIPTION: GRAB BAR;INCREASED TIME
TOILET_TRANSFER_LEVEL_OF_ASSIST: ABLE TO COMPLETE TOILET TRANSFER WITHOUT ASSIST
BED_CHAIR_WHEELCHAIR_TRANSFER_DESCRIPTION: ADAPTIVE EQUIPMENT
BED_CHAIR_WHEELCHAIR_TRANSFER_DESCRIPTION: INCREASED TIME;ADAPTIVE EQUIPMENT

## 2023-02-07 ASSESSMENT — PAIN DESCRIPTION - PAIN TYPE
TYPE: ACUTE PAIN

## 2023-02-07 ASSESSMENT — GAIT ASSESSMENTS
DISTANCE (FEET): 200
GAIT LEVEL OF ASSIST: MODIFIED INDEPENDENT
ASSISTIVE DEVICE: FRONT WHEEL WALKER
DEVIATION: BRADYKINETIC;ANTALGIC

## 2023-02-07 ASSESSMENT — BRIEF INTERVIEW FOR MENTAL STATUS (BIMS)
BIMS SUMMARY SCORE: 15
ASKED TO RECALL BED: YES, NO CUE REQUIRED
WHAT MONTH IS IT: ACCURATE WITHIN 5 DAYS
INITIAL REPETITION OF BED BLUE SOCK - FIRST ATTEMPT: 3
ASKED TO RECALL SOCK: YES, NO CUE REQUIRED
WHAT YEAR IS IT: CORRECT
WHAT DAY OF THE WEEK IS IT: CORRECT
ASKED TO RECALL BLUE: YES, NO CUE REQUIRED

## 2023-02-07 NOTE — PROGRESS NOTES
Received bedside shift report from Cecelia BERGERON RN regarding patient and assumed care. Patient awake, calm and stable, currently positioned in bed for comfort and safety; call light within reach. Denies pain or discomfort at this time. Will continue to monitor.

## 2023-02-07 NOTE — DISCHARGE PLANNING
Case management  Reviewed signed copy of IMM and answered all questions.    Dc date /disposition: 2/8/23 with home health and private caregivers from [facility].

## 2023-02-07 NOTE — PROGRESS NOTES
Hospital Medicine Daily Progress Note        Chief Complaint  Hyponatremia  Hypertension    Interval Problem Update  Blood pressures labile this morning.  Labs reviewed.    Review of Systems  Review of Systems   Constitutional:  Negative for chills and fever.   HENT: Negative.     Eyes: Negative.    Respiratory:  Negative for cough and shortness of breath.    Cardiovascular:  Negative for chest pain and palpitations.   Gastrointestinal:  Negative for abdominal pain, nausea and vomiting.   Genitourinary: Negative.    Musculoskeletal:  Positive for joint pain.        Wound pain   Skin:  Negative for itching and rash.   Endo/Heme/Allergies:  Negative for polydipsia. Does not bruise/bleed easily.      Physical Exam  Temp:  [36.2 °C (97.2 °F)-36.7 °C (98.1 °F)] 36.7 °C (98.1 °F)  Pulse:  [64-73] 73  Resp:  [18] 18  BP: ()/(43-66) 105/48  SpO2:  [94 %-97 %] 97 %    Physical Exam  Vitals reviewed.   Constitutional:       General: She is not in acute distress.     Appearance: Normal appearance. She is not ill-appearing.   HENT:      Head: Normocephalic and atraumatic.      Right Ear: External ear normal.      Left Ear: External ear normal.      Nose: Nose normal.      Mouth/Throat:      Pharynx: Oropharynx is clear.   Eyes:      General:         Right eye: No discharge.         Left eye: No discharge.      Extraocular Movements: Extraocular movements intact.      Conjunctiva/sclera: Conjunctivae normal.   Cardiovascular:      Rate and Rhythm: Normal rate and regular rhythm.   Pulmonary:      Effort: Pulmonary effort is normal. No respiratory distress.      Breath sounds: Normal breath sounds. No wheezing.   Abdominal:      General: Bowel sounds are normal. There is no distension.      Palpations: Abdomen is soft.      Tenderness: There is no abdominal tenderness.   Musculoskeletal:      Cervical back: Normal range of motion and neck supple.      Right lower leg: No edema.      Left lower leg: No edema.   Skin:      General: Skin is warm and dry.   Neurological:      Mental Status: She is alert and oriented to person, place, and time.       Fluids    Intake/Output Summary (Last 24 hours) at 2/6/2023 2359  Last data filed at 2/6/2023 1845  Gross per 24 hour   Intake 600 ml   Output 1 ml   Net 599 ml         Laboratory  Recent Labs     02/06/23  0600   WBC 7.3   RBC 2.32*   HEMOGLOBIN 7.9*   HEMATOCRIT 23.3*   .4*   MCH 34.1*   MCHC 33.9   RDW 46.7   PLATELETCT 248   MPV 9.6     Recent Labs     02/06/23  0600   SODIUM 128*   POTASSIUM 4.2   CHLORIDE 94*   CO2 28   GLUCOSE 107*   BUN 14   CREATININE 0.42*   CALCIUM 8.6                   Assessment/Plan  * Closed right hip fracture, sequela- (present on admission)  Assessment & Plan  2/2 mechanical GLF  S/P R hemiarthroplasty on 1/31/23 by Dr. Rasmussen  Wound care and pain control per Physiatry    Anemia  Assessment & Plan   mL  Has macrocytic indices  Vit B12 and TSH levels normal  Folate testing no longer performed at this facility  Check FOB x 3 for downtrending Hb  Check F/U labs in AM     Chronic abdominal pain- (present on admission)  Assessment & Plan  Currently being worked up as outpt  GI F/U    Hyponatremia- (present on admission)  Assessment & Plan  Appears chronic w/ baseline Na+ 130  Na+ levels improving on NaCl tabs  Continue present management    Hypothyroidism- (present on admission)  Assessment & Plan  Euthyroid on Synthroid    Gastroesophageal reflux disease- (present on admission)  Assessment & Plan  On Omeprazole and Carafate    HTN (hypertension)- (present on admission)  Assessment & Plan  Observe blood pressure trends on increased Lisinopril       Full Code

## 2023-02-07 NOTE — PROGRESS NOTES
"Rehab Progress Note     Date of Service: 2/7/2023  Chief Complaint: follow up hip fracture    Interval Events (Subjective)    Patient seen and examined today in her room.   She is resting in bed.  Pain is controlled.  We reviewed all of her discharge medications, discussed opiate risks.  She has been using some trazodone for sleep and would like some at discharge.    Patient has no other new questions, concerns, or complaints today.     ROS: No changes to bowel, bladder, pain, mood, or sleep.         Objective:  VITAL SIGNS: /60   Pulse 70   Temp 36.4 °C (97.5 °F) (Oral)   Resp 18   Ht 1.549 m (5' 1\")   Wt 44.9 kg (99 lb)   SpO2 94%   BMI 18.71 kg/m²   Gen: alert, no apparent distress  CV: Regular rate, regular rhythm  Resp: Clear to auscultation bilaterally      Recent Results (from the past 72 hour(s))   PHOSPHORUS    Collection Time: 02/06/23  6:00 AM   Result Value Ref Range    Phosphorus 3.3 2.5 - 4.5 mg/dL   CBC WITHOUT DIFFERENTIAL    Collection Time: 02/06/23  6:00 AM   Result Value Ref Range    WBC 7.3 4.8 - 10.8 K/uL    RBC 2.32 (L) 4.20 - 5.40 M/uL    Hemoglobin 7.9 (L) 12.0 - 16.0 g/dL    Hematocrit 23.3 (L) 37.0 - 47.0 %    .4 (H) 81.4 - 97.8 fL    MCH 34.1 (H) 27.0 - 33.0 pg    MCHC 33.9 33.6 - 35.0 g/dL    RDW 46.7 35.9 - 50.0 fL    Platelet Count 248 164 - 446 K/uL    MPV 9.6 9.0 - 12.9 fL   Basic Metabolic Panel    Collection Time: 02/06/23  6:00 AM   Result Value Ref Range    Sodium 128 (L) 135 - 145 mmol/L    Potassium 4.2 3.6 - 5.5 mmol/L    Chloride 94 (L) 96 - 112 mmol/L    Co2 28 20 - 33 mmol/L    Glucose 107 (H) 65 - 99 mg/dL    Bun 14 8 - 22 mg/dL    Creatinine 0.42 (L) 0.50 - 1.40 mg/dL    Calcium 8.6 8.5 - 10.5 mg/dL    Anion Gap 6.0 (L) 7.0 - 16.0   ESTIMATED GFR    Collection Time: 02/06/23  6:00 AM   Result Value Ref Range    GFR (CKD-EPI) 97 >60 mL/min/1.73 m 2   CBC WITHOUT DIFFERENTIAL    Collection Time: 02/07/23  6:43 AM   Result Value Ref Range    WBC 4.7 (L) " 4.8 - 10.8 K/uL    RBC 2.44 (L) 4.20 - 5.40 M/uL    Hemoglobin 8.2 (L) 12.0 - 16.0 g/dL    Hematocrit 24.9 (L) 37.0 - 47.0 %    .0 (H) 81.4 - 97.8 fL    MCH 33.6 (H) 27.0 - 33.0 pg    MCHC 32.9 (L) 33.6 - 35.0 g/dL    RDW 48.2 35.9 - 50.0 fL    Platelet Count 292 164 - 446 K/uL    MPV 9.6 9.0 - 12.9 fL   Basic Metabolic Panel    Collection Time: 02/07/23  6:43 AM   Result Value Ref Range    Sodium 133 (L) 135 - 145 mmol/L    Potassium 4.1 3.6 - 5.5 mmol/L    Chloride 98 96 - 112 mmol/L    Co2 29 20 - 33 mmol/L    Glucose 95 65 - 99 mg/dL    Bun 13 8 - 22 mg/dL    Creatinine 0.45 (L) 0.50 - 1.40 mg/dL    Calcium 8.6 8.5 - 10.5 mg/dL    Anion Gap 6.0 (L) 7.0 - 16.0   ESTIMATED GFR    Collection Time: 02/07/23  6:43 AM   Result Value Ref Range    GFR (CKD-EPI) 95 >60 mL/min/1.73 m 2       Scheduled Medications   Medication Dose Frequency    lisinopril  20 mg QAM AC    HYDROcodone-acetaminophen  1 Tablet BID    gabapentin  100 mg Q EVENING    sodium chloride  1 g TID WITH MEALS    enoxaparin (LOVENOX) injection  40 mg DAILY AT 1800    levothyroxine  50 mcg AM ES    omeprazole  20 mg DAILY    senna-docusate  2 Tablet BID    sucralfate  1 g BID       Current Diet Order   Procedures    Diet Order Diet: Regular (Requesting for prune juice with breakfast every morning.)       Assessment:    This patient is a 82 y.o. female admitted for acute inpatient rehabilitation with Closed right hip fracture, sequela.    I led and attended the weekly conference, and agree with the IDT conference documentation and plan of care as noted below.    Date of conference: 2/6/2023    Goals and barriers: See IDT note.    CM/social support: family supportive, hired caregivers    Anticipated DC date: 2/8    Home health: PT/OT    Equip: FWW    Follow up: PCP, surgery       Problem List/Medical Decision Making and Plan:    Right femoral neck fracture  S/p Right hemiparthroplasty 1/31 Dr. Rasmussen  WBAT  Posterior hip precautions  PT/OT, 1.5 hr  each discipline, 5 days per week    Outpatient follow up with surgery  Staples out 14 days post-op, 2/14 - can be done by home health nursing or surgeon    Pain management, controlled  Scheduled and PRN 5 mg hydrocodone-APAP, home medication  PRN ice  Lidoderm patch    Peripheral neuropathy  With pain in feet at night, resolved  Continue low dose gabapentin 100 mg QHS    Essential hypertension  Lisinopril, dose increased  Appreciate hospitalist assistance     Chronic abdominal pain  Sucralfate, home medication  Outpatient follow-up with GI     GERD  Omeprazole     Hypothyroidism  Levothyroxine     Hyponatremia  Appears to be acute on chronic  Continue salt tabs, discharge on taper  Appreciate hospitalist assistance     Macrocytic anemia, stable  Postoperative  Has dropped 3 hemoglobin point since surgery, continue to monitor with labs, transfuse for less than 7    DVT prophylaxis  Lovenox as inpatient    Anu Abad M.D.  Physical Medicine and Rehabilitation

## 2023-02-07 NOTE — CARE PLAN
Problem: Mobility Transfers  Goal: STG-Within one week, patient will transfer bed to chair with set up assist  Outcome: Met     Problem: PT-Long Term Goals  Goal: LTG-By discharge, patient will ambulate 200ft Noah FWW  Outcome: Met  Goal: LTG-By discharge, patient will transfer one surface to another Noah  Outcome: Met  Goal: LTG-By discharge, patient will ambulate up/down 2 stairs with SBA  Outcome: Met  Goal: LTG-By discharge, patient will perform bed mobility independently  Outcome: Met

## 2023-02-07 NOTE — PROGRESS NOTES
Hospital Medicine Daily Progress Note        Chief Complaint  Hyponatremia  Hypertension    Interval Problem Update  Discussed about her hypo-na -- pt doesn't normally eat much at home and doesn't use salt but will try to add salt to her diet.    Review of Systems  Review of Systems   Constitutional:  Negative for chills and fever.   Respiratory:  Negative for shortness of breath.    Cardiovascular:  Negative for chest pain.   Gastrointestinal:  Negative for abdominal pain, diarrhea, nausea and vomiting.   Psychiatric/Behavioral:  The patient is not nervous/anxious.       Physical Exam  Temp:  [36.4 °C (97.5 °F)-36.7 °C (98.1 °F)] 36.4 °C (97.5 °F)  Pulse:  [70-73] 70  Resp:  [18] 18  BP: (105-132)/(48-66) 132/60  SpO2:  [94 %-97 %] 94 %    Physical Exam  Vitals and nursing note reviewed.   Constitutional:       Appearance: Normal appearance.   HENT:      Head: Atraumatic.   Eyes:      Conjunctiva/sclera: Conjunctivae normal.      Pupils: Pupils are equal, round, and reactive to light.   Cardiovascular:      Rate and Rhythm: Normal rate and regular rhythm.      Heart sounds: No murmur heard.  Pulmonary:      Effort: Pulmonary effort is normal.      Breath sounds: No stridor. No wheezing or rales.   Abdominal:      General: There is no distension.      Palpations: Abdomen is soft.      Tenderness: There is no abdominal tenderness.   Musculoskeletal:      Cervical back: Normal range of motion and neck supple.      Right lower leg: No edema.      Left lower leg: No edema.   Skin:     General: Skin is warm and dry.      Findings: No rash.   Neurological:      Mental Status: She is alert and oriented to person, place, and time.   Psychiatric:         Mood and Affect: Mood normal.         Behavior: Behavior normal.       Fluids    Intake/Output Summary (Last 24 hours) at 2/7/2023 1648  Last data filed at 2/7/2023 0567  Gross per 24 hour   Intake 480 ml   Output 1 ml   Net 479 ml           Laboratory  Recent Labs      02/06/23  0600 02/07/23  0643   WBC 7.3 4.7*   RBC 2.32* 2.44*   HEMOGLOBIN 7.9* 8.2*   HEMATOCRIT 23.3* 24.9*   .4* 102.0*   MCH 34.1* 33.6*   MCHC 33.9 32.9*   RDW 46.7 48.2   PLATELETCT 248 292   MPV 9.6 9.6       Recent Labs     02/06/23  0600 02/07/23  0643   SODIUM 128* 133*   POTASSIUM 4.2 4.1   CHLORIDE 94* 98   CO2 28 29   GLUCOSE 107* 95   BUN 14 13   CREATININE 0.42* 0.45*   CALCIUM 8.6 8.6                     Assessment/Plan  * Closed right hip fracture, sequela- (present on admission)  Assessment & Plan  2nd to GLF  S/P right hemiarthroplasty (1/31)    Anemia  Assessment & Plan  H&H stable with Hb 8.2  B12: ok  F/U [facility]     Chronic abdominal pain- (present on admission)  Assessment & Plan  Currently being worked up as out patient  Follows with GI    Hyponatremia- (present on admission)  Assessment & Plan  Has a chronic hx with baseline  Na: 124 --> 127 --> 128 -> 133 (2/7)  Has a diminished appetite  On salt tabs: 1g tid  Monitor    Hypothyroidism- (present on admission)  Assessment & Plan  On Synthroid    Gastroesophageal reflux disease- (present on admission)  Assessment & Plan  On Prilosec  On Carafate    HTN (hypertension)- (present on admission)  Assessment & Plan  BP ok  On Lisinopril  Monitor

## 2023-02-07 NOTE — THERAPY
Physical Therapy   Daily Treatment     Patient Name: Alyssa Juan  Age:  82 y.o., Sex:  female  Medical Record #: [id number]  Today's Date: 2/7/2023     Precautions  Precautions: Fall Risk, Posterior Hip Precautions, Full Weight Bearing Right Lower Extremity    Subjective    Pt eager to return home to her      Objective       02/07/23 0930 02/07/23 1301   PT Charge Group   PT Gait Training (Units) 2  --    PT Therapeutic Exercise (Units)  --  2   PT Therapeutic Activities (Units) 2  --    PT Total Time Spent   PT Individual Total Time Spent (Mins) 60 30   Pain 0 - 10 Group   Location  --  Hip   Location Orientation  --  Right   Pain Rating Scale (NPRS)  --  5   Therapist Pain Assessment  --  Post Activity Pain Same as Prior to Activity   Gait Functional Level of Assist    Gait Level Of Assist Modified Independent  --    Assistive Device Front Wheel Walker  --    Distance (Feet) 200  --    # of Times Distance was Traveled 4  --    Deviation Bradykinetic;Antalgic  --    Stairs Functional Level of Assist   Level of Assist with Stairs Standby Assist  --    # of Stairs Climbed 4  --    Stairs Description   (sidestepping with R HR, step to pattern 6inch steps)  --    Transfer Functional Level of Assist   Bed, Chair, Wheelchair Transfer Modified Independent  --    Bed Chair Wheelchair Transfer Description Adaptive equipment  --    Supine Lower Body Exercise   Bridges  --  Two Legged;1 set of 10   Short Arc Quad  --  1 set of 10;Right    Heel Slide  --  1 set of 10;Right   Standing Lower Body Exercises   Hamstring Curl  --  1 set of 10;Right    Hip Extension  --  1 set of 10;Right    Hip Abduction  --  1 set of 10;Right    Marching  --  1 set of 10   Heel Rise  --  1 set of 10;Bilateral   Comments  --  performed with B UE support on elevated bed   Bed Mobility    Supine to Sit Modified Independent  --    Sit to Supine Modified Independent  --    Sit to Stand Modified Independent  --    Scooting Modified  Independent  --    Rolling Modified Independent  --    Interdisciplinary Plan of Care Collaboration   IDT Collaboration with  Occupational Therapist  --    Patient Position at End of Therapy In Bed;Call Light within Reach;Tray Table within Reach;Phone within Reach Seated;Self Releasing Lap Belt Applied;Call Light within Reach;Tray Table within Reach;Phone within Reach  (ice pack to R hip)   Collaboration Comments SW notes and DC planning  --      1st tx- Completed car transfer SBA with FWW, ambulation with FWW outdoors and ramped surfaces SBA-CGA, and pt directed stair training. Pt verbalized correctly how to instruct her neighbor to assist with stair negotiation to enter the home. Pt also demonstrated ability to  object off floor using reacher and FWW    2nd tx- reviewed supine and standing HEP to ensure independence with performance    Assessment    Pt has made excellent progress while admitted and is ready to DC home with CG assistance tomorrow. She has progressed to Noah for basic fxl mobility and requires CGA for stair negotiation and car transfers. Pt would benefit from HHPT to continue to address impairments and progress her mobility.  Strengths: Able to follow instructions, Alert and oriented, Effective communication skills, Good carryover of learning  Barriers: Generalized weakness, Home accessibility, Pain    Plan    DC    Passport items to be completed:  Completed    Physical Therapy Problems (Active)       There are no active problems.

## 2023-02-07 NOTE — THERAPY
Occupational Therapy  Daily Treatment     Patient Name: Alyssa Juan  Age:  82 y.o., Sex:  female  Medical Record #: [id number]  Today's Date: 2/7/2023     Precautions  Precautions: Fall Risk, Posterior Hip Precautions, Full Weight Bearing Right Lower Extremity         Subjective    Pt agreeable to OT session     Objective       02/07/23 0831   OT Charge Group   OT Self Care / ADL (Units) 2   OT Therapeutic Exercise (Units) 2   OT Total Time Spent   OT Individual Total Time Spent (Mins) 60   Precautions   Precautions Fall Risk;Posterior Hip Precautions;Full Weight Bearing Right Lower Extremity   Functional Level of Assist   Grooming Supervision;Standing   Grooming Description Standing at sink   Upper Body Dressing Modified Independent   Upper Body Dressing Description Increased time   Lower Body Dressing Supervision   Lower Body Dressing Description Supervision for safety;Shoe horn;Reacher;Grab bar   Toileting Supervision   Toileting Description Supervision for safety;Grab bar;Increased time   Toilet Transfers Modified Independent   Toilet Transfer Description Grab bar;Increased time   Sitting Upper Body Exercises   Chest Press 3 sets of 10;Bilateral;Weight (See Comments for lbs)  (2# weighted ball, seated EOM)   Comments Core strengthening  (lateral leans R/L 2 sets of 15; modified situps 2 sets of 10;)   Interdisciplinary Plan of Care Collaboration   IDT Collaboration with  Family / Caregiver   Patient Position at End of Therapy Seated;Phone within Reach;Tray Table within Reach;Call Light within Reach   Collaboration Comments pt's daughter present at beginning of session         Assessment    Pt completed dressing and core strengthening during session. Pt has demonstrated increased independence with ADLs since eval. Pt is going to have assistance at home to assist with ADLs and IADls as needed. Pt reports she is ready for d/c tomorrow.   Strengths: Able to follow instructions, Adequate strength, Independent  prior level of function, Supportive family, Pleasant and cooperative, Willingly participates in therapeutic activities, Motivated for self care and independence, Good insight into deficits/needs  Barriers: Decreased endurance, Impaired balance, Limited mobility, Pain    Plan    D/c home tomorrow    Occupational Therapy Goals (Active)       Problem: OT Long Term Goals       Dates: Start: 02/02/23         Goal: LTG-By discharge, patient will complete basic self care tasks w/mod I- supervision.        Dates: Start: 02/02/23            Goal: LTG-By discharge, patient will perform bathroom transfers w/ mod I - supervision.        Dates: Start: 02/02/23            Goal: LTG-By discharge, patient will complete basic home management w/ supervision- min A.       Dates: Start: 02/02/23

## 2023-02-07 NOTE — CARE PLAN
Problem: OT Long Term Goals  Goal: LTG-By discharge, patient will complete basic self care tasks w/mod I- supervision.   Outcome: Met  Goal: LTG-By discharge, patient will perform bathroom transfers w/ mod I - supervision.   Outcome: Met  Goal: LTG-By discharge, patient will complete basic home management w/ supervision- min A.  Outcome: Met

## 2023-02-07 NOTE — PROGRESS NOTES
Received shift report and assumed care of patient.  Patient awake, calm and stable, currently positioned in bed for comfort and safety; call light within reach.  States pain is 5/10 at this time.  Will continue to monitor.

## 2023-02-07 NOTE — DISCHARGE INSTRUCTIONS
[facility] NURSING DISCHARGE INSTRUCTIONS    Blood Pressure : 111/63  Weight: 44.9 kg (99 lb)  Nursing recommendations for Alyssa Juan at time of discharge are as follows:  Client verbalized understanding of all discharge instructions and prescriptions.     Review all your home medications and newly ordered medications with your doctor and/or pharmacist. Follow medication instructions as directed by your doctor and/or pharmacist.    Pain Management:   Discharge Pain Medication Instructions:  Comfort Goal: Comfort with Movement, Perform Activity  Notify your primary care provider if pain is unrelieved with these measures, if the pain is new, or increased in intensity.    Discharge Skin Characteristics: Warm, Dry (swelling in RLE)  Discharge Skin Exam: Clear (swelling to RLE)  Wound 01/31/23 Incision Hip Right aquacel (Active)   Wound Image   02/01/23 1914   Site Assessment Other (Comment) 02/07/23 2044   Periwound Assessment Clean;Dry;Intact 02/07/23 2044   Margins Attached edges 02/07/23 2044   Closure Approximated;Staples;Secondary intention 02/07/23 2044   Drainage Amount None 02/07/23 2044   Drainage Description Sanguineous 02/01/23 2100   Treatments Site care;Offloading 02/07/23 2044   Wound Cleansing Approved Wound Cleanser 02/07/23 2044   Periwound Protectant Not Applicable 02/07/23 2044   Dressing Cleansing/Solutions Not Applicable 02/07/23 2044   Dressing Options [place] Dressing 02/07/23 2044   Dressing Changed Observed 02/07/23 2044   Dressing Status Clean;Dry;Intact 02/07/23 2044   Dressing Change/Treatment Frequency As Needed 02/07/23 2044   NEXT Weekly Photo (Inpatient Only) 02/11/23 02/04/23 2345     Skin / Wound Care Instructions: Please contact your primary care physician for any change in skin integrity.     If You Have Surgical Incisions / Wounds:  Monitor surgical site(s) for signs of increased swelling, redness or symptoms of drainage from the site or fever as this  could indicate signs and symptoms of infection. If these symptoms are noted, notifiy your primary care provider.      Discharge Safety Instructions: Should Have ADULT SUPERVISION     Discharge Safety Concerns: History Of Falls, Weakness  The interdisciplinary team has made recommendation that you should not be left alone  in the house due to history of falls, weakness, and unsteady gait  Anti-embolic stockings are required during the day and off at night to increase circulation to the lower extremities.    Discharge Diet: Regular     Discharge Liquids: Thin  Discharge Bowel Function: Continent  Please contact your primary care physician for any changes in bowel habits.  Discharge Bowel Program:    Discharge Bladder Function: Continent  Discharge Urinary Devices:        Nursing Discharge Plan:   Influenza Vaccine Indication: Patient Refuses    Case Management Discharge Instructions:   Discharge Location:    Agency Name/Address/Phone:    Home Health:    Outpatient Services:    DME Provider/Phone:    Medical Equipment Ordered:    Prescription Faxed to:        Discharge Medication Instructions:  Below are the medications your physician expects you to take upon discharge:    Edema    Edema is when you have too much fluid in your body or under your skin. Edema may make your legs, feet, and ankles swell up. Swelling is also common in looser tissues, like around your eyes. This is a common condition. It gets more common as you get older. There are many possible causes of edema. Eating too much salt (sodium) and being on your feet or sitting for a long time can cause edema in your legs, feet, and ankles. Hot weather may make edema worse.  Edema is usually painless. Your skin may look swollen or shiny.  Follow these instructions at home:  Keep the swollen body part raised (elevated) above the level of your heart when you are sitting or lying down.  Do not sit still or stand for a long time.  Do not wear tight clothes. Do not  wear garters on your upper legs.  Exercise your legs. This can help the swelling go down.  Wear elastic bandages or support stockings as told by your doctor.  Eat a low-salt (low-sodium) diet to reduce fluid as told by your doctor.  Depending on the cause of your swelling, you may need to limit how much fluid you drink (fluid restriction).  Take over-the-counter and prescription medicines only as told by your doctor.  Contact a doctor if:  Treatment is not working.  You have heart, liver, or kidney disease and have symptoms of edema.  You have sudden and unexplained weight gain.  Get help right away if:  You have shortness of breath or chest pain.  You cannot breathe when you lie down.  You have pain, redness, or warmth in the swollen areas.  You have heart, liver, or kidney disease and get edema all of a sudden.  You have a fever and your symptoms get worse all of a sudden.  Summary  Edema is when you have too much fluid in your body or under your skin.  Edema may make your legs, feet, and ankles swell up. Swelling is also common in looser tissues, like around your eyes.  Raise (elevate) the swollen body part above the level of your heart when you are sitting or lying down.  Follow your doctor's instructions about diet and how much fluid you can drink (fluid restriction).  This information is not intended to replace advice given to you by your health care provider. Make sure you discuss any questions you have with your health care provider.  Document Released: 06/05/2009 Document Revised: 12/21/2018 Document Reviewed: 01/05/2018  [employer] Patient Education © 2020 [employer]  Constipation, Adult  Constipation is when a person:  Poops (has a bowel movement) fewer times in a week than normal.  Has a hard time pooping.  Has poop that is dry, hard, or bigger than normal.  Follow these instructions at home:  Eating and drinking    Eat foods that have a lot of fiber, such as:  Fresh fruits and vegetables.  Whole  "grains.  Beans.  Eat less of foods that are high in fat, low in fiber, or overly processed, such as:  French fries.  Hamburgers.  Cookies.  Candy.  Soda.  Drink enough fluid to keep your pee (urine) clear or pale yellow.  General instructions  Exercise regularly or as told by your doctor.  Go to the restroom when you feel like you need to poop. Do not hold it in.  Take over-the-counter and prescription medicines only as told by your doctor. These include any fiber supplements.  Do pelvic floor retraining exercises, such as:  Doing deep breathing while relaxing your lower belly (abdomen).  Relaxing your pelvic floor while pooping.  Watch your condition for any changes.  Keep all follow-up visits as told by your doctor. This is important.  Contact a doctor if:  You have pain that gets worse.  You have a fever.  You have not pooped for 4 days.  You throw up (vomit).  You are not hungry.  You lose weight.  You are bleeding from the anus.  You have thin, pencil-like poop (stool).  Get help right away if:  You have a fever, and your symptoms suddenly get worse.  You leak poop or have blood in your poop.  Your belly feels hard or bigger than normal (is bloated).  You have very bad belly pain.  You feel dizzy or you faint.  This information is not intended to replace advice given to you by your health care provider. Make sure you discuss any questions you have with your health care provider.  Document Released: 06/05/2009 Document Revised: 11/30/2018 Document Reviewed: 06/07/2017  [employer] Patient Education © 2020 [employer]      Hip Hemiarthroplasty  The hip joint is located where the upper end of the femur meets the pelvis socket (acetabulum). The femur, or thigh bone, looks like a long stem with a ball on the end. The acetabulum is a socket or cup-like structure in the pelvis, or hip bone. This \"ball and socket\" allows your hip to move.  During Hip hemiarthroplasty, your surgeon removes the diseased bone tissue and " cartilage from the hip joint. The healthy parts of the hip are left intact. The head of the femur (the ball) and the socket (acetabulum) is replaced with new, artificial parts. The new hip is made of materials that allow a normal movement of the joint. This surgery usually lasts 2 to 3 hours.   The purpose of this surgery is to reduce pain and improve range of motion. It is one of the most successful joint replacement surgeries. It most often greatly improves the quality of life.  LET YOUR CAREGIVERS KNOW ABOUT:  Allergies  Medications taken including herbs, eye drops, over the counter medications, and creams  Use of steroids (by mouth or creams)  Previous problems with anesthetics or Novocaine  Possibility of pregnancy, if this applies  History of blood clots (thrombophlebitis)  History of bleeding or blood problems  Previous surgery  Other health problems  BEFORE THE PROCEDURE  Do not eat or drink anything for as long as directed by your caregiver prior to surgery.  You should be present 60 minutes prior to your procedure or as directed.  Prior to surgery an IV (intravenous line for giving fluids) may be started. You may be given an anesthetic (medications and gas to help you sleep) during the procedure.   AFTER THE PROCEDURE  After surgery, you will be taken to the recovery area. There a nurse will watch and check your progress. You may have a catheter (a long, narrow, hollow tube) in your bladder following surgery. The catheter helps you pass your water. Once you're awake, stable, and taking fluids well, barring other problems you'll be returned to your room. You will receive physical therapy until you are doing well and your caregiver feels it is safe for you to be transferred home or to an extended care facility.  HOME CARE INSTRUCTIONS   You may resume normal diet and activities as directed or allowed.  Change dressings if necessary or as directed.  Only take over-the-counter or prescription medicines for  pain, discomfort, or fever as directed by your caregiver.  SEEK IMMEDIATE MEDICAL CARE IF:  You develop:  Swelling of your calf or leg or develop shortness of breath or chest pain.  Redness, swelling, or increasing pain in the wound.  Pus coming from wound.  An unexplained oral temperature over 102° F (38.9° C) develops.  A foul smell coming from the wound or dressing.  A breaking open of the wound (edges not staying together) after sutures or staples have been removed.  MAKE SURE YOU:   Understand these instructions.  Will watch your condition.  Will get help right away if you are not doing well or get worse.  Document Released: 01/02/2008 Document Revised: 03/11/2013 Document Reviewed: 01/29/2008  [employer]® Patient Information ©2014 [employer].    Incision Care, Adult  An incision is a cut that a doctor makes in your skin for surgery (for a procedure). Most times, these cuts are closed after surgery. Your cut from surgery may be closed with stitches (sutures), staples, skin glue, or skin tape (adhesive strips). You may need to return to your doctor to have stitches or staples taken out. This may happen many days or many weeks after your surgery. The cut needs to be well cared for so it does not get infected.  How to care for your cut  Cut care    Follow instructions from your doctor about how to take care of your cut. Make sure you:  Wash your hands with soap and water before you change your bandage (dressing). If you cannot use soap and water, use hand .  Change your bandage as told by your doctor.  Leave stitches, skin glue, or skin tape in place. They may need to stay in place for 2 weeks or longer. If tape strips get loose and curl up, you may trim the loose edges. Do not remove tape strips completely unless your doctor says it is okay.  Check your cut area every day for signs of infection. Check for:  More redness, swelling, or pain.  More fluid or blood.  Warmth.  Pus or a bad smell.  Ask your  doctor how to clean the cut. This may include:  Using mild soap and water.  Using a clean towel to pat the cut dry after you clean it.  Putting a cream or ointment on the cut. Do this only as told by your doctor.  Covering the cut with a clean bandage.  Ask your doctor when you can leave the cut uncovered.  Do not take baths, swim, or use a hot tub until your doctor says it is okay. Ask your doctor if you can take showers. You may only be allowed to take sponge baths for bathing.  Medicines  If you were prescribed an antibiotic medicine, cream, or ointment, take the antibiotic or put it on the cut as told by your doctor. Do not stop taking or putting on the antibiotic even if your condition gets better.  Take over-the-counter and prescription medicines only as told by your doctor.  General instructions  Limit movement around your cut. This helps healing.  Avoid straining, lifting, or exercise for the first month, or for as long as told by your doctor.  Follow instructions from your doctor about going back to your normal activities.  Ask your doctor what activities are safe.  Protect your cut from the sun when you are outside for the first 6 months, or for as long as told by your doctor. Put on sunscreen around the scar or cover up the scar.  Keep all follow-up visits as told by your doctor. This is important.  Contact a doctor if:  Your have more redness, swelling, or pain around the cut.  You have more fluid or blood coming from the cut.  Your cut feels warm to the touch.  You have pus or a bad smell coming from the cut.  You have a fever or shaking chills.  You feel sick to your stomach (nauseous) or you throw up (vomit).  You are dizzy.  Your stitches or staples come undone.  Get help right away if:  You have a red streak coming from your cut.  Your cut bleeds through the bandage and the bleeding does not stop with gentle pressure.  The edges of your cut open up and separate.  You have very bad (severe)  "pain.  You have a rash.  You are confused.  You pass out (faint).  You have trouble breathing and you have a fast heartbeat.  This information is not intended to replace advice given to you by your health care provider. Make sure you discuss any questions you have with your health care provider.  Document Released: 03/11/2013 Document Revised: 05/07/2018 Document Reviewed: 08/25/2017  [employer] Patient Education © 2020 [employer]    Prevent Falls in Your Home    \"Falling once doubles your chance of falling again\"        -Center for Disease Control and Prevention    Falls in the home can lead to serious injury (fractures, brain injuries), hospitalizations, increased medical costs, and could even be fatal.  The good news is, there are many precautions you can take to avoid falls in your home and help keep you safe:     If prescribed an assistive device (walker, crutches), use as instructed by the healthcare provider\"   Remove any tripping hazards from your home, including loose cords, throw rugs and clutter  Keep a nightlight on in dark (hallways, bathrooms, etc)   Get up slowly, to make sure you feel okay before getting up  Be aware of any side effects of your medications: some medications may make you dizzy  Place a non-skid rubber mat in your shower or tub-consider a shower bench or chair if unsteady on your feet  Wear supportive shoes or non-skid socks when moving around  Start an exercise program once approved by your provider.  If you are feeling weak following a hospital stay, talk to your doctor about home health or outpatient therapy programs designed to help rebuild your strength and endurance    Depression / Suicide Risk    As you are discharged from this [facility] facility, it is important to learn how to keep safe from harming yourself.    Recognize the warning signs:  Abrupt changes in personality, positive or negative- including increase in energy   Giving away possessions  Change in eating " patterns- significant weight changes-  positive or negative  Change in sleeping patterns- unable to sleep or sleeping all the time   Unwillingness or inability to communicate  Depression  Unusual sadness, discouragement and loneliness  Talk of wanting to die  Neglect of personal appearance   Rebelliousness- reckless behavior  Withdrawal from people/activities they love  Confusion- inability to concentrate     If you or a loved one observes any of these behaviors or has concerns about self-harm, here's what you can do:  Talk about it- your feelings and reasons for harming yourself  Remove any means that you might use to hurt yourself (examples: pills, rope, extension cords, firearm)  Get professional help from the community (Mental Health, Substance Abuse, psychological counseling)  Do not be alone:Call your Safe Contact- someone whom you trust who will be there for you.  Call your local CRISIS HOTLINE 298-3534 or 796-707-9666  Call your local Children's Mobile Crisis Response Team [place] (069) 992-4838 or www.[employer]  Call the toll free National Suicide Prevention Hotlines   National Suicide Prevention Lifeline 483-855-TIGL (1495)  [place] Hope Line Network 800-SUICIDE (189-6441)    Occupational Therapy Discharge Instructions for Alyssa Bookererty    2/7/2023    Level of Assist Required for Eating: Able to Complete Eating without Assist  Level of Assist Required for Grooming: Able to Complete Grooming without Assist  Level of Assist Required for Dressing: Requires Supervision with Dressing  Equipment for Dressing: Reacher, Sock Aid, Long Handled Shoe Horn  Level of Assist Required for Toileting: Able to Complete Toileting without Assist  Level of Assist Required for Toilet Transfer: Able to Complete Toilet Transfer without Assist  Equipment for Toilet Transfer: Grab Bars by Toilet  Level of Assist Required for Bathing: Requires Supervision with Bathing  Level of Assist Required for Shower  Transfer: Requires Supervision with Shower Transfer  Equipment for Shower Transfer: Grab Bars in Tub / Shower, Shower Chair  Level of Assist Required for Home Mgmt: Requires Physical Assist with Home Management  Level of Assist Required for Meal Prep: Requires Physical Assist with Meal Preparation  Driving: Please Contact Physician Prior to Driving  Home Exercise Program: None Issued  It has been wonderful working with you! Keep up the hard work and take care of yourself! - Jocelyn Graves OTR/L    Physical Therapy Discharge Instructions for Alyssa Juan    2/8/2023    Weight Bearing Status - Patient Should: Bear Weight as Tolerated Right Leg  Level of Assist Required for Ambulation: Supervision on Curbs, Supervision on Stairs, No Assist on Flat Surfaces  Device Recommended for Ambulation: Front-Wheeled Walker  Level of Assist Required to Propel Wheelchair: Requires No Assist  Level of Assist Required for Transfers: Requires No Assist  Device Recommended for Transfers: Front-Wheeled Walker  Home Exercise Program: Refer to Home Exercise Program Handout for Details    You're going to continue to make excellent progress at home! Keep it up :) Jb, PT DPT

## 2023-02-07 NOTE — CARE PLAN
Problem: Skin Integrity  Goal: Patient's skin integrity will be maintained or improve  Outcome: Progressing  Note: Patient's skin remains intact and free from new or accidental injury this shift.  Will continue to monitor.    The patient is Stable - Low risk of patient condition declining or worsening    Shift Goals  Clinical Goals: Safety  Patient Goals: Sleep well    Progress made toward(s) clinical / shift goals:  pt skin intact, no s/s breakdown    Patient is not progressing towards the following goals:

## 2023-02-07 NOTE — THERAPY
Occupational Therapy  Daily Treatment     Patient Name: Alyssa Juan  Age:  82 y.o., Sex:  female  Medical Record #: [id number]  Today's Date: 2/7/2023     Precautions  Precautions: (P) Fall Risk, Posterior Hip Precautions, Full Weight Bearing Right Lower Extremity         Subjective    Pt reports she is ready to d/c home tomorrow.      Objective       02/07/23 1401   OT Charge Group   OT Self Care / ADL (Units) 1   OT Therapy Activity (Units) 1   OT Total Time Spent   OT Individual Total Time Spent (Mins) 30   Precautions   Precautions Fall Risk;Posterior Hip Precautions;Full Weight Bearing Right Lower Extremity   Functional Level of Assist   Lower Body Dressing Supervision   Lower Body Dressing Description Grab bar;Assistive devices;Sock aid;Shoe horn;Reacher   Toileting Modified Independent   Toileting Description Grab bar;Increased time   Bed, Chair, Wheelchair Transfer Modified Independent   Bed Chair Wheelchair Transfer Description Increased time;Adaptive equipment   Toilet Transfers Modified Independent   Toilet Transfer Description Grab bar;Increased time  (w/c <>toilet)   Interdisciplinary Plan of Care Collaboration   Patient Position at End of Therapy In Bed;Call Light within Reach;Tray Table within Reach;Phone within Reach       Passport to independence and OT goals were dicussed w/ pt in preparation for d/c home tomorrow. Pt is able to complete all ADLs with mod I or supervision from caregiver at home.   Assessment    Pt completed session focused on ADLs w/ increased independence and increased safety awareness. Pt has demonstrated increased independence, balance, and safety since eval.   Strengths: Able to follow instructions, Adequate strength, Independent prior level of function, Supportive family, Pleasant and cooperative, Willingly participates in therapeutic activities, Motivated for self care and independence, Good insight into deficits/needs  Barriers: Decreased endurance, Impaired balance,  Limited mobility, Pain    Plan    D/c home tomorrow    Occupational Therapy Goals (Active)       There are no active problems.

## 2023-02-08 ENCOUNTER — PHARMACY VISIT (OUTPATIENT)
Dept: PHARMACY | Facility: MEDICAL CENTER | Age: 83
End: 2023-02-08
Payer: COMMERCIAL

## 2023-02-08 VITALS
RESPIRATION RATE: 18 BRPM | BODY MASS INDEX: 18.69 KG/M2 | HEART RATE: 65 BPM | SYSTOLIC BLOOD PRESSURE: 111 MMHG | OXYGEN SATURATION: 94 % | HEIGHT: 61 IN | TEMPERATURE: 97.7 F | WEIGHT: 99 LBS | DIASTOLIC BLOOD PRESSURE: 63 MMHG

## 2023-02-08 PROCEDURE — 700102 HCHG RX REV CODE 250 W/ 637 OVERRIDE(OP): Performed by: PHYSICAL MEDICINE & REHABILITATION

## 2023-02-08 PROCEDURE — 99239 HOSP IP/OBS DSCHRG MGMT >30: CPT | Performed by: PHYSICAL MEDICINE & REHABILITATION

## 2023-02-08 PROCEDURE — 99231 SBSQ HOSP IP/OBS SF/LOW 25: CPT | Performed by: HOSPITALIST

## 2023-02-08 PROCEDURE — A9270 NON-COVERED ITEM OR SERVICE: HCPCS | Performed by: PHYSICAL MEDICINE & REHABILITATION

## 2023-02-08 RX ADMIN — Medication 3 MG: at 00:40

## 2023-02-08 RX ADMIN — OMEPRAZOLE 20 MG: 20 CAPSULE, DELAYED RELEASE ORAL at 08:42

## 2023-02-08 RX ADMIN — SUCRALFATE 1 G: 1 TABLET ORAL at 08:42

## 2023-02-08 RX ADMIN — SODIUM CHLORIDE 1 G: 1 TABLET ORAL at 08:42

## 2023-02-08 RX ADMIN — SENNOSIDES AND DOCUSATE SODIUM 2 TABLET: 50; 8.6 TABLET ORAL at 08:43

## 2023-02-08 RX ADMIN — LEVOTHYROXINE SODIUM 50 MCG: 0.05 TABLET ORAL at 05:51

## 2023-02-08 RX ADMIN — HYDROCODONE BITARTRATE AND ACETAMINOPHEN 1 TABLET: 5; 325 TABLET ORAL at 08:43

## 2023-02-08 RX ADMIN — HYDROCODONE BITARTRATE AND ACETAMINOPHEN 1 TABLET: 5; 325 TABLET ORAL at 00:46

## 2023-02-08 ASSESSMENT — ENCOUNTER SYMPTOMS
NAUSEA: 0
DIZZINESS: 0
FEVER: 0
VOMITING: 0
HALLUCINATIONS: 0
HEADACHES: 0
BLURRED VISION: 0
SHORTNESS OF BREATH: 0
PALPITATIONS: 0

## 2023-02-08 ASSESSMENT — PAIN DESCRIPTION - PAIN TYPE
TYPE: ACUTE PAIN
TYPE: ACUTE PAIN

## 2023-02-08 NOTE — PROGRESS NOTES
Patient discharged to home per order.  Discharge instructions reviewed with patient; they verbalize understanding and signed copies placed in chart.  Patient has all belongings; signed copy of form in chart.  Patient left facility at 1100 via wheelchair accompanied by rehab staff and neighbor.    Hip dressing changed, photo uploaded.

## 2023-02-08 NOTE — PROGRESS NOTES
Hospital Medicine Daily Progress Note        Chief Complaint  Hyponatremia  Hypertension    Interval Problem Update  Pt going home today.  Pt to use some salt to her diet at home.    Review of Systems  Review of Systems   Constitutional:  Negative for fever.   Eyes:  Negative for blurred vision.   Respiratory:  Negative for shortness of breath.    Cardiovascular:  Negative for palpitations.   Gastrointestinal:  Negative for nausea and vomiting.   Neurological:  Negative for dizziness and headaches.   Psychiatric/Behavioral:  Negative for hallucinations.       Physical Exam  Temp:  [36.5 °C (97.7 °F)-36.9 °C (98.4 °F)] 36.5 °C (97.7 °F)  Pulse:  [65-82] 65  Resp:  [18] 18  BP: (109-120)/(57-63) 111/63  SpO2:  [93 %-94 %] 94 %    Physical Exam  Vitals and nursing note reviewed.   Constitutional:       Appearance: Normal appearance.   HENT:      Head: Atraumatic.   Eyes:      Conjunctiva/sclera: Conjunctivae normal.      Pupils: Pupils are equal, round, and reactive to light.   Cardiovascular:      Rate and Rhythm: Normal rate and regular rhythm.      Heart sounds: No murmur heard.  Pulmonary:      Effort: Pulmonary effort is normal.      Breath sounds: No stridor. No wheezing or rales.   Abdominal:      General: There is no distension.      Palpations: Abdomen is soft.      Tenderness: There is no abdominal tenderness.   Musculoskeletal:      Cervical back: Normal range of motion and neck supple.      Right lower leg: No edema.      Left lower leg: No edema.   Skin:     General: Skin is warm and dry.      Findings: No rash.   Neurological:      Mental Status: She is alert and oriented to person, place, and time.   Psychiatric:         Mood and Affect: Mood normal.         Behavior: Behavior normal.       Fluids    Intake/Output Summary (Last 24 hours) at 2/8/2023 0811  Last data filed at 2/8/2023 0551  Gross per 24 hour   Intake 720 ml   Output --   Net 720 ml           Laboratory  Recent Labs     02/06/23  0600  02/07/23  0643   WBC 7.3 4.7*   RBC 2.32* 2.44*   HEMOGLOBIN 7.9* 8.2*   HEMATOCRIT 23.3* 24.9*   .4* 102.0*   MCH 34.1* 33.6*   MCHC 33.9 32.9*   RDW 46.7 48.2   PLATELETCT 248 292   MPV 9.6 9.6       Recent Labs     02/06/23  0600 02/07/23  0643   SODIUM 128* 133*   POTASSIUM 4.2 4.1   CHLORIDE 94* 98   CO2 28 29   GLUCOSE 107* 95   BUN 14 13   CREATININE 0.42* 0.45*   CALCIUM 8.6 8.6                     Assessment/Plan  * Closed right hip fracture, sequela- (present on admission)  Assessment & Plan  2nd to GLF  S/P right hemiarthroplasty (1/31)    Anemia- (present on admission)  Assessment & Plan  H&H stable with Hb 8.2  B12: ok  F/U [facility]     Chronic abdominal pain- (present on admission)  Assessment & Plan  Currently being worked up as out patient  Follows with GI    Hyponatremia- (present on admission)  Assessment & Plan  Has a chronic hx with baseline  Na: 124 --> 127 --> 128 -> 133 (2/7)  Has a diminished appetite  On salt tabs: 1g tid  Monitor    Hypothyroidism- (present on admission)  Assessment & Plan  On Synthroid    Gastroesophageal reflux disease- (present on admission)  Assessment & Plan  On Prilosec  On Carafate    HTN (hypertension)- (present on admission)  Assessment & Plan  BP ok  On Lisinopril  Monitor

## 2023-02-09 NOTE — DISCHARGE SUMMARY
Admission Date: 2/1/2023    Discharge Date: 2/8/2023    Attending Provider: Anu Abad MD    Admission Diagnosis:   Active Hospital Problems    Diagnosis     *Closed right hip fracture, sequela     Peripheral polyneuropathy     Other insomnia     Chronic abdominal pain     Anemia     HTN (hypertension)     Gastroesophageal reflux disease     Hypothyroidism     Hyponatremia        Discharge Diagnosis:  Active Hospital Problems    Diagnosis     *Closed right hip fracture, sequela     Peripheral polyneuropathy     Other insomnia     Chronic abdominal pain     Anemia     HTN (hypertension)     Gastroesophageal reflux disease     Hypothyroidism     Hyponatremia        HPI per H&P:    82-year-old female with history of hypertension, GERD, hypothyroidism, admitted to hospital on 1/31/2023 with right hip pain after ground-level fall at home.  Imaging showed right femoral neck displaced fracture for which she underwent a right hemiarthroplasty with Dr. Rasmussen on 1/31.  She is weightbearing as tolerated with posterior hip precautions.  Her hemoglobin dropped postoperatively from 12.2 to 10.6.     Patient currently reports intermittent right hip pain, ranges from 4-5/10 at rest, and higher with movement. She moved her bowels this morning and denies any issues with urination.  Patient reports history of chronic postprandial abdominal pain for which she is being worked up as an outpatient with gastroenterology.  She has a history of a right reverse total shoulder replacement and is right hand dominant.      Her sodium level this morning is much lower at 124, so the hospitalist has been consulted.      Patient was evaluated by Rehab Medicine physician and Physical Therapy and Occupational Therapy and determined to be appropriate for acute inpatient rehab and was transferred to [facility] on 2/1/2023  4:33 PM.    Rehab Hospital Course    Patient pain was well controlled on her scheduled 5 mg hydrocodone  APAP, home medication.  She continued her weightbearing as tolerated with posterior hip precautions.  She will need her staples removed by home health on 2/14.  She was started on 100 mg gabapentin at night for peripheral neuropathic pain which improved.  Her lisinopril was decreased due to mild relative hypotension.  She was continued on her omeprazole and sucralfate for her chronic abdominal pain which will need outpatient GI follow-up.  She was started on salt tabs for what appeared to be acute on chronic hyponatremia.  She was discharged home on a taper with recommendations to follow-up with her PCP.  She had postoperative anemia which stabilized.  She was treated with Lovenox for DVT prophylaxis.    Labs    Lab Results   Component Value Date/Time    WBC 4.7 (L) 02/07/2023 06:43 AM    RBC 2.44 (L) 02/07/2023 06:43 AM    HEMOGLOBIN 8.2 (L) 02/07/2023 06:43 AM    HEMATOCRIT 24.9 (L) 02/07/2023 06:43 AM    .0 (H) 02/07/2023 06:43 AM    MCH 33.6 (H) 02/07/2023 06:43 AM    MCHC 32.9 (L) 02/07/2023 06:43 AM    MPV 9.6 02/07/2023 06:43 AM    NEUTSPOLYS 60.10 02/02/2023 06:02 AM    LYMPHOCYTES 25.90 02/02/2023 06:02 AM    MONOCYTES 13.10 02/02/2023 06:02 AM    EOSINOPHILS 0.30 02/02/2023 06:02 AM    BASOPHILS 0.30 02/02/2023 06:02 AM          Functional Status at Discharge  Eating:  Independent  Eating Description:     Grooming:  Supervision, Standing  Grooming Description:  Standing at sink  Bathing:  Minimal Assist (per nursing report)  Bathing Description:  Grab bar, Hand held shower, Tub bench, Assit with back, Assit wtih lower extremities, Increased time, Set up for wound protection, Supervision for safety, Assit with perineal  Upper Body Dressing:  Modified Independent  Upper Body Dressing Description:  Increased time  Lower Body Dressing:  Supervision  Lower Body Dressing Description:  Grab bar, Assistive devices, Sock aid, Shoe horn, Reacher  Discharge Location : Home  Patient Discharging with Assist of:  Paid Caregiver  Level of Supervision Required: Intermittent Supervision  Recommended Equipment for Discharge: Sock Aid;Reacher;Grab Bars by Toilet;Grab Bars in Tub / Shower  Recommended Services Upon Discharge: Home Health Occupational Therapy  Long Term Goals Met: 2  Long Term Goals Not Met: 0  Criteria for Termination of Services: Maximum Function Achieved for Inpatient Rehabilitation  Walk:  Modified Independent  Distance Walked:  200  Number of Times Distance Was Traveled:  4  Assistive Device:  Front Wheel Walker  Gait Deviation:  Bradykinetic, Antalgic  Wheelchair:  Supervised  Distance Propelled:  200   Wheelchair Description:  Extra time, Leg rest management  Stairs Standby Assist  Stairs Description  (sidestepping with R HR, step to pattern 6inch steps)  Discharge Location: Home  Patient Discharging with Assist of: Caregiver  Level of Supervision Required Upon Discharge: Intermittent Supervision  Recommended Equipment for Discharge: Front-Wheeled Walker (railing for stairs)  Recommeded Services Upon Discharge: Home Health Physical Therapy  Long Term Goals Met: 4  Long Term Goals Not Met: 0  Criteria for Termination of Services: Maximum Function Achieved for Inpatient Rehabilitation  Comprehension:  Modified Independent  Comprehension Description:  Glasses  Expression:  Independent  Expression Description:     Social Interaction:     Social Interaction Description:     Problem Solving:  Independent  Problem Solving Description:     Memory:  Independent  Memory Description:          Anu SUNG M.D., personally performed a complete drug regimen review and no potential clinically significant medication issues were identified.     Discharge Medication:     Medication List        START taking these medications        Instructions   gabapentin 100 MG Caps  Commonly known as: NEURONTIN   Take 1 capsule by mouth every evening.  Dose: 100 mg     sodium chloride 1 GM Tabs  Start taking on: February 7,  2023  Commonly known as: SALT   Take 1 Tablet by mouth 3 times a day with meals for 7 days, THEN 1 Tablet 2 times a day for 7 days, THEN 1 Tablet every day for 7 days.     traZODone 50 MG Tabs  Commonly known as: DESYREL   Take 1 Tablet by mouth at bedtime as needed for Sleep.  Dose: 50 mg            CHANGE how you take these medications        Instructions   sucralfate 1 GM Tabs  What changed: additional instructions  Commonly known as: CARAFATE   Take 1 Tablet by mouth 2 times a day.  Dose: 1 g            CONTINUE taking these medications        Instructions   D3 PO   Take 1 Capsule by mouth every day.  Dose: 1 Capsule     FIBER PO   Take 1 Scoop by mouth every day.  Dose: 1 Scoop     FISH OIL PO   Take 1 Capsule by mouth every day.  Dose: 1 Capsule     HYDROcodone-acetaminophen 5-325 MG Tabs per tablet  Commonly known as: NORCO   Take 1 tablet by mouth 2 times a day for 7 days.  Dose: 1 Tablet     levothyroxine 50 MCG Tabs  Commonly known as: SYNTHROID   Take 50 mcg by mouth every morning on an empty stomach.  Dose: 50 mcg     lisinopril 20 MG Tabs  Commonly known as: PRINIVIL   Take 20 mg by mouth every day.  Dose: 20 mg     MULTIVITAMIN PO   Take 1 Tablet by mouth every day.  Dose: 1 Tablet     pantoprazole 40 MG Tbec  Commonly known as: PROTONIX   Take 40 mg by mouth every day.  Dose: 40 mg     polyethylene glycol/lytes 17 g Pack  Commonly known as: MIRALAX   Take 17 g by mouth every day.  Dose: 17 g     TURMERIC PO   Take 1 Capsule by mouth every day.  Dose: 1 Capsule     VITAMIN B COMPLEX PO   Take 1 Tablet by mouth every day.  Dose: 1 Tablet            STOP taking these medications      Aleve 220 MG tablet  Generic drug: naproxen              Discharge Diet:  Regular    Discharge Activity:  As tolerated    Disposition:  Patient to discharge home with family support and community resources.     Equipment:  Follow-up & Discharge Instructions:  Home health: PT/OT     Equip: FWW     Follow up: PCP, surgery       Condition on Discharge:  Good    More than 35 minutes was spent on discharging this patient, including face-to-face time, prescription management, and the dictation of this note.    Anu Abad M.D.    Date of Service: 2/8/2023

## 2023-02-12 NOTE — PROCEDURE: COUNSELING
as evidenced by mild muscle/fat loss, likely meeting <75% EER >days
Detail Level: Zone
Detail Level: Simple

## 2023-05-10 ENCOUNTER — HOSPITAL ENCOUNTER (OUTPATIENT)
Dept: LAB | Facility: MEDICAL CENTER | Age: 83
End: 2023-05-10
Attending: FAMILY MEDICINE
Payer: MEDICARE

## 2023-05-10 LAB
ALBUMIN SERPL BCP-MCNC: 4 G/DL (ref 3.2–4.9)
ALBUMIN/GLOB SERPL: 1.5 G/DL
ALP SERPL-CCNC: 59 U/L (ref 30–99)
ALT SERPL-CCNC: 20 U/L (ref 2–50)
ANION GAP SERPL CALC-SCNC: 13 MMOL/L (ref 7–16)
AST SERPL-CCNC: 25 U/L (ref 12–45)
BASOPHILS # BLD AUTO: 0.4 % (ref 0–1.8)
BASOPHILS # BLD: 0.02 K/UL (ref 0–0.12)
BILIRUB SERPL-MCNC: 0.2 MG/DL (ref 0.1–1.5)
BUN SERPL-MCNC: 23 MG/DL (ref 8–22)
CALCIUM ALBUM COR SERPL-MCNC: 9.1 MG/DL (ref 8.5–10.5)
CALCIUM SERPL-MCNC: 9.1 MG/DL (ref 8.5–10.5)
CHLORIDE SERPL-SCNC: 94 MMOL/L (ref 96–112)
CO2 SERPL-SCNC: 26 MMOL/L (ref 20–33)
CREAT SERPL-MCNC: 0.66 MG/DL (ref 0.5–1.4)
EOSINOPHIL # BLD AUTO: 0.07 K/UL (ref 0–0.51)
EOSINOPHIL NFR BLD: 1.4 % (ref 0–6.9)
ERYTHROCYTE [DISTWIDTH] IN BLOOD BY AUTOMATED COUNT: 48.2 FL (ref 35.9–50)
GFR SERPLBLD CREATININE-BSD FMLA CKD-EPI: 87 ML/MIN/1.73 M 2
GLOBULIN SER CALC-MCNC: 2.7 G/DL (ref 1.9–3.5)
GLUCOSE SERPL-MCNC: 119 MG/DL (ref 65–99)
HCT VFR BLD AUTO: 36.7 % (ref 37–47)
HGB BLD-MCNC: 11.8 G/DL (ref 12–16)
IMM GRANULOCYTES # BLD AUTO: 0.02 K/UL (ref 0–0.11)
IMM GRANULOCYTES NFR BLD AUTO: 0.4 % (ref 0–0.9)
LYMPHOCYTES # BLD AUTO: 1.74 K/UL (ref 1–4.8)
LYMPHOCYTES NFR BLD: 33.6 % (ref 22–41)
MCH RBC QN AUTO: 32.1 PG (ref 27–33)
MCHC RBC AUTO-ENTMCNC: 32.2 G/DL (ref 33.6–35)
MCV RBC AUTO: 99.7 FL (ref 81.4–97.8)
MONOCYTES # BLD AUTO: 0.63 K/UL (ref 0–0.85)
MONOCYTES NFR BLD AUTO: 12.2 % (ref 0–13.4)
NEUTROPHILS # BLD AUTO: 2.7 K/UL (ref 2–7.15)
NEUTROPHILS NFR BLD: 52 % (ref 44–72)
NRBC # BLD AUTO: 0 K/UL
NRBC BLD-RTO: 0 /100 WBC
PLATELET # BLD AUTO: 249 K/UL (ref 164–446)
PMV BLD AUTO: 10.4 FL (ref 9–12.9)
POTASSIUM SERPL-SCNC: 4.4 MMOL/L (ref 3.6–5.5)
PROT SERPL-MCNC: 6.7 G/DL (ref 6–8.2)
RBC # BLD AUTO: 3.68 M/UL (ref 4.2–5.4)
SODIUM SERPL-SCNC: 133 MMOL/L (ref 135–145)
WBC # BLD AUTO: 5.2 K/UL (ref 4.8–10.8)

## 2023-05-10 PROCEDURE — 85025 COMPLETE CBC W/AUTO DIFF WBC: CPT

## 2023-05-10 PROCEDURE — 80053 COMPREHEN METABOLIC PANEL: CPT

## 2023-05-10 PROCEDURE — 36415 COLL VENOUS BLD VENIPUNCTURE: CPT

## 2023-05-17 ENCOUNTER — HOSPITAL ENCOUNTER (OUTPATIENT)
Dept: LAB | Facility: MEDICAL CENTER | Age: 83
End: 2023-05-17
Attending: NURSE PRACTITIONER
Payer: MEDICARE

## 2023-05-17 ENCOUNTER — APPOINTMENT (RX ONLY)
Dept: URBAN - METROPOLITAN AREA CLINIC 20 | Facility: CLINIC | Age: 83
Setting detail: DERMATOLOGY
End: 2023-05-17

## 2023-05-17 DIAGNOSIS — L57.0 ACTINIC KERATOSIS: ICD-10-CM

## 2023-05-17 DIAGNOSIS — D18.0 HEMANGIOMA: ICD-10-CM

## 2023-05-17 DIAGNOSIS — L81.4 OTHER MELANIN HYPERPIGMENTATION: ICD-10-CM

## 2023-05-17 DIAGNOSIS — Z85.828 PERSONAL HISTORY OF OTHER MALIGNANT NEOPLASM OF SKIN: ICD-10-CM

## 2023-05-17 DIAGNOSIS — L82.1 OTHER SEBORRHEIC KERATOSIS: ICD-10-CM

## 2023-05-17 DIAGNOSIS — D22 MELANOCYTIC NEVI: ICD-10-CM

## 2023-05-17 DIAGNOSIS — L57.8 OTHER SKIN CHANGES DUE TO CHRONIC EXPOSURE TO NONIONIZING RADIATION: ICD-10-CM

## 2023-05-17 PROBLEM — D22.5 MELANOCYTIC NEVI OF TRUNK: Status: ACTIVE | Noted: 2023-05-17

## 2023-05-17 PROBLEM — D18.01 HEMANGIOMA OF SKIN AND SUBCUTANEOUS TISSUE: Status: ACTIVE | Noted: 2023-05-17

## 2023-05-17 LAB
ALBUMIN SERPL BCP-MCNC: 4.2 G/DL (ref 3.2–4.9)
ALBUMIN/GLOB SERPL: 1.4 G/DL
ALP SERPL-CCNC: 59 U/L (ref 30–99)
ALT SERPL-CCNC: 20 U/L (ref 2–50)
ANION GAP SERPL CALC-SCNC: 10 MMOL/L (ref 7–16)
APPEARANCE UR: CLEAR
AST SERPL-CCNC: 32 U/L (ref 12–45)
BACTERIA #/AREA URNS HPF: NEGATIVE /HPF
BILIRUB SERPL-MCNC: 0.2 MG/DL (ref 0.1–1.5)
BILIRUB UR QL STRIP.AUTO: NEGATIVE
BUN SERPL-MCNC: 28 MG/DL (ref 8–22)
CALCIUM ALBUM COR SERPL-MCNC: 8.9 MG/DL (ref 8.5–10.5)
CALCIUM SERPL-MCNC: 9.1 MG/DL (ref 8.5–10.5)
CHLORIDE SERPL-SCNC: 94 MMOL/L (ref 96–112)
CO2 SERPL-SCNC: 27 MMOL/L (ref 20–33)
COLOR UR: YELLOW
CREAT SERPL-MCNC: 0.75 MG/DL (ref 0.5–1.4)
EPI CELLS #/AREA URNS HPF: NEGATIVE /HPF
ERYTHROCYTE [DISTWIDTH] IN BLOOD BY AUTOMATED COUNT: 48.3 FL (ref 35.9–50)
GFR SERPLBLD CREATININE-BSD FMLA CKD-EPI: 79 ML/MIN/1.73 M 2
GLOBULIN SER CALC-MCNC: 2.9 G/DL (ref 1.9–3.5)
GLUCOSE SERPL-MCNC: 76 MG/DL (ref 65–99)
GLUCOSE UR STRIP.AUTO-MCNC: NEGATIVE MG/DL
HCT VFR BLD AUTO: 38.2 % (ref 37–47)
HGB BLD-MCNC: 12.5 G/DL (ref 12–16)
HYALINE CASTS #/AREA URNS LPF: NORMAL /LPF
INR PPP: 0.94 (ref 0.87–1.13)
KETONES UR STRIP.AUTO-MCNC: NEGATIVE MG/DL
LEUKOCYTE ESTERASE UR QL STRIP.AUTO: ABNORMAL
MCH RBC QN AUTO: 32 PG (ref 27–33)
MCHC RBC AUTO-ENTMCNC: 32.7 G/DL (ref 33.6–35)
MCV RBC AUTO: 97.7 FL (ref 81.4–97.8)
MICRO URNS: ABNORMAL
NITRITE UR QL STRIP.AUTO: NEGATIVE
PH UR STRIP.AUTO: 6 [PH] (ref 5–8)
PLATELET # BLD AUTO: 186 K/UL (ref 164–446)
PMV BLD AUTO: 12 FL (ref 9–12.9)
POTASSIUM SERPL-SCNC: 4.9 MMOL/L (ref 3.6–5.5)
PROT SERPL-MCNC: 7.1 G/DL (ref 6–8.2)
PROT UR QL STRIP: NEGATIVE MG/DL
PROTHROMBIN TIME: 12.5 SEC (ref 12–14.6)
RBC # BLD AUTO: 3.91 M/UL (ref 4.2–5.4)
RBC # URNS HPF: NORMAL /HPF
RBC UR QL AUTO: NEGATIVE
SODIUM SERPL-SCNC: 131 MMOL/L (ref 135–145)
SP GR UR STRIP.AUTO: 1.01
UROBILINOGEN UR STRIP.AUTO-MCNC: 0.2 MG/DL
WBC # BLD AUTO: 5.2 K/UL (ref 4.8–10.8)
WBC #/AREA URNS HPF: NORMAL /HPF

## 2023-05-17 PROCEDURE — ? COUNSELING

## 2023-05-17 PROCEDURE — 80053 COMPREHEN METABOLIC PANEL: CPT

## 2023-05-17 PROCEDURE — 36415 COLL VENOUS BLD VENIPUNCTURE: CPT

## 2023-05-17 PROCEDURE — 99213 OFFICE O/P EST LOW 20 MIN: CPT | Mod: 25

## 2023-05-17 PROCEDURE — 85610 PROTHROMBIN TIME: CPT | Mod: GA

## 2023-05-17 PROCEDURE — ? LIQUID NITROGEN

## 2023-05-17 PROCEDURE — 81001 URINALYSIS AUTO W/SCOPE: CPT

## 2023-05-17 PROCEDURE — 85027 COMPLETE CBC AUTOMATED: CPT

## 2023-05-17 PROCEDURE — 17000 DESTRUCT PREMALG LESION: CPT

## 2023-05-17 PROCEDURE — 87086 URINE CULTURE/COLONY COUNT: CPT | Mod: GA

## 2023-05-17 ASSESSMENT — LOCATION SIMPLE DESCRIPTION DERM
LOCATION SIMPLE: CHEST
LOCATION SIMPLE: LEFT FOREARM
LOCATION SIMPLE: RIGHT UPPER ARM
LOCATION SIMPLE: LEFT THIGH
LOCATION SIMPLE: RIGHT FOREARM
LOCATION SIMPLE: RIGHT CHEEK
LOCATION SIMPLE: RIGHT THIGH
LOCATION SIMPLE: RIGHT CLAVICULAR SKIN
LOCATION SIMPLE: LEFT FOREHEAD
LOCATION SIMPLE: UPPER BACK
LOCATION SIMPLE: LEFT CHEEK
LOCATION SIMPLE: RIGHT SHOULDER
LOCATION SIMPLE: RIGHT UPPER BACK
LOCATION SIMPLE: LEFT UPPER ARM

## 2023-05-17 ASSESSMENT — LOCATION DETAILED DESCRIPTION DERM
LOCATION DETAILED: LEFT DISTAL DORSAL FOREARM
LOCATION DETAILED: RIGHT INFERIOR UPPER BACK
LOCATION DETAILED: RIGHT CENTRAL MALAR CHEEK
LOCATION DETAILED: LEFT INFERIOR CENTRAL MALAR CHEEK
LOCATION DETAILED: RIGHT ANTERIOR PROXIMAL THIGH
LOCATION DETAILED: RIGHT MID-UPPER BACK
LOCATION DETAILED: RIGHT CLAVICULAR SKIN
LOCATION DETAILED: LEFT ANTERIOR PROXIMAL UPPER ARM
LOCATION DETAILED: RIGHT ANTERIOR SHOULDER
LOCATION DETAILED: RIGHT ANTERIOR PROXIMAL UPPER ARM
LOCATION DETAILED: LEFT LATERAL SUPERIOR CHEST
LOCATION DETAILED: INFERIOR THORACIC SPINE
LOCATION DETAILED: LEFT LATERAL FOREHEAD
LOCATION DETAILED: RIGHT SUPERIOR MEDIAL UPPER BACK
LOCATION DETAILED: LEFT MEDIAL SUPERIOR CHEST
LOCATION DETAILED: RIGHT ANTERIOR DISTAL THIGH
LOCATION DETAILED: LEFT ANTERIOR DISTAL THIGH
LOCATION DETAILED: RIGHT DISTAL DORSAL FOREARM

## 2023-05-17 ASSESSMENT — LOCATION ZONE DERM
LOCATION ZONE: TRUNK
LOCATION ZONE: LEG
LOCATION ZONE: ARM
LOCATION ZONE: FACE

## 2023-05-20 LAB
BACTERIA UR CULT: NORMAL
SIGNIFICANT IND 70042: NORMAL
SITE SITE: NORMAL
SOURCE SOURCE: NORMAL

## 2023-07-16 NOTE — PROCEDURE: MIPS QUALITY
Quality 402: Tobacco Use And Help With Quitting Among Adolescents: Patient screened for tobacco and never smoked
Detail Level: Detailed
Quality 111:Pneumonia Vaccination Status For Older Adults: Pneumococcal Vaccination Previously Received
Quality 130: Documentation Of Current Medications In The Medical Record: Current Medications Documented
Quality 431: Preventive Care And Screening: Unhealthy Alcohol Use - Screening: Patient screened for unhealthy alcohol use using a single question and scores less than 2 times per year
Quality 226: Preventive Care And Screening: Tobacco Use: Screening And Cessation Intervention: Patient screened for tobacco use and is an ex/non-smoker
IV discontinued, cath removed intact

## 2023-08-08 ENCOUNTER — APPOINTMENT (RX ONLY)
Dept: URBAN - METROPOLITAN AREA CLINIC 20 | Facility: CLINIC | Age: 83
Setting detail: DERMATOLOGY
End: 2023-08-08

## 2023-08-08 DIAGNOSIS — L82.0 INFLAMED SEBORRHEIC KERATOSIS: ICD-10-CM | Status: INADEQUATELY CONTROLLED

## 2023-08-08 DIAGNOSIS — L57.0 ACTINIC KERATOSIS: ICD-10-CM

## 2023-08-08 DIAGNOSIS — B00.1 HERPESVIRAL VESICULAR DERMATITIS: ICD-10-CM | Status: INADEQUATELY CONTROLLED

## 2023-08-08 PROCEDURE — ? COUNSELING

## 2023-08-08 PROCEDURE — ? PRESCRIPTION

## 2023-08-08 PROCEDURE — 99214 OFFICE O/P EST MOD 30 MIN: CPT | Mod: 25

## 2023-08-08 PROCEDURE — 17110 DESTRUCTION B9 LES UP TO 14: CPT

## 2023-08-08 PROCEDURE — 17000 DESTRUCT PREMALG LESION: CPT | Mod: 59

## 2023-08-08 PROCEDURE — ? LIQUID NITROGEN

## 2023-08-08 PROCEDURE — ? ADDITIONAL NOTES

## 2023-08-08 PROCEDURE — ? PHOTODYNAMIC THERAPY COUNSELING

## 2023-08-08 PROCEDURE — 17003 DESTRUCT PREMALG LES 2-14: CPT | Mod: 59

## 2023-08-08 RX ORDER — ACYCLOVIR 400 MG/1
TABLET ORAL
Qty: 30 | Refills: 1 | Status: ERX

## 2023-08-08 ASSESSMENT — LOCATION DETAILED DESCRIPTION DERM
LOCATION DETAILED: LEFT INFERIOR MEDIAL FOREHEAD
LOCATION DETAILED: INFERIOR MID FOREHEAD
LOCATION DETAILED: RIGHT FOREHEAD
LOCATION DETAILED: INFERIOR THORACIC SPINE
LOCATION DETAILED: PHILTRUM
LOCATION DETAILED: RIGHT CENTRAL POSTAURICULAR SKIN
LOCATION DETAILED: LEFT INFERIOR CENTRAL MALAR CHEEK
LOCATION DETAILED: LEFT INFERIOR FOREHEAD
LOCATION DETAILED: RIGHT INFERIOR CENTRAL MALAR CHEEK
LOCATION DETAILED: RIGHT INFERIOR FOREHEAD
LOCATION DETAILED: LEFT INFERIOR LATERAL FOREHEAD

## 2023-08-08 ASSESSMENT — LOCATION SIMPLE DESCRIPTION DERM
LOCATION SIMPLE: SCALP
LOCATION SIMPLE: RIGHT CHEEK
LOCATION SIMPLE: LEFT CHEEK
LOCATION SIMPLE: RIGHT FOREHEAD
LOCATION SIMPLE: UPPER LIP
LOCATION SIMPLE: UPPER BACK
LOCATION SIMPLE: INFERIOR FOREHEAD
LOCATION SIMPLE: LEFT FOREHEAD

## 2023-08-08 ASSESSMENT — LOCATION ZONE DERM
LOCATION ZONE: TRUNK
LOCATION ZONE: FACE
LOCATION ZONE: LIP
LOCATION ZONE: SCALP

## 2023-08-08 NOTE — PROCEDURE: ADDITIONAL NOTES
Render Risk Assessment In Note?: no
Additional Notes: Will submit for 1 redlight treatment one straight on
Detail Level: Simple

## 2023-08-08 NOTE — PROCEDURE: LIQUID NITROGEN
Medical Necessity Clause: This procedure was medically necessary because the lesions that were treated were:
Render Note In Bullet Format When Appropriate: No
Show Spray Paint Technique Variable?: Yes
Detail Level: Detailed
Post-Care Instructions: I reviewed with the patient in detail post-care instructions. Patient is to wear sunprotection, and avoid picking at any of the treated lesions. Pt may apply Vaseline to crusted or scabbing areas.
Medical Necessity Information: It is in your best interest to select a reason for this procedure from the list below. All of these items fulfill various CMS LCD requirements except the new and changing color options.
Spray Paint Text: The liquid nitrogen was applied to the skin utilizing a spray paint frosting technique.
Consent: The patient's consent was obtained including but not limited to risks of crusting, scabbing, blistering, scarring, darker or lighter pigmentary change, recurrence, incomplete removal and infection.
Duration Of Freeze Thaw-Cycle (Seconds): 0

## 2023-11-14 ENCOUNTER — APPOINTMENT (RX ONLY)
Dept: URBAN - METROPOLITAN AREA CLINIC 20 | Facility: CLINIC | Age: 83
Setting detail: DERMATOLOGY
End: 2023-11-14

## 2023-11-14 DIAGNOSIS — L57.0 ACTINIC KERATOSIS: ICD-10-CM

## 2023-11-14 PROCEDURE — ? COUNSELING

## 2023-11-14 PROCEDURE — ? PDT: RED

## 2023-11-14 PROCEDURE — 96567 PDT DSTR PRMLG LES SKN: CPT

## 2023-11-14 ASSESSMENT — LOCATION ZONE DERM: LOCATION ZONE: FACE

## 2023-11-14 ASSESSMENT — LOCATION SIMPLE DESCRIPTION DERM: LOCATION SIMPLE: SUPERIOR FOREHEAD

## 2023-11-14 ASSESSMENT — LOCATION DETAILED DESCRIPTION DERM: LOCATION DETAILED: SUPERIOR MID FOREHEAD

## 2023-11-14 NOTE — PROCEDURE: PDT: RED
Anesthesia Type: 1% lidocaine with epinephrine
Total Number Of Aks Treated (Optional To Report): 0
Number Of Kerasticks/Tubes Of Metvixia/Tubes Of Ameluz Used: 1
Detail Level: Zone
Incubation Time (Set To 00:00:00 If Not Wanted): 01:30:00
Debridement Text (Will Only Render In Visit Note If You Select Debridement Option Under Who Performed The Pdt Field): Prior to application of the photodynamic medication the hyperkeratotic lesions were curetted to make them more amenable to therapy.
Who Performed The Pdt?: Performed by Nurse, MA or Aesthetician ([id number])
Post-Care Instructions: I reviewed with the patient in detail post-care instructions. Patient is to avoid sunlight for the next 2 days, and wear sun protection. Patients may expect sunburn like redness, discomfort and scabbing.
Ndc# (Optional): [id number]
Lot # (Optional): [id number]
Illumination Time: 00:16:40
Comments: Pt has history of cold sores, has rx at home.
Bill For Wasted Drug?: no
Application Method: under occlusion
Consent: Written consent obtained.  The risks were reviewed with the patient including but not limited to: pigmentary changes, pain, blistering, scabbing, redness, and the remote possibility of scarring.
Photosensitizer: Ameluz
Expiration Date (Optional): 02/2025
History Of Hsv?: Yes

## 2023-11-29 ENCOUNTER — PATIENT MESSAGE (OUTPATIENT)
Dept: HEALTH INFORMATION MANAGEMENT | Facility: OTHER | Age: 83
End: 2023-11-29

## 2023-12-06 ENCOUNTER — APPOINTMENT (RX ONLY)
Dept: URBAN - METROPOLITAN AREA CLINIC 20 | Facility: CLINIC | Age: 83
Setting detail: DERMATOLOGY
End: 2023-12-06

## 2023-12-06 DIAGNOSIS — L82.1 OTHER SEBORRHEIC KERATOSIS: ICD-10-CM

## 2023-12-06 DIAGNOSIS — L57.0 ACTINIC KERATOSIS: ICD-10-CM

## 2023-12-06 PROCEDURE — ? COUNSELING

## 2023-12-06 PROCEDURE — 17000 DESTRUCT PREMALG LESION: CPT

## 2023-12-06 PROCEDURE — ? LIQUID NITROGEN

## 2023-12-06 PROCEDURE — 99212 OFFICE O/P EST SF 10 MIN: CPT | Mod: 25

## 2023-12-06 ASSESSMENT — LOCATION SIMPLE DESCRIPTION DERM
LOCATION SIMPLE: LEFT FOREHEAD
LOCATION SIMPLE: RIGHT TEMPLE

## 2023-12-06 ASSESSMENT — LOCATION ZONE DERM: LOCATION ZONE: FACE

## 2023-12-06 ASSESSMENT — LOCATION DETAILED DESCRIPTION DERM
LOCATION DETAILED: LEFT SUPERIOR MEDIAL FOREHEAD
LOCATION DETAILED: RIGHT MID TEMPLE

## 2023-12-06 NOTE — PROCEDURE: MIPS QUALITY
Detail Level: Detailed
Quality 431: Preventive Care And Screening: Unhealthy Alcohol Use - Screening: Patient screened for unhealthy alcohol use using a single question and scores less than 2 times per year
Quality 402: Tobacco Use And Help With Quitting Among Adolescents: Patient screened for tobacco and never smoked
Quality 111:Pneumonia Vaccination Status For Older Adults: Pneumococcal Vaccination Previously Received
Quality 130: Documentation Of Current Medications In The Medical Record: Current Medications Documented
Quality 226: Preventive Care And Screening: Tobacco Use: Screening And Cessation Intervention: Patient screened for tobacco use and is an ex/non-smoker

## 2023-12-27 ENCOUNTER — HOSPITAL ENCOUNTER (OUTPATIENT)
Dept: LAB | Facility: MEDICAL CENTER | Age: 83
End: 2023-12-27
Attending: STUDENT IN AN ORGANIZED HEALTH CARE EDUCATION/TRAINING PROGRAM
Payer: MEDICARE

## 2023-12-27 LAB
ALBUMIN SERPL BCP-MCNC: 4.3 G/DL (ref 3.2–4.9)
ALBUMIN/GLOB SERPL: 1.6 G/DL
ALP SERPL-CCNC: 75 U/L (ref 30–99)
ALT SERPL-CCNC: 18 U/L (ref 2–50)
ANION GAP SERPL CALC-SCNC: 8 MMOL/L (ref 7–16)
APPEARANCE UR: CLEAR
AST SERPL-CCNC: 26 U/L (ref 12–45)
BACTERIA #/AREA URNS HPF: NEGATIVE /HPF
BASOPHILS # BLD AUTO: 0.5 % (ref 0–1.8)
BASOPHILS # BLD: 0.02 K/UL (ref 0–0.12)
BILIRUB SERPL-MCNC: 0.5 MG/DL (ref 0.1–1.5)
BILIRUB UR QL STRIP.AUTO: NEGATIVE
BUN SERPL-MCNC: 11 MG/DL (ref 8–22)
CALCIUM ALBUM COR SERPL-MCNC: 9.4 MG/DL (ref 8.5–10.5)
CALCIUM SERPL-MCNC: 9.6 MG/DL (ref 8.5–10.5)
CHLORIDE SERPL-SCNC: 89 MMOL/L (ref 96–112)
CHOLEST SERPL-MCNC: 269 MG/DL (ref 100–199)
CO2 SERPL-SCNC: 28 MMOL/L (ref 20–33)
COLOR UR: YELLOW
CREAT SERPL-MCNC: 0.56 MG/DL (ref 0.5–1.4)
EOSINOPHIL # BLD AUTO: 0.08 K/UL (ref 0–0.51)
EOSINOPHIL NFR BLD: 2.2 % (ref 0–6.9)
EPI CELLS #/AREA URNS HPF: NEGATIVE /HPF
ERYTHROCYTE [DISTWIDTH] IN BLOOD BY AUTOMATED COUNT: 45.4 FL (ref 35.9–50)
EST. AVERAGE GLUCOSE BLD GHB EST-MCNC: 108 MG/DL
FASTING STATUS PATIENT QL REPORTED: NORMAL
GFR SERPLBLD CREATININE-BSD FMLA CKD-EPI: 90 ML/MIN/1.73 M 2
GLOBULIN SER CALC-MCNC: 2.7 G/DL (ref 1.9–3.5)
GLUCOSE SERPL-MCNC: 82 MG/DL (ref 65–99)
GLUCOSE UR STRIP.AUTO-MCNC: NEGATIVE MG/DL
HBA1C MFR BLD: 5.4 % (ref 4–5.6)
HCT VFR BLD AUTO: 37.6 % (ref 37–47)
HDLC SERPL-MCNC: 146 MG/DL
HGB BLD-MCNC: 12.6 G/DL (ref 12–16)
HYALINE CASTS #/AREA URNS LPF: NORMAL /LPF
IMM GRANULOCYTES # BLD AUTO: 0.01 K/UL (ref 0–0.11)
IMM GRANULOCYTES NFR BLD AUTO: 0.3 % (ref 0–0.9)
KETONES UR STRIP.AUTO-MCNC: NEGATIVE MG/DL
LDLC SERPL CALC-MCNC: 112 MG/DL
LEUKOCYTE ESTERASE UR QL STRIP.AUTO: ABNORMAL
LYMPHOCYTES # BLD AUTO: 1.58 K/UL (ref 1–4.8)
LYMPHOCYTES NFR BLD: 43.4 % (ref 22–41)
MCH RBC QN AUTO: 33.2 PG (ref 27–33)
MCHC RBC AUTO-ENTMCNC: 33.5 G/DL (ref 32.2–35.5)
MCV RBC AUTO: 98.9 FL (ref 81.4–97.8)
MICRO URNS: ABNORMAL
MONOCYTES # BLD AUTO: 0.59 K/UL (ref 0–0.85)
MONOCYTES NFR BLD AUTO: 16.2 % (ref 0–13.4)
NEUTROPHILS # BLD AUTO: 1.36 K/UL (ref 1.82–7.42)
NEUTROPHILS NFR BLD: 37.4 % (ref 44–72)
NITRITE UR QL STRIP.AUTO: NEGATIVE
NRBC # BLD AUTO: 0 K/UL
NRBC BLD-RTO: 0 /100 WBC (ref 0–0.2)
PH UR STRIP.AUTO: 8 [PH] (ref 5–8)
PLATELET # BLD AUTO: 257 K/UL (ref 164–446)
PMV BLD AUTO: 10.2 FL (ref 9–12.9)
POTASSIUM SERPL-SCNC: 5 MMOL/L (ref 3.6–5.5)
PROT SERPL-MCNC: 7 G/DL (ref 6–8.2)
PROT UR QL STRIP: NEGATIVE MG/DL
RBC # BLD AUTO: 3.8 M/UL (ref 4.2–5.4)
RBC # URNS HPF: NORMAL /HPF
RBC UR QL AUTO: NEGATIVE
SODIUM SERPL-SCNC: 125 MMOL/L (ref 135–145)
SP GR UR STRIP.AUTO: 1.01
TRIGL SERPL-MCNC: 55 MG/DL (ref 0–149)
TSH SERPL DL<=0.005 MIU/L-ACNC: 3.11 UIU/ML (ref 0.38–5.33)
UROBILINOGEN UR STRIP.AUTO-MCNC: 0.2 MG/DL
WBC # BLD AUTO: 3.6 K/UL (ref 4.8–10.8)
WBC #/AREA URNS HPF: NORMAL /HPF

## 2023-12-27 PROCEDURE — 80053 COMPREHEN METABOLIC PANEL: CPT

## 2023-12-27 PROCEDURE — 80061 LIPID PANEL: CPT

## 2023-12-27 PROCEDURE — 81001 URINALYSIS AUTO W/SCOPE: CPT

## 2023-12-27 PROCEDURE — 85025 COMPLETE CBC W/AUTO DIFF WBC: CPT

## 2023-12-27 PROCEDURE — 84443 ASSAY THYROID STIM HORMONE: CPT

## 2023-12-27 PROCEDURE — 83036 HEMOGLOBIN GLYCOSYLATED A1C: CPT | Mod: GA

## 2023-12-27 PROCEDURE — 36415 COLL VENOUS BLD VENIPUNCTURE: CPT

## 2024-01-19 ENCOUNTER — HOSPITAL ENCOUNTER (OUTPATIENT)
Dept: LAB | Facility: MEDICAL CENTER | Age: 84
End: 2024-01-19
Attending: STUDENT IN AN ORGANIZED HEALTH CARE EDUCATION/TRAINING PROGRAM
Payer: MEDICARE

## 2024-01-19 LAB
25(OH)D3 SERPL-MCNC: 68 NG/ML (ref 30–100)
ALBUMIN SERPL BCP-MCNC: 4.3 G/DL (ref 3.2–4.9)
ALBUMIN/GLOB SERPL: 1.5 G/DL
ALP SERPL-CCNC: 64 U/L (ref 30–99)
ALT SERPL-CCNC: 20 U/L (ref 2–50)
ANION GAP SERPL CALC-SCNC: 9 MMOL/L (ref 7–16)
AST SERPL-CCNC: 29 U/L (ref 12–45)
BILIRUB SERPL-MCNC: 0.3 MG/DL (ref 0.1–1.5)
BUN SERPL-MCNC: 12 MG/DL (ref 8–22)
CALCIUM ALBUM COR SERPL-MCNC: 9.2 MG/DL (ref 8.5–10.5)
CALCIUM SERPL-MCNC: 9.4 MG/DL (ref 8.5–10.5)
CHLORIDE SERPL-SCNC: 90 MMOL/L (ref 96–112)
CHLORIDE UR-SCNC: 48 MMOL/L
CO2 SERPL-SCNC: 29 MMOL/L (ref 20–33)
CREAT SERPL-MCNC: 0.53 MG/DL (ref 0.5–1.4)
GFR SERPLBLD CREATININE-BSD FMLA CKD-EPI: 91 ML/MIN/1.73 M 2
GLOBULIN SER CALC-MCNC: 2.8 G/DL (ref 1.9–3.5)
GLUCOSE SERPL-MCNC: 87 MG/DL (ref 65–99)
OSMOLALITY SERPL: 272 MOSM/KG H2O (ref 278–298)
POTASSIUM SERPL-SCNC: 4.2 MMOL/L (ref 3.6–5.5)
PROT SERPL-MCNC: 7.1 G/DL (ref 6–8.2)
SODIUM SERPL-SCNC: 128 MMOL/L (ref 135–145)
SODIUM UR-SCNC: 56 MMOL/L

## 2024-01-19 PROCEDURE — 83930 ASSAY OF BLOOD OSMOLALITY: CPT

## 2024-01-19 PROCEDURE — 82306 VITAMIN D 25 HYDROXY: CPT

## 2024-01-19 PROCEDURE — 36415 COLL VENOUS BLD VENIPUNCTURE: CPT

## 2024-01-19 PROCEDURE — 84300 ASSAY OF URINE SODIUM: CPT

## 2024-01-19 PROCEDURE — 80053 COMPREHEN METABOLIC PANEL: CPT

## 2024-01-19 PROCEDURE — 82436 ASSAY OF URINE CHLORIDE: CPT

## 2024-01-19 PROCEDURE — 83935 ASSAY OF URINE OSMOLALITY: CPT

## 2024-01-20 LAB — OSMOLALITY UR: 307 MOSM/KG H2O (ref 300–900)

## 2024-01-22 ENCOUNTER — TELEPHONE (OUTPATIENT)
Dept: HEALTH INFORMATION MANAGEMENT | Facility: OTHER | Age: 84
End: 2024-01-22
Payer: MEDICARE

## 2024-03-15 ASSESSMENT — PATIENT HEALTH QUESTIONNAIRE - PHQ9
2. FEELING DOWN, DEPRESSED, IRRITABLE, OR HOPELESS: NOT AT ALL
1. LITTLE INTEREST OR PLEASURE IN DOING THINGS: NOT AT ALL

## 2024-03-15 ASSESSMENT — ACTIVITIES OF DAILY LIVING (ADL): BATHING_REQUIRES_ASSISTANCE: 0

## 2024-03-15 ASSESSMENT — ENCOUNTER SYMPTOMS: GENERAL WELL-BEING: GOOD

## 2024-03-18 PROBLEM — Z91.81 RISK FOR FALLS: Status: ACTIVE | Noted: 2023-01-31

## 2024-03-19 ENCOUNTER — OFFICE VISIT (OUTPATIENT)
Dept: FAMILY PLANNING/WOMEN'S HEALTH CLINIC | Facility: PHYSICIAN GROUP | Age: 84
End: 2024-03-19
Payer: MEDICARE

## 2024-03-19 VITALS
WEIGHT: 100 LBS | DIASTOLIC BLOOD PRESSURE: 58 MMHG | SYSTOLIC BLOOD PRESSURE: 108 MMHG | HEIGHT: 61 IN | BODY MASS INDEX: 18.88 KG/M2

## 2024-03-19 DIAGNOSIS — Z78.0 POSTMENOPAUSAL: ICD-10-CM
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
## 2024-03-19 DIAGNOSIS — I10 PRIMARY HYPERTENSION: Chronic | ICD-10-CM

## 2024-03-19 DIAGNOSIS — Z91.81 RISK FOR FALLS: ICD-10-CM

## 2024-03-19 DIAGNOSIS — K21.9 GASTROESOPHAGEAL REFLUX DISEASE, UNSPECIFIED WHETHER ESOPHAGITIS PRESENT: Chronic | ICD-10-CM

## 2024-03-19 PROCEDURE — 3078F DIAST BP <80 MM HG: CPT

## 2024-03-19 PROCEDURE — G0439 PPPS, SUBSEQ VISIT: HCPCS

## 2024-03-19 PROCEDURE — 1126F AMNT PAIN NOTED NONE PRSNT: CPT

## 2024-03-19 PROCEDURE — 3074F SYST BP LT 130 MM HG: CPT

## 2024-03-19 RX ORDER — AZELASTINE 1 MG/ML
1 SPRAY, METERED NASAL 2 TIMES DAILY
COMMUNITY

## 2024-03-19 SDOH — ECONOMIC STABILITY: FOOD INSECURITY: WITHIN THE PAST 12 MONTHS, THE FOOD YOU BOUGHT JUST DIDN'T LAST AND YOU DIDN'T HAVE MONEY TO GET MORE.: NEVER TRUE

## 2024-03-19 SDOH — ECONOMIC STABILITY: HOUSING INSECURITY
IN THE LAST 12 MONTHS, WAS THERE A TIME WHEN YOU DID NOT HAVE A STEADY PLACE TO SLEEP OR SLEPT IN A SHELTER (INCLUDING NOW)?: NO

## 2024-03-19 SDOH — ECONOMIC STABILITY: FOOD INSECURITY: WITHIN THE PAST 12 MONTHS, YOU WORRIED THAT YOUR FOOD WOULD RUN OUT BEFORE YOU GOT MONEY TO BUY MORE.: NEVER TRUE

## 2024-03-19 SDOH — ECONOMIC STABILITY: HOUSING INSECURITY: IN THE LAST 12 MONTHS, HOW MANY PLACES HAVE YOU LIVED?: 1

## 2024-03-19 SDOH — ECONOMIC STABILITY: INCOME INSECURITY: IN THE LAST 12 MONTHS, WAS THERE A TIME WHEN YOU WERE NOT ABLE TO PAY THE MORTGAGE OR RENT ON TIME?: NO

## 2024-03-19 SDOH — ECONOMIC STABILITY: TRANSPORTATION INSECURITY
IN THE PAST 12 MONTHS, HAS LACK OF TRANSPORTATION KEPT YOU FROM MEETINGS, WORK, OR FROM GETTING THINGS NEEDED FOR DAILY LIVING?: NO

## 2024-03-19 SDOH — ECONOMIC STABILITY: INCOME INSECURITY: HOW HARD IS IT FOR YOU TO PAY FOR THE VERY BASICS LIKE FOOD, HOUSING, MEDICAL CARE, AND HEATING?: NOT HARD AT ALL

## 2024-03-19 ASSESSMENT — PAIN SCALES - GENERAL: PAINLEVEL: NO PAIN

## 2024-03-19 ASSESSMENT — FIBROSIS 4 INDEX: FIB4 SCORE: 2.12

## 2024-03-19 ASSESSMENT — PATIENT HEALTH QUESTIONNAIRE - PHQ9: CLINICAL INTERPRETATION OF PHQ2 SCORE: 0

## 2024-03-19 NOTE — ASSESSMENT & PLAN NOTE
Chronic, stable. Currently takes pantoprazole 40 mg daily. States this has well controlled her reflux. Follow up with PCP for continued monitoring.

## 2024-03-19 NOTE — ASSESSMENT & PLAN NOTE
Chronic, Stable. BP in office today is 108/58. She does check her BP at home. She currently takes lisinopril 20 mg daily. Follow up with PCP for continued monitoring and management.

## 2024-03-19 NOTE — ASSESSMENT & PLAN NOTE
Patient fall-risk assessment in office is positive. She reports 2 or more falls in the last year. She fell Jan 2023 and broke her hip. She had surgery. Educated on removing hazards in the home, keeping walkways clear, as well as wearing appropriate non-slip footwear around the house.

## 2024-03-19 NOTE — PROGRESS NOTES
Comprehensive Health Assessment Program     Alyssa Juan is a 83 y.o. here for her comprehensive health assessment.    Patient Active Problem List    Diagnosis Date Noted    Peripheral polyneuropathy 02/07/2023    Other insomnia 02/07/2023    Chronic abdominal pain 02/02/2023    Anemia 02/02/2023    Closed right hip fracture, sequela 02/01/2023    Closed displaced fracture of right femoral neck (HCC) 01/31/2023    HTN (hypertension) 01/31/2023    Gastroesophageal reflux disease 01/31/2023    Hypothyroidism 01/31/2023    Fall from ground level 01/31/2023    Hyponatremia 01/31/2023    Risk for falls 01/31/2023    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/05/2015       Current Outpatient Medications   Medication Sig Dispense Refill    azelastine (ASTELIN) 137 MCG/SPRAY nasal spray Administer 1 Spray into affected nostril(S) 2 times a day.      mupirocin (BACTROBAN) 2 % Ointment Apply 1 Application topically 2 times a day.      Cholecalciferol (D3 PO) Take 1 Capsule by mouth every day.      FIBER PO Take 1 Scoop by mouth every day.      lisinopril (PRINIVIL) 20 MG Tab Take 20 mg by mouth every day.      pantoprazole (PROTONIX) 40 MG Tablet Delayed Response Take 40 mg by mouth every day.      levothyroxine (SYNTHROID) 50 MCG Tab Take 50 mcg by mouth every morning on an empty stomach.      polyethylene glycol/lytes (MIRALAX) PACK Take 17 g by mouth every day.      Multiple Vitamins-Minerals (MULTIVITAMIN PO) Take 1 Tablet by mouth every day.      Omega-3 Fatty Acids (FISH OIL PO) Take 1 Capsule by mouth every day.      TURMERIC PO Take 1 Capsule by mouth every day.      B Complex Vitamins (VITAMIN B COMPLEX PO) Take 1 Tablet by mouth every day.       No current facility-administered medications for this visit.          Current supplements as per medication list.     Allergies:   Codeine and Sulfa drugs  Social History     Tobacco Use    Smoking status: Never    Smokeless tobacco: Never   Vaping  "Use    Vaping Use: Never used   Substance Use Topics    Alcohol use: Yes     Alcohol/week: 3.5 oz     Types: 7 Standard drinks or equivalent per week     Comment: \"a glass of wine every night\"    Drug use: No     Family History   Problem Relation Age of Onset    Diabetes Other     Hypertension Other     Stroke Other     Cancer Other      Alyssa  has a past medical history of Cold (04/24/2015), Colitis, Disease of thyroid gland, Hypertension, and Pain (04/24/2015).   Past Surgical History:   Procedure Laterality Date    PB PARTIAL HIP REPLACEMENT Right 1/31/2023    Procedure: HEMIARTHROPLASTY, HIP;  Surgeon: Lv Rasmussen M.D.;  Location: SURGERY [facility];  Service: Orthopedics    SHOULDER ARTHROPLASTY TOTAL Right 5/5/2015    Procedure: SHOULDER ARTHROPLASTY TOTAL;  Surgeon: Fernanda Mendosa M.D.;  Location: SURGERY [facility];  Service:     ROTATOR CUFF REPAIR  2014    right    CATARACT EXTRACTION WITH IOL  2013    b/l    MAMMOPLASTY AUGMENTATION  2001    HYSTERECTOMY, VAGINAL  1978    OTHER  1975    lumbar laminectomy       Screening:  In the last six months have you experienced any leakage of urine? Yes she wears depends and a pad. Urge incontinence.     Depression Screening  Little interest or pleasure in doing things?  0 - not at all  Feeling down, depressed , or hopeless? 0 - not at all  Patient Health Questionnaire Score: 0     If depressive symptoms identified deferred to follow up visit unless specifically addressed in assessment and plan.    Interpretation of PHQ-9 Total Score   Score Severity   1-4 No Depression   5-9 Mild Depression   10-14 Moderate Depression   15-19 Moderately Severe Depression   20-27 Severe Depression    Screening for Cognitive Impairment  Do you or any of your friends or family members have any concern about your memory? Yes, will forget why she entered a room. Her immediate recall is not as quick as it used to be. She has a strong family hx of Alzheimer's. She is aware " of her memory.   Three Minute Recall (Leader, Season, Table) 3/3    Rony clock face with all 12 numbers and set the hands to show 10 minutes after 11.  No Did 8:40   Cognitive concerns identified deferred for follow up unless specifically addressed in assessment and plan.    Fall Risk Assessment  Has the patient had two or more falls in the last year or any fall with injury in the last year?  Yes, she fell in 2023 and broke her hip. She had surgery and is doing well. Is going to go to PT. She scored a 7 on her fall risk score but this has all started since her fall.    Safety Assessment  Do you always wear your seatbelt?  Yes  Any changes to home needed to function safely? No  Difficulty hearing.  Yes she wears hearing aids.   Patient counseled about all safety risks that were identified.    Functional Assessment ADLs  Are there any barriers preventing you from cooking for yourself or meeting nutritional needs?  No.    Are there any barriers preventing you from driving safely or obtaining transportation?  No.    Are there any barriers preventing you from using a telephone or calling for help?  No    Are there any barriers preventing you from shopping?  No.    Are there any barriers preventing you from taking care of your own finances?  No    Are there any barriers preventing you from managing your medications?  No    Are there any barriers preventing you from showering, bathing or dressing yourself? No    Are there any barriers preventing you from doing housework or laundry? No  Are there any barriers preventing you from using the toilet?No  Are you currently engaging in any exercise or physical activity?  Yes.  She is doing light stretches. She used to walk a lot before her hip surgery.    Self-Assessment of Health  What is your perception of your health? Good  Do you sleep more than six hours a night? No  In the past 7 days, how much did pain keep you from doing your normal work? None  Do you spend quality time  with family or friends (virtually or in person)? Yes  Do you usually eat a heart healthy diet that constists of a variety of fruits, vegetables, whole grains and fiber? Yes  Do you eat foods high in fat and/or Fast Food more than three times per week? No    Advance Care Planning  Do you have an Advance Directive, Living Will, Durable Power of , or POLST? Yes                 Health Maintenance Summary            Ordered - Bone Density Scan (Every 5 Years) Ordered on 3/19/2024      No completion history exists for this topic.              Annual Wellness Visit (Yearly) Next due on 3/19/2025      03/19/2024  Level of Service: [place] ANNUAL WELLNESS VISIT-INCLUDES PPPS SUBSEQUE*              IMM DTaP/Tdap/Td Vaccine (2 - Td or Tdap) Next due on 4/22/2026 04/22/2016  Imm Admin: Tdap Vaccine              Zoster (Shingles) Vaccines (Series Information) Completed      06/03/2020  Imm Admin: Zoster Vaccine Recombinant (RZV) (SHINGRIX)    01/24/2020  Imm Admin: Zoster Vaccine Recombinant (RZV) (SHINGRIX)    04/11/2015  Imm Admin: Zoster Vaccine Live (ZVL) (Zostavax) - HISTORICAL DATA              Influenza Vaccine (Series Information) Completed      09/20/2023  Imm Admin: Influenza Vaccine, Quadrivalent, Adjuvanted (Pf)    10/09/2022  Imm Admin: Influenza Vaccine Adult HD    09/26/2021  Imm Admin: Influenza Vaccine Adult HD    09/29/2020  Imm Admin: Influenza Vaccine Quad Recombinant    09/25/2019  Imm Admin: Influenza, Unspecified - HISTORICAL DATA    Only the first 5 history entries have been loaded, but more history exists.              COVID-19 Vaccine (Series Information) Completed      09/20/2023  Imm Admin: Covid-19 Mrna (Spikevax) Moderna 12+ Years    10/09/2022  Outside Immunization: COVID mRNA Bivalent(MOD)    04/15/2022  Imm Admin: MODERNA SARS-COV-2 VACCINE (12+)    10/28/2021  Imm Admin: MODERNA SARS-COV-2 VACCINE (12+)    02/20/2021  Imm Admin: MODERNA SARS-COV-2 VACCINE (12+)    Only the first 5  "history entries have been loaded, but more history exists.              Pneumococcal Vaccine: 65+ Years (Series Information) Completed      11/09/2023  Imm Admin: Pneumococcal Conjugate Vaccine (PCV20)    02/01/2018  Imm Admin: Pneumococcal polysaccharide vaccine (PPSV-23)    03/09/2016  Imm Admin: Pneumococcal Conjugate Vaccine (Prevnar/PCV-13)    05/05/2010  Imm Admin: Pneumococcal Vaccine (UF) - HISTORICAL DATA              Hepatitis A Vaccine (Hep A) (Series Information) Aged Out      No completion history exists for this topic.              Hepatitis B Vaccine (Hep B) (Series Information) Aged Out      No completion history exists for this topic.              HPV Vaccines (Series Information) Aged Out      No completion history exists for this topic.              Polio Vaccine (Inactivated Polio) (Series Information) Aged Out      No completion history exists for this topic.              Meningococcal Immunization (Series Information) Aged Out      No completion history exists for this topic.                    Patient Care Team:  Angela Trejo M.D. as PCP - General (Internal Medicine)      Financial Resource Strain: Low Risk  (3/19/2024)    Overall Financial Resource Strain (CARDIA)     Difficulty of Paying Living Expenses: Not hard at all      Transportation Needs: No Transportation Needs (3/19/2024)    PRAPARE - Transportation     Lack of Transportation (Medical): No     Lack of Transportation (Non-Medical): No      Food Insecurity: No Food Insecurity (3/19/2024)    Hunger Vital Sign     Worried About Running Out of Food in the Last Year: Never true     Ran Out of Food in the Last Year: Never true        Encounter Vitals  Blood Pressure : 108/58  Weight: 45.4 kg (100 lb)  Height: 154.9 cm (5' 1\")  BMI (Calculated): 18.89  Pain Score: No pain     Alert, oriented in no acute distress.  Eye contact is good, speech goal directed, affect calm.    Assessment and Plan. The following treatment and monitoring plan " is recommended:    Gastroesophageal reflux disease  Chronic, stable. Currently takes pantoprazole 40 mg daily. States this has well controlled her reflux. Follow up with PCP for continued monitoring.    HTN (hypertension)  Chronic, Stable. BP in office today is 108/58. She does check her BP at home. She currently takes lisinopril 20 mg daily. Follow up with PCP for continued monitoring and management.    Fall from ground level  Risk for falls  Patient fall-risk assessment in office is positive. She reports 2 or more falls in the last year. She fell Jan 2023 and broke her hip. She had surgery. Educated on removing hazards in the home, keeping walkways clear, as well as wearing appropriate non-slip footwear around the house.      Postmenopausal  She is overdue for her DEXA scan. Ordered at today's visit.     Services suggested: No services needed at this time  Health Care Screening: Age-appropriate preventive services recommended by USPTF and ACIP covered by Medicare were discussed today. Services ordered if indicated and agreed upon by the patient.  Referrals offered: Community-based lifestyle interventions to reduce health risks and promote self-management and wellness, fall prevention, nutrition, physical activity, tobacco-use cessation, weight loss, and mental health services as per orders if indicated.    Discussion today about general wellness and lifestyle habits:    Prevent falls and reduce trip hazards; Cautioned about securing or removing rugs.  Have a working fire alarm and carbon monoxide detector.  Engage in regular physical activity and social activities.    Follow-up: Return for appointment with Primary Care Provider as needed..

## 2024-04-11 ENCOUNTER — HOSPITAL ENCOUNTER (OUTPATIENT)
Dept: LAB | Facility: MEDICAL CENTER | Age: 84
End: 2024-04-11
Attending: INTERNAL MEDICINE
Payer: MEDICARE

## 2024-04-11 LAB
25(OH)D3 SERPL-MCNC: 52 NG/ML (ref 30–100)
ALBUMIN SERPL BCP-MCNC: 4.3 G/DL (ref 3.2–4.9)
ALBUMIN/GLOB SERPL: 1.9 G/DL
ALP SERPL-CCNC: 58 U/L (ref 30–99)
ALT SERPL-CCNC: 16 U/L (ref 2–50)
ANION GAP SERPL CALC-SCNC: 10 MMOL/L (ref 7–16)
APPEARANCE UR: CLEAR
AST SERPL-CCNC: 31 U/L (ref 12–45)
BILIRUB SERPL-MCNC: 0.3 MG/DL (ref 0.1–1.5)
BILIRUB UR QL STRIP.AUTO: NEGATIVE
BUN SERPL-MCNC: 13 MG/DL (ref 8–22)
CALCIUM ALBUM COR SERPL-MCNC: 9 MG/DL (ref 8.5–10.5)
CALCIUM SERPL-MCNC: 9.2 MG/DL (ref 8.5–10.5)
CHLORIDE SERPL-SCNC: 94 MMOL/L (ref 96–112)
CO2 SERPL-SCNC: 26 MMOL/L (ref 20–33)
COLOR UR: YELLOW
CORTIS SERPL-MCNC: 18.1 UG/DL (ref 0–23)
CREAT SERPL-MCNC: 0.77 MG/DL (ref 0.5–1.4)
CREAT UR-MCNC: 28.79 MG/DL
CREAT UR-MCNC: 28.84 MG/DL
FASTING STATUS PATIENT QL REPORTED: NORMAL
GFR SERPLBLD CREATININE-BSD FMLA CKD-EPI: 76 ML/MIN/1.73 M 2
GLOBULIN SER CALC-MCNC: 2.3 G/DL (ref 1.9–3.5)
GLUCOSE SERPL-MCNC: 93 MG/DL (ref 65–99)
GLUCOSE UR STRIP.AUTO-MCNC: NEGATIVE MG/DL
KETONES UR STRIP.AUTO-MCNC: NEGATIVE MG/DL
LEUKOCYTE ESTERASE UR QL STRIP.AUTO: NEGATIVE
MAGNESIUM SERPL-MCNC: 2.3 MG/DL (ref 1.5–2.5)
MICRO URNS: NORMAL
MICROALBUMIN UR-MCNC: <1.2 MG/DL
MICROALBUMIN/CREAT UR: NORMAL MG/G (ref 0–30)
NITRITE UR QL STRIP.AUTO: NEGATIVE
OSMOLALITY UR: 309 MOSM/KG H2O (ref 300–900)
PH UR STRIP.AUTO: 7.5 [PH] (ref 5–8)
PHOSPHATE SERPL-MCNC: 3.6 MG/DL (ref 2.5–4.5)
POTASSIUM SERPL-SCNC: 5.1 MMOL/L (ref 3.6–5.5)
POTASSIUM UR-SCNC: 36 MMOL/L
PROT SERPL-MCNC: 6.6 G/DL (ref 6–8.2)
PROT UR QL STRIP: NEGATIVE MG/DL
RBC UR QL AUTO: NEGATIVE
SODIUM SERPL-SCNC: 130 MMOL/L (ref 135–145)
SODIUM UR-SCNC: 74 MMOL/L
SP GR UR STRIP.AUTO: 1.01
TSH SERPL DL<=0.005 MIU/L-ACNC: 1.1 UIU/ML (ref 0.38–5.33)
URATE SERPL-MCNC: 4.1 MG/DL (ref 1.9–8.2)
UROBILINOGEN UR STRIP.AUTO-MCNC: 0.2 MG/DL

## 2024-04-11 PROCEDURE — 83935 ASSAY OF URINE OSMOLALITY: CPT

## 2024-04-11 PROCEDURE — 82570 ASSAY OF URINE CREATININE: CPT | Mod: 91

## 2024-04-11 PROCEDURE — 84100 ASSAY OF PHOSPHORUS: CPT

## 2024-04-11 PROCEDURE — 84300 ASSAY OF URINE SODIUM: CPT

## 2024-04-11 PROCEDURE — 82043 UR ALBUMIN QUANTITATIVE: CPT

## 2024-04-11 PROCEDURE — 82533 TOTAL CORTISOL: CPT

## 2024-04-11 PROCEDURE — 84550 ASSAY OF BLOOD/URIC ACID: CPT

## 2024-04-11 PROCEDURE — 81003 URINALYSIS AUTO W/O SCOPE: CPT

## 2024-04-11 PROCEDURE — 82306 VITAMIN D 25 HYDROXY: CPT

## 2024-04-11 PROCEDURE — 80053 COMPREHEN METABOLIC PANEL: CPT

## 2024-04-11 PROCEDURE — 84443 ASSAY THYROID STIM HORMONE: CPT

## 2024-04-11 PROCEDURE — 84133 ASSAY OF URINE POTASSIUM: CPT

## 2024-04-11 PROCEDURE — 83735 ASSAY OF MAGNESIUM: CPT

## 2024-04-11 PROCEDURE — 36415 COLL VENOUS BLD VENIPUNCTURE: CPT

## 2024-05-21 ENCOUNTER — APPOINTMENT (RX ONLY)
Dept: URBAN - METROPOLITAN AREA CLINIC 20 | Facility: CLINIC | Age: 84
Setting detail: DERMATOLOGY
End: 2024-05-21

## 2024-05-21 DIAGNOSIS — L81.4 OTHER MELANIN HYPERPIGMENTATION: ICD-10-CM

## 2024-05-21 DIAGNOSIS — L82.1 OTHER SEBORRHEIC KERATOSIS: ICD-10-CM

## 2024-05-21 DIAGNOSIS — L56.5 DISSEMINATED SUPERFICIAL ACTINIC POROKERATOSIS (DSAP): ICD-10-CM

## 2024-05-21 DIAGNOSIS — L57.8 OTHER SKIN CHANGES DUE TO CHRONIC EXPOSURE TO NONIONIZING RADIATION: ICD-10-CM

## 2024-05-21 DIAGNOSIS — D18.0 HEMANGIOMA: ICD-10-CM

## 2024-05-21 DIAGNOSIS — Z85.828 PERSONAL HISTORY OF OTHER MALIGNANT NEOPLASM OF SKIN: ICD-10-CM

## 2024-05-21 DIAGNOSIS — L57.0 ACTINIC KERATOSIS: ICD-10-CM

## 2024-05-21 DIAGNOSIS — D22 MELANOCYTIC NEVI: ICD-10-CM

## 2024-05-21 PROBLEM — D22.5 MELANOCYTIC NEVI OF TRUNK: Status: ACTIVE | Noted: 2024-05-21

## 2024-05-21 PROBLEM — D18.01 HEMANGIOMA OF SKIN AND SUBCUTANEOUS TISSUE: Status: ACTIVE | Noted: 2024-05-21

## 2024-05-21 PROBLEM — D48.5 NEOPLASM OF UNCERTAIN BEHAVIOR OF SKIN: Status: ACTIVE | Noted: 2024-05-21

## 2024-05-21 PROCEDURE — 11102 TANGNTL BX SKIN SINGLE LES: CPT

## 2024-05-21 PROCEDURE — 17000 DESTRUCT PREMALG LESION: CPT | Mod: 59

## 2024-05-21 PROCEDURE — ? LIQUID NITROGEN

## 2024-05-21 PROCEDURE — 99213 OFFICE O/P EST LOW 20 MIN: CPT | Mod: 25

## 2024-05-21 PROCEDURE — ? BIOPSY BY SHAVE METHOD

## 2024-05-21 PROCEDURE — ? COUNSELING

## 2024-05-21 ASSESSMENT — LOCATION DETAILED DESCRIPTION DERM
LOCATION DETAILED: RIGHT ANTERIOR SHOULDER
LOCATION DETAILED: LEFT INFERIOR CENTRAL MALAR CHEEK
LOCATION DETAILED: RIGHT INFERIOR UPPER BACK
LOCATION DETAILED: LEFT DISTAL DORSAL FOREARM
LOCATION DETAILED: LEFT POSTERIOR ANKLE
LOCATION DETAILED: RIGHT SUPERIOR MEDIAL UPPER BACK
LOCATION DETAILED: RIGHT CLAVICULAR SKIN
LOCATION DETAILED: RIGHT ANTERIOR DISTAL THIGH
LOCATION DETAILED: RIGHT CENTRAL MALAR CHEEK
LOCATION DETAILED: RIGHT DISTAL DORSAL FOREARM
LOCATION DETAILED: LEFT LATERAL SUPERIOR CHEST
LOCATION DETAILED: RIGHT MID-UPPER BACK
LOCATION DETAILED: RIGHT ANTERIOR PROXIMAL UPPER ARM
LOCATION DETAILED: LEFT MEDIAL SUPERIOR CHEST
LOCATION DETAILED: RIGHT CENTRAL FRONTAL SCALP
LOCATION DETAILED: RIGHT ANTERIOR PROXIMAL THIGH
LOCATION DETAILED: LEFT ANTERIOR DISTAL THIGH
LOCATION DETAILED: INFERIOR THORACIC SPINE
LOCATION DETAILED: LEFT ANTERIOR PROXIMAL UPPER ARM

## 2024-05-21 ASSESSMENT — LOCATION ZONE DERM
LOCATION ZONE: SCALP
LOCATION ZONE: FACE
LOCATION ZONE: ARM
LOCATION ZONE: TRUNK
LOCATION ZONE: LEG

## 2024-05-21 ASSESSMENT — LOCATION SIMPLE DESCRIPTION DERM
LOCATION SIMPLE: LEFT THIGH
LOCATION SIMPLE: RIGHT FOREARM
LOCATION SIMPLE: LEFT ANKLE
LOCATION SIMPLE: RIGHT CLAVICULAR SKIN
LOCATION SIMPLE: RIGHT UPPER ARM
LOCATION SIMPLE: RIGHT UPPER BACK
LOCATION SIMPLE: RIGHT CHEEK
LOCATION SIMPLE: LEFT FOREARM
LOCATION SIMPLE: RIGHT SHOULDER
LOCATION SIMPLE: RIGHT THIGH
LOCATION SIMPLE: CHEST
LOCATION SIMPLE: LEFT CHEEK
LOCATION SIMPLE: RIGHT SCALP
LOCATION SIMPLE: UPPER BACK
LOCATION SIMPLE: LEFT UPPER ARM

## 2024-07-03 ENCOUNTER — APPOINTMENT (RX ONLY)
Dept: URBAN - METROPOLITAN AREA CLINIC 20 | Facility: CLINIC | Age: 84
Setting detail: DERMATOLOGY
End: 2024-07-03

## 2024-07-03 DIAGNOSIS — D69.2 OTHER NONTHROMBOCYTOPENIC PURPURA: ICD-10-CM

## 2024-07-03 DIAGNOSIS — L57.0 ACTINIC KERATOSIS: ICD-10-CM

## 2024-07-03 DIAGNOSIS — L56.5 DISSEMINATED SUPERFICIAL ACTINIC POROKERATOSIS (DSAP): ICD-10-CM

## 2024-07-03 PROCEDURE — 17000 DESTRUCT PREMALG LESION: CPT

## 2024-07-03 PROCEDURE — ? COUNSELING

## 2024-07-03 PROCEDURE — 17003 DESTRUCT PREMALG LES 2-14: CPT

## 2024-07-03 PROCEDURE — ? ADDITIONAL NOTES

## 2024-07-03 PROCEDURE — 99213 OFFICE O/P EST LOW 20 MIN: CPT | Mod: 25

## 2024-07-03 PROCEDURE — ? LIQUID NITROGEN

## 2024-07-03 ASSESSMENT — LOCATION DETAILED DESCRIPTION DERM
LOCATION DETAILED: LEFT SUPERIOR FOREHEAD
LOCATION DETAILED: LEFT LATERAL FOREHEAD
LOCATION DETAILED: RIGHT INFERIOR CENTRAL MALAR CHEEK
LOCATION DETAILED: RIGHT INFERIOR LATERAL MALAR CHEEK
LOCATION DETAILED: LEFT SUPERIOR HELIX
LOCATION DETAILED: RIGHT LATERAL MALAR CHEEK
LOCATION DETAILED: LEFT POSTERIOR ANKLE
LOCATION DETAILED: RIGHT VENTRAL PROXIMAL FOREARM
LOCATION DETAILED: RIGHT SUPERIOR MEDIAL FOREHEAD

## 2024-07-03 ASSESSMENT — LOCATION ZONE DERM
LOCATION ZONE: FACE
LOCATION ZONE: LEG
LOCATION ZONE: ARM
LOCATION ZONE: EAR

## 2024-07-03 ASSESSMENT — LOCATION SIMPLE DESCRIPTION DERM
LOCATION SIMPLE: RIGHT CHEEK
LOCATION SIMPLE: RIGHT FOREARM
LOCATION SIMPLE: LEFT FOREHEAD
LOCATION SIMPLE: RIGHT FOREHEAD
LOCATION SIMPLE: LEFT ANKLE
LOCATION SIMPLE: LEFT EAR

## 2024-07-03 NOTE — PROCEDURE: ADDITIONAL NOTES
Additional Notes: Scab noted reviewed path\\n\\nBacitracin given at todays visit
Detail Level: Simple
Render Risk Assessment In Note?: no
Additional Notes: If this continues, or worsens consider blood work \\n\\nPatient sees a cardiologist for irregular heartbeat. Being monitored and gets blood work often.

## 2024-07-18 ENCOUNTER — HOSPITAL ENCOUNTER (OUTPATIENT)
Facility: MEDICAL CENTER | Age: 84
End: 2024-07-18
Attending: OTOLARYNGOLOGY
Payer: MEDICARE

## 2024-07-18 PROCEDURE — 87186 SC STD MICRODIL/AGAR DIL: CPT

## 2024-07-18 PROCEDURE — 87070 CULTURE OTHR SPECIMN AEROBIC: CPT

## 2024-07-18 PROCEDURE — 87077 CULTURE AEROBIC IDENTIFY: CPT | Mod: 91

## 2024-07-18 PROCEDURE — 87205 SMEAR GRAM STAIN: CPT

## 2024-07-18 PROCEDURE — 87076 CULTURE ANAEROBE IDENT EACH: CPT

## 2024-07-18 PROCEDURE — 87075 CULTR BACTERIA EXCEPT BLOOD: CPT

## 2024-07-19 LAB
GRAM STN SPEC: NORMAL
SIGNIFICANT IND 70042: NORMAL
SITE SITE: NORMAL
SOURCE SOURCE: NORMAL

## 2024-07-22 LAB
BACTERIA WND AEROBE CULT: ABNORMAL
BACTERIA WND AEROBE CULT: ABNORMAL
GRAM STN SPEC: ABNORMAL
SIGNIFICANT IND 70042: ABNORMAL
SITE SITE: ABNORMAL
SOURCE SOURCE: ABNORMAL

## 2024-07-23 LAB
BACTERIA SPEC ANAEROBE CULT: NORMAL
SIGNIFICANT IND 70042: NORMAL
SITE SITE: NORMAL
SOURCE SOURCE: NORMAL

## 2024-11-08 ENCOUNTER — HOSPITAL ENCOUNTER (OUTPATIENT)
Dept: RADIOLOGY | Facility: MEDICAL CENTER | Age: 84
End: 2024-11-08
Attending: NURSE PRACTITIONER
Payer: MEDICARE

## 2024-11-08 DIAGNOSIS — Z96.611 PRESENCE OF RIGHT ARTIFICIAL SHOULDER JOINT: ICD-10-CM

## 2024-11-08 DIAGNOSIS — M25.512 CHRONIC LEFT SHOULDER PAIN: ICD-10-CM

## 2024-11-08 DIAGNOSIS — G89.29 CHRONIC LEFT SHOULDER PAIN: ICD-10-CM

## 2024-11-08 PROCEDURE — 73030 X-RAY EXAM OF SHOULDER: CPT | Mod: RT

## 2024-11-08 PROCEDURE — 73030 X-RAY EXAM OF SHOULDER: CPT | Mod: LT

## 2024-11-12 ENCOUNTER — HOSPITAL ENCOUNTER (OUTPATIENT)
Dept: LAB | Facility: MEDICAL CENTER | Age: 84
End: 2024-11-12
Attending: INTERNAL MEDICINE
Payer: MEDICARE

## 2024-11-12 LAB
APPEARANCE UR: CLEAR
BACTERIA #/AREA URNS HPF: NORMAL /HPF
BASOPHILS # BLD AUTO: 0.4 % (ref 0–1.8)
BASOPHILS # BLD: 0.03 K/UL (ref 0–0.12)
BILIRUB UR QL STRIP.AUTO: NEGATIVE
CASTS URNS QL MICRO: NORMAL /LPF (ref 0–2)
COLOR UR: YELLOW
EOSINOPHIL # BLD AUTO: 0.04 K/UL (ref 0–0.51)
EOSINOPHIL NFR BLD: 0.6 % (ref 0–6.9)
EPITHELIAL CELLS 1715: NORMAL /HPF (ref 0–5)
ERYTHROCYTE [DISTWIDTH] IN BLOOD BY AUTOMATED COUNT: 46.4 FL (ref 35.9–50)
GLUCOSE UR STRIP.AUTO-MCNC: NEGATIVE MG/DL
HCT VFR BLD AUTO: 36.7 % (ref 37–47)
HGB BLD-MCNC: 12 G/DL (ref 12–16)
IMM GRANULOCYTES # BLD AUTO: 0.01 K/UL (ref 0–0.11)
IMM GRANULOCYTES NFR BLD AUTO: 0.1 % (ref 0–0.9)
KETONES UR STRIP.AUTO-MCNC: NEGATIVE MG/DL
LEUKOCYTE ESTERASE UR QL STRIP.AUTO: ABNORMAL
LYMPHOCYTES # BLD AUTO: 2.38 K/UL (ref 1–4.8)
LYMPHOCYTES NFR BLD: 33.9 % (ref 22–41)
MCH RBC QN AUTO: 33.1 PG (ref 27–33)
MCHC RBC AUTO-ENTMCNC: 32.7 G/DL (ref 32.2–35.5)
MCV RBC AUTO: 101.1 FL (ref 81.4–97.8)
MICRO URNS: ABNORMAL
MONOCYTES # BLD AUTO: 0.93 K/UL (ref 0–0.85)
MONOCYTES NFR BLD AUTO: 13.2 % (ref 0–13.4)
NEUTROPHILS # BLD AUTO: 3.63 K/UL (ref 1.82–7.42)
NEUTROPHILS NFR BLD: 51.8 % (ref 44–72)
NITRITE UR QL STRIP.AUTO: NEGATIVE
NRBC # BLD AUTO: 0 K/UL
NRBC BLD-RTO: 0 /100 WBC (ref 0–0.2)
PH UR STRIP.AUTO: 6.5 [PH] (ref 5–8)
PLATELET # BLD AUTO: 213 K/UL (ref 164–446)
PMV BLD AUTO: 11.7 FL (ref 9–12.9)
PROT UR QL STRIP: NEGATIVE MG/DL
RBC # BLD AUTO: 3.63 M/UL (ref 4.2–5.4)
RBC # URNS HPF: NORMAL /HPF (ref 0–2)
RBC UR QL AUTO: NEGATIVE
SP GR UR STRIP.AUTO: 1.01
UROBILINOGEN UR STRIP.AUTO-MCNC: 0.2 EU/DL
WBC # BLD AUTO: 7 K/UL (ref 4.8–10.8)
WBC #/AREA URNS HPF: NORMAL /HPF

## 2024-11-12 PROCEDURE — 83735 ASSAY OF MAGNESIUM: CPT

## 2024-11-12 PROCEDURE — 82043 UR ALBUMIN QUANTITATIVE: CPT

## 2024-11-12 PROCEDURE — 82306 VITAMIN D 25 HYDROXY: CPT

## 2024-11-12 PROCEDURE — 36415 COLL VENOUS BLD VENIPUNCTURE: CPT

## 2024-11-12 PROCEDURE — 83935 ASSAY OF URINE OSMOLALITY: CPT

## 2024-11-12 PROCEDURE — 81001 URINALYSIS AUTO W/SCOPE: CPT

## 2024-11-12 PROCEDURE — 84550 ASSAY OF BLOOD/URIC ACID: CPT

## 2024-11-12 PROCEDURE — 82570 ASSAY OF URINE CREATININE: CPT

## 2024-11-12 PROCEDURE — 84100 ASSAY OF PHOSPHORUS: CPT

## 2024-11-12 PROCEDURE — 85025 COMPLETE CBC W/AUTO DIFF WBC: CPT

## 2024-11-12 PROCEDURE — 80053 COMPREHEN METABOLIC PANEL: CPT

## 2024-11-12 PROCEDURE — 84133 ASSAY OF URINE POTASSIUM: CPT

## 2024-11-12 PROCEDURE — 84300 ASSAY OF URINE SODIUM: CPT

## 2024-11-13 LAB
25(OH)D3 SERPL-MCNC: 60 NG/ML (ref 30–100)
ALBUMIN SERPL BCP-MCNC: 4.2 G/DL (ref 3.2–4.9)
ALBUMIN/GLOB SERPL: 1.4 G/DL
ALP SERPL-CCNC: 58 U/L (ref 30–99)
ALT SERPL-CCNC: 22 U/L (ref 2–50)
ANION GAP SERPL CALC-SCNC: 8 MMOL/L (ref 7–16)
AST SERPL-CCNC: 31 U/L (ref 12–45)
BILIRUB SERPL-MCNC: 0.3 MG/DL (ref 0.1–1.5)
BUN SERPL-MCNC: 19 MG/DL (ref 8–22)
CALCIUM ALBUM COR SERPL-MCNC: 9.3 MG/DL (ref 8.5–10.5)
CALCIUM SERPL-MCNC: 9.5 MG/DL (ref 8.5–10.5)
CHLORIDE SERPL-SCNC: 87 MMOL/L (ref 96–112)
CO2 SERPL-SCNC: 28 MMOL/L (ref 20–33)
CREAT SERPL-MCNC: 0.72 MG/DL (ref 0.5–1.4)
CREAT UR-MCNC: 22.09 MG/DL
CREAT UR-MCNC: 22.34 MG/DL
GFR SERPLBLD CREATININE-BSD FMLA CKD-EPI: 82 ML/MIN/1.73 M 2
GLOBULIN SER CALC-MCNC: 2.9 G/DL (ref 1.9–3.5)
GLUCOSE SERPL-MCNC: 84 MG/DL (ref 65–99)
MAGNESIUM SERPL-MCNC: 2.2 MG/DL (ref 1.5–2.5)
MICROALBUMIN UR-MCNC: <1.2 MG/DL
MICROALBUMIN/CREAT UR: NORMAL MG/G (ref 0–30)
OSMOLALITY UR: 281 MOSM/KG H2O (ref 300–900)
PHOSPHATE SERPL-MCNC: 4 MG/DL (ref 2.5–4.5)
POTASSIUM SERPL-SCNC: 5.1 MMOL/L (ref 3.6–5.5)
POTASSIUM UR-SCNC: 29.1 MMOL/L
PROT SERPL-MCNC: 7.1 G/DL (ref 6–8.2)
SODIUM SERPL-SCNC: 123 MMOL/L (ref 135–145)
SODIUM UR-SCNC: 52 MMOL/L
URATE SERPL-MCNC: 3.4 MG/DL (ref 1.9–8.2)

## 2024-11-19 ENCOUNTER — HOSPITAL ENCOUNTER (EMERGENCY)
Facility: MEDICAL CENTER | Age: 84
End: 2024-11-19
Attending: EMERGENCY MEDICINE
Payer: MEDICARE

## 2024-11-19 ENCOUNTER — OFFICE VISIT (OUTPATIENT)
Dept: URGENT CARE | Facility: CLINIC | Age: 84
End: 2024-11-19
Payer: MEDICARE

## 2024-11-19 ENCOUNTER — APPOINTMENT (OUTPATIENT)
Dept: RADIOLOGY | Facility: MEDICAL CENTER | Age: 84
End: 2024-11-19
Attending: EMERGENCY MEDICINE
Payer: MEDICARE

## 2024-11-19 ENCOUNTER — APPOINTMENT (RX ONLY)
Dept: URBAN - METROPOLITAN AREA CLINIC 20 | Facility: CLINIC | Age: 84
Setting detail: DERMATOLOGY
End: 2024-11-19

## 2024-11-19 VITALS
DIASTOLIC BLOOD PRESSURE: 78 MMHG | RESPIRATION RATE: 16 BRPM | HEIGHT: 60 IN | TEMPERATURE: 97.4 F | SYSTOLIC BLOOD PRESSURE: 136 MMHG | WEIGHT: 105 LBS | OXYGEN SATURATION: 95 % | HEART RATE: 55 BPM | BODY MASS INDEX: 20.62 KG/M2

## 2024-11-19 VITALS
WEIGHT: 103.17 LBS | BODY MASS INDEX: 20.26 KG/M2 | HEART RATE: 51 BPM | HEIGHT: 60 IN | SYSTOLIC BLOOD PRESSURE: 167 MMHG | TEMPERATURE: 97.3 F | RESPIRATION RATE: 18 BRPM | OXYGEN SATURATION: 95 % | DIASTOLIC BLOOD PRESSURE: 74 MMHG

## 2024-11-19 DIAGNOSIS — S09.90XA CLOSED HEAD INJURY, INITIAL ENCOUNTER: ICD-10-CM

## 2024-11-19 DIAGNOSIS — L57.0 ACTINIC KERATOSIS: ICD-10-CM

## 2024-11-19 DIAGNOSIS — S01.01XA LACERATION OF SCALP, INITIAL ENCOUNTER: ICD-10-CM

## 2024-11-19 DIAGNOSIS — Z85.828 PERSONAL HISTORY OF OTHER MALIGNANT NEOPLASM OF SKIN: ICD-10-CM

## 2024-11-19 DIAGNOSIS — R00.1 BRADYCARDIA: ICD-10-CM

## 2024-11-19 DIAGNOSIS — L82.1 OTHER SEBORRHEIC KERATOSIS: ICD-10-CM

## 2024-11-19 DIAGNOSIS — D22 MELANOCYTIC NEVI: ICD-10-CM

## 2024-11-19 DIAGNOSIS — L81.4 OTHER MELANIN HYPERPIGMENTATION: ICD-10-CM

## 2024-11-19 DIAGNOSIS — L57.8 OTHER SKIN CHANGES DUE TO CHRONIC EXPOSURE TO NONIONIZING RADIATION: ICD-10-CM

## 2024-11-19 DIAGNOSIS — S00.01XA ABRASION OF SCALP, INITIAL ENCOUNTER: ICD-10-CM

## 2024-11-19 DIAGNOSIS — D18.0 HEMANGIOMA: ICD-10-CM
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
## 2024-11-19 PROBLEM — D18.01 HEMANGIOMA OF SKIN AND SUBCUTANEOUS TISSUE: Status: ACTIVE | Noted: 2024-11-19

## 2024-11-19 PROBLEM — D48.5 NEOPLASM OF UNCERTAIN BEHAVIOR OF SKIN: Status: ACTIVE | Noted: 2024-11-19

## 2024-11-19 PROBLEM — D22.5 MELANOCYTIC NEVI OF TRUNK: Status: ACTIVE | Noted: 2024-11-19

## 2024-11-19 PROCEDURE — ? ORDER FOR PHOTODYNAMIC THERAPY

## 2024-11-19 PROCEDURE — 99205 OFFICE O/P NEW HI 60 MIN: CPT | Performed by: NURSE PRACTITIONER

## 2024-11-19 PROCEDURE — 99284 EMERGENCY DEPT VISIT MOD MDM: CPT

## 2024-11-19 PROCEDURE — 99213 OFFICE O/P EST LOW 20 MIN: CPT | Mod: 25

## 2024-11-19 PROCEDURE — ? BIOPSY BY SHAVE METHOD

## 2024-11-19 PROCEDURE — 3078F DIAST BP <80 MM HG: CPT | Performed by: NURSE PRACTITIONER

## 2024-11-19 PROCEDURE — ? PHOTODYNAMIC THERAPY COUNSELING

## 2024-11-19 PROCEDURE — 72125 CT NECK SPINE W/O DYE: CPT

## 2024-11-19 PROCEDURE — 3075F SYST BP GE 130 - 139MM HG: CPT | Performed by: NURSE PRACTITIONER

## 2024-11-19 PROCEDURE — 69100 BIOPSY OF EXTERNAL EAR: CPT

## 2024-11-19 PROCEDURE — 700111 HCHG RX REV CODE 636 W/ 250 OVERRIDE (IP): Performed by: EMERGENCY MEDICINE

## 2024-11-19 PROCEDURE — 90715 TDAP VACCINE 7 YRS/> IM: CPT | Performed by: EMERGENCY MEDICINE

## 2024-11-19 PROCEDURE — 70450 CT HEAD/BRAIN W/O DYE: CPT

## 2024-11-19 PROCEDURE — ? LIQUID NITROGEN

## 2024-11-19 PROCEDURE — 17004 DESTROY PREMAL LESIONS 15/>: CPT

## 2024-11-19 PROCEDURE — 90471 IMMUNIZATION ADMIN: CPT

## 2024-11-19 PROCEDURE — ? COUNSELING

## 2024-11-19 RX ADMIN — CLOSTRIDIUM TETANI TOXOID ANTIGEN (FORMALDEHYDE INACTIVATED), CORYNEBACTERIUM DIPHTHERIAE TOXOID ANTIGEN (FORMALDEHYDE INACTIVATED), BORDETELLA PERTUSSIS TOXOID ANTIGEN (GLUTARALDEHYDE INACTIVATED), BORDETELLA PERTUSSIS FILAMENTOUS HEMAGGLUTININ ANTIGEN (FORMALDEHYDE INACTIVATED), BORDETELLA PERTUSSIS PERTACTIN ANTIGEN, AND BORDETELLA PERTUSSIS FIMBRIAE 2/3 ANTIGEN 0.5 ML: 5; 2; 2.5; 5; 3; 5 INJECTION, SUSPENSION INTRAMUSCULAR at 16:05

## 2024-11-19 ASSESSMENT — LOCATION SIMPLE DESCRIPTION DERM
LOCATION SIMPLE: LEFT CHEEK
LOCATION SIMPLE: POSTERIOR SCALP
LOCATION SIMPLE: CHEST
LOCATION SIMPLE: RIGHT FOREHEAD
LOCATION SIMPLE: UPPER BACK
LOCATION SIMPLE: LEFT FOREHEAD
LOCATION SIMPLE: RIGHT CHEEK
LOCATION SIMPLE: LEFT UPPER ARM
LOCATION SIMPLE: RIGHT UPPER BACK
LOCATION SIMPLE: RIGHT CLAVICULAR SKIN
LOCATION SIMPLE: RIGHT POSTERIOR UPPER ARM
LOCATION SIMPLE: RIGHT SHOULDER
LOCATION SIMPLE: LEFT HAND
LOCATION SIMPLE: RIGHT FOREARM
LOCATION SIMPLE: LEFT TEMPLE
LOCATION SIMPLE: LEFT THIGH
LOCATION SIMPLE: LEFT FOREARM
LOCATION SIMPLE: RIGHT UPPER ARM
LOCATION SIMPLE: RIGHT THIGH

## 2024-11-19 ASSESSMENT — LOCATION DETAILED DESCRIPTION DERM
LOCATION DETAILED: RIGHT PROXIMAL DORSAL FOREARM
LOCATION DETAILED: LEFT ANTERIOR PROXIMAL UPPER ARM
LOCATION DETAILED: RIGHT CENTRAL MALAR CHEEK
LOCATION DETAILED: LEFT ANTERIOR DISTAL THIGH
LOCATION DETAILED: RIGHT ANTERIOR PROXIMAL UPPER ARM
LOCATION DETAILED: LEFT LATERAL PROXIMAL UPPER ARM
LOCATION DETAILED: POSTERIOR MID-PARIETAL SCALP
LOCATION DETAILED: MID-OCCIPITAL SCALP
LOCATION DETAILED: LEFT MEDIAL SUPERIOR CHEST
LOCATION DETAILED: RIGHT ANTERIOR PROXIMAL THIGH
LOCATION DETAILED: INFERIOR THORACIC SPINE
LOCATION DETAILED: RIGHT MID-UPPER BACK
LOCATION DETAILED: LEFT LATERAL FOREHEAD
LOCATION DETAILED: LEFT LATERAL SUPERIOR CHEST
LOCATION DETAILED: 2ND WEB SPACE LEFT HAND
LOCATION DETAILED: LEFT CENTRAL TEMPLE
LOCATION DETAILED: RIGHT SUPERIOR MEDIAL UPPER BACK
LOCATION DETAILED: LEFT INFERIOR CENTRAL MALAR CHEEK
LOCATION DETAILED: RIGHT SUPERIOR FOREHEAD
LOCATION DETAILED: RIGHT PROXIMAL POSTERIOR UPPER ARM
LOCATION DETAILED: LEFT INFERIOR FOREHEAD
LOCATION DETAILED: RIGHT ANTERIOR DISTAL THIGH
LOCATION DETAILED: RIGHT INFERIOR UPPER BACK
LOCATION DETAILED: RIGHT ANTERIOR SHOULDER
LOCATION DETAILED: LEFT VENTRAL PROXIMAL FOREARM
LOCATION DETAILED: LEFT VENTRAL LATERAL DISTAL FOREARM
LOCATION DETAILED: LEFT DISTAL DORSAL FOREARM
LOCATION DETAILED: RIGHT DISTAL DORSAL FOREARM
LOCATION DETAILED: RIGHT CLAVICULAR SKIN

## 2024-11-19 ASSESSMENT — LOCATION ZONE DERM
LOCATION ZONE: TRUNK
LOCATION ZONE: FACE
LOCATION ZONE: LEG
LOCATION ZONE: SCALP
LOCATION ZONE: HAND
LOCATION ZONE: ARM

## 2024-11-19 ASSESSMENT — FIBROSIS 4 INDEX
FIB4 SCORE: 2.64
FIB4 SCORE: 2.64

## 2024-11-19 NOTE — PROCEDURE: ORDER FOR PHOTODYNAMIC THERAPY
Location: Face
Frequency Of Pdt: Single Treatment
Legs Incubation Time: 2 Hours
Detail Level: Simple
Pdt Type: Red Light
Debridement: No
Face And Scalp Incubation Time: 1 Hour for the face and 2 Hours for the scalp
Consent: The procedure and risks were reviewed with the patient including but not limited to: burning, pigmentary changes, pain, blistering, scabbing, redness, and the possibility of needing numerous treatments. Strict photoprotection after the procedure was also discussed.
Neck Incubation Time: 1 Hour
Photosensitizer: Ameluz

## 2024-11-19 NOTE — PROGRESS NOTES
Alyssa Juan is a 84 y.o. female who presents for Fall (Patient explains fell backward in garage x 1 hr ago and hit head)      HPI  This is a new problem. Alyssa Juan is a 84 y.o. patient who presents to urgent care with c/o: She was at home in her garage and she said she wobbled and fell over backward hitting her head to the cement ground.  She was able to get herself up and call her neighbor.  She has a laceration to the right side of her scalp that was bleeding but the bleeding is controlled.  She denies loss of consciousness however her fall was unwitnessed.  She does recall all events around the fall but is not clear why she fell down.  She does feel very shaky.  She denies any weakness.  Her friend drove her to urgent care today.  She reports her vision is normal.  She denies nausea, neck pain, back pain.  No other aggravating leaving factors.  She is not on any anticoagulant therapy    ROS See HPI    Allergies:       Allergies   Allergen Reactions    Codeine Vomiting and Nausea    Sulfa Drugs Hives       PMSFS Hx:  Past Medical History:   Diagnosis Date    Cold 04/24/2015    recent cold or allergies    Colitis     Disease of thyroid gland     Hypertension     Pain 04/24/2015    right shoulder     Past Surgical History:   Procedure Laterality Date    PB PARTIAL HIP REPLACEMENT Right 1/31/2023    Procedure: HEMIARTHROPLASTY, HIP;  Surgeon: Lv Rasmussen M.D.;  Location: SURGERY [facility];  Service: Orthopedics    SHOULDER ARTHROPLASTY TOTAL Right 5/5/2015    Procedure: SHOULDER ARTHROPLASTY TOTAL;  Surgeon: Fernanda Mendosa M.D.;  Location: [facility];  Service:     ROTATOR CUFF REPAIR  2014    right    CATARACT EXTRACTION WITH IOL  2013    b/l    MAMMOPLASTY AUGMENTATION  2001    HYSTERECTOMY, VAGINAL  1978    OTHER  1975    lumbar laminectomy     Family History   Problem Relation Age of Onset    Diabetes Other     Hypertension Other     Stroke Other     Cancer Other   "    Social History     Tobacco Use    Smoking status: Never    Smokeless tobacco: Never   Substance Use Topics    Alcohol use: Yes     Alcohol/week: 3.5 oz     Types: 7 Standard drinks or equivalent per week     Comment: \"a glass of wine every night\"         Problems:   Patient Active Problem List   Diagnosis    Primary localized osteoarthrosis of shoulder region    Closed displaced fracture of right femoral neck (HCC)    HTN (hypertension)    Gastroesophageal reflux disease    Hypothyroidism    Fall from ground level    Hyponatremia    Risk for falls    Closed right hip fracture, sequela    Chronic abdominal pain    Anemia    Peripheral polyneuropathy    Other insomnia       Medications:   Current Outpatient Medications on File Prior to Visit   Medication Sig Dispense Refill    azelastine (ASTELIN) 137 MCG/SPRAY nasal spray Administer 1 Spray into affected nostril(S) 2 times a day.      mupirocin (BACTROBAN) 2 % Ointment Apply 1 Application topically 2 times a day.      Cholecalciferol (D3 PO) Take 1 Capsule by mouth every day.      FIBER PO Take 1 Scoop by mouth every day.      lisinopril (PRINIVIL) 20 MG Tab Take 20 mg by mouth every day.      pantoprazole (PROTONIX) 40 MG Tablet Delayed Response Take 40 mg by mouth every day.      levothyroxine (SYNTHROID) 50 MCG Tab Take 50 mcg by mouth every morning on an empty stomach.      polyethylene glycol/lytes (MIRALAX) PACK Take 17 g by mouth every day.      Multiple Vitamins-Minerals (MULTIVITAMIN PO) Take 1 Tablet by mouth every day.      Omega-3 Fatty Acids (FISH OIL PO) Take 1 Capsule by mouth every day.      TURMERIC PO Take 1 Capsule by mouth every day.      B Complex Vitamins (VITAMIN B COMPLEX PO) Take 1 Tablet by mouth every day.       No current facility-administered medications on file prior to visit.        Objective:     /78 (BP Location: Left arm, Patient Position: Sitting, BP Cuff Size: Large adult)   Pulse (!) 55   Temp 36.3 °C (97.4 °F)   Resp " 16   Ht 1.524 m (5')   Wt 47.6 kg (105 lb)   SpO2 95%   BMI 20.51 kg/m²     Physical Exam  Vitals and nursing note reviewed.   Constitutional:       General: She is not in acute distress.     Appearance: Normal appearance. She is normal weight.   HENT:      Head: Abrasion (small laceration but difficult to fully visualize due to matted blood. Hemostasis present. No crepitus.) present. No raccoon eyes or Lemus's sign.        Nose: No rhinorrhea.      Mouth/Throat:      Mouth: Mucous membranes are moist.   Eyes:      General: Lids are normal.      Extraocular Movements: Extraocular movements intact.      Pupils: Pupils are equal, round, and reactive to light.   Cardiovascular:      Rate and Rhythm: Regular rhythm. Bradycardia present.      Pulses: Normal pulses.      Heart sounds: Normal heart sounds.   Pulmonary:      Effort: Pulmonary effort is normal.      Breath sounds: Normal breath sounds and air entry.   Musculoskeletal:      Cervical back: Neck supple. No rigidity or tenderness.      Comments: Moves all extremities x 4.   Skin:     General: Skin is warm and dry.      Capillary Refill: Capillary refill takes less than 2 seconds.   Neurological:      Mental Status: She is alert and oriented to person, place, and time.      Motor: Motor function is intact.      Coordination: Coordination is intact.      Gait: Gait abnormal (Unsteady).      Comments: She is shaky.  Her gait is supported by her friend.  Speech is clear  No focal deficit   Psychiatric:         Behavior: Behavior is cooperative.         Assessment /Associated Orders:      1. Fall from ground level        2. Closed head injury, initial encounter        3. Laceration of scalp, initial encounter        4. Bradycardia            Medical Decision Making:    Alyssa Juan is a very pleasant 84 y.o. female   Pt's clinical presentation and exam today indicate a need for higher level of care with further evaluation and/or diagnostics.  She meets  criteria for head CT which cannot be obtained in a timely manner for this patient.  I also have concerned about her bradycardia.  She is not on any beta-blockers.  I do not know where her baseline heart rate is.  [facility] was  called to arrange transfer to higher level of care in ER.  Pt is to be transported via POV with her friend Ritu. Declines ambulance transport to ER.    I have reiterated to patient that although an Urgent Care to ER transfer was made this will not necessarily expedite the ER process        Please note that this dictation was created using voice recognition software. I have worked with consultants from the vendor as well as technical experts from [facility] to optimize the interface. I have made every reasonable attempt to correct obvious errors, but I expect that there are errors of grammar and possibly content that I did not discover before finalizing the note.  This note was electronically signed by provider

## 2024-11-19 NOTE — ED TRIAGE NOTES
Patient presents to the ER with the following complaints:    Chief Complaint   Patient presents with    GLF     MGLF. Patient fell backward in the garage while picking up a box.     Head Injury       BP (!) 191/111   Pulse (!) 59   Temp 36.3 °C (97.3 °F) (Temporal)   Resp 18   Ht 1.524 m (5')   Wt 46.8 kg (103 lb 2.8 oz)   SpO2 90%   BMI 20.15 kg/m²

## 2024-11-19 NOTE — PROCEDURE: BIOPSY BY SHAVE METHOD
Detail Level: Detailed
Depth Of Biopsy: dermis
Was A Bandage Applied: Yes
Size Of Lesion In Cm: 0.5
X Size Of Lesion In Cm: 0
Biopsy Type: H and E
Biopsy Method: Personna blade
Anesthesia Type: 1% lidocaine with 1:100,000 epinephrine and a 1:6 solution of 8.4% sodium bicarbonate
Anesthesia Volume In Cc: 0.2
Hemostasis: Drysol and Electrocautery
Wound Care: Aquaphor
Dressing: Band-Aid
Destruction After The Procedure: No
Type Of Destruction Used: Curettage
Curettage Text: The wound bed was treated with curettage after the biopsy was performed.
Cryotherapy Text: The wound bed was treated with cryotherapy after the biopsy was performed.
Electrodesiccation Text: The wound bed was treated with electrodesiccation after the biopsy was performed.
Electrodesiccation And Curettage Text: The wound bed was treated with electrodesiccation and curettage after the biopsy was performed.
Silver Nitrate Text: The wound bed was treated with silver nitrate after the biopsy was performed.
Lab: [id number]
Lab Facility: 
Consent: Written consent was obtained and risks were reviewed including but not limited to scarring, infection, bleeding, scabbing, incomplete removal, nerve damage and allergy to anesthesia.
Post-Care Instructions: I reviewed with the patient in detail post-care instructions. Patient is to keep the biopsy site dry overnight, and then apply bacitracin twice daily until healed. Patient may apply hydrogen peroxide soaks to remove any crusting.
Notification Instructions: Patient will be notified of biopsy results. However, patient instructed to call the office if not contacted within 2 weeks.
Billing Type: Third-Party Bill
Information: Selecting Yes will display possible errors in your note based on the variables you have selected. This validation is only offered as a suggestion for you. PLEASE NOTE THAT THE VALIDATION TEXT WILL BE REMOVED WHEN YOU FINALIZE YOUR NOTE. IF YOU WANT TO FAX A PRELIMINARY NOTE YOU WILL NEED TO TOGGLE THIS TO 'NO' IF YOU DO NOT WANT IT IN YOUR FAXED NOTE.

## 2024-11-19 NOTE — ED PROVIDER NOTES
ER Provider Note    Scribed for Dr. Edwin Sanchez M.D. by Kevin Thurman. 11/19/2024  3:29 PM    Primary Care Provider: Angela Trejo M.D.    CHIEF COMPLAINT  Chief Complaint   Patient presents with    GLF     MGLF. Patient fell backward in the garage while picking up a box.     Head Injury     EXTERNAL RECORDS REVIEWED  Outpatient Notes urgent care visit from earlier today for same complaint, sent here for CT    HPI/ROS    LIMITATION TO HISTORY   Select: : None  OUTSIDE HISTORIAN(S):  Friend neighbor at bedside    Alyssa Juan is a 84 y.o. 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 She states she was in the garage lifting a heavy box when she tripped and fell backwards, striking her head on the concrete floor. She states she presented to Urgent Care and was advised to present to the ED for further evaluation. She denies current blood thinner use. She denies dizziness, chest pain or shortness of breath prior to falling. She reports a history of a hip and back injury a few years ago, and has had issues with her balance since then. She denies any other pain aside from the head injury. She denies hematemesis. She is unsure of last tetanus, however she believes it is over 5 years ago.     PAST MEDICAL HISTORY  Past Medical History:   Diagnosis Date    Cold 04/24/2015    recent cold or allergies    Colitis     Disease of thyroid gland     Hypertension     Pain 04/24/2015    right shoulder       SURGICAL HISTORY  Past Surgical History:   Procedure Laterality Date    PB PARTIAL HIP REPLACEMENT Right 1/31/2023    Procedure: HEMIARTHROPLASTY, HIP;  Surgeon: Lv Rasmussen M.D.;  Location: SURGERY [facility];  Service: Orthopedics    SHOULDER ARTHROPLASTY TOTAL Right 5/5/2015    Procedure: SHOULDER ARTHROPLASTY TOTAL;  Surgeon: Fernanda Mendosa M.D.;  Location: [facility];  Service:     ROTATOR CUFF REPAIR  2014    right    CATARACT EXTRACTION WITH IOL  2013    b/l     MAMMOPLASTY AUGMENTATION  2001    HYSTERECTOMY, VAGINAL  1978    OTHER  1975    lumbar laminectomy       FAMILY HISTORY  Family History   Problem Relation Age of Onset    Diabetes Other     Hypertension Other     Stroke Other     Cancer Other        SOCIAL HISTORY   reports that she has never smoked. She has never used smokeless tobacco. She reports current alcohol use of about 3.5 oz of alcohol per week. She reports that she does not use drugs.    CURRENT MEDICATIONS  Previous Medications    AZELASTINE (ASTELIN) 137 MCG/SPRAY NASAL SPRAY    Administer 1 Spray into affected nostril(S) 2 times a day.    B COMPLEX VITAMINS (VITAMIN B COMPLEX PO)    Take 1 Tablet by mouth every day.    CHOLECALCIFEROL (D3 PO)    Take 1 Capsule by mouth every day.    FIBER PO    Take 1 Scoop by mouth every day.    LEVOTHYROXINE (SYNTHROID) 50 MCG TAB    Take 50 mcg by mouth every morning on an empty stomach.    LISINOPRIL (PRINIVIL) 20 MG TAB    Take 20 mg by mouth every day.    MULTIPLE VITAMINS-MINERALS (MULTIVITAMIN PO)    Take 1 Tablet by mouth every day.    MUPIROCIN (BACTROBAN) 2 % OINTMENT    Apply 1 Application topically 2 times a day.    OMEGA-3 FATTY ACIDS (FISH OIL PO)    Take 1 Capsule by mouth every day.    PANTOPRAZOLE (PROTONIX) 40 MG TABLET DELAYED RESPONSE    Take 40 mg by mouth every day.    POLYETHYLENE GLYCOL/LYTES (MIRALAX) PACK    Take 17 g by mouth every day.    TURMERIC PO    Take 1 Capsule by mouth every day.       ALLERGIES  Codeine and Sulfa drugs    PHYSICAL EXAM  BP (!) 191/111   Pulse (!) 59   Temp 36.3 °C (97.3 °F) (Temporal)   Resp 18   Ht 1.524 m (5')   Wt 46.8 kg (103 lb 2.8 oz)   SpO2 90%   BMI 20.15 kg/m²   Constitutional: Alert in no apparent distress.  HENT: Bilateral external ears normal, Nose normal. Cephalohematoma to the right parieto-occipital region with overlying abrasions  Eyes: Pupils are equal and reactive, Conjunctiva normal, Non-icteric.   Neck: Normal range of motion, No  tenderness, Supple,   Cardiovascular: Regular rate and rhythm, no murmurs.   Thorax & Lungs: Normal breath sounds, No respiratory distress, No wheezing, No chest tenderness.   Abdomen: Bowel sounds normal, Soft, No tenderness,   Skin: Warm, Dry, No erythema, No rash.   Back: No bony tenderness, No CVA tenderness.   Extremities: No edema, No tenderness, No cyanosis, no tenderness  Neurologic: Alert and oriented, conversational  Psychiatric: Affect normal     DIAGNOSTIC STUDIES & PROCEDURES    Radiology:   The attending Emergency Physician has independently interpreted the diagnostic imaging associated with this visit and is awaiting the final reading from the radiologist, which will be displayed below.  Preliminary interpretation is a follows: No intracranial hemorrhage or skull fracture  Radiologist interpretation:     CT-CSPINE WITHOUT PLUS RECONS   Final Result      Advanced multilevel degenerative disease without a definite fracture.      CT-HEAD W/O   Final Result      There is no definite acute intracranial abnormality.                    COURSE & MEDICAL DECISION MAKING    ED Observation Status? No; Patient does not meet criteria for ED Observation.     INITIAL ASSESSMENT AND PLAN  Care Narrative:       3:29 PM - Patient seen and evaluated at bedside. Discussed plan of care, including imaging. Patient agrees to plan of care. Ordered CT-head w/o and CT-C-spine w/o to evaluate. Tetanus dose given here.    4:20 PM - Patient was reevaluated at bedside. Discussed radiology results with the patient and informed them that they are reassuring. Patient will now be discharged at this time. Discussed return precautions and plan for at home care. Patient verbalizes understanding and agreement to this plan of care.         Patient with ground-level fall striking head.  Ultimately CT scan was necessary.  Patient did have an abrasion to the scalp.  There was no laceration to repair.  CT scans of the head neck were negative.   Will discharge the patient home with strict return precautions and follow-up.               DISPOSITION AND DISCUSSIONS  I have discussed management of the patient with the following physicians and FLY's: None    Discussion of management with other QHP or appropriate source(s): None     Escalation of care considered, and ultimately not performed: acute inpatient care management, however at this time, the patient is most appropriate for outpatient management.    Barriers to care at this time, including but not limited to: None.     DISPOSITION:  Patient will be discharged home in stable condition.    FOLLOW UP:  Angela Trejo M.D.  [address] [address]  [phone]    In 2 days      FINAL IMPRESSION   1. Closed head injury, initial encounter    2. Abrasion of scalp, initial encounter         IKevin (Scribe), am scribing for, and in the presence of, Edwin Sanchez M.D..    Electronically signed by: Kevin Thurman (Oniel), 11/19/2024    IEdwin M.D. personally performed the services described in this documentation, as scribed by Kevin Thurman in my presence, and it is both accurate and complete.    The note accurately reflects work and decisions made by me.  Edwin Sanchez M.D.  11/19/2024  10:16 PM

## 2024-11-20 NOTE — ED NOTES
PT verbalizes understanding of discharge instructions. PT ambulates to lobby with steady gate accompanied by friend

## 2024-11-22 ENCOUNTER — HOSPITAL ENCOUNTER (EMERGENCY)
Facility: MEDICAL CENTER | Age: 84
End: 2024-11-22
Attending: STUDENT IN AN ORGANIZED HEALTH CARE EDUCATION/TRAINING PROGRAM
Payer: MEDICARE

## 2024-11-22 VITALS
DIASTOLIC BLOOD PRESSURE: 57 MMHG | SYSTOLIC BLOOD PRESSURE: 133 MMHG | WEIGHT: 104 LBS | HEART RATE: 60 BPM | OXYGEN SATURATION: 96 % | HEIGHT: 60 IN | TEMPERATURE: 97.7 F | RESPIRATION RATE: 19 BRPM | BODY MASS INDEX: 20.42 KG/M2

## 2024-11-22 DIAGNOSIS — R42 LIGHTHEADEDNESS: ICD-10-CM

## 2024-11-22 DIAGNOSIS — R00.1 BRADYCARDIA: ICD-10-CM

## 2024-11-22 DIAGNOSIS — R11.0 NAUSEA: ICD-10-CM

## 2024-11-22 DIAGNOSIS — R19.7 DIARRHEA, UNSPECIFIED TYPE: ICD-10-CM

## 2024-11-22 LAB
ALBUMIN SERPL BCP-MCNC: 4.1 G/DL (ref 3.2–4.9)
ALBUMIN/GLOB SERPL: 1.4 G/DL
ALP SERPL-CCNC: 67 U/L (ref 30–99)
ALT SERPL-CCNC: 25 U/L (ref 2–50)
ANION GAP SERPL CALC-SCNC: 11 MMOL/L (ref 7–16)
AST SERPL-CCNC: 30 U/L (ref 12–45)
BASOPHILS # BLD AUTO: 0.6 % (ref 0–1.8)
BASOPHILS # BLD: 0.03 K/UL (ref 0–0.12)
BILIRUB SERPL-MCNC: 0.3 MG/DL (ref 0.1–1.5)
BUN SERPL-MCNC: 17 MG/DL (ref 8–22)
CALCIUM ALBUM COR SERPL-MCNC: 9.1 MG/DL (ref 8.5–10.5)
CALCIUM SERPL-MCNC: 9.2 MG/DL (ref 8.4–10.2)
CHLORIDE SERPL-SCNC: 94 MMOL/L (ref 96–112)
CO2 SERPL-SCNC: 23 MMOL/L (ref 20–33)
CREAT SERPL-MCNC: 0.7 MG/DL (ref 0.5–1.4)
EKG IMPRESSION: NORMAL
EOSINOPHIL # BLD AUTO: 0.04 K/UL (ref 0–0.51)
EOSINOPHIL NFR BLD: 0.8 % (ref 0–6.9)
ERYTHROCYTE [DISTWIDTH] IN BLOOD BY AUTOMATED COUNT: 44.3 FL (ref 35.9–50)
GFR SERPLBLD CREATININE-BSD FMLA CKD-EPI: 85 ML/MIN/1.73 M 2
GLOBULIN SER CALC-MCNC: 2.9 G/DL (ref 1.9–3.5)
GLUCOSE SERPL-MCNC: 98 MG/DL (ref 65–99)
HCT VFR BLD AUTO: 36.6 % (ref 37–47)
HGB BLD-MCNC: 12.4 G/DL (ref 12–16)
IMM GRANULOCYTES # BLD AUTO: 0.01 K/UL (ref 0–0.11)
IMM GRANULOCYTES NFR BLD AUTO: 0.2 % (ref 0–0.9)
LYMPHOCYTES # BLD AUTO: 1.63 K/UL (ref 1–4.8)
LYMPHOCYTES NFR BLD: 31.5 % (ref 22–41)
MCH RBC QN AUTO: 33.7 PG (ref 27–33)
MCHC RBC AUTO-ENTMCNC: 33.9 G/DL (ref 32.2–35.5)
MCV RBC AUTO: 99.5 FL (ref 81.4–97.8)
MONOCYTES # BLD AUTO: 0.56 K/UL (ref 0–0.85)
MONOCYTES NFR BLD AUTO: 10.8 % (ref 0–13.4)
NEUTROPHILS # BLD AUTO: 2.91 K/UL (ref 1.82–7.42)
NEUTROPHILS NFR BLD: 56.1 % (ref 44–72)
NRBC # BLD AUTO: 0 K/UL
NRBC BLD-RTO: 0 /100 WBC (ref 0–0.2)
PLATELET # BLD AUTO: 255 K/UL (ref 164–446)
PMV BLD AUTO: 9 FL (ref 9–12.9)
POTASSIUM SERPL-SCNC: 4.4 MMOL/L (ref 3.6–5.5)
PROT SERPL-MCNC: 7 G/DL (ref 6–8.2)
RBC # BLD AUTO: 3.68 M/UL (ref 4.2–5.4)
SODIUM SERPL-SCNC: 128 MMOL/L (ref 135–145)
TROPONIN T SERPL-MCNC: 10 NG/L (ref 6–19)
TROPONIN T SERPL-MCNC: 11 NG/L (ref 6–19)
WBC # BLD AUTO: 5.2 K/UL (ref 4.8–10.8)

## 2024-11-22 PROCEDURE — 80053 COMPREHEN METABOLIC PANEL: CPT

## 2024-11-22 PROCEDURE — 85025 COMPLETE CBC W/AUTO DIFF WBC: CPT

## 2024-11-22 PROCEDURE — 93005 ELECTROCARDIOGRAM TRACING: CPT | Performed by: STUDENT IN AN ORGANIZED HEALTH CARE EDUCATION/TRAINING PROGRAM

## 2024-11-22 PROCEDURE — 84484 ASSAY OF TROPONIN QUANT: CPT

## 2024-11-22 PROCEDURE — 36415 COLL VENOUS BLD VENIPUNCTURE: CPT

## 2024-11-22 PROCEDURE — 99284 EMERGENCY DEPT VISIT MOD MDM: CPT

## 2024-11-22 ASSESSMENT — FIBROSIS 4 INDEX: FIB4 SCORE: 2.64

## 2024-11-23 NOTE — ED NOTES
Assumed care of pt s/p EKG x 2 and saline lock with blood draw by er tech. Pt in no apparent distress, aao, denies c/o-partial linen chg for mod amt fecal incontinence. Friend to bedside after same

## 2024-11-23 NOTE — ED NOTES
Dc instructions and medications discussed with patient at bedside. All questions answered at this time. VSS. No IV in place at this time. Pt to lobby without incident. Friend to drive patient home.

## 2024-11-23 NOTE — DISCHARGE INSTRUCTIONS
As we discussed you should call your cardiologist to schedule a follow-up appointment on Monday.    Please return to the emergency department immediately if you develop any recurrent lightheadedness, palpitations, chest pain or pressure, shortness of breath, if you pass out or otherwise are feeling worse.

## 2024-11-23 NOTE — ED TRIAGE NOTES
Chief Complaint   Patient presents with    Weakness    Dizziness     - LOC   Near syncope   A&O x4  -thinners     BP (!) 181/73   Pulse 60   Temp 36.5 °C (97.7 °F) (Temporal)   Ht 1.524 m (5')   Wt 47.2 kg (104 lb)   SpO2 95%   BMI 20.31 kg/m²     Friend called EMS   Pt eating at a restaurant with friend. Sudden onset of dizziness weakness after eating.  Loss of bowels when EMS arrived.

## 2024-11-23 NOTE — ED PROVIDER NOTES
"ED Provider Note    CHIEF COMPLAINT  Chief Complaint   Patient presents with    Weakness    Dizziness     - LOC   Near syncope   A&O x4  -thinners       EXTERNAL RECORDS REVIEWED  Outpatient Notes patient was seen in the emergency department on the 19th of this month after a mechanical fall.  CT head and C-spine unremarkable at that time.  She is not taking blood thinners.    HPI/ROS  LIMITATION TO HISTORY   Select: : None    Alyssa Juan is a 84 y.o. female who presents to the emergency department for evaluation of lightheadedness and diarrhea.  The patient states she was feeling like her normal self and was eating lunch at a restaurant with a friend.  She states has a history of IBS and ate multiple things that typically upset her stomach.  She states that after eating meats she began to feel queasy, nauseous and had an episode of diarrhea.  During this time she states she was feeling very lightheaded.  She denies feeling like she was going to pass out but states that if the symptoms had progressed she may have gotten at that point and she does deny any episode of syncope.  She denies any chest pain or pressure, palpitations, diaphoresis.  She currently states that she feels like her normal self.  She states she knows that she has a history of a \"skipped heartbeat\" for which she has an outpatient appointment with cardiology coming up.  She denies any prior cardiac history.    PAST MEDICAL HISTORY   has a past medical history of Cold (04/24/2015), Colitis, Disease of thyroid gland, Hypertension, and Pain (04/24/2015).    SURGICAL HISTORY   has a past surgical history that includes rotator cuff repair (2014); hysterectomy, vaginal (1978); other (1975); cataract extraction with iol (2013); mammoplasty augmentation (2001); shoulder arthroplasty total (Right, 5/5/2015); and partial hip replacement (Right, 1/31/2023).    FAMILY HISTORY  Family History   Problem Relation Age of Onset    Diabetes Other     " "Hypertension Other     Stroke Other     Cancer Other        SOCIAL HISTORY  Social History     Tobacco Use    Smoking status: Never    Smokeless tobacco: Never   Vaping Use    Vaping status: Never Used   Substance and Sexual Activity    Alcohol use: Yes     Alcohol/week: 3.5 oz     Types: 7 Standard drinks or equivalent per week     Comment: \"a glass of wine every night\"    Drug use: No    Sexual activity: Not Currently       CURRENT MEDICATIONS  Home Medications    **Home medications have not yet been reviewed for this encounter**         ALLERGIES  Allergies   Allergen Reactions    Codeine Vomiting and Nausea    Sulfa Drugs Hives       PHYSICAL EXAM  VITAL SIGNS: BP (!) 181/73   Pulse 60   Temp 36.5 °C (97.7 °F) (Temporal)   Resp 20   Ht 1.524 m (5')   Wt 47.2 kg (104 lb)   SpO2 95%   BMI 20.31 kg/m²    Constitutional: No acute distress, pleasant elderly female answering all questions appropriately  HEENT: Atraumatic, normocephalic, pupils are equal round reactive to light, nose normal, mouth shows moist mucous membranes  Neck: Supple, no JVD, no tracheal deviation  Cardiovascular: Bradycardic irregular rhythm  Thorax & Lungs: No respiratory distress, clear breath sounds  GI: Soft, minimally distended abdomen, no tenderness  Skin: Warm, dry, no acute rash or lesion  Musculoskeletal: Moving all extremities, no acute deformity, no edema, no tenderness  Neurologic: A&Ox3, at baseline mentation, cranial nerves II through XII are grossly intact, no sensory deficit, no ataxia  Psychiatric: Appropriate affect for situation at this time        EKG/LABS  Labs Reviewed   CBC WITH DIFFERENTIAL - Abnormal; Notable for the following components:       Result Value    RBC 3.68 (*)     Hematocrit 36.6 (*)     MCV 99.5 (*)     MCH 33.7 (*)     All other components within normal limits   COMP METABOLIC PANEL   TROPONIN     Results for orders placed or performed during the hospital encounter of 11/22/24   EKG (Now)   Result " Value Ref Range    Report       [facility] Emergency Dept.    Test Date:  2024  Pt Name:    EDIL FUENTES              Department: [facility]  MRN:        [id number]                      Room:       -ROOM 4  Gender:     Female                       Technician: [id number]  :        [id number]                   Requested By:GREY PARKER  Order #:    [id number]                    Reading MD:    Measurements  Intervals                                Axis  Rate:       75                           P:          -50  MT:         208                          QRS:        40  QRSD:       92                           T:          258  QT:         458  QTc:        512    Interpretive Statements  Sinus or ectopic atrial rhythm  Multiple premature complexes, vent & supraven  Probable left atrial enlargement  Low voltage, precordial leads  RSR' in V1 or V2, probably normal variant  Repol abnrm suggests ischemia, inferior leads  Compared to ECG 2023 12:45:29  Ectopic atrial rhythm now present  RSR' in V 1 or V2 now present  Early repolarization now present  Possible ischemia now present  Sinus rhythm no longer present  T-wave abnormality no longer present         I have independently interpreted this EKG    COURSE & MEDICAL DECISION MAKING    ASSESSMENT, COURSE AND PLAN  Care Narrative:     Patient presents to the ER for evaluation of an episode of lightheadedness that accompanied nausea and diarrhea after eating heavy foods with a known history of IBS.  She currently is asymptomatic.  Telemetry demonstrates a bradycardic irregular rhythm which on EKG appears to demonstrate a sinus bradycardia with frequent atrial ectopic beats.  This is not changed from prior EKG with no evidence of acute ischemia.  Per record review over the last year the patient is chronically bradycardic with heart rate in the 50s to low 60s.  Her blood pressure is normal currently.  She is mentating perfectly appropriately and  denies any symptoms.  Chiefly I am concerned for vasovagal lightheadedness likely exacerbating chronic bradycardia.  Will obtain laboratory To screen for atypical ACS, electrolyte abnormality and plan for continued telemetry.  Doubt heart failure exacerbation given absence of leg swelling, increasing fatigue, shortness of breath or evidence of pulmonary edema on exam clinically.  Patient had an echocardiogram in 2022 that was normal.    Patient's laboratory workup today is quite reassuring.  No significant electrolyte abnormality other than baseline chronic hyponatremia.  Serial troponins are negative.  She is not anemic.  Patient monitored on continuous telemetry with persistent heart rates in the high 50s to 60s which is baseline for her.  I discussed with her my recommendation that we admitted to the hospital for continuous telemetry and consideration of a repeat echocardiogram however she would prefer to return home and follow-up with her cardiologist on Monday.  She states that she will stay with a friend who can monitor her and she understands that she can return to the ER at anytime for repeat assessment.  She was strongly encouraged to return with any subsequent episodes of lightheadedness, syncope, chest pain or pressure, shortness of breath or if she is otherwise feeling any worse.  At this point I do feel that this is a reasonable plan given her chronic baseline bradycardia, no acute ischemia on EKG, no chest discomfort, stable blood pressure and normal mentation and likely gastrointestinal/vasovagal etiology of her symptoms.  Patient discharged in stable condition.    ADDITIONAL PROBLEMS MANAGED  None    DISPOSITION AND DISCUSSIONS  I have discussed management of the patient with the following physicians and FLY's: None    Discussion of management with other QHP or appropriate source(s): None     Escalation of care considered, and ultimately not performed:after discussion with the patient / family, they  have elected to decline an escalation in care      FINAL DIAGNOSIS  1. Lightheadedness    2. Bradycardia    3. Diarrhea, unspecified type    4. Nausea         Electronically signed by: Matt Tavera M.D., 11/22/2024 5:41 PM

## 2024-11-23 NOTE — ED NOTES
"While going over discharge paperwork pt reported \"chest feeling heavy\". Pt reports she has felt this since syncopal episode prior to arrival. ERP notified and orders obtained.   "

## 2024-12-26 ENCOUNTER — HOME HEALTH ADMISSION (OUTPATIENT)
Dept: HOME HEALTH SERVICES | Facility: HOME HEALTHCARE | Age: 84
End: 2024-12-26
Payer: MEDICARE

## 2025-01-01 ENCOUNTER — HOME CARE VISIT (OUTPATIENT)
Dept: HOME HEALTH SERVICES | Facility: HOME HEALTHCARE | Age: 85
End: 2025-01-01
Payer: MEDICARE

## 2025-01-01 VITALS
HEART RATE: 52 BPM | RESPIRATION RATE: 18 BRPM | BODY MASS INDEX: 18.5 KG/M2 | SYSTOLIC BLOOD PRESSURE: 138 MMHG | TEMPERATURE: 97 F | HEIGHT: 61 IN | DIASTOLIC BLOOD PRESSURE: 70 MMHG | OXYGEN SATURATION: 93 % | WEIGHT: 98 LBS

## 2025-01-01 PROCEDURE — 665999 HH PPS REVENUE DEBIT

## 2025-01-01 PROCEDURE — 665998 HH PPS REVENUE CREDIT

## 2025-01-01 PROCEDURE — 665005 NO-PAY RAP - HOME HEALTH

## 2025-01-01 PROCEDURE — G0493 RN CARE EA 15 MIN HH/HOSPICE: HCPCS

## 2025-01-01 PROCEDURE — 665001 SOC-HOME HEALTH

## 2025-01-01 ASSESSMENT — FIBROSIS 4 INDEX: FIB4 SCORE: 2

## 2025-01-02 ENCOUNTER — DOCUMENTATION (OUTPATIENT)
Dept: MEDICAL GROUP | Facility: PHYSICIAN GROUP | Age: 85
End: 2025-01-02
Payer: MEDICARE

## 2025-01-02 PROCEDURE — 665998 HH PPS REVENUE CREDIT

## 2025-01-02 PROCEDURE — 665999 HH PPS REVENUE DEBIT

## 2025-01-02 SDOH — ECONOMIC STABILITY: HOUSING INSECURITY: HOME SAFETY: PTENCURAGED TO WEAR NONSKID FOOTWEAR AT ALL TIME, BE MINDFUL OF OXYGEN TUBING TRIP HAZARD

## 2025-01-02 SDOH — ECONOMIC STABILITY: HOUSING INSECURITY: EVIDENCE OF SMOKING MATERIAL: 0

## 2025-01-02 ASSESSMENT — ENCOUNTER SYMPTOMS
SUBJECTIVE PAIN PROGRESSION: UNCHANGED
VOMITING: PT DENIES
DYSPNEA ON EXERTION: 1
SHORTNESS OF BREATH: 1
CONSTIPATION: 1
LOWEST PAIN SEVERITY IN PAST 24 HOURS: 0/10
DEBILITATING PAIN: 1
PAIN LOCATION - PAIN FREQUENCY: WITH ACTIVITY
PAIN LOCATION - PAIN SEVERITY: 3/10
NAUSEA: PT DENEIS
STOOL FREQUENCY: DAILY
HYPOTENSION: 1
PAIN LOCATION: LEFT GROIN
FATIGUE: 1
PAIN SEVERITY GOAL: 0/10
DYSPNEA ACTIVITY LEVEL: AFTER AMBULATING MORE THAN 20 FT
HIGHEST PAIN SEVERITY IN PAST 24 HOURS: 8/10
PAIN: 1
LAST BOWEL MOVEMENT: 67205

## 2025-01-02 NOTE — PROGRESS NOTES
Medication chart review for [facility] services    Received referral from [facility].   Medications reviewed  compared with discharge summary if available.  Discharge summary date, if applicable:   11/24    Current medication list per [facility]     Medication list one, patient is currently taking    Current Outpatient Medications:     HYDROcodone-acetaminophen, 1 Tablet, Oral, Q12HRS PRN    omeprazole, 20 mg, Oral, DAILY    flecainide, 50 mg, Oral, BID    gabapentin, 100 mg, Oral, TID PRN    Lubiprostone, 1 Capsule, Oral, DAILY AT 1800    docusate sodium, 100 mg, Oral, QDAY PRN    Bacillus Coagulans-Inulin (ALIGN PREBIOTIC-PROBIOTIC PO), 1 Each Tablet, Oral, DAILY AT 1800    Home Care Oxygen, 2 L/min, Inhalation, QHS    Cholecalciferol (D3 PO), 1 Capsule, Oral, DAILY    FIBER PO, 1 Scoop, Oral, DAILY    lisinopril, 20 mg, Oral, DAILY    levothyroxine, 50 mcg, Oral, AM ES    polyethylene glycol/lytes, 17 g, Oral, DAILY    Multiple Vitamins-Minerals (MULTIVITAMIN PO), 1 Tablet, Oral, DAILY    Omega-3 Fatty Acids (FISH OIL PO), 1 Capsule, Oral, DAILY    TURMERIC PO, 1 Capsule, Oral, DAILY    B Complex Vitamins (VITAMIN B COMPLEX PO), 1 Tablet, Oral, DAILY  N/a    Medication list two, drugs that the patient has been prescribed or recommended to take by their healthcare provider on discharge summary      Allergies   Allergen Reactions    Codeine Vomiting and Nausea    Sulfa Drugs Hives       Labs     Lab Results   Component Value Date/Time    SODIUM 128 (L) 11/22/2024 05:40 PM    POTASSIUM 4.4 11/22/2024 05:40 PM    CHLORIDE 94 (L) 11/22/2024 05:40 PM    CO2 23 11/22/2024 05:40 PM    GLUCOSE 98 11/22/2024 05:40 PM    BUN 17 11/22/2024 05:40 PM    CREATININE 0.70 11/22/2024 05:40 PM     Lab Results   Component Value Date/Time    ALKPHOSPHAT 67 11/22/2024 05:40 PM    ASTSGOT 30 11/22/2024 05:40 PM    ALTSGPT 25 11/22/2024 05:40 PM    TBILIRUBIN 0.3 11/22/2024 05:40 PM    INR 0.94 05/17/2023 10:27 AM    ALBUMIN  4.1 11/22/2024 05:40 PM        Assessment for clinically significant drug interactions, drug omissions/additions, duplicative therapies.            BRAYDON Trejo M.D.  [address] [address]  Fax: [phone]    [facility]  Phone [phone] fax [phone]    This note was created using voice recognition software (Dragon). The accuracy of the dictation is limited by the abilities of the software. I have reviewed the note prior to signing, however some errors in grammar and context are still possible. If you have any questions related to this note please do not hesitate to contact our office.

## 2025-01-03 ENCOUNTER — HOME CARE VISIT (OUTPATIENT)
Dept: HOME HEALTH SERVICES | Facility: HOME HEALTHCARE | Age: 85
End: 2025-01-03
Payer: MEDICARE

## 2025-01-03 PROCEDURE — 665999 HH PPS REVENUE DEBIT

## 2025-01-03 PROCEDURE — 665998 HH PPS REVENUE CREDIT

## 2025-01-03 PROCEDURE — G0151 HHCP-SERV OF PT,EA 15 MIN: HCPCS

## 2025-01-04 PROCEDURE — 665999 HH PPS REVENUE DEBIT

## 2025-01-04 PROCEDURE — 665998 HH PPS REVENUE CREDIT

## 2025-01-05 VITALS
TEMPERATURE: 97.6 F | OXYGEN SATURATION: 94 % | HEART RATE: 62 BPM | RESPIRATION RATE: 16 BRPM | DIASTOLIC BLOOD PRESSURE: 60 MMHG | SYSTOLIC BLOOD PRESSURE: 120 MMHG

## 2025-01-05 PROCEDURE — 665999 HH PPS REVENUE DEBIT

## 2025-01-05 PROCEDURE — 665998 HH PPS REVENUE CREDIT

## 2025-01-05 ASSESSMENT — ENCOUNTER SYMPTOMS
PAIN SEVERITY GOAL: 2/10
PAIN LOCATION - PAIN QUALITY: ACHE
SUBJECTIVE PAIN PROGRESSION: UNCHANGED
MUSCLE WEAKNESS: 1
PAIN: 1
ARTHRALGIAS: 1
HIGHEST PAIN SEVERITY IN PAST 24 HOURS: 7/10
LOWEST PAIN SEVERITY IN PAST 24 HOURS: 5/10
PAIN LOCATION - PAIN SEVERITY: 5/10
PAIN LOCATION - PAIN FREQUENCY: CONSTANT

## 2025-01-05 ASSESSMENT — ACTIVITIES OF DAILY LIVING (ADL)
AMBULATION ASSISTANCE: STAND BY ASSIST
AMBULATION ASSISTANCE ON FLAT SURFACES: 1
CURRENT_FUNCTION: STAND BY ASSIST
AMBULATION_DISTANCE/DURATION_TOLERATED: <100FT ONE WAY

## 2025-01-05 ASSESSMENT — PATIENT HEALTH QUESTIONNAIRE - PHQ9: CLINICAL INTERPRETATION OF PHQ2 SCORE: 0

## 2025-01-06 PROCEDURE — 665999 HH PPS REVENUE DEBIT

## 2025-01-06 PROCEDURE — 665998 HH PPS REVENUE CREDIT

## 2025-01-07 ENCOUNTER — HOME CARE VISIT (OUTPATIENT)
Dept: HOME HEALTH SERVICES | Facility: HOME HEALTHCARE | Age: 85
End: 2025-01-07
Payer: MEDICARE

## 2025-01-07 PROCEDURE — G0151 HHCP-SERV OF PT,EA 15 MIN: HCPCS

## 2025-01-07 PROCEDURE — 665999 HH PPS REVENUE DEBIT

## 2025-01-07 PROCEDURE — 665998 HH PPS REVENUE CREDIT

## 2025-01-08 VITALS
TEMPERATURE: 97.8 F | RESPIRATION RATE: 16 BRPM | SYSTOLIC BLOOD PRESSURE: 122 MMHG | DIASTOLIC BLOOD PRESSURE: 70 MMHG | HEART RATE: 68 BPM | OXYGEN SATURATION: 96 %

## 2025-01-08 PROCEDURE — 665999 HH PPS REVENUE DEBIT

## 2025-01-08 PROCEDURE — 665998 HH PPS REVENUE CREDIT

## 2025-01-08 ASSESSMENT — ENCOUNTER SYMPTOMS
PAIN LOCATION - PAIN FREQUENCY: CONSTANT
PAIN SEVERITY GOAL: 2/10
SUBJECTIVE PAIN PROGRESSION: UNCHANGED
PAIN LOCATION - PAIN QUALITY: DULL ACHE
PAIN: 1
LOWEST PAIN SEVERITY IN PAST 24 HOURS: 4/10
HIGHEST PAIN SEVERITY IN PAST 24 HOURS: 7/10
PAIN LOCATION - PAIN SEVERITY: 4/10

## 2025-01-08 ASSESSMENT — PATIENT HEALTH QUESTIONNAIRE - PHQ9: CLINICAL INTERPRETATION OF PHQ2 SCORE: 0

## 2025-01-09 ENCOUNTER — HOME CARE VISIT (OUTPATIENT)
Dept: HOME HEALTH SERVICES | Facility: HOME HEALTHCARE | Age: 85
End: 2025-01-09
Payer: MEDICARE

## 2025-01-09 ENCOUNTER — HOSPITAL ENCOUNTER (OUTPATIENT)
Facility: MEDICAL CENTER | Age: 85
End: 2025-01-09
Attending: STUDENT IN AN ORGANIZED HEALTH CARE EDUCATION/TRAINING PROGRAM
Payer: MEDICARE

## 2025-01-09 LAB
APPEARANCE UR: CLEAR
BASOPHILS # BLD AUTO: 0.7 % (ref 0–1.8)
BASOPHILS # BLD: 0.03 K/UL (ref 0–0.12)
BILIRUB UR QL STRIP.AUTO: NEGATIVE
COLOR UR: YELLOW
EOSINOPHIL # BLD AUTO: 0.09 K/UL (ref 0–0.51)
EOSINOPHIL NFR BLD: 2 % (ref 0–6.9)
ERYTHROCYTE [DISTWIDTH] IN BLOOD BY AUTOMATED COUNT: 43.6 FL (ref 35.9–50)
GLUCOSE UR STRIP.AUTO-MCNC: NEGATIVE MG/DL
HCT VFR BLD AUTO: 37.6 % (ref 37–47)
HGB BLD-MCNC: 12.5 G/DL (ref 12–16)
IMM GRANULOCYTES # BLD AUTO: 0.02 K/UL (ref 0–0.11)
IMM GRANULOCYTES NFR BLD AUTO: 0.4 % (ref 0–0.9)
KETONES UR STRIP.AUTO-MCNC: NEGATIVE MG/DL
LEUKOCYTE ESTERASE UR QL STRIP.AUTO: NEGATIVE
LYMPHOCYTES # BLD AUTO: 1.46 K/UL (ref 1–4.8)
LYMPHOCYTES NFR BLD: 31.7 % (ref 22–41)
MCH RBC QN AUTO: 32.2 PG (ref 27–33)
MCHC RBC AUTO-ENTMCNC: 33.2 G/DL (ref 32.2–35.5)
MCV RBC AUTO: 96.9 FL (ref 81.4–97.8)
MICRO URNS: NORMAL
MONOCYTES # BLD AUTO: 0.67 K/UL (ref 0–0.85)
MONOCYTES NFR BLD AUTO: 14.5 % (ref 0–13.4)
NEUTROPHILS # BLD AUTO: 2.34 K/UL (ref 1.82–7.42)
NEUTROPHILS NFR BLD: 50.7 % (ref 44–72)
NITRITE UR QL STRIP.AUTO: NEGATIVE
NRBC # BLD AUTO: 0 K/UL
NRBC BLD-RTO: 0 /100 WBC (ref 0–0.2)
PH UR STRIP.AUTO: 7.5 [PH] (ref 5–8)
PLATELET # BLD AUTO: 266 K/UL (ref 164–446)
PMV BLD AUTO: 10.4 FL (ref 9–12.9)
PROT UR QL STRIP: NEGATIVE MG/DL
RBC # BLD AUTO: 3.88 M/UL (ref 4.2–5.4)
RBC UR QL AUTO: NEGATIVE
SP GR UR STRIP.AUTO: 1.01
UROBILINOGEN UR STRIP.AUTO-MCNC: 0.2 EU/DL
WBC # BLD AUTO: 4.6 K/UL (ref 4.8–10.8)

## 2025-01-09 PROCEDURE — 81003 URINALYSIS AUTO W/O SCOPE: CPT

## 2025-01-09 PROCEDURE — G0299 HHS/HOSPICE OF RN EA 15 MIN: HCPCS

## 2025-01-09 PROCEDURE — 82306 VITAMIN D 25 HYDROXY: CPT

## 2025-01-09 PROCEDURE — 85025 COMPLETE CBC W/AUTO DIFF WBC: CPT

## 2025-01-09 PROCEDURE — 80053 COMPREHEN METABOLIC PANEL: CPT

## 2025-01-09 PROCEDURE — 665999 HH PPS REVENUE DEBIT

## 2025-01-09 PROCEDURE — 665998 HH PPS REVENUE CREDIT

## 2025-01-09 PROCEDURE — G0151 HHCP-SERV OF PT,EA 15 MIN: HCPCS

## 2025-01-09 PROCEDURE — G0152 HHCP-SERV OF OT,EA 15 MIN: HCPCS

## 2025-01-09 PROCEDURE — 84443 ASSAY THYROID STIM HORMONE: CPT

## 2025-01-09 PROCEDURE — 80061 LIPID PANEL: CPT

## 2025-01-10 ENCOUNTER — HOME CARE VISIT (OUTPATIENT)
Dept: HOME HEALTH SERVICES | Facility: HOME HEALTHCARE | Age: 85
End: 2025-01-10
Payer: MEDICARE

## 2025-01-10 VITALS
RESPIRATION RATE: 16 BRPM | SYSTOLIC BLOOD PRESSURE: 128 MMHG | TEMPERATURE: 97.4 F | DIASTOLIC BLOOD PRESSURE: 68 MMHG | HEART RATE: 61 BPM

## 2025-01-10 LAB
25(OH)D3 SERPL-MCNC: 60 NG/ML (ref 30–100)
ALBUMIN SERPL BCP-MCNC: 4.1 G/DL (ref 3.2–4.9)
ALBUMIN/GLOB SERPL: 1.6 G/DL
ALP SERPL-CCNC: 114 U/L (ref 30–99)
ALT SERPL-CCNC: 18 U/L (ref 2–50)
ANION GAP SERPL CALC-SCNC: 8 MMOL/L (ref 7–16)
AST SERPL-CCNC: 27 U/L (ref 12–45)
BILIRUB SERPL-MCNC: 0.4 MG/DL (ref 0.1–1.5)
BUN SERPL-MCNC: 15 MG/DL (ref 8–22)
CALCIUM ALBUM COR SERPL-MCNC: 9.2 MG/DL (ref 8.5–10.5)
CALCIUM SERPL-MCNC: 9.3 MG/DL (ref 8.5–10.5)
CHLORIDE SERPL-SCNC: 89 MMOL/L (ref 96–112)
CHOLEST SERPL-MCNC: 236 MG/DL (ref 100–199)
CO2 SERPL-SCNC: 26 MMOL/L (ref 20–33)
CREAT SERPL-MCNC: 0.74 MG/DL (ref 0.5–1.4)
GFR SERPLBLD CREATININE-BSD FMLA CKD-EPI: 79 ML/MIN/1.73 M 2
GLOBULIN SER CALC-MCNC: 2.6 G/DL (ref 1.9–3.5)
GLUCOSE SERPL-MCNC: 82 MG/DL (ref 65–99)
HDLC SERPL-MCNC: 128 MG/DL
LDLC SERPL CALC-MCNC: 95 MG/DL
POTASSIUM SERPL-SCNC: 4.7 MMOL/L (ref 3.6–5.5)
PROT SERPL-MCNC: 6.7 G/DL (ref 6–8.2)
SODIUM SERPL-SCNC: 123 MMOL/L (ref 135–145)
TRIGL SERPL-MCNC: 66 MG/DL (ref 0–149)
TSH SERPL-ACNC: 0.63 UIU/ML (ref 0.35–5.5)

## 2025-01-10 PROCEDURE — 665998 HH PPS REVENUE CREDIT

## 2025-01-10 PROCEDURE — 665999 HH PPS REVENUE DEBIT

## 2025-01-10 ASSESSMENT — ENCOUNTER SYMPTOMS
HIGHEST PAIN SEVERITY IN PAST 24 HOURS: 8/10
VOMITING: DENIES
NAUSEA: DENIES
PAIN LOCATION - EXACERBATING FACTORS: AMBULATION/MOVEMENT
PAIN LOCATION - PAIN FREQUENCY: WITH ACTIVITY
LIMITED RANGE OF MOTION: 1
LOWEST PAIN SEVERITY IN PAST 24 HOURS: 0/10
PAIN LOCATION - PAIN QUALITY: SHARP
PAIN LOCATION - PAIN SEVERITY: 0/10
SUBJECTIVE PAIN PROGRESSION: UNCHANGED
PAIN LOCATION - RELIEVING FACTORS: REST
PAIN SEVERITY GOAL: 0/10
LAST BOWEL MOVEMENT: 67213
PAIN: 1

## 2025-01-10 ASSESSMENT — ACTIVITIES OF DAILY LIVING (ADL): OASIS_M1830: 03

## 2025-01-10 NOTE — CASE COMMUNICATION
I agree with these changes  ----- Message -----  From: Magy Watkins PT  Sent: 1/10/2025  11:53 AM PST  To: Chela Weaver R.N.      Quality Review for SOC OASIS by DEMETRIS Watkins PT  on  January 10, 2025       Edits completed by DEMETRIS Watkins PT:  1.  and  diagnosis coding updated per chart review.  2.  - checked 4, multiple ER visits, last 6 months per EPIC.  3.  - unchecked NA and entered 1/1/2025 per [facility] order.   4.  - changed to 1 per respiratory assessment.  5.  - changed to 1 per [facility]-10 response to incontinence.  6.  - changed to therapeutic diet; dx of Htn.  7. [facility]-10 - checked yes to prior history of falls per EPIC.  8.  - changed to C1, oxygen therapy and checked intermittent per MAR.  9. ,, - changed to 2 per narrative;  and  - changed to 3 per narrative; SN6322J,F,G,H - changed to 3 per anand rative; [id number] - changed to 4 per narrative; DG3587G,E,F,I,J,K - changed to 4 per narrative; QX6976C,P - changed to 88.  10.  - checked indication noted for opioid.  11.  - changed to 3 as pt requires assist with transfers and ambulation.  12. Functional Limitations - checked bowel/bladder incontinence per response in [facility]-10; Safety Measures - checked ambulate only with assistance; Nutritional Requirements - unchecked regula r diet and checked low sodium diet; dx of Htn.  13.  Updated F2F data.

## 2025-01-10 NOTE — CASE COMMUNICATION
Quality Review for SOC OASIS by DEMETRIS Watkins PT  on  January 10, 2025       Edits completed by DEMETRIS Watkins, PT:  1.  and  diagnosis coding updated per chart review.  2.  - checked 4, multiple ER visits, last 6 months per EPIC.  3.  - unchecked NA and entered 1/1/2025 per [facility] order.  4.  - changed to 1 per respiratory assessment.  5.  - changed to 1 per [facility]-10 response to incontinence.  6.  - changed to  therapeutic diet; dx of Htn.  7. [facility]-10 - checked yes to prior history of falls per EPIC.  8.  - changed to C1, oxygen therapy and checked intermittent per MAR.  9. ,, - changed to 2 per narrative;  and  - changed to 3 per narrative; [id number],F,G,H - changed to 3 per narrative; [id number] - changed to 4 per narrative; MU6430Z,E,F,I,J,K - changed to 4 per narrative; DC2200Z,P - changed to 88.  10.  - [place] ked indication noted for opioid.  11.  - changed to 3 as pt requires assist with transfers and ambulation.  12. Functional Limitations - checked bowel/bladder incontinence per response in [facility]-10; Safety Measures - checked ambulate only with assistance; Nutritional Requirements - unchecked regular diet and checked low sodium diet; dx of Htn.  13.  Updated F2F data.

## 2025-01-11 PROCEDURE — 665998 HH PPS REVENUE CREDIT

## 2025-01-11 PROCEDURE — 665999 HH PPS REVENUE DEBIT

## 2025-01-12 VITALS — OXYGEN SATURATION: 94 % | HEART RATE: 64 BPM

## 2025-01-12 PROCEDURE — 665998 HH PPS REVENUE CREDIT

## 2025-01-12 PROCEDURE — 665999 HH PPS REVENUE DEBIT

## 2025-01-12 ASSESSMENT — ENCOUNTER SYMPTOMS
LOWEST PAIN SEVERITY IN PAST 24 HOURS: 6/10
HIGHEST PAIN SEVERITY IN PAST 24 HOURS: 8/10
PAIN LOCATION - PAIN QUALITY: ACHING
PAIN LOCATION - PAIN FREQUENCY: FREQUENT
HIGHEST PAIN SEVERITY IN PAST 24 HOURS: 8/10
SUBJECTIVE PAIN PROGRESSION: UNCHANGED
PAIN SEVERITY GOAL: 0/10
PAIN LOCATION - PAIN FREQUENCY: CONSTANT
PAIN LOCATION - PAIN SEVERITY: 5/10
PAIN LOCATION - PAIN SEVERITY: 6/10
PAIN LOCATION - RELIEVING FACTORS: MEDICATIONS
PAIN: 1
PAIN LOCATION - PAIN QUALITY: SHARP
PAIN SEVERITY GOAL: 2/10
PAIN LOCATION: LEFT GROIN
PAIN LOCATION - EXACERBATING FACTORS: MOVEMENT
PAIN: 1
PAIN LOCATION - PAIN DURATION: SINCE FALL
LOWEST PAIN SEVERITY IN PAST 24 HOURS: 4/10
SUBJECTIVE PAIN PROGRESSION: UNCHANGED

## 2025-01-12 ASSESSMENT — ACTIVITIES OF DAILY LIVING (ADL)
PREPARING MEALS: NEEDS ASSISTANCE
DRESSING_LB_CURRENT_FUNCTION: SUPERVISION
SHOPPING: DEPENDENT
CURRENT_FUNCTION: INDEPENDENT
TOILETING: INDEPENDENT
USING THE TELPHONE: INDEPENDENT
GROOMING_WITHIN_DEFINED_LIMITS: 1
TRANSPORTATION ASSESSED: 1
TRANSPORTATION: DEPENDENT
BATHING_CURRENT_FUNCTION: STAND BY ASSIST
BATHING ASSESSED: 1
SHOPPING ASSESSED: 1
GROOMING ASSESSED: 1
ORAL_CARE_ASSESSED: 1
FEEDING: INDEPENDENT
TOILETING: 1
LAUNDRY: DEPENDENT
FEEDING_WITHIN_DEFINED_LIMITS: 1
LIGHT HOUSEKEEPING: DEPENDENT
DRESSING_UB_CURRENT_FUNCTION: INDEPENDENT
AMBULATION ASSISTANCE: 1
FEEDING ASSESSED: 1
AMBULATION ASSISTANCE: INDEPENDENT
TELEPHONE USE ASSESSED: 1
PHYSICAL TRANSFERS ASSESSED: 1
LAUNDRY ASSESSED: 1
HOUSEKEEPING ASSESSED: 1
ORAL_CARE_CURRENT_FUNCTION: INDEPENDENT
GROOMING_CURRENT_FUNCTION: INDEPENDENT

## 2025-01-12 ASSESSMENT — PATIENT HEALTH QUESTIONNAIRE - PHQ9: CLINICAL INTERPRETATION OF PHQ2 SCORE: 0

## 2025-01-13 PROCEDURE — 665998 HH PPS REVENUE CREDIT

## 2025-01-13 PROCEDURE — 665999 HH PPS REVENUE DEBIT

## 2025-01-14 ENCOUNTER — HOME CARE VISIT (OUTPATIENT)
Dept: HOME HEALTH SERVICES | Facility: HOME HEALTHCARE | Age: 85
End: 2025-01-14
Payer: MEDICARE

## 2025-01-14 PROCEDURE — 665998 HH PPS REVENUE CREDIT

## 2025-01-14 PROCEDURE — 665999 HH PPS REVENUE DEBIT

## 2025-01-14 PROCEDURE — G0151 HHCP-SERV OF PT,EA 15 MIN: HCPCS

## 2025-01-15 ENCOUNTER — HOME CARE VISIT (OUTPATIENT)
Dept: HOME HEALTH SERVICES | Facility: HOME HEALTHCARE | Age: 85
End: 2025-01-15
Payer: MEDICARE

## 2025-01-15 VITALS
RESPIRATION RATE: 16 BRPM | DIASTOLIC BLOOD PRESSURE: 70 MMHG | SYSTOLIC BLOOD PRESSURE: 122 MMHG | OXYGEN SATURATION: 95 % | TEMPERATURE: 97.5 F | HEART RATE: 62 BPM

## 2025-01-15 PROCEDURE — 665999 HH PPS REVENUE DEBIT

## 2025-01-15 PROCEDURE — 665998 HH PPS REVENUE CREDIT

## 2025-01-15 ASSESSMENT — ENCOUNTER SYMPTOMS
LOWEST PAIN SEVERITY IN PAST 24 HOURS: 4/10
SUBJECTIVE PAIN PROGRESSION: UNCHANGED
PAIN LOCATION - PAIN FREQUENCY: CONSTANT
PAIN LOCATION - PAIN QUALITY: ACHE
PAIN: 1
PAIN LOCATION - PAIN SEVERITY: 6/10
PAIN SEVERITY GOAL: 2/10
HIGHEST PAIN SEVERITY IN PAST 24 HOURS: 6/10

## 2025-01-15 ASSESSMENT — PATIENT HEALTH QUESTIONNAIRE - PHQ9: CLINICAL INTERPRETATION OF PHQ2 SCORE: 0

## 2025-01-16 ENCOUNTER — HOME CARE VISIT (OUTPATIENT)
Dept: HOME HEALTH SERVICES | Facility: HOME HEALTHCARE | Age: 85
End: 2025-01-16
Payer: MEDICARE

## 2025-01-16 PROCEDURE — 665999 HH PPS REVENUE DEBIT

## 2025-01-16 PROCEDURE — G0151 HHCP-SERV OF PT,EA 15 MIN: HCPCS

## 2025-01-16 PROCEDURE — 665998 HH PPS REVENUE CREDIT

## 2025-01-17 PROCEDURE — 665998 HH PPS REVENUE CREDIT

## 2025-01-17 PROCEDURE — 665999 HH PPS REVENUE DEBIT

## 2025-01-18 PROCEDURE — 665998 HH PPS REVENUE CREDIT

## 2025-01-18 PROCEDURE — 665999 HH PPS REVENUE DEBIT

## 2025-01-19 PROCEDURE — 665999 HH PPS REVENUE DEBIT

## 2025-01-19 PROCEDURE — 665998 HH PPS REVENUE CREDIT

## 2025-01-20 VITALS
DIASTOLIC BLOOD PRESSURE: 70 MMHG | HEART RATE: 60 BPM | SYSTOLIC BLOOD PRESSURE: 118 MMHG | TEMPERATURE: 97.6 F | OXYGEN SATURATION: 95 % | RESPIRATION RATE: 16 BRPM

## 2025-01-20 PROCEDURE — 665998 HH PPS REVENUE CREDIT

## 2025-01-20 PROCEDURE — 665999 HH PPS REVENUE DEBIT

## 2025-01-20 ASSESSMENT — ENCOUNTER SYMPTOMS
LOWEST PAIN SEVERITY IN PAST 24 HOURS: 5/10
SUBJECTIVE PAIN PROGRESSION: UNCHANGED
PAIN LOCATION - PAIN FREQUENCY: CONSTANT
PAIN SEVERITY GOAL: 2/10
PAIN LOCATION - PAIN SEVERITY: 5/10
PAIN LOCATION - PAIN QUALITY: ACHE
PAIN: 1
HIGHEST PAIN SEVERITY IN PAST 24 HOURS: 6/10

## 2025-01-20 ASSESSMENT — PATIENT HEALTH QUESTIONNAIRE - PHQ9: CLINICAL INTERPRETATION OF PHQ2 SCORE: 0

## 2025-01-21 ENCOUNTER — HOME CARE VISIT (OUTPATIENT)
Dept: HOME HEALTH SERVICES | Facility: HOME HEALTHCARE | Age: 85
End: 2025-01-21
Payer: MEDICARE

## 2025-01-21 VITALS
DIASTOLIC BLOOD PRESSURE: 60 MMHG | SYSTOLIC BLOOD PRESSURE: 110 MMHG | OXYGEN SATURATION: 97 % | HEART RATE: 65 BPM | TEMPERATURE: 97.4 F | RESPIRATION RATE: 14 BRPM

## 2025-01-21 PROCEDURE — G0299 HHS/HOSPICE OF RN EA 15 MIN: HCPCS

## 2025-01-21 PROCEDURE — 665998 HH PPS REVENUE CREDIT

## 2025-01-21 PROCEDURE — 665999 HH PPS REVENUE DEBIT

## 2025-01-21 PROCEDURE — G0151 HHCP-SERV OF PT,EA 15 MIN: HCPCS

## 2025-01-21 SDOH — ECONOMIC STABILITY: HOUSING INSECURITY: EVIDENCE OF SMOKING MATERIAL: 0

## 2025-01-21 ASSESSMENT — ENCOUNTER SYMPTOMS
HIGHEST PAIN SEVERITY IN PAST 24 HOURS: 6/10
LOWEST PAIN SEVERITY IN PAST 24 HOURS: 4/10
PAIN LOCATION - PAIN SEVERITY: 5/10
HIGHEST PAIN SEVERITY IN PAST 24 HOURS: 6/10
PAIN LOCATION - PAIN FREQUENCY: CONSTANT
PAIN SEVERITY GOAL: 2/10
PAIN LOCATION - RELIEVING FACTORS: REST/MEDS
MUSCLE WEAKNESS: 1
SUBJECTIVE PAIN PROGRESSION: GRADUALLY IMPROVING
LIMITED RANGE OF MOTION: 1
PAIN LOCATION - PAIN FREQUENCY: CONSTANT
FATIGUES EASILY: 1
LOWEST PAIN SEVERITY IN PAST 24 HOURS: 3/10
PAIN SEVERITY GOAL: 0/10
PAIN: 1
PAIN: 1
SUBJECTIVE PAIN PROGRESSION: UNCHANGED
LAST BOWEL MOVEMENT: 67226
PAIN LOCATION - PAIN QUALITY: ACHE
PAIN LOCATION - PAIN SEVERITY: 3/10

## 2025-01-21 ASSESSMENT — PATIENT HEALTH QUESTIONNAIRE - PHQ9: CLINICAL INTERPRETATION OF PHQ2 SCORE: 0

## 2025-01-22 ENCOUNTER — HOME CARE VISIT (OUTPATIENT)
Dept: HOME HEALTH SERVICES | Facility: HOME HEALTHCARE | Age: 85
End: 2025-01-22
Payer: MEDICARE

## 2025-01-22 PROCEDURE — G0152 HHCP-SERV OF OT,EA 15 MIN: HCPCS

## 2025-01-22 PROCEDURE — 665998 HH PPS REVENUE CREDIT

## 2025-01-22 PROCEDURE — 665999 HH PPS REVENUE DEBIT

## 2025-01-23 ENCOUNTER — HOME CARE VISIT (OUTPATIENT)
Dept: HOME HEALTH SERVICES | Facility: HOME HEALTHCARE | Age: 85
End: 2025-01-23
Payer: MEDICARE

## 2025-01-23 VITALS
DIASTOLIC BLOOD PRESSURE: 78 MMHG | TEMPERATURE: 97.9 F | HEART RATE: 60 BPM | RESPIRATION RATE: 16 BRPM | OXYGEN SATURATION: 93 % | SYSTOLIC BLOOD PRESSURE: 130 MMHG

## 2025-01-23 PROCEDURE — 665998 HH PPS REVENUE CREDIT

## 2025-01-23 PROCEDURE — 665999 HH PPS REVENUE DEBIT

## 2025-01-23 PROCEDURE — G0151 HHCP-SERV OF PT,EA 15 MIN: HCPCS

## 2025-01-23 ASSESSMENT — ACTIVITIES OF DAILY LIVING (ADL): AMBULATION ASSISTANCE ON FLAT SURFACES: 1

## 2025-01-23 ASSESSMENT — ENCOUNTER SYMPTOMS
PAIN LOCATION - PAIN QUALITY: ACHE
LOWEST PAIN SEVERITY IN PAST 24 HOURS: 4/10
PAIN SEVERITY GOAL: 2/10
HIGHEST PAIN SEVERITY IN PAST 24 HOURS: 7/10
PAIN LOCATION - PAIN FREQUENCY: CONSTANT
SUBJECTIVE PAIN PROGRESSION: UNCHANGED
PAIN: 1
PAIN LOCATION - PAIN SEVERITY: 6/10

## 2025-01-23 ASSESSMENT — PATIENT HEALTH QUESTIONNAIRE - PHQ9: CLINICAL INTERPRETATION OF PHQ2 SCORE: 0

## 2025-01-24 PROCEDURE — 665999 HH PPS REVENUE DEBIT

## 2025-01-24 PROCEDURE — 665998 HH PPS REVENUE CREDIT

## 2025-01-25 PROCEDURE — 665998 HH PPS REVENUE CREDIT

## 2025-01-25 PROCEDURE — 665999 HH PPS REVENUE DEBIT

## 2025-01-26 VITALS
TEMPERATURE: 98.3 F | RESPIRATION RATE: 16 BRPM | HEART RATE: 65 BPM | DIASTOLIC BLOOD PRESSURE: 60 MMHG | SYSTOLIC BLOOD PRESSURE: 100 MMHG | OXYGEN SATURATION: 92 %

## 2025-01-26 PROCEDURE — 665998 HH PPS REVENUE CREDIT

## 2025-01-26 PROCEDURE — 665999 HH PPS REVENUE DEBIT

## 2025-01-26 ASSESSMENT — ENCOUNTER SYMPTOMS
PAIN LOCATION - PAIN DURATION: MONTHS
PAIN LOCATION - RELIEVING FACTORS: MOVEMENT
HIGHEST PAIN SEVERITY IN PAST 24 HOURS: 6/10
LOWEST PAIN SEVERITY IN PAST 24 HOURS: 4/10
SUBJECTIVE PAIN PROGRESSION: UNCHANGED
PAIN LOCATION - PAIN SEVERITY: 4/10
PAIN LOCATION - PAIN FREQUENCY: FREQUENT
PAIN LOCATION: LEFT GROIN
PAIN SEVERITY GOAL: 0/10
PAIN: 1
PAIN LOCATION - PAIN QUALITY: ACHING

## 2025-01-27 PROCEDURE — 665998 HH PPS REVENUE CREDIT

## 2025-01-27 PROCEDURE — 665999 HH PPS REVENUE DEBIT

## 2025-01-28 ENCOUNTER — HOME CARE VISIT (OUTPATIENT)
Dept: HOME HEALTH SERVICES | Facility: HOME HEALTHCARE | Age: 85
End: 2025-01-28
Payer: MEDICARE

## 2025-01-28 VITALS
RESPIRATION RATE: 16 BRPM | SYSTOLIC BLOOD PRESSURE: 128 MMHG | DIASTOLIC BLOOD PRESSURE: 60 MMHG | OXYGEN SATURATION: 91 % | HEART RATE: 70 BPM | TEMPERATURE: 98.5 F

## 2025-01-28 PROCEDURE — G0151 HHCP-SERV OF PT,EA 15 MIN: HCPCS

## 2025-01-28 PROCEDURE — 665999 HH PPS REVENUE DEBIT

## 2025-01-28 PROCEDURE — G0299 HHS/HOSPICE OF RN EA 15 MIN: HCPCS

## 2025-01-28 PROCEDURE — 665998 HH PPS REVENUE CREDIT

## 2025-01-28 SDOH — ECONOMIC STABILITY: HOUSING INSECURITY: EVIDENCE OF SMOKING MATERIAL: 0

## 2025-01-28 ASSESSMENT — ENCOUNTER SYMPTOMS
PAIN LOCATION - PAIN FREQUENCY: CONSTANT
HIGHEST PAIN SEVERITY IN PAST 24 HOURS: 9/10
PAIN LOCATION - PAIN DURATION: ALWAYS
PAIN: 1
FATIGUE: 1
LIMITED RANGE OF MOTION: 1
PAIN SEVERITY GOAL: 0/10
LAST BOWEL MOVEMENT: 67232
SUBJECTIVE PAIN PROGRESSION: UNCHANGED
MUSCLE WEAKNESS: 1
LOWEST PAIN SEVERITY IN PAST 24 HOURS: 4/10
PAIN LOCATION - PAIN SEVERITY: 4/10

## 2025-01-29 PROCEDURE — 665998 HH PPS REVENUE CREDIT

## 2025-01-29 PROCEDURE — 665999 HH PPS REVENUE DEBIT

## 2025-01-29 ASSESSMENT — ENCOUNTER SYMPTOMS
SUBJECTIVE PAIN PROGRESSION: UNCHANGED
PAIN: 1
PAIN LOCATION - PAIN SEVERITY: 6/10
LOWEST PAIN SEVERITY IN PAST 24 HOURS: 6/10
HIGHEST PAIN SEVERITY IN PAST 24 HOURS: 8/10
PAIN SEVERITY GOAL: 2/10
PAIN LOCATION - PAIN QUALITY: DULL ACHE
PAIN LOCATION - PAIN FREQUENCY: CONSTANT
DEBILITATING PAIN: 1
PAIN LOCATION: LOWER BACK

## 2025-01-29 ASSESSMENT — PATIENT HEALTH QUESTIONNAIRE - PHQ9: CLINICAL INTERPRETATION OF PHQ2 SCORE: 0

## 2025-01-30 ENCOUNTER — HOME CARE VISIT (OUTPATIENT)
Dept: HOME HEALTH SERVICES | Facility: HOME HEALTHCARE | Age: 85
End: 2025-01-30
Payer: MEDICARE

## 2025-01-30 VITALS
OXYGEN SATURATION: 96 % | SYSTOLIC BLOOD PRESSURE: 128 MMHG | TEMPERATURE: 97.7 F | HEART RATE: 64 BPM | RESPIRATION RATE: 16 BRPM | DIASTOLIC BLOOD PRESSURE: 65 MMHG

## 2025-01-30 PROCEDURE — 665998 HH PPS REVENUE CREDIT

## 2025-01-30 PROCEDURE — 665999 HH PPS REVENUE DEBIT

## 2025-01-30 PROCEDURE — G0152 HHCP-SERV OF OT,EA 15 MIN: HCPCS

## 2025-01-30 ASSESSMENT — ENCOUNTER SYMPTOMS
SUBJECTIVE PAIN PROGRESSION: UNCHANGED
HIGHEST PAIN SEVERITY IN PAST 24 HOURS: 7/10
PAIN LOCATION - PAIN FREQUENCY: CONSTANT
DEBILITATING PAIN: 1
PAIN: 1
PAIN LOCATION - PAIN SEVERITY: 6/10
LOWEST PAIN SEVERITY IN PAST 24 HOURS: 5/10
PAIN LOCATION: LOWER BACK AND LEFT HIP
PAIN SEVERITY GOAL: 2/10
PAIN LOCATION - PAIN QUALITY: DULL ACHE

## 2025-01-30 ASSESSMENT — PATIENT HEALTH QUESTIONNAIRE - PHQ9: CLINICAL INTERPRETATION OF PHQ2 SCORE: 0

## 2025-01-31 ENCOUNTER — HOME CARE VISIT (OUTPATIENT)
Dept: HOME HEALTH SERVICES | Facility: HOME HEALTHCARE | Age: 85
End: 2025-01-31
Payer: MEDICARE

## 2025-01-31 VITALS
DIASTOLIC BLOOD PRESSURE: 64 MMHG | OXYGEN SATURATION: 94 % | SYSTOLIC BLOOD PRESSURE: 110 MMHG | TEMPERATURE: 98.6 F | RESPIRATION RATE: 16 BRPM | HEART RATE: 54 BPM

## 2025-01-31 ASSESSMENT — ENCOUNTER SYMPTOMS
PAIN LOCATION - PAIN DURATION: WEEKS
PAIN LOCATION - PAIN QUALITY: ACHING
PAIN SEVERITY GOAL: 0/10
LOWEST PAIN SEVERITY IN PAST 24 HOURS: 5/10
HIGHEST PAIN SEVERITY IN PAST 24 HOURS: 9/10
PAIN LOCATION - PAIN FREQUENCY: FREQUENT
PAIN LOCATION: BACK
PAIN LOCATION - PAIN SEVERITY: 5/10
PAIN: 1
SUBJECTIVE PAIN PROGRESSION: UNCHANGED
DEBILITATING PAIN: 1

## 2025-01-31 ASSESSMENT — FIBROSIS 4 INDEX: FIB4 SCORE: 2.03

## 2025-02-04 ENCOUNTER — HOME CARE VISIT (OUTPATIENT)
Dept: HOME HEALTH SERVICES | Facility: HOME HEALTHCARE | Age: 85
End: 2025-02-04
Payer: MEDICARE

## 2025-02-04 VITALS
HEART RATE: 54 BPM | TEMPERATURE: 97.5 F | DIASTOLIC BLOOD PRESSURE: 60 MMHG | OXYGEN SATURATION: 93 % | RESPIRATION RATE: 16 BRPM | SYSTOLIC BLOOD PRESSURE: 112 MMHG

## 2025-02-04 VITALS
RESPIRATION RATE: 16 BRPM | BODY MASS INDEX: 19.24 KG/M2 | TEMPERATURE: 98.9 F | DIASTOLIC BLOOD PRESSURE: 60 MMHG | SYSTOLIC BLOOD PRESSURE: 108 MMHG | HEIGHT: 60 IN | WEIGHT: 98 LBS | OXYGEN SATURATION: 94 % | HEART RATE: 65 BPM

## 2025-02-04 PROCEDURE — G0299 HHS/HOSPICE OF RN EA 15 MIN: HCPCS

## 2025-02-04 PROCEDURE — G0151 HHCP-SERV OF PT,EA 15 MIN: HCPCS

## 2025-02-04 SDOH — ECONOMIC STABILITY: HOUSING INSECURITY: EVIDENCE OF SMOKING MATERIAL: 0

## 2025-02-04 ASSESSMENT — ENCOUNTER SYMPTOMS
PAIN: 1
PAIN: 1
LAST BOWEL MOVEMENT: 67240
PAIN LOCATION - PAIN FREQUENCY: CONSTANT
SUBJECTIVE PAIN PROGRESSION: UNCHANGED
PAIN LOCATION - PAIN DURATION: CHRONIC
LOWEST PAIN SEVERITY IN PAST 24 HOURS: 5/10
SUBJECTIVE PAIN PROGRESSION: UNCHANGED
PAIN LOCATION: L HIP
PAIN SEVERITY GOAL: 2/10
PAIN LOCATION - PAIN FREQUENCY: CONSTANT
HIGHEST PAIN SEVERITY IN PAST 24 HOURS: 7/10
PAIN LOCATION - PAIN QUALITY: DULL ACHE
PAIN: 1
PAIN LOCATION - EXACERBATING FACTORS: AMBULATION/MOVEMENT
LOWEST PAIN SEVERITY IN PAST 24 HOURS: 5/10
DEBILITATING PAIN: 1
PERSON REPORTING PAIN: PATIENT
PAIN LOCATION - RELIEVING FACTORS: REST MEDS
PAIN LOCATION - PAIN SEVERITY: 6/10
PAIN SEVERITY GOAL: 0/10
HIGHEST PAIN SEVERITY IN PAST 24 HOURS: 8/10
PAIN LOCATION - PAIN SEVERITY: 5/10

## 2025-02-04 ASSESSMENT — PATIENT HEALTH QUESTIONNAIRE - PHQ9
CLINICAL INTERPRETATION OF PHQ2 SCORE: 0
CLINICAL INTERPRETATION OF PHQ2 SCORE: 0

## 2025-02-04 ASSESSMENT — ACTIVITIES OF DAILY LIVING (ADL): AMBULATION ASSISTANCE ON FLAT SURFACES: 1

## 2025-02-06 ENCOUNTER — HOME CARE VISIT (OUTPATIENT)
Dept: HOME HEALTH SERVICES | Facility: HOME HEALTHCARE | Age: 85
End: 2025-02-06
Payer: MEDICARE

## 2025-02-06 PROCEDURE — G0152 HHCP-SERV OF OT,EA 15 MIN: HCPCS

## 2025-02-06 PROCEDURE — G0151 HHCP-SERV OF PT,EA 15 MIN: HCPCS

## 2025-02-07 ASSESSMENT — ENCOUNTER SYMPTOMS
LOWEST PAIN SEVERITY IN PAST 24 HOURS: 3/10
PAIN LOCATION - PAIN QUALITY: SHOOTING
SUBJECTIVE PAIN PROGRESSION: UNCHANGED
PAIN: 1
PAIN LOCATION - EXACERBATING FACTORS: MOVEMENTS
PAIN LOCATION - PAIN FREQUENCY: FREQUENT
PAIN LOCATION - PAIN SEVERITY: 4/10
PAIN SEVERITY GOAL: 0/10
HIGHEST PAIN SEVERITY IN PAST 24 HOURS: 8/10
PAIN LOCATION - RELIEVING FACTORS: REST
PAIN LOCATION: BACK
PAIN LOCATION - PAIN DURATION: MONTHS

## 2025-02-09 VITALS
DIASTOLIC BLOOD PRESSURE: 60 MMHG | RESPIRATION RATE: 16 BRPM | SYSTOLIC BLOOD PRESSURE: 110 MMHG | HEART RATE: 60 BPM | OXYGEN SATURATION: 94 % | TEMPERATURE: 97.8 F

## 2025-02-09 ASSESSMENT — ENCOUNTER SYMPTOMS
LOWEST PAIN SEVERITY IN PAST 24 HOURS: 5/10
PAIN SEVERITY GOAL: 2/10
PAIN: 1
SUBJECTIVE PAIN PROGRESSION: UNCHANGED
DEBILITATING PAIN: 1
PAIN LOCATION - PAIN QUALITY: ACHE
HIGHEST PAIN SEVERITY IN PAST 24 HOURS: 8/10
PAIN LOCATION - PAIN FREQUENCY: CONSTANT
PAIN LOCATION - PAIN SEVERITY: 6/10

## 2025-02-09 ASSESSMENT — PATIENT HEALTH QUESTIONNAIRE - PHQ9: CLINICAL INTERPRETATION OF PHQ2 SCORE: 0

## 2025-02-11 ENCOUNTER — HOME CARE VISIT (OUTPATIENT)
Dept: HOME HEALTH SERVICES | Facility: HOME HEALTHCARE | Age: 85
End: 2025-02-11
Payer: MEDICARE

## 2025-02-11 PROCEDURE — G0151 HHCP-SERV OF PT,EA 15 MIN: HCPCS

## 2025-02-11 ASSESSMENT — ENCOUNTER SYMPTOMS
PAIN LOCATION - PAIN SEVERITY: 6/10
LOWEST PAIN SEVERITY IN PAST 24 HOURS: 6/10
SUBJECTIVE PAIN PROGRESSION: UNCHANGED
PAIN LOCATION - PAIN QUALITY: ACHE
PAIN SEVERITY GOAL: 2/10
HIGHEST PAIN SEVERITY IN PAST 24 HOURS: 7/10
PAIN: 1
PAIN LOCATION - PAIN FREQUENCY: CONSTANT

## 2025-02-11 ASSESSMENT — PATIENT HEALTH QUESTIONNAIRE - PHQ9: CLINICAL INTERPRETATION OF PHQ2 SCORE: 0

## 2025-02-13 ENCOUNTER — HOME CARE VISIT (OUTPATIENT)
Dept: HOME HEALTH SERVICES | Facility: HOME HEALTHCARE | Age: 85
End: 2025-02-13
Payer: MEDICARE

## 2025-02-13 PROCEDURE — G0151 HHCP-SERV OF PT,EA 15 MIN: HCPCS

## 2025-02-13 ASSESSMENT — FIBROSIS 4 INDEX: FIB4 SCORE: 2.03

## 2025-02-16 VITALS
DIASTOLIC BLOOD PRESSURE: 72 MMHG | RESPIRATION RATE: 16 BRPM | OXYGEN SATURATION: 96 % | BODY MASS INDEX: 19.24 KG/M2 | WEIGHT: 98 LBS | HEIGHT: 60 IN | TEMPERATURE: 98.1 F | SYSTOLIC BLOOD PRESSURE: 125 MMHG | HEART RATE: 82 BPM

## 2025-02-16 SDOH — ECONOMIC STABILITY: HOUSING INSECURITY: EVIDENCE OF SMOKING MATERIAL: 0

## 2025-02-16 ASSESSMENT — ENCOUNTER SYMPTOMS
SUBJECTIVE PAIN PROGRESSION: UNCHANGED
LOWEST PAIN SEVERITY IN PAST 24 HOURS: 4/10
PAIN LOCATION - PAIN FREQUENCY: CONSTANT
PAIN LOCATION - PAIN QUALITY: DULL ACHE
PAIN LOCATION - PAIN SEVERITY: 4/10
PAIN SEVERITY GOAL: 2/10
PAIN: 1
PAIN LOCATION - PAIN DURATION: CHRONIC
HIGHEST PAIN SEVERITY IN PAST 24 HOURS: 6/10

## 2025-02-16 ASSESSMENT — PATIENT HEALTH QUESTIONNAIRE - PHQ9: CLINICAL INTERPRETATION OF PHQ2 SCORE: 0

## 2025-02-16 ASSESSMENT — ACTIVITIES OF DAILY LIVING (ADL)
HOME_HEALTH_OASIS: 01
OASIS_M1830: 02

## 2025-03-14 ENCOUNTER — APPOINTMENT (OUTPATIENT)
Dept: RADIOLOGY | Facility: MEDICAL CENTER | Age: 85
DRG: 956 | End: 2025-03-14
Attending: ORTHOPAEDIC SURGERY
Payer: MEDICARE

## 2025-03-14 ENCOUNTER — ANESTHESIA EVENT (OUTPATIENT)
Dept: SURGERY | Facility: MEDICAL CENTER | Age: 85
DRG: 956 | End: 2025-03-14
Payer: MEDICARE

## 2025-03-14 ENCOUNTER — HOSPITAL ENCOUNTER (INPATIENT)
Facility: MEDICAL CENTER | Age: 85
LOS: 4 days | DRG: 956 | End: 2025-03-18
Attending: STUDENT IN AN ORGANIZED HEALTH CARE EDUCATION/TRAINING PROGRAM | Admitting: STUDENT IN AN ORGANIZED HEALTH CARE EDUCATION/TRAINING PROGRAM
Payer: MEDICARE

## 2025-03-14 ENCOUNTER — APPOINTMENT (OUTPATIENT)
Dept: RADIOLOGY | Facility: MEDICAL CENTER | Age: 85
DRG: 956 | End: 2025-03-14
Attending: STUDENT IN AN ORGANIZED HEALTH CARE EDUCATION/TRAINING PROGRAM
Payer: MEDICARE

## 2025-03-14 ENCOUNTER — ANESTHESIA (OUTPATIENT)
Dept: SURGERY | Facility: MEDICAL CENTER | Age: 85
DRG: 956 | End: 2025-03-14
Payer: MEDICARE

## 2025-03-14 DIAGNOSIS — G62.9 PERIPHERAL POLYNEUROPATHY: ICD-10-CM

## 2025-03-14 DIAGNOSIS — M19.019 PRIMARY LOCALIZED OSTEOARTHROSIS OF SHOULDER REGION, UNSPECIFIED LATERALITY: ICD-10-CM

## 2025-03-14 DIAGNOSIS — E87.1 HYPONATREMIA: ICD-10-CM
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
## 2025-03-14 DIAGNOSIS — I48.91 ATRIAL FIBRILLATION, UNSPECIFIED TYPE (HCC): ICD-10-CM

## 2025-03-14 DIAGNOSIS — R54 AGE-RELATED PHYSICAL DEBILITY: ICD-10-CM

## 2025-03-14 DIAGNOSIS — S72.002A LEFT DISPLACED FEMORAL NECK FRACTURE (HCC): ICD-10-CM

## 2025-03-14 DIAGNOSIS — K21.9 GASTROESOPHAGEAL REFLUX DISEASE, UNSPECIFIED WHETHER ESOPHAGITIS PRESENT: Chronic | ICD-10-CM

## 2025-03-14 DIAGNOSIS — S72.001A CLOSED DISPLACED FRACTURE OF RIGHT FEMORAL NECK (HCC): ICD-10-CM

## 2025-03-14 DIAGNOSIS — Z96.649 S/P HIP HEMIARTHROPLASTY: ICD-10-CM

## 2025-03-14 DIAGNOSIS — I10 PRIMARY HYPERTENSION: Chronic | ICD-10-CM

## 2025-03-14 DIAGNOSIS — E03.9 HYPOTHYROIDISM, UNSPECIFIED TYPE: Chronic | ICD-10-CM

## 2025-03-14 PROBLEM — Z01.818 ENCOUNTER FOR PREOPERATIVE ASSESSMENT: Status: ACTIVE | Noted: 2025-03-14

## 2025-03-14 PROBLEM — E83.51 HYPOCALCEMIA: Status: ACTIVE | Noted: 2025-03-14

## 2025-03-14 PROBLEM — Z71.89 ACP (ADVANCE CARE PLANNING): Status: ACTIVE | Noted: 2025-03-14

## 2025-03-14 PROBLEM — S32.592A CLOSED FRACTURE OF RAMUS OF LEFT PUBIS (HCC): Status: ACTIVE | Noted: 2025-03-14

## 2025-03-14 PROBLEM — I49.9 ARRHYTHMIA: Status: ACTIVE | Noted: 2025-03-14

## 2025-03-14 PROBLEM — E87.6 HYPOKALEMIA: Status: ACTIVE | Noted: 2025-03-14

## 2025-03-14 LAB
ABO + RH BLD: NORMAL
ABO GROUP BLD: NORMAL
ALBUMIN SERPL BCP-MCNC: 3.3 G/DL (ref 3.2–4.9)
ALBUMIN/GLOB SERPL: 1.6 G/DL
ALP SERPL-CCNC: 90 U/L (ref 30–99)
ALT SERPL-CCNC: 16 U/L (ref 2–50)
ANION GAP SERPL CALC-SCNC: 8 MMOL/L (ref 7–16)
AST SERPL-CCNC: 24 U/L (ref 12–45)
BASOPHILS # BLD AUTO: 0.5 % (ref 0–1.8)
BASOPHILS # BLD: 0.02 K/UL (ref 0–0.12)
BILIRUB SERPL-MCNC: <0.2 MG/DL (ref 0.1–1.5)
BLD GP AB SCN SERPL QL: NORMAL
BUN SERPL-MCNC: 16 MG/DL (ref 8–22)
CALCIUM ALBUM COR SERPL-MCNC: 7.8 MG/DL (ref 8.5–10.5)
CALCIUM SERPL-MCNC: 7.2 MG/DL (ref 8.4–10.2)
CHLORIDE SERPL-SCNC: 97 MMOL/L (ref 96–112)
CK SERPL-CCNC: 213 U/L (ref 0–154)
CO2 SERPL-SCNC: 24 MMOL/L (ref 20–33)
CREAT SERPL-MCNC: 0.47 MG/DL (ref 0.5–1.4)
CRP SERPL HS-MCNC: <0.3 MG/DL (ref 0–0.75)
EKG IMPRESSION: NORMAL
EOSINOPHIL # BLD AUTO: 0.04 K/UL (ref 0–0.51)
EOSINOPHIL NFR BLD: 1 % (ref 0–6.9)
ERYTHROCYTE [DISTWIDTH] IN BLOOD BY AUTOMATED COUNT: 47 FL (ref 35.9–50)
GFR SERPLBLD CREATININE-BSD FMLA CKD-EPI: 93 ML/MIN/1.73 M 2
GLOBULIN SER CALC-MCNC: 2.1 G/DL (ref 1.9–3.5)
GLUCOSE SERPL-MCNC: 85 MG/DL (ref 65–99)
HCT VFR BLD AUTO: 34.5 % (ref 37–47)
HGB BLD-MCNC: 11.6 G/DL (ref 12–16)
IMM GRANULOCYTES # BLD AUTO: 0.03 K/UL (ref 0–0.11)
IMM GRANULOCYTES NFR BLD AUTO: 0.7 % (ref 0–0.9)
INR PPP: 0.9 (ref 0.87–1.13)
LYMPHOCYTES # BLD AUTO: 1.47 K/UL (ref 1–4.8)
LYMPHOCYTES NFR BLD: 36.4 % (ref 22–41)
MAGNESIUM SERPL-MCNC: 2.3 MG/DL (ref 1.5–2.5)
MCH RBC QN AUTO: 33.1 PG (ref 27–33)
MCHC RBC AUTO-ENTMCNC: 33.6 G/DL (ref 32.2–35.5)
MCV RBC AUTO: 98.6 FL (ref 81.4–97.8)
MONOCYTES # BLD AUTO: 0.64 K/UL (ref 0–0.85)
MONOCYTES NFR BLD AUTO: 15.8 % (ref 0–13.4)
NEUTROPHILS # BLD AUTO: 1.84 K/UL (ref 1.82–7.42)
NEUTROPHILS NFR BLD: 45.6 % (ref 44–72)
NRBC # BLD AUTO: 0 K/UL
NRBC BLD-RTO: 0 /100 WBC (ref 0–0.2)
NT-PROBNP SERPL IA-MCNC: 231 PG/ML (ref 0–125)
OSMOLALITY SERPL: 267 MOSM/KG H2O (ref 278–298)
PHOSPHATE SERPL-MCNC: 3.7 MG/DL (ref 2.5–4.5)
PLATELET # BLD AUTO: 250 K/UL (ref 164–446)
PMV BLD AUTO: 8.8 FL (ref 9–12.9)
POTASSIUM SERPL-SCNC: 3.5 MMOL/L (ref 3.6–5.5)
PROT SERPL-MCNC: 5.4 G/DL (ref 6–8.2)
PROTHROMBIN TIME: 12.5 SEC (ref 12–14.6)
RBC # BLD AUTO: 3.5 M/UL (ref 4.2–5.4)
RH BLD: NORMAL
SODIUM SERPL-SCNC: 129 MMOL/L (ref 135–145)
WBC # BLD AUTO: 4 K/UL (ref 4.8–10.8)

## 2025-03-14 PROCEDURE — 86140 C-REACTIVE PROTEIN: CPT

## 2025-03-14 PROCEDURE — 99497 ADVNCD CARE PLAN 30 MIN: CPT | Mod: 25 | Performed by: STUDENT IN AN ORGANIZED HEALTH CARE EDUCATION/TRAINING PROGRAM

## 2025-03-14 PROCEDURE — 160031 HCHG SURGERY MINUTES - 1ST 30 MINS LEVEL 5: Performed by: ORTHOPAEDIC SURGERY

## 2025-03-14 PROCEDURE — 94760 N-INVAS EAR/PLS OXIMETRY 1: CPT

## 2025-03-14 PROCEDURE — 73502 X-RAY EXAM HIP UNI 2-3 VIEWS: CPT | Mod: LT

## 2025-03-14 PROCEDURE — 700101 HCHG RX REV CODE 250: Performed by: ORTHOPAEDIC SURGERY

## 2025-03-14 PROCEDURE — 770001 HCHG ROOM/CARE - MED/SURG/GYN PRIV*

## 2025-03-14 PROCEDURE — 82550 ASSAY OF CK (CPK): CPT

## 2025-03-14 PROCEDURE — 700101 HCHG RX REV CODE 250: Performed by: STUDENT IN AN ORGANIZED HEALTH CARE EDUCATION/TRAINING PROGRAM

## 2025-03-14 PROCEDURE — 96368 THER/DIAG CONCURRENT INF: CPT

## 2025-03-14 PROCEDURE — 36415 COLL VENOUS BLD VENIPUNCTURE: CPT

## 2025-03-14 PROCEDURE — 96365 THER/PROPH/DIAG IV INF INIT: CPT

## 2025-03-14 PROCEDURE — 96366 THER/PROPH/DIAG IV INF ADDON: CPT

## 2025-03-14 PROCEDURE — 700111 HCHG RX REV CODE 636 W/ 250 OVERRIDE (IP): Performed by: STUDENT IN AN ORGANIZED HEALTH CARE EDUCATION/TRAINING PROGRAM

## 2025-03-14 PROCEDURE — 700105 HCHG RX REV CODE 258: Performed by: ORTHOPAEDIC SURGERY

## 2025-03-14 PROCEDURE — 160015 HCHG STAT PREOP MINUTES: Performed by: ORTHOPAEDIC SURGERY

## 2025-03-14 PROCEDURE — 700111 HCHG RX REV CODE 636 W/ 250 OVERRIDE (IP): Mod: JZ | Performed by: STUDENT IN AN ORGANIZED HEALTH CARE EDUCATION/TRAINING PROGRAM

## 2025-03-14 PROCEDURE — 80053 COMPREHEN METABOLIC PANEL: CPT

## 2025-03-14 PROCEDURE — 72170 X-RAY EXAM OF PELVIS: CPT

## 2025-03-14 PROCEDURE — 96367 TX/PROPH/DG ADDL SEQ IV INF: CPT

## 2025-03-14 PROCEDURE — 85610 PROTHROMBIN TIME: CPT

## 2025-03-14 PROCEDURE — 0SRS01A REPLACEMENT OF LEFT HIP JOINT, FEMORAL SURFACE WITH METAL SYNTHETIC SUBSTITUTE, UNCEMENTED, OPEN APPROACH: ICD-10-PCS | Performed by: ORTHOPAEDIC SURGERY

## 2025-03-14 PROCEDURE — 160002 HCHG RECOVERY MINUTES (STAT): Performed by: ORTHOPAEDIC SURGERY

## 2025-03-14 PROCEDURE — 85025 COMPLETE CBC W/AUTO DIFF WBC: CPT

## 2025-03-14 PROCEDURE — 700102 HCHG RX REV CODE 250 W/ 637 OVERRIDE(OP): Performed by: STUDENT IN AN ORGANIZED HEALTH CARE EDUCATION/TRAINING PROGRAM

## 2025-03-14 PROCEDURE — 86850 RBC ANTIBODY SCREEN: CPT

## 2025-03-14 PROCEDURE — A9270 NON-COVERED ITEM OR SERVICE: HCPCS | Performed by: STUDENT IN AN ORGANIZED HEALTH CARE EDUCATION/TRAINING PROGRAM

## 2025-03-14 PROCEDURE — 99223 1ST HOSP IP/OBS HIGH 75: CPT | Mod: AI | Performed by: STUDENT IN AN ORGANIZED HEALTH CARE EDUCATION/TRAINING PROGRAM

## 2025-03-14 PROCEDURE — 86901 BLOOD TYPING SEROLOGIC RH(D): CPT

## 2025-03-14 PROCEDURE — 160009 HCHG ANES TIME/MIN: Performed by: ORTHOPAEDIC SURGERY

## 2025-03-14 PROCEDURE — 502000 HCHG MISC OR IMPLANTS RC 0278: Performed by: ORTHOPAEDIC SURGERY

## 2025-03-14 PROCEDURE — 160048 HCHG OR STATISTICAL LEVEL 1-5: Performed by: ORTHOPAEDIC SURGERY

## 2025-03-14 PROCEDURE — 83735 ASSAY OF MAGNESIUM: CPT

## 2025-03-14 PROCEDURE — 700111 HCHG RX REV CODE 636 W/ 250 OVERRIDE (IP): Performed by: ORTHOPAEDIC SURGERY

## 2025-03-14 PROCEDURE — 83880 ASSAY OF NATRIURETIC PEPTIDE: CPT

## 2025-03-14 PROCEDURE — 96375 TX/PRO/DX INJ NEW DRUG ADDON: CPT

## 2025-03-14 PROCEDURE — 160042 HCHG SURGERY MINUTES - EA ADDL 1 MIN LEVEL 5: Performed by: ORTHOPAEDIC SURGERY

## 2025-03-14 PROCEDURE — C1776 JOINT DEVICE (IMPLANTABLE): HCPCS | Performed by: ORTHOPAEDIC SURGERY

## 2025-03-14 PROCEDURE — 160035 HCHG PACU - 1ST 60 MINS PHASE I: Performed by: ORTHOPAEDIC SURGERY

## 2025-03-14 PROCEDURE — 71045 X-RAY EXAM CHEST 1 VIEW: CPT

## 2025-03-14 PROCEDURE — 86900 BLOOD TYPING SEROLOGIC ABO: CPT

## 2025-03-14 PROCEDURE — 83930 ASSAY OF BLOOD OSMOLALITY: CPT

## 2025-03-14 PROCEDURE — 84100 ASSAY OF PHOSPHORUS: CPT

## 2025-03-14 PROCEDURE — 99291 CRITICAL CARE FIRST HOUR: CPT

## 2025-03-14 PROCEDURE — 93005 ELECTROCARDIOGRAM TRACING: CPT | Mod: TC | Performed by: STUDENT IN AN ORGANIZED HEALTH CARE EDUCATION/TRAINING PROGRAM

## 2025-03-14 DEVICE — IMPLANTABLE DEVICE: Type: IMPLANTABLE DEVICE | Site: HIP | Status: FUNCTIONAL

## 2025-03-14 RX ORDER — ONDANSETRON 2 MG/ML
4 INJECTION INTRAMUSCULAR; INTRAVENOUS
Status: DISCONTINUED | OUTPATIENT
Start: 2025-03-14 | End: 2025-03-14 | Stop reason: HOSPADM

## 2025-03-14 RX ORDER — LIDOCAINE HYDROCHLORIDE 20 MG/ML
INJECTION, SOLUTION EPIDURAL; INFILTRATION; INTRACAUDAL; PERINEURAL PRN
Status: DISCONTINUED | OUTPATIENT
Start: 2025-03-14 | End: 2025-03-14 | Stop reason: SURG

## 2025-03-14 RX ORDER — HALOPERIDOL 5 MG/ML
1 INJECTION INTRAMUSCULAR
Status: DISCONTINUED | OUTPATIENT
Start: 2025-03-14 | End: 2025-03-14 | Stop reason: HOSPADM

## 2025-03-14 RX ORDER — ROCURONIUM BROMIDE 10 MG/ML
INJECTION, SOLUTION INTRAVENOUS PRN
Status: DISCONTINUED | OUTPATIENT
Start: 2025-03-14 | End: 2025-03-14 | Stop reason: SURG

## 2025-03-14 RX ORDER — TRANEXAMIC ACID 100 MG/ML
INJECTION, SOLUTION INTRAVENOUS PRN
Status: DISCONTINUED | OUTPATIENT
Start: 2025-03-14 | End: 2025-03-14 | Stop reason: SURG

## 2025-03-14 RX ORDER — HYDROMORPHONE HYDROCHLORIDE 1 MG/ML
0.4 INJECTION, SOLUTION INTRAMUSCULAR; INTRAVENOUS; SUBCUTANEOUS
Status: DISCONTINUED | OUTPATIENT
Start: 2025-03-14 | End: 2025-03-14 | Stop reason: HOSPADM

## 2025-03-14 RX ORDER — ACETAMINOPHEN 325 MG/1
650 TABLET ORAL EVERY 6 HOURS PRN
Status: DISCONTINUED | OUTPATIENT
Start: 2025-03-19 | End: 2025-03-18 | Stop reason: HOSPADM

## 2025-03-14 RX ORDER — DEXMEDETOMIDINE HYDROCHLORIDE 100 UG/ML
INJECTION, SOLUTION INTRAVENOUS PRN
Status: DISCONTINUED | OUTPATIENT
Start: 2025-03-14 | End: 2025-03-14 | Stop reason: HOSPADM

## 2025-03-14 RX ORDER — SODIUM CHLORIDE, SODIUM LACTATE, POTASSIUM CHLORIDE, CALCIUM CHLORIDE 600; 310; 30; 20 MG/100ML; MG/100ML; MG/100ML; MG/100ML
INJECTION, SOLUTION INTRAVENOUS CONTINUOUS
Status: ACTIVE | OUTPATIENT
Start: 2025-03-14 | End: 2025-03-14

## 2025-03-14 RX ORDER — POTASSIUM CHLORIDE 1500 MG/1
20 TABLET, EXTENDED RELEASE ORAL DAILY
Status: DISCONTINUED | OUTPATIENT
Start: 2025-03-14 | End: 2025-03-14

## 2025-03-14 RX ORDER — MORPHINE SULFATE 4 MG/ML
4 INJECTION INTRAVENOUS
Status: DISCONTINUED | OUTPATIENT
Start: 2025-03-14 | End: 2025-03-18 | Stop reason: HOSPADM

## 2025-03-14 RX ORDER — DIPHENHYDRAMINE HYDROCHLORIDE 50 MG/ML
12.5 INJECTION, SOLUTION INTRAMUSCULAR; INTRAVENOUS
Status: DISCONTINUED | OUTPATIENT
Start: 2025-03-14 | End: 2025-03-14 | Stop reason: HOSPADM

## 2025-03-14 RX ORDER — EPHEDRINE SULFATE 50 MG/ML
INJECTION, SOLUTION INTRAVENOUS PRN
Status: DISCONTINUED | OUTPATIENT
Start: 2025-03-14 | End: 2025-03-14 | Stop reason: SURG

## 2025-03-14 RX ORDER — CEFAZOLIN SODIUM 1 G/3ML
INJECTION, POWDER, FOR SOLUTION INTRAMUSCULAR; INTRAVENOUS PRN
Status: DISCONTINUED | OUTPATIENT
Start: 2025-03-14 | End: 2025-03-14 | Stop reason: HOSPADM

## 2025-03-14 RX ORDER — OXYCODONE HYDROCHLORIDE 10 MG/1
10 TABLET ORAL
Refills: 0 | Status: DISCONTINUED | OUTPATIENT
Start: 2025-03-14 | End: 2025-03-18 | Stop reason: HOSPADM

## 2025-03-14 RX ORDER — ALENDRONATE SODIUM 70 MG/1
70 TABLET ORAL
Status: DISCONTINUED | OUTPATIENT
Start: 2025-03-17 | End: 2025-03-18 | Stop reason: HOSPADM

## 2025-03-14 RX ORDER — OXYCODONE HCL 5 MG/5 ML
5 SOLUTION, ORAL ORAL
Status: COMPLETED | OUTPATIENT
Start: 2025-03-14 | End: 2025-03-14

## 2025-03-14 RX ORDER — GABAPENTIN 100 MG/1
100 CAPSULE ORAL 3 TIMES DAILY PRN
Status: DISCONTINUED | OUTPATIENT
Start: 2025-03-14 | End: 2025-03-14

## 2025-03-14 RX ORDER — OXYCODONE HYDROCHLORIDE 5 MG/1
5 TABLET ORAL
Refills: 0 | Status: DISCONTINUED | OUTPATIENT
Start: 2025-03-14 | End: 2025-03-18 | Stop reason: HOSPADM

## 2025-03-14 RX ORDER — SODIUM CHLORIDE, SODIUM LACTATE, POTASSIUM CHLORIDE, AND CALCIUM CHLORIDE .6; .31; .03; .02 G/100ML; G/100ML; G/100ML; G/100ML
500 INJECTION, SOLUTION INTRAVENOUS
Status: DISCONTINUED | OUTPATIENT
Start: 2025-03-14 | End: 2025-03-18 | Stop reason: HOSPADM

## 2025-03-14 RX ORDER — FLECAINIDE ACETATE 50 MG/1
25 TABLET ORAL 2 TIMES DAILY
Status: DISCONTINUED | OUTPATIENT
Start: 2025-03-15 | End: 2025-03-18 | Stop reason: HOSPADM

## 2025-03-14 RX ORDER — LABETALOL HYDROCHLORIDE 5 MG/ML
5 INJECTION, SOLUTION INTRAVENOUS
Status: DISCONTINUED | OUTPATIENT
Start: 2025-03-14 | End: 2025-03-14 | Stop reason: HOSPADM

## 2025-03-14 RX ORDER — ROPIVACAINE HYDROCHLORIDE 5 MG/ML
INJECTION, SOLUTION EPIDURAL; INFILTRATION; PERINEURAL
Status: DISCONTINUED | OUTPATIENT
Start: 2025-03-14 | End: 2025-03-14 | Stop reason: HOSPADM

## 2025-03-14 RX ORDER — ALBUTEROL SULFATE 5 MG/ML
2.5 SOLUTION RESPIRATORY (INHALATION)
Status: DISCONTINUED | OUTPATIENT
Start: 2025-03-14 | End: 2025-03-14 | Stop reason: HOSPADM

## 2025-03-14 RX ORDER — HYDROMORPHONE HYDROCHLORIDE 1 MG/ML
0.1 INJECTION, SOLUTION INTRAMUSCULAR; INTRAVENOUS; SUBCUTANEOUS
Status: DISCONTINUED | OUTPATIENT
Start: 2025-03-14 | End: 2025-03-14 | Stop reason: HOSPADM

## 2025-03-14 RX ORDER — POLYETHYLENE GLYCOL 3350 17 G/17G
1 POWDER, FOR SOLUTION ORAL
Status: DISCONTINUED | OUTPATIENT
Start: 2025-03-14 | End: 2025-03-18 | Stop reason: HOSPADM

## 2025-03-14 RX ORDER — FLECAINIDE ACETATE 50 MG/1
50 TABLET ORAL 2 TIMES DAILY
Status: DISCONTINUED | OUTPATIENT
Start: 2025-03-14 | End: 2025-03-14

## 2025-03-14 RX ORDER — SODIUM CHLORIDE 9 MG/ML
INJECTION, SOLUTION INTRAMUSCULAR; INTRAVENOUS; SUBCUTANEOUS
Status: DISCONTINUED | OUTPATIENT
Start: 2025-03-14 | End: 2025-03-14 | Stop reason: HOSPADM

## 2025-03-14 RX ORDER — GABAPENTIN 100 MG/1
100 CAPSULE ORAL 3 TIMES DAILY PRN
Status: DISCONTINUED | OUTPATIENT
Start: 2025-03-14 | End: 2025-03-18 | Stop reason: HOSPADM

## 2025-03-14 RX ORDER — ONDANSETRON 4 MG/1
4 TABLET, ORALLY DISINTEGRATING ORAL EVERY 4 HOURS PRN
Status: DISCONTINUED | OUTPATIENT
Start: 2025-03-14 | End: 2025-03-18 | Stop reason: HOSPADM

## 2025-03-14 RX ORDER — PHENYLEPHRINE HCL IN 0.9% NACL 1 MG/10 ML
SYRINGE (ML) INTRAVENOUS PRN
Status: DISCONTINUED | OUTPATIENT
Start: 2025-03-14 | End: 2025-03-14 | Stop reason: SURG

## 2025-03-14 RX ORDER — ONDANSETRON 2 MG/ML
4 INJECTION INTRAMUSCULAR; INTRAVENOUS EVERY 4 HOURS PRN
Status: DISCONTINUED | OUTPATIENT
Start: 2025-03-14 | End: 2025-03-18 | Stop reason: HOSPADM

## 2025-03-14 RX ORDER — LISINOPRIL 20 MG/1
20 TABLET ORAL DAILY
Status: DISCONTINUED | OUTPATIENT
Start: 2025-03-14 | End: 2025-03-18 | Stop reason: HOSPADM

## 2025-03-14 RX ORDER — AMOXICILLIN 250 MG
2 CAPSULE ORAL EVERY EVENING
Status: DISCONTINUED | OUTPATIENT
Start: 2025-03-14 | End: 2025-03-18 | Stop reason: HOSPADM

## 2025-03-14 RX ORDER — SODIUM CHLORIDE AND POTASSIUM CHLORIDE 300; 900 MG/100ML; MG/100ML
INJECTION, SOLUTION INTRAVENOUS CONTINUOUS
Status: DISPENSED | OUTPATIENT
Start: 2025-03-14 | End: 2025-03-14

## 2025-03-14 RX ORDER — ACETAMINOPHEN 325 MG/1
650 TABLET ORAL EVERY 6 HOURS PRN
Status: DISCONTINUED | OUTPATIENT
Start: 2025-03-14 | End: 2025-03-14

## 2025-03-14 RX ORDER — DEXAMETHASONE SODIUM PHOSPHATE 4 MG/ML
INJECTION, SOLUTION INTRA-ARTICULAR; INTRALESIONAL; INTRAMUSCULAR; INTRAVENOUS; SOFT TISSUE PRN
Status: DISCONTINUED | OUTPATIENT
Start: 2025-03-14 | End: 2025-03-14 | Stop reason: SURG

## 2025-03-14 RX ORDER — ENOXAPARIN SODIUM 100 MG/ML
40 INJECTION SUBCUTANEOUS DAILY
Status: DISCONTINUED | OUTPATIENT
Start: 2025-03-14 | End: 2025-03-17

## 2025-03-14 RX ORDER — CHLORAL HYDRATE 500 MG
1000 CAPSULE ORAL DAILY
Status: DISCONTINUED | OUTPATIENT
Start: 2025-03-14 | End: 2025-03-18 | Stop reason: HOSPADM

## 2025-03-14 RX ORDER — CALCIUM GLUCONATE 20 MG/ML
1 INJECTION, SOLUTION INTRAVENOUS ONCE
Status: COMPLETED | OUTPATIENT
Start: 2025-03-14 | End: 2025-03-14

## 2025-03-14 RX ORDER — LIDOCAINE 4 G/G
1 PATCH TOPICAL EVERY 24 HOURS
Status: DISCONTINUED | OUTPATIENT
Start: 2025-03-14 | End: 2025-03-18 | Stop reason: HOSPADM

## 2025-03-14 RX ORDER — MAGNESIUM SULFATE 1 G/100ML
1 INJECTION INTRAVENOUS ONCE
Status: COMPLETED | OUTPATIENT
Start: 2025-03-14 | End: 2025-03-14

## 2025-03-14 RX ORDER — POTASSIUM CHLORIDE 1500 MG/1
20 TABLET, EXTENDED RELEASE ORAL ONCE
Status: COMPLETED | OUTPATIENT
Start: 2025-03-14 | End: 2025-03-14

## 2025-03-14 RX ORDER — HYDRALAZINE HYDROCHLORIDE 20 MG/ML
5 INJECTION INTRAMUSCULAR; INTRAVENOUS
Status: DISCONTINUED | OUTPATIENT
Start: 2025-03-14 | End: 2025-03-14 | Stop reason: HOSPADM

## 2025-03-14 RX ORDER — MEPERIDINE HYDROCHLORIDE 25 MG/ML
6.25 INJECTION INTRAMUSCULAR; INTRAVENOUS; SUBCUTANEOUS
Status: DISCONTINUED | OUTPATIENT
Start: 2025-03-14 | End: 2025-03-14 | Stop reason: HOSPADM

## 2025-03-14 RX ORDER — ONDANSETRON 2 MG/ML
INJECTION INTRAMUSCULAR; INTRAVENOUS PRN
Status: DISCONTINUED | OUTPATIENT
Start: 2025-03-14 | End: 2025-03-14 | Stop reason: HOSPADM

## 2025-03-14 RX ORDER — LABETALOL HYDROCHLORIDE 5 MG/ML
10 INJECTION, SOLUTION INTRAVENOUS EVERY 4 HOURS PRN
Status: DISCONTINUED | OUTPATIENT
Start: 2025-03-14 | End: 2025-03-18 | Stop reason: HOSPADM

## 2025-03-14 RX ORDER — VITAMIN B COMPLEX
1000 TABLET ORAL DAILY
Status: DISCONTINUED | OUTPATIENT
Start: 2025-03-14 | End: 2025-03-18 | Stop reason: HOSPADM

## 2025-03-14 RX ORDER — ACETAMINOPHEN 325 MG/1
650 TABLET ORAL EVERY 6 HOURS
Status: DISCONTINUED | OUTPATIENT
Start: 2025-03-14 | End: 2025-03-18 | Stop reason: HOSPADM

## 2025-03-14 RX ORDER — HYDROMORPHONE HYDROCHLORIDE 1 MG/ML
0.2 INJECTION, SOLUTION INTRAMUSCULAR; INTRAVENOUS; SUBCUTANEOUS
Status: DISCONTINUED | OUTPATIENT
Start: 2025-03-14 | End: 2025-03-14 | Stop reason: HOSPADM

## 2025-03-14 RX ORDER — EPHEDRINE SULFATE 50 MG/ML
5 INJECTION, SOLUTION INTRAVENOUS
Status: DISCONTINUED | OUTPATIENT
Start: 2025-03-14 | End: 2025-03-14 | Stop reason: HOSPADM

## 2025-03-14 RX ORDER — OMEPRAZOLE 20 MG/1
20 CAPSULE, DELAYED RELEASE ORAL DAILY
Status: DISCONTINUED | OUTPATIENT
Start: 2025-03-14 | End: 2025-03-18 | Stop reason: HOSPADM

## 2025-03-14 RX ORDER — OXYCODONE HCL 5 MG/5 ML
10 SOLUTION, ORAL ORAL
Status: COMPLETED | OUTPATIENT
Start: 2025-03-14 | End: 2025-03-14

## 2025-03-14 RX ORDER — LEVOTHYROXINE SODIUM 50 UG/1
50 TABLET ORAL
Status: DISCONTINUED | OUTPATIENT
Start: 2025-03-14 | End: 2025-03-18 | Stop reason: HOSPADM

## 2025-03-14 RX ADMIN — ACETAMINOPHEN 650 MG: 325 TABLET ORAL at 06:22

## 2025-03-14 RX ADMIN — EPHEDRINE SULFATE 10 MG: 50 INJECTION, SOLUTION INTRAVENOUS at 14:23

## 2025-03-14 RX ADMIN — OMEPRAZOLE 20 MG: 20 CAPSULE, DELAYED RELEASE ORAL at 06:00

## 2025-03-14 RX ADMIN — FENTANYL CITRATE 50 MCG: 50 INJECTION, SOLUTION INTRAMUSCULAR; INTRAVENOUS at 13:42

## 2025-03-14 RX ADMIN — DEXAMETHASONE SODIUM PHOSPHATE 4 MG: 4 INJECTION INTRA-ARTICULAR; INTRALESIONAL; INTRAMUSCULAR; INTRAVENOUS; SOFT TISSUE at 13:49

## 2025-03-14 RX ADMIN — FENTANYL CITRATE 25 MCG: 50 INJECTION, SOLUTION INTRAMUSCULAR; INTRAVENOUS at 16:44

## 2025-03-14 RX ADMIN — LIDOCAINE 1 PATCH: 4 PATCH TOPICAL at 06:36

## 2025-03-14 RX ADMIN — FENTANYL CITRATE 25 MCG: 50 INJECTION, SOLUTION INTRAMUSCULAR; INTRAVENOUS at 15:30

## 2025-03-14 RX ADMIN — LIDOCAINE HYDROCHLORIDE 100 MG: 20 INJECTION, SOLUTION EPIDURAL; INFILTRATION; INTRACAUDAL; PERINEURAL at 13:42

## 2025-03-14 RX ADMIN — ACETAMINOPHEN 650 MG: 325 TABLET ORAL at 21:26

## 2025-03-14 RX ADMIN — ROCURONIUM BROMIDE 50 MG: 10 INJECTION INTRAVENOUS at 13:44

## 2025-03-14 RX ADMIN — DEXMEDETOMIDINE HYDROCHLORIDE 10 MCG: 100 INJECTION, SOLUTION INTRAVENOUS at 13:49

## 2025-03-14 RX ADMIN — CEFAZOLIN 2 G: 1 INJECTION, POWDER, FOR SOLUTION INTRAMUSCULAR; INTRAVENOUS at 13:38

## 2025-03-14 RX ADMIN — POTASSIUM CHLORIDE AND SODIUM CHLORIDE: 900; 300 INJECTION, SOLUTION INTRAVENOUS at 07:29

## 2025-03-14 RX ADMIN — MAGNESIUM SULFATE IN DEXTROSE 1 G: 10 INJECTION, SOLUTION INTRAVENOUS at 06:24

## 2025-03-14 RX ADMIN — MORPHINE SULFATE 4 MG: 4 INJECTION, SOLUTION INTRAMUSCULAR; INTRAVENOUS at 07:45

## 2025-03-14 RX ADMIN — CALCIUM GLUCONATE 1 G: 20 INJECTION, SOLUTION INTRAVENOUS at 07:41

## 2025-03-14 RX ADMIN — ENOXAPARIN SODIUM 40 MG: 100 INJECTION SUBCUTANEOUS at 21:27

## 2025-03-14 RX ADMIN — SENNOSIDES AND DOCUSATE SODIUM 2 TABLET: 50; 8.6 TABLET ORAL at 21:28

## 2025-03-14 RX ADMIN — FENTANYL CITRATE 50 MCG: 50 INJECTION, SOLUTION INTRAMUSCULAR; INTRAVENOUS at 14:11

## 2025-03-14 RX ADMIN — OXYCODONE 5 MG: 5 TABLET ORAL at 21:27

## 2025-03-14 RX ADMIN — Medication 100 MCG: at 14:27

## 2025-03-14 RX ADMIN — DEXMEDETOMIDINE HYDROCHLORIDE 10 MCG: 100 INJECTION, SOLUTION INTRAVENOUS at 13:42

## 2025-03-14 RX ADMIN — TRANEXAMIC ACID 1000 MG: 100 INJECTION, SOLUTION INTRAVENOUS at 13:49

## 2025-03-14 RX ADMIN — OXYCODONE HYDROCHLORIDE 5 MG: 5 SOLUTION ORAL at 16:44

## 2025-03-14 RX ADMIN — PROPOFOL 120 MG: 10 INJECTION, EMULSION INTRAVENOUS at 13:43

## 2025-03-14 RX ADMIN — OXYCODONE HYDROCHLORIDE 5 MG: 5 SOLUTION ORAL at 15:30

## 2025-03-14 RX ADMIN — ONDANSETRON 4 MG: 2 INJECTION INTRAMUSCULAR; INTRAVENOUS at 14:20

## 2025-03-14 RX ADMIN — SUGAMMADEX 200 MG: 100 INJECTION, SOLUTION INTRAVENOUS at 14:28

## 2025-03-14 RX ADMIN — SODIUM CHLORIDE, POTASSIUM CHLORIDE, SODIUM LACTATE AND CALCIUM CHLORIDE: 600; 310; 30; 20 INJECTION, SOLUTION INTRAVENOUS at 13:38

## 2025-03-14 RX ADMIN — POTASSIUM CHLORIDE 20 MEQ: 1500 TABLET, EXTENDED RELEASE ORAL at 06:23

## 2025-03-14 RX ADMIN — CEFAZOLIN 2 G: 2 INJECTION, POWDER, FOR SOLUTION INTRAMUSCULAR; INTRAVENOUS at 21:40

## 2025-03-14 ASSESSMENT — LIFESTYLE VARIABLES: SUBSTANCE_ABUSE: 0

## 2025-03-14 ASSESSMENT — PAIN DESCRIPTION - PAIN TYPE
TYPE: SURGICAL PAIN
TYPE: SURGICAL PAIN
TYPE: ACUTE PAIN;SURGICAL PAIN
TYPE: SURGICAL PAIN

## 2025-03-14 ASSESSMENT — ENCOUNTER SYMPTOMS
VOMITING: 0
NECK PAIN: 0
FOCAL WEAKNESS: 0
COUGH: 0
HEARTBURN: 0
LOSS OF CONSCIOUSNESS: 0
BRUISES/BLEEDS EASILY: 0
FALLS: 1
SHORTNESS OF BREATH: 0
WEAKNESS: 1
DIZZINESS: 0
PALPITATIONS: 0
HEMOPTYSIS: 0
CHILLS: 0
DEPRESSION: 0
SEIZURES: 0
MYALGIAS: 1
HEADACHES: 0
BLURRED VISION: 0
BACK PAIN: 0
FEVER: 0
ABDOMINAL PAIN: 0
DOUBLE VISION: 0
NAUSEA: 0

## 2025-03-14 ASSESSMENT — FIBROSIS 4 INDEX
FIB4 SCORE: 2.03
FIB4 SCORE: 2.04
FIB4 SCORE: 2.04

## 2025-03-14 NOTE — ANESTHESIA TIME REPORT
Anesthesia Start and Stop Event Times       Date Time Event    3/14/2025 1333 Ready for Procedure     1338 Anesthesia Start     1438 Anesthesia Stop          Responsible Staff  03/14/25      Name Role Begin Maribell Solomon Ma, M.D. Anesth 1338 1438          Anesthesia Time Report

## 2025-03-14 NOTE — ED TRIAGE NOTES
Chief Complaint   Patient presents with    Fall     Pt  was getting up to get a drink of water tripped & fell   Pain to L.hip  Slight reduction/rotation noted pt received   50mcg fentanyl by FIRE  25mcg Fentanyl PTA      Pulse 66   Resp 14   Ht 1.524 m (5')   Wt 44.5 kg (98 lb)   SpO2 97%   BMI 19.14 kg/m²

## 2025-03-14 NOTE — ANESTHESIA PROCEDURE NOTES
Airway    Date/Time: 3/14/2025 1:47 PM    Performed by: Juanito Sykes M.D.  Authorized by: Juanito Sykes M.D.    Location:  OR  Urgency:  Elective  Indications for Airway Management:  Anesthesia      Spontaneous Ventilation: absent    Sedation Level:  Deep  Preoxygenated: Yes    Mask Difficulty Assessment:  0 - not attempted  Final Airway Type:  Endotracheal airway  Final Endotracheal Airway:  ETT  Cuffed: Yes    Technique Used for Successful ETT Placement:  Direct laryngoscopy    Insertion Site:  Oral  Blade Type:  Selene  Laryngoscope Blade/Videolaryngoscope Blade Size:  3  ETT Size (mm):  6.5  Measured from:  Lips  Placement Verified by: auscultation and capnometry    Cormack-Lehane Classification:  Grade IIb - view of arytenoids or posterior of glottis only  Number of Attempts at Approach:  1

## 2025-03-14 NOTE — ASSESSMENT & PLAN NOTE
S/p GLF  X-ray left hip: Impacted femoral neck fracture, left superior and inferior pubic ramus fracture    Pain and BM regimen in place  PT/OT post surgery evaluation pending  I sent referrals for post acute placement, SNF vs IRF.  Lovenox DVT prophylaxis

## 2025-03-14 NOTE — ED NOTES
"Pre-op provided report.   Pt last ate at 1900 last night and last had liquids with her pills this morning. Pt updated on plan of care with questions answered to the best of this RNS abilities.      ICE pack applied to left groin to assist with pain as pt declined need for additional pain medication but said it would \"twinge\" with movement.   "

## 2025-03-14 NOTE — ANESTHESIA PREPROCEDURE EVALUATION
Case: [id number] Date/Time: 03/14/25 1402    Procedure: HEMIARTHROPLASTY, HIP (Left)    Location:  OR 05 / SURGERY [facility]    Surgeons: Radha Cota M.D.            Relevant Problems   CARDIAC   (positive) Arrhythmia   (positive) HTN (hypertension)      GI   (positive) Gastroesophageal reflux disease      ENDO   (positive) Hypothyroidism       Physical Exam    Airway   Mallampati: II  TM distance: >3 FB  Neck ROM: full       Cardiovascular - normal exam  Rhythm: regular  Rate: normal  (-) murmur     Dental - normal exam           Pulmonary - normal exam  Breath sounds clear to auscultation     Abdominal    Neurological - normal exam                   Anesthesia Plan    ASA 2       Plan - general       Airway plan will be ETT          Induction: intravenous    Postoperative Plan: Postoperative administration of opioids is intended.    Pertinent diagnostic labs and testing reviewed    Informed Consent:    Anesthetic plan and risks discussed with patient.    Use of blood products discussed with: patient whom consented to blood products.

## 2025-03-14 NOTE — OP REPORT
Referring Provider: ALAINA MONSIVAIS ME*    PCP:  Angela Trejo M.D.    Name:  Alyssa Juan    MRN: [id number]    DATE OF SURGERY: 03/14/25    SURGEON: Radha Cota MD    PREOPERATIVE DIAGNOSIS:  DISPLACED LEFT FEMORAL NECK FRACTURE     POSTOPERATIVE DIAGNOSIS:  DISPLACED LEFT FEMORAL NECK FRACTURE    PROCEDURE(s) PERFORMED:  Procedure(s):  Left - HEMIARTHROPLASTY, HIP - Wound Class: Clean - Incision Closure: Deep and Superficial Layers     Assistants:  Circulator: Tina Saleem R.N.  Limb Chong: Jolynn Funes R.N.  Scrub Person: Sheryl Orr      Assistants were mandatory to provide adequate exposure for accurate implantation of prosthetic components, while protecting vital neurovascular structures    ANESTHESIOLOGIST:  Anesthesiologist: Juanito Sykes M.D.    ANESTHESIA: General    ESTIMATED BLOOD LOSS: 200 cc    ANTIBIOTICS: 2 gm of Ancef before incision    COMPLICATIONS: * No complications entered in OR log *    IMPLANTS:   [place]  Size 4 standard Accolade 2  Metal 28 mm +4 head  47 mm outer bearing    INDICATION FOR PROCEDURE: Alyssa Juan is a 84 y.o.  female  who presented for left femoral neck fracture. The patient suffered a ground level fall resulting in a femoral neck fracture. We discussed management options including nonoperative and operative management; given displacement of the fracture, activity status and comorbidities, recommend hemiarthroplasty. Patient is a community ambulator with no pre-existing hip pain. Risks and benefits of surgery were reviewed which included but were not limited to PE, DVT, infection, hardware failure or loosening, reoperation, dislocation, leg lengthening,  the need for transfusion and death. Alternatives to surgery had previously been reviewed with the patient as well.  The patient wished to proceed with the above procedure.    PROCEDURE IN DETAIL: The patient was met in the preoperative holding area. Informed consent was previously obtained in  the ER and we reviewed this again. Appropriate site was marked. The patient was taken to the operative theater where anesthesia as above was performed without complication. The patient was then placed in the lateral decubitus position.  The operative lower extremity was then prepped and draped in normal sterile fashion. A formal time-out was performed, identifying correct patient, correct site, and correct procedure.  The patient received antibiotics prior to incision.      A lateral skin incision that was centered over the greater trochanter was made.  The iliotibial band was divided in line with the fibers.  The gluteus valerie was split with blunt dissection.  A posterior capsulotomy was then performed and fracture hematoma was encountered.  The posterior capsule was tagged with an Ethibond suture for closure later in the case.  Capsulotomy was extended up to the labrum. Clean up cut was made at the femoral neck and the femoral head was removed without difficulty using the corkscrew.     Attention was then directed toward exposure of the acetabulum. Labrum was noted to be intact circumferentially. All bony fragments were removed from the acetabulum.    Attention was then directed toward the preparation of the femur to accept the femoral component. Appropriate retractors were placed and excellent exposure was obtained.  Femoral preparation began with a box cut osteotome followed by a T handled canal finder.   Serial reaming followed by broaching matching the patients native anteversion continued until good fit and fill was noted.  Excellent torsional stability was noted.  Trial reduction was then performed.  Excellent stability was noted and I felt I restored leg lengths and offset. The hip was dislocated posteriorly and all trial components removed.  The wound was then copiously irrigated.  The final femoral component was then press-fit down the medullary canal matching the native femoral anteversion. The  trunnion was thoroughly cleaned and the femoral head was then impacted without complication.  The hip was reduced and again noted to have satisfactory motion, stability and restoration of leg length as was previously noted with the trial components.      The wound was copiously irrigated. Final hemostasis was obtained with electrocautery.  The posterior capsule was repaired with ethibond sutures and the IT band was closed with quill. The subcutaneous layer closed with interrupted 2-0 vicryl suture and the skin with staples.  A sterile dressing was placed. All counts were correct at the end of the case. The patient was awoken from anesthesia and seemed to tolerate procedure well.      DISPOSTION: The patient will be weight bearing as tolerated.  The patient will have PT/OT while in-house. DVT prophylaxis will begin POD #1.  Follow-up in my clinic has been set up for a wound check already.  Posterior hip precautions will be reinforced to the patient.

## 2025-03-14 NOTE — H&P
Hospital Medicine History & Physical Note    Date of Service  3/14/2025    Primary Care Physician  Angela Trejo M.D.    Consultants  Orthopedic (Dr. Cota)    Code Status  DNAR/DNI    Chief Complaint  Chief Complaint   Patient presents with    Fall     Pt  was getting up to get a drink of water tripped & fell   Pain to L.hip  Slight reduction/rotation noted pt received   50mcg fentanyl by FIRE  25mcg Fentanyl PTA        History of Presenting Illness  Alyssa Juan is a 84 y.o. female with history of hypertension, GERD, hypothyroidism who presented 3/14/2025 with evaluation for ground-level fall, left hip pain.  Patient reported having difficulty sleeping earlier, got up to go to kitchen to get a drink.  After drinking fluid, patient reported to have ground-level fall as she was attempting to sit in a chair.  Denies head injury.  X-ray of left hip noted to have impacted left femoral neck fracture, left superior and inferior pubic rami fracture.  Case was discussed with orthopedic surgery, tentative plan for operative intervention.  Therefore, admitted to medicine service for further evaluation and treatment.    I discussed the plan of care with patient, bedside RN, charge RN, and pharmacy.    Review of Systems  Review of Systems   Constitutional:  Positive for malaise/fatigue. Negative for chills and fever.   HENT:  Negative for nosebleeds and tinnitus.    Eyes:  Negative for blurred vision and double vision.   Respiratory:  Negative for cough, hemoptysis and shortness of breath.    Cardiovascular:  Negative for chest pain and palpitations.   Gastrointestinal:  Negative for abdominal pain, heartburn, nausea and vomiting.   Genitourinary:  Negative for dysuria and urgency.   Musculoskeletal:  Positive for falls, joint pain and myalgias. Negative for back pain and neck pain.   Skin:  Negative for itching and rash.   Neurological:  Positive for weakness. Negative for dizziness, focal weakness, seizures, loss of  consciousness and headaches.   Endo/Heme/Allergies:  Negative for environmental allergies. Does not bruise/bleed easily.   Psychiatric/Behavioral:  Negative for depression and substance abuse.    All other systems reviewed and are negative.      Past Medical History   has a past medical history of Cold (04/24/2015), Colitis, Disease of thyroid gland, Hypertension, and Pain (04/24/2015).    Surgical History   has a past surgical history that includes rotator cuff repair (2014); hysterectomy, vaginal (1978); other (1975); cataract extraction with iol (2013); mammoplasty augmentation (2001); shoulder arthroplasty total (Right, 5/5/2015); and pr partial hip replacement (Right, 1/31/2023).     Family History  family history includes Cancer in an other family member; Diabetes in an other family member; Hypertension in an other family member; Stroke in an other family member.   Family history reviewed with patient. There is family history that is pertinent to the chief complaint.     Social History   reports that she has never smoked. She has never used smokeless tobacco. She reports current alcohol use of about 3.5 oz of alcohol per week. She reports that she does not use drugs.    Allergies  Allergies   Allergen Reactions    Codeine Vomiting and Nausea    Sulfa Drugs Hives       Medications  Prior to Admission Medications   Prescriptions Last Dose Informant Patient Reported? Taking?   B Complex Vitamins (VITAMIN B COMPLEX PO)  Patient Yes No   Sig: Take 1 Tablet by mouth every day. Indications: suppliment   Bacillus Coagulans-Inulin (ALIGN PREBIOTIC-PROBIOTIC PO)   Yes No   Sig: Take 1 Each Tablet by mouth every day at 6 PM. Indications: Constipation   Bismuth Subsalicylate 525 MG/15ML Suspension   Yes No   Sig: Take 30 mL by mouth 1 time a day as needed (upset stomach/diarrhea). Indications: Diarrhea, Indigestion   Cholecalciferol (D3 PO)  Patient Yes No   Sig: Take 1 Capsule by mouth every day. Indications: suppliment    FIBER PO  Patient Yes No   Sig: Take 1 Scoop by mouth every day. Indications: suppliment   HYDROcodone-acetaminophen (NORCO) 5-325 MG Tab per tablet   Yes No   Sig: Take 1 Tablet by mouth every 8 hours as needed (severe pain 7-10). take 1 tablet by mouth 1 to 3 times a day for 7 days as needed  Indications: Pain   Home Care Oxygen   Yes No   Sig: Inhale 2 L/min at bedtime. Oxygen dose range: 2 L/min  Respiratory route via: Nasal Cannula   Oxygen supplier: [employer]      Indications: shortness of breath   Lubiprostone 8 MCG Cap   Yes No   Sig: Take 1 Capsule by mouth every day at 6 PM. Indications: Chronic Constipation of Unknown Cause   Misc Natural Products (IBEROGAST) Cap   Yes No   Sig: Take 1 Capsule by mouth 1 time a day as needed (gut health). Indications: gut health   Multiple Vitamins-Minerals (MULTIVITAMIN PO)  Patient Yes No   Sig: Take 1 Tablet by mouth every day. Indications: suppliment   Omega-3 Fatty Acids (FISH OIL PO)  Patient Yes No   Sig: Take 1 Capsule by mouth every day. Indications: Suppliment   TURMERIC PO  Patient Yes No   Sig: Take 1 Capsule by mouth every day. Indications: suppliment   alendronate (FOSAMAX) 70 MG Tab   Yes No   Sig: Take 70 mg by mouth every 7 days. Indications: Osteopenia   docusate sodium (COLACE) 100 MG Cap   Yes No   Sig: Take 100 mg by mouth 1 time a day as needed for Constipation. Indications: Constipation   flecainide (TAMBOCOR) 50 MG tablet   Yes No   Sig: Take 50 mg by mouth 2 times a day. Indications: Arrythmia   gabapentin (NEURONTIN) 100 MG Cap   Yes No   Sig: Take 100 mg by mouth 3 times a day as needed (pain). Take 1-3 times daily   Indications: Neuropathic Pain   ibuprofen (MOTRIN) 200 MG Tab   Yes No   Sig: Take 200 mg by mouth every 6 hours as needed for Inflammation or Mild Pain. Indications: Pain, pain 1-3 or inflammation   levothyroxine (SYNTHROID) 50 MCG Tab  Patient's Home Pharmacy Yes No   Sig: Take 50 mcg by mouth every morning on an empty stomach.  Indications: Underactive Thyroid   lisinopril (PRINIVIL) 20 MG Tab  Patient's Home Pharmacy Yes No   Sig: Take 20 mg by mouth every day. Indications: High Blood Pressure   omeprazole (PRILOSEC OTC) 20 MG tablet   Yes No   Sig: Take 20 mg by mouth every day. Take before breakfast  Indications: Heartburn   polyethylene glycol/lytes (MIRALAX) PACK  Patient Yes No   Sig: Take 17 g by mouth every day. Indications: Constipation      Facility-Administered Medications: None       Physical Exam  Pulse:  [60-66] 60  Resp:  [12-17] 12  BP: (186)/(86) 186/86  SpO2:  [97 %-100 %] 100 %  Blood Pressure : (!) 186/86       Pulse: 61   Respiration: 12   Pulse Oximetry: 97 % (increased to 3L NC)       Physical Exam  Vitals and nursing note reviewed.   Constitutional:       General: She is not in acute distress.  HENT:      Head: Normocephalic and atraumatic.      Nose: Nose normal.      Mouth/Throat:      Mouth: Mucous membranes are dry.      Pharynx: Oropharynx is clear.   Eyes:      General: No scleral icterus.     Extraocular Movements: Extraocular movements intact.   Cardiovascular:      Rate and Rhythm: Normal rate and regular rhythm.      Pulses: Normal pulses.      Heart sounds:      No friction rub.   Pulmonary:      Effort: No respiratory distress.      Breath sounds: No wheezing or rales.   Chest:      Chest wall: No tenderness.   Abdominal:      General: There is no distension.      Tenderness: There is no abdominal tenderness. There is no guarding or rebound.   Musculoskeletal:         General: Tenderness and signs of injury present. No swelling.      Cervical back: Neck supple. No tenderness.      Right lower leg: No edema.      Left lower leg: No edema.      Comments: LLE - tender at hip and groin, decreased ROM due to pain   Skin:     General: Skin is warm and dry.      Capillary Refill: Capillary refill takes less than 2 seconds.   Neurological:      General: No focal deficit present.      Mental Status: She is alert  "and oriented to person, place, and time.   Psychiatric:         Mood and Affect: Mood normal.         Laboratory:  Recent Labs     03/14/25  0335   WBC 4.0*   RBC 3.50*   HEMOGLOBIN 11.6*   HEMATOCRIT 34.5*   MCV 98.6*   MCH 33.1*   MCHC 33.6   RDW 47.0   PLATELETCT 250   MPV 8.8*     Recent Labs     03/14/25  0335   SODIUM 129*   POTASSIUM 3.5*   CHLORIDE 97   CO2 24   GLUCOSE 85   BUN 16   CREATININE 0.47*   CALCIUM 7.2*     Recent Labs     03/14/25  0335   ALTSGPT 16   ASTSGOT 24   ALKPHOSPHAT 90   TBILIRUBIN <0.2   GLUCOSE 85     Recent Labs     03/14/25  0335   INR 0.90     No results for input(s): \"NTPROBNP\" in the last 72 hours.      No results for input(s): \"TROPONINT\" in the last 72 hours.    Imaging:  DX-CHEST-PORTABLE (1 VIEW)   Final Result         1.  Interstitial pulmonary parenchymal prominence suggest chronic underlying lung disease, component of interstitial edema and/or infiltrates not excluded.   2.  Trace bilateral pleural effusions   3.  Atherosclerosis      DX-HIP-COMPLETE - UNILATERAL 2+ LEFT   Final Result         1.  Impacted left femoral neck fracture   2.  Left superior and inferior pubic ramus fracture is at the symphysis          X-Ray:  I have personally reviewed the images and compared with prior images.  EKG:  I have personally reviewed the images and compared with prior images.    Assessment/Plan:  Justification for Admission Status  I anticipate this patient will require at least 2 midnights of hospitalization, therefore appropriate for inpatient status.      * Hip fracture requiring operative repair, left, closed, initial encounter (HCC)- (present on admission)  Assessment & Plan  S/p GLF  X-ray left hip: Impacted femoral neck fracture, left superior and inferior pubic ramus fracture    Supportive pain control-multimodal pain management  PT/OT after the OR  Orthopedic surgery consulted, tentative plan for operative intervention in AM    Closed fracture of ramus of left pubis " ([facility])  Assessment & Plan  Supportive pain control  PT/OT    Age-related physical debility  Assessment & Plan  PT/OT    Encounter for preoperative assessment  Assessment & Plan  Admit to:    Orthopedic/Med-Surg floor since no major co-morbidities.     Cardiovascular:   Patient does not have history of CHF  Pre-op EKG: Yes    Pulmonary:  Oxygen per protocol  Incentive Spirometer    GI:   No history of cirrhosis. Standard bowel regimen. Hold for loose stools.    Renal:   IV fluids: -150 mL/hr for 2 days   Labs: Metabolic Panel with AM labs    Musculoskeletal:   Check 25 OH vitamin D level. If 31-40 pg/mL, consider starting vitamin D3 1000 IU PO daily. If 20-30 pg/mL, consider vitamin D3 2000 IU PO daily. If <20 pg/mL, vitamin D2 50,000 IU weekly x 8 weeks then 2000 IU PO daily.    Neurologic:   Pain Control: Neuro checks every 4 hours  Avoid fentanyl (short-acting)  Acetaminophen 1000 mg PO TID; 650 mg PO TID if liver problems    Hematologic:  Plan on pharmacologic DVT prophylaxis post operative day #1. Hold for decreasing hemoglobin. Notify provider for hemoglobin less than 8.  Order preoperative type and cross.   Hgb every 6 hours if high bleeding risk.   If patient was on anticoagulation prior to arrival risks and benefits will be weighed by teams including surgery, hospitalist, geriatrics, and anesthesia for delaying surgery more than 24 hours.   On anticoagulation prior to arrival: No    Medical Assessment Risk:  Intermediate    Surgical Risk:   Intermediate      Hypocalcemia  Assessment & Plan  Replace as appropriate    Hypokalemia  Assessment & Plan  Replace as appropriate  Replace Mg    Arrhythmia  Assessment & Plan  Continue flecainide  She is not on anticoagulation    Peripheral polyneuropathy- (present on admission)  Assessment & Plan  Gabapentin    Hypothyroidism- (present on admission)  Assessment & Plan  Continue Synthroid    Gastroesophageal reflux disease- (present on admission)  Assessment &  Plan  PPI    HTN (hypertension)- (present on admission)  Assessment & Plan  Continue lisinopril    ACP (advance care planning)  Assessment & Plan  Goal of care discussed with patient in emergency room.  She stated she does not wish to have aggressive/heroic life-sustaining measures-no CPR/defibrillation/intubation or mechanical ventilation.  She is however agreeable for hospitalization, treatment of her pain, proposed operative orthopedic surgery by orthopedic surgery service as clinically warranted.  She is also agreeable for temporary reversal of CODE STATUS to full code to undergo orthopedic surgery.  However, agreeable for DNR/DNI status for the purpose of current hospitalization.  Diagnosis, prognosis, question and concern addressed.  ACP: 17 minutes        VTE prophylaxis: enoxaparin ppx

## 2025-03-14 NOTE — ED PROVIDER NOTES
"ED Provider Note    CHIEF COMPLAINT  Chief Complaint   Patient presents with    Fall     Pt  was getting up to get a drink of water tripped & fell   Pain to L.hip  Slight reduction/rotation noted pt received   50mcg fentanyl by FIRE  25mcg Fentanyl PTA          HPI/ROS  LIMITATION TO HISTORY   Select: Has limited medical fluency.  OUTSIDE HISTORIAN(S):  EMS gave 75 mcg of fentanyl en route, stable vital signs.  Externally rotated left leg.    Alyssa Juan is a 84 y.o. female who presents with left hip pain after a nonsyncopal fall, patient reports she can sleep, got out of bed, went to her chair, sat in her chair upon standing, she reached for her walker, which she has been using after having back pain, and missed the walker, resulting in a fall, denies head strike, LOC.  Reports left hip pain since then.  Denies any numbness or weakness but she has a history of a partial right arthroplasty performed by [facility] several years prior.  Follows with Dr. Melton for back pain.  History of hypertension, heart disease.  She is unsure why she is on oxygen, it was prescribed by her cardiologist.  They are \"running tests.  \"Follows with Dr. Vail.    PAST MEDICAL HISTORY   has a past medical history of Cold (04/24/2015), Colitis, Disease of thyroid gland, Hypertension, and Pain (04/24/2015).    SURGICAL HISTORY   has a past surgical history that includes rotator cuff repair (2014); hysterectomy, vaginal (1978); other (1975); cataract extraction with iol (2013); mammoplasty augmentation (2001); shoulder arthroplasty total (Right, 5/5/2015); and partial hip replacement (Right, 1/31/2023).    FAMILY HISTORY  Family History   Problem Relation Age of Onset    Diabetes Other     Hypertension Other     Stroke Other     Cancer Other        SOCIAL HISTORY  Social History     Tobacco Use    Smoking status: Never    Smokeless tobacco: Never   Vaping Use    Vaping status: Never Used   Substance and Sexual Activity    " "Alcohol use: Yes     Alcohol/week: 3.5 oz     Types: 7 Standard drinks or equivalent per week     Comment: \"a glass of wine every night\"    Drug use: No    Sexual activity: Not Currently       CURRENT MEDICATIONS  Home Medications    **Home medications have not yet been reviewed for this encounter**       Audit from Redirected Encounters    **Home medications have not yet been reviewed for this encounter**         ALLERGIES  Allergies   Allergen Reactions    Codeine Vomiting and Nausea    Sulfa Drugs Hives       PHYSICAL EXAM  VITAL SIGNS: BP (!) 186/86   Pulse 60   Resp 12   Ht 1.524 m (5')   Wt 45.4 kg (100 lb)   SpO2 100%   BMI 19.53 kg/m²    General: Pleasant elderly female.  Lying in [facility].  Head: Normocephalic atraumatic  Eyes: Extraocular motion intact  Neck: Supple, no rigidity  Cardiovascular: Regular rate and rhythm no murmurs rubs or gallops  Respiratory: Clear to auscultation bilaterally, equal chest rise and fall, no increased work of breathing  Abdomen: Soft nontender no guarding  Musculoskeletal: Warm and well perfused, no peripheral edema.  2+ DP pulses bilateral.  Externally rotated left leg, positive logroll, left side.  Neuro: Alert, 5-5 strength upper and lower EXTR.  Flexes and extends toes, hallices, able to dorsiflex/plantar flex the ankle, sensation intact to light touch, deep, superficial peroneal, tibial, sural and plantar distributions.    Integumentary: No wounds or rashes      EKG/LABS  Results for orders placed or performed during the hospital encounter of 25   EKG   Result Value Ref Range    Report       [facility] Emergency Dept.    Test Date:  2025  Pt Name:    EDIL FUENTES              Department: [facility]  MRN:        [id number]                      [facility]  Gender:     Female                       Technician: JAXSON  :        [id number]                   Requested By:SARAH LEO  Order #:    [id number]                "     Reading MD: Donato Metz    Measurements  Intervals                                Axis  Rate:       62                           P:          72  WY:         58                           QRS:        -8  QRSD:       96                           T:          17  QT:         448  QTc:        455    Interpretive Statements  Sinus rhythm, rate of 62, normal axis, short WY interval otherwise normal  intervals, nonspecific ST abnormalities in the anterior lateral leads, not  significantly changed compared to prior 11/22/2024  Electronically Signed On 03- 06:42:57 PDT by Donato Metz         I have independently interpreted this EKG    RADIOLOGY/PROCEDURES   Preliminary interpretation: Impacted left femoral neck fracture.    Radiologist interpretation:  DX-CHEST-PORTABLE (1 VIEW)   Final Result         1.  Interstitial pulmonary parenchymal prominence suggest chronic underlying lung disease, component of interstitial edema and/or infiltrates not excluded.   2.  Trace bilateral pleural effusions   3.  Atherosclerosis      DX-HIP-COMPLETE - UNILATERAL 2+ LEFT   Final Result         1.  Impacted left femoral neck fracture   2.  Left superior and inferior pubic ramus fracture is at the symphysis          COURSE & MEDICAL DECISION MAKING    ASSESSMENT, COURSE AND PLAN  Care Narrative: 84-year-old presenting with nonsyncopal fall.  Left leg pain.  No head strike.  Externally rotated left leg.  Petite, frail appearing.      04:15: Spoke With Dr. Cota, requests n.p.o., admission.  Patient's pain well-controlled after medications ministered via EMS.  I obtained preoperative labs, EKG and chest x-ray, which are notable for potential mild pulmonary congestion.    I discussed the plan of care with the patient, she is agreeable to this, patient is admitted to the hospitalist service with plan for or in the morning, n.p.o. since 7 PM yesterday.        DISPOSITION AND DISCUSSIONS  I have discussed management of the patient  with the following physicians and FLY's:   Dr Cota, Orthopedist, Dr Fernnádez, Hospitalist    Discussion of management with other [facility] [place] or appropriate source(s): None     Escalation of care considered, and ultimately not performed:diagnostic imaging        FINAL DIAGNOSIS  1. Left displaced femoral neck fracture (HCC)    2. Hyponatremia    3. Ground-level fall         Electronically signed by: Donato Metz M.D., 3/14/2025 3:39 AM

## 2025-03-14 NOTE — PROGRESS NOTES
Hospital Medicine Daily Progress Note    Date of Service  3/14/2025    Chief Complaint  Alyssa Juan is a 84 y.o. female admitted 3/14/2025 with GLF    Hospital Course  No notes on file    Interval Problem Update  S/p left hip hemiarthroplasty surgery today  Pt seen in PACU, states she is in severe pain.  Bedside RN stated feet were dusky, I checked pulses and needed a doppler to identify dorsalis pedis bilaterally.  Pt agreeable to SNF or [facility].    I have discussed this patient's plan of care and discharge plan at IDT rounds today with Case Management, Nursing, Nursing leadership, and other members of the IDT team.    Consultants/Specialty  orthopedics    Code Status  DNAR/DNI    Disposition  The patient is not medically cleared for discharge to home or a post-acute facility.      I have placed the appropriate orders for post-discharge needs.        Physical Exam  Pulse:  [60-66] 60  Resp:  [12-17] 12  BP: (186)/(86) 186/86  SpO2:  [97 %-100 %] 100 %      Fluids  No intake or output data in the 24 hours ending 03/14/25 0802     Laboratory  Recent Labs     03/14/25  0335   WBC 4.0*   RBC 3.50*   HEMOGLOBIN 11.6*   HEMATOCRIT 34.5*   MCV 98.6*   MCH 33.1*   MCHC 33.6   RDW 47.0   PLATELETCT 250   MPV 8.8*     Recent Labs     03/14/25  0335   SODIUM 129*   POTASSIUM 3.5*   CHLORIDE 97   CO2 24   GLUCOSE 85   BUN 16   CREATININE 0.47*   CALCIUM 7.2*     Recent Labs     03/14/25  0335   INR 0.90                 Assessment/Plan  * Hip fracture requiring operative repair, left, closed, initial encounter (HCC)- (present on admission)  Assessment & Plan  S/p GLF  X-ray left hip: Impacted femoral neck fracture, left superior and inferior pubic ramus fracture        To OR today 2PM  PT/OT after surgery  Pain regimen in place  Monitor Bms, XR also shows significant stool      Encounter for preoperative assessment- (present on admission)  Assessment & Plan  Admit to:    Orthopedic/Med-Surg floor since no major  co-morbidities.     Cardiovascular:   Patient does not have history of CHF  Pre-op EKG: Yes    Pulmonary:  Oxygen per protocol  Incentive Spirometer    GI:   No history of cirrhosis. Standard bowel regimen. Hold for loose stools.    Renal:   IV fluids: -150 mL/hr for 2 days   Labs: Metabolic Panel with AM labs    Musculoskeletal:   Check 25 OH vitamin D level. If 31-40 pg/mL, consider starting vitamin D3 1000 IU PO daily. If 20-30 pg/mL, consider vitamin D3 2000 IU PO daily. If <20 pg/mL, vitamin D2 50,000 IU weekly x 8 weeks then 2000 IU PO daily.    Neurologic:   Pain Control: Neuro checks every 4 hours  Avoid fentanyl (short-acting)  Acetaminophen 1000 mg PO TID; 650 mg PO TID if liver problems    Hematologic:  Plan on pharmacologic DVT prophylaxis post operative day #1. Hold for decreasing hemoglobin. Notify provider for hemoglobin less than 8.  Order preoperative type and cross.   Hgb every 6 hours if high bleeding risk.   If patient was on anticoagulation prior to arrival risks and benefits will be weighed by teams including surgery, hospitalist, geriatrics, and anesthesia for delaying surgery more than 24 hours.   On anticoagulation prior to arrival: No    Medical Assessment Risk:  Intermediate    Surgical Risk:   Intermediate        I have performed a physical exam and reviewed and updated ROS and Plan today (3/14/2025). In review of yesterday's note (3/13/2025), there are no changes except as documented above.

## 2025-03-14 NOTE — ED NOTES
Pharmacy Medication Reconciliation    ~Medication Reconciliation partially completed per patient at bedside  ~Allergies reviewed and updated  ~Is dispense history available yes   ~Patient home pharmacy :  [facility]    ~Med rec to follow up with patient home pharmacy when they reopen to verify current medications

## 2025-03-14 NOTE — CONSULTS
CC: left hip fracture    HPI:   Harper is an 84-year-old female who suffered a ground-level fall resulting in a left displaced femoral neck fracture.  She has previously had a right displaced femoral neck fracture and was treated with hemiarthroplasty.  She has past medical history including hypertension, GERD, hypothyroidism and.  She has unable to weight-bear following surgery.  At baseline she walks with a walker most of the time but sometimes forgets it.    ROS: Positive for musculoskeletal pain and what is stated in the HPI and PMH, otherwise negative review of systems    PMH:   Past Medical History:   Diagnosis Date    Cold 04/24/2015    recent cold or allergies    Colitis     Disease of thyroid gland     Hypertension     Pain 04/24/2015    right shoulder       PSH:   Past Surgical History:   Procedure Laterality Date    PB PARTIAL HIP REPLACEMENT Right 1/31/2023    Procedure: HEMIARTHROPLASTY, HIP;  Surgeon: Lv Rasmussen M.D.;  [facility]: [facility];  Service: Orthopedics    SHOULDER ARTHROPLASTY TOTAL Right 5/5/2015    Procedure: SHOULDER ARTHROPLASTY TOTAL;  Surgeon: Fernanda Mendosa M.D.;  Location: [facility];  Service:     ROTATOR CUFF REPAIR  2014    right    CATARACT EXTRACTION WITH IOL  2013    b/l    MAMMOPLASTY AUGMENTATION  2001    HYSTERECTOMY, VAGINAL  1978    OTHER  1975    lumbar laminectomy       Meds:   (Not in a hospital admission)      Allergies:   Allergies   Allergen Reactions    Codeine Vomiting and Nausea    Sulfa Drugs Hives       SH:   Social History     Socioeconomic History    Marital status:      Spouse name: Not on file    Number of children: Not on file    Years of education: Not on file    Highest education level: Not on file   Occupational History    Not on file   Tobacco Use    Smoking status: Never    Smokeless tobacco: Never   Vaping Use    Vaping status: Never Used   Substance and Sexual Activity    Alcohol use: Yes     Alcohol/week: 3.5  "oz     Types: 7 Standard drinks or equivalent per week     Comment: \"a glass of wine every night\"    Drug use: No    Sexual activity: Not Currently   Other Topics Concern    Not on file   Social History Narrative    Not on file     Social Drivers of Health     Financial Resource Strain: Low Risk  (3/19/2024)    Overall Financial Resource Strain (CARDIA)     Difficulty of Paying Living Expenses: Not hard at all   Food Insecurity: No Food Insecurity (3/19/2024)    Hunger Vital Sign     Worried About Running Out of Food in the Last Year: Never true     Ran Out of Food in the Last Year: Never true   Transportation Needs: No Transportation Needs (3/19/2024)    PRAPARE - Transportation     Lack of Transportation (Medical): No     Lack of Transportation (Non-Medical): No   Physical Activity: Not on file   Stress: Not on file   Social Connections: Feeling Socially Integrated (2/13/2025)    OASIS : Social Isolation     Frequency of experiencing loneliness or isolation: Never   Intimate Partner Violence: Not on file   Housing Stability: Low Risk  (3/19/2024)    Housing Stability Vital Sign     Unable to Pay for Housing in the Last Year: No     Number of Places Lived in the Last Year: 1     Unstable Housing in the Last Year: No       FH:   Family History   Problem Relation Age of Onset    Diabetes Other     Hypertension Other     Stroke Other     Cancer Other        Physical Exam:   General: AO x4  Eyes: non-icteric  Breathing: nonlabored  MSK:   Evaluation of the left hip demonstrates bruising at the lateral hip.  She holds her hip in external rotation and shortening.  She is able to fire EHL, FHL, tib ant gastrocsoleus.  Intact sensation in dorsal and plantar foot    Imaging:   X-rays reviewed and demonstrate displaced femoral neck fracture    Assessment/Plan:   84-year-old female suffered a ground-level fall resulting in displaced femoral neck fracture.  Discussed treatment options including operative versus " nonoperative management.  Operative management includes hemiarthroplasty given age and activity status.  Risks of surgery include but are not limited to dislocation, leg lengthening, nerve damage, bleeding, risk of anesthesia including stroke and attack and death.  Patient wished to proceed with surgery and consent was signed.      Radha Cota M.D.

## 2025-03-15 ENCOUNTER — APPOINTMENT (OUTPATIENT)
Dept: RADIOLOGY | Facility: MEDICAL CENTER | Age: 85
DRG: 956 | End: 2025-03-15
Payer: MEDICARE

## 2025-03-15 LAB
ALBUMIN SERPL BCP-MCNC: 3.6 G/DL (ref 3.2–4.9)
ALP SERPL-CCNC: 98 U/L (ref 30–99)
ALT SERPL-CCNC: 18 U/L (ref 2–50)
APPEARANCE UR: CLEAR
AST SERPL-CCNC: 30 U/L (ref 12–45)
BASOPHILS # BLD AUTO: 0 % (ref 0–1.8)
BASOPHILS # BLD: 0 K/UL (ref 0–0.12)
BILIRUB CONJ SERPL-MCNC: <0.2 MG/DL (ref 0.1–0.5)
BILIRUB INDIRECT SERPL-MCNC: NORMAL MG/DL (ref 0–1)
BILIRUB SERPL-MCNC: 0.3 MG/DL (ref 0.1–1.5)
BILIRUB UR QL STRIP.AUTO: NEGATIVE
BUN SERPL-MCNC: 15 MG/DL (ref 8–22)
CALCIUM ALBUM COR SERPL-MCNC: 9.2 MG/DL (ref 8.5–10.5)
CALCIUM SERPL-MCNC: 8.9 MG/DL (ref 8.4–10.2)
CHLORIDE SERPL-SCNC: 90 MMOL/L (ref 96–112)
CO2 SERPL-SCNC: 25 MMOL/L (ref 20–33)
COLOR UR: YELLOW
CREAT SERPL-MCNC: 0.53 MG/DL (ref 0.5–1.4)
CREAT UR-MCNC: 88.9 MG/DL
CREAT UR-MCNC: 89 MG/DL
EOSINOPHIL # BLD AUTO: 0 K/UL (ref 0–0.51)
EOSINOPHIL NFR BLD: 0 % (ref 0–6.9)
ERYTHROCYTE [DISTWIDTH] IN BLOOD BY AUTOMATED COUNT: 49.3 FL (ref 35.9–50)
GFR SERPLBLD CREATININE-BSD FMLA CKD-EPI: 90 ML/MIN/1.73 M 2
GLUCOSE SERPL-MCNC: 126 MG/DL (ref 65–99)
GLUCOSE UR STRIP.AUTO-MCNC: NEGATIVE MG/DL
HCT VFR BLD AUTO: 33.8 % (ref 37–47)
HGB BLD-MCNC: 11.4 G/DL (ref 12–16)
IMM GRANULOCYTES # BLD AUTO: 0.02 K/UL (ref 0–0.11)
IMM GRANULOCYTES NFR BLD AUTO: 0.3 % (ref 0–0.9)
KETONES UR STRIP.AUTO-MCNC: NEGATIVE MG/DL
LEUKOCYTE ESTERASE UR QL STRIP.AUTO: NEGATIVE
LYMPHOCYTES # BLD AUTO: 0.61 K/UL (ref 1–4.8)
LYMPHOCYTES NFR BLD: 9.1 % (ref 22–41)
MAGNESIUM SERPL-MCNC: 2.1 MG/DL (ref 1.5–2.5)
MCH RBC QN AUTO: 33.7 PG (ref 27–33)
MCHC RBC AUTO-ENTMCNC: 33.7 G/DL (ref 32.2–35.5)
MCV RBC AUTO: 100 FL (ref 81.4–97.8)
MICRO URNS: NORMAL
MONOCYTES # BLD AUTO: 0.5 K/UL (ref 0–0.85)
MONOCYTES NFR BLD AUTO: 7.4 % (ref 0–13.4)
NEUTROPHILS # BLD AUTO: 5.61 K/UL (ref 1.82–7.42)
NEUTROPHILS NFR BLD: 83.2 % (ref 44–72)
NITRITE UR QL STRIP.AUTO: NEGATIVE
NRBC # BLD AUTO: 0 K/UL
NRBC BLD-RTO: 0 /100 WBC (ref 0–0.2)
OSMOLALITY SERPL: 273 MOSM/KG H2O (ref 278–298)
OSMOLALITY UR: 620 MOSM/KG H2O (ref 300–900)
PH UR STRIP.AUTO: 5.5 [PH] (ref 5–8)
PHOSPHATE SERPL-MCNC: 3.7 MG/DL (ref 2.5–4.5)
PLATELET # BLD AUTO: 248 K/UL (ref 164–446)
PMV BLD AUTO: 9.4 FL (ref 9–12.9)
POTASSIUM SERPL-SCNC: 5.3 MMOL/L (ref 3.6–5.5)
PROT SERPL-MCNC: 6.2 G/DL (ref 6–8.2)
PROT UR QL STRIP: NEGATIVE MG/DL
PROT UR-MCNC: 23.4 MG/DL (ref 0–15)
PROT/CREAT UR: 263 MG/G (ref 10–107)
RBC # BLD AUTO: 3.38 M/UL (ref 4.2–5.4)
RBC UR QL AUTO: NEGATIVE
SODIUM SERPL-SCNC: 124 MMOL/L (ref 135–145)
SODIUM UR-SCNC: 52 MMOL/L
SP GR UR STRIP.AUTO: 1.03
UROBILINOGEN UR STRIP.AUTO-MCNC: 0.2 EU/DL
WBC # BLD AUTO: 6.7 K/UL (ref 4.8–10.8)

## 2025-03-15 PROCEDURE — 84155 ASSAY OF PROTEIN SERUM: CPT

## 2025-03-15 PROCEDURE — 97535 SELF CARE MNGMENT TRAINING: CPT

## 2025-03-15 PROCEDURE — 700111 HCHG RX REV CODE 636 W/ 250 OVERRIDE (IP): Mod: JZ | Performed by: STUDENT IN AN ORGANIZED HEALTH CARE EDUCATION/TRAINING PROGRAM

## 2025-03-15 PROCEDURE — 83930 ASSAY OF BLOOD OSMOLALITY: CPT

## 2025-03-15 PROCEDURE — 83735 ASSAY OF MAGNESIUM: CPT

## 2025-03-15 PROCEDURE — 73030 X-RAY EXAM OF SHOULDER: CPT | Mod: LT

## 2025-03-15 PROCEDURE — 97161 PT EVAL LOW COMPLEX 20 MIN: CPT

## 2025-03-15 PROCEDURE — 700102 HCHG RX REV CODE 250 W/ 637 OVERRIDE(OP): Performed by: STUDENT IN AN ORGANIZED HEALTH CARE EDUCATION/TRAINING PROGRAM

## 2025-03-15 PROCEDURE — 700102 HCHG RX REV CODE 250 W/ 637 OVERRIDE(OP): Performed by: INTERNAL MEDICINE

## 2025-03-15 PROCEDURE — 700105 HCHG RX REV CODE 258: Performed by: ORTHOPAEDIC SURGERY

## 2025-03-15 PROCEDURE — 36415 COLL VENOUS BLD VENIPUNCTURE: CPT

## 2025-03-15 PROCEDURE — 94760 N-INVAS EAR/PLS OXIMETRY 1: CPT

## 2025-03-15 PROCEDURE — 84075 ASSAY ALKALINE PHOSPHATASE: CPT

## 2025-03-15 PROCEDURE — A9270 NON-COVERED ITEM OR SERVICE: HCPCS | Performed by: STUDENT IN AN ORGANIZED HEALTH CARE EDUCATION/TRAINING PROGRAM

## 2025-03-15 PROCEDURE — 82247 BILIRUBIN TOTAL: CPT

## 2025-03-15 PROCEDURE — 770001 HCHG ROOM/CARE - MED/SURG/GYN PRIV*

## 2025-03-15 PROCEDURE — A9270 NON-COVERED ITEM OR SERVICE: HCPCS | Performed by: INTERNAL MEDICINE

## 2025-03-15 PROCEDURE — 85025 COMPLETE CBC W/AUTO DIFF WBC: CPT

## 2025-03-15 PROCEDURE — 84460 ALANINE AMINO (ALT) (SGPT): CPT

## 2025-03-15 PROCEDURE — 97166 OT EVAL MOD COMPLEX 45 MIN: CPT

## 2025-03-15 PROCEDURE — 700111 HCHG RX REV CODE 636 W/ 250 OVERRIDE (IP): Performed by: ORTHOPAEDIC SURGERY

## 2025-03-15 PROCEDURE — 84300 ASSAY OF URINE SODIUM: CPT

## 2025-03-15 PROCEDURE — 700105 HCHG RX REV CODE 258: Performed by: INTERNAL MEDICINE

## 2025-03-15 PROCEDURE — 80069 RENAL FUNCTION PANEL: CPT

## 2025-03-15 PROCEDURE — 84156 ASSAY OF PROTEIN URINE: CPT

## 2025-03-15 PROCEDURE — 84450 TRANSFERASE (AST) (SGOT): CPT

## 2025-03-15 PROCEDURE — 700101 HCHG RX REV CODE 250: Performed by: STUDENT IN AN ORGANIZED HEALTH CARE EDUCATION/TRAINING PROGRAM

## 2025-03-15 PROCEDURE — 99233 SBSQ HOSP IP/OBS HIGH 50: CPT | Performed by: INTERNAL MEDICINE

## 2025-03-15 PROCEDURE — 81003 URINALYSIS AUTO W/O SCOPE: CPT

## 2025-03-15 PROCEDURE — 82248 BILIRUBIN DIRECT: CPT

## 2025-03-15 PROCEDURE — 83935 ASSAY OF URINE OSMOLALITY: CPT

## 2025-03-15 PROCEDURE — 82570 ASSAY OF URINE CREATININE: CPT

## 2025-03-15 RX ORDER — SODIUM CHLORIDE 9 MG/ML
INJECTION, SOLUTION INTRAVENOUS CONTINUOUS
Status: ACTIVE | OUTPATIENT
Start: 2025-03-15 | End: 2025-03-16

## 2025-03-15 RX ADMIN — GABAPENTIN 100 MG: 100 CAPSULE ORAL at 10:59

## 2025-03-15 RX ADMIN — OXYCODONE 5 MG: 5 TABLET ORAL at 13:14

## 2025-03-15 RX ADMIN — SODIUM CHLORIDE: 9 INJECTION, SOLUTION INTRAVENOUS at 13:20

## 2025-03-15 RX ADMIN — POLYETHYLENE GLYCOL 3350 1 PACKET: 17 POWDER, FOR SOLUTION ORAL at 08:27

## 2025-03-15 RX ADMIN — Medication 1000 UNITS: at 05:37

## 2025-03-15 RX ADMIN — OMEGA-3 FATTY ACIDS CAP 1000 MG 1000 MG: 1000 CAP at 05:36

## 2025-03-15 RX ADMIN — ACETAMINOPHEN 650 MG: 325 TABLET ORAL at 00:08

## 2025-03-15 RX ADMIN — SENNOSIDES AND DOCUSATE SODIUM 2 TABLET: 50; 8.6 TABLET ORAL at 18:22

## 2025-03-15 RX ADMIN — OXYCODONE HYDROCHLORIDE 10 MG: 10 TABLET ORAL at 18:21

## 2025-03-15 RX ADMIN — OXYCODONE 5 MG: 5 TABLET ORAL at 08:28

## 2025-03-15 RX ADMIN — ACETAMINOPHEN 650 MG: 325 TABLET ORAL at 05:36

## 2025-03-15 RX ADMIN — ACETAMINOPHEN 650 MG: 325 TABLET ORAL at 10:58

## 2025-03-15 RX ADMIN — CEFAZOLIN 2 G: 2 INJECTION, POWDER, FOR SOLUTION INTRAMUSCULAR; INTRAVENOUS at 05:37

## 2025-03-15 RX ADMIN — OMEPRAZOLE 20 MG: 20 CAPSULE, DELAYED RELEASE ORAL at 05:36

## 2025-03-15 RX ADMIN — LISINOPRIL 20 MG: 20 TABLET ORAL at 05:36

## 2025-03-15 RX ADMIN — MAGNESIUM 64 MG (MAGNESIUM CHLORIDE) TABLET,DELAYED RELEASE 64 MG: at 10:59

## 2025-03-15 RX ADMIN — FLECAINIDE ACETATE 25 MG: 50 TABLET ORAL at 05:36

## 2025-03-15 RX ADMIN — LIDOCAINE 1 PATCH: 4 PATCH TOPICAL at 05:38

## 2025-03-15 RX ADMIN — ENOXAPARIN SODIUM 40 MG: 100 INJECTION SUBCUTANEOUS at 18:23

## 2025-03-15 RX ADMIN — ACETAMINOPHEN 650 MG: 325 TABLET ORAL at 18:21

## 2025-03-15 RX ADMIN — ONDANSETRON 4 MG: 2 INJECTION INTRAMUSCULAR; INTRAVENOUS at 10:57

## 2025-03-15 RX ADMIN — FLECAINIDE ACETATE 25 MG: 50 TABLET ORAL at 18:22

## 2025-03-15 RX ADMIN — LEVOTHYROXINE SODIUM 50 MCG: 0.05 TABLET ORAL at 05:35

## 2025-03-15 ASSESSMENT — COGNITIVE AND FUNCTIONAL STATUS - GENERAL
DRESSING REGULAR UPPER BODY CLOTHING: A LITTLE
PERSONAL GROOMING: A LITTLE
TURNING FROM BACK TO SIDE WHILE IN FLAT BAD: A LOT
WALKING IN HOSPITAL ROOM: A LOT
MOVING FROM LYING ON BACK TO SITTING ON SIDE OF FLAT BED: A LOT
MOBILITY SCORE: 12
DAILY ACTIVITIY SCORE: 16
TOILETING: A LOT
CLIMB 3 TO 5 STEPS WITH RAILING: TOTAL
MOVING TO AND FROM BED TO CHAIR: A LOT
SUGGESTED CMS G CODE MODIFIER DAILY ACTIVITY: CK
HELP NEEDED FOR BATHING: A LOT
SUGGESTED CMS G CODE MODIFIER MOBILITY: CL
STANDING UP FROM CHAIR USING ARMS: A LITTLE
DRESSING REGULAR LOWER BODY CLOTHING: A LOT

## 2025-03-15 ASSESSMENT — PATIENT HEALTH QUESTIONNAIRE - PHQ9
1. LITTLE INTEREST OR PLEASURE IN DOING THINGS: NOT AT ALL
2. FEELING DOWN, DEPRESSED, IRRITABLE, OR HOPELESS: NOT AT ALL
SUM OF ALL RESPONSES TO PHQ9 QUESTIONS 1 AND 2: 0

## 2025-03-15 ASSESSMENT — LIFESTYLE VARIABLES
HAVE PEOPLE ANNOYED YOU BY CRITICIZING YOUR DRINKING: NO
HAVE YOU EVER FELT YOU SHOULD CUT DOWN ON YOUR DRINKING: NO
TOTAL SCORE: 0
ON A TYPICAL DAY WHEN YOU DRINK ALCOHOL HOW MANY DRINKS DO YOU HAVE: 0
CONSUMPTION TOTAL: NEGATIVE
CONSUMPTION TOTAL: INCOMPLETE
HOW MANY TIMES IN THE PAST YEAR HAVE YOU HAD 5 OR MORE DRINKS IN A DAY: 0
AVERAGE NUMBER OF DAYS PER WEEK YOU HAVE A DRINK CONTAINING ALCOHOL: 0
DOES PATIENT WANT TO STOP DRINKING: NO
EVER HAD A DRINK FIRST THING IN THE MORNING TO STEADY YOUR NERVES TO GET RID OF A HANGOVER: NO
HAVE PEOPLE ANNOYED YOU BY CRITICIZING YOUR DRINKING: NO
EVER FELT BAD OR GUILTY ABOUT YOUR DRINKING: NO
EVER HAD A DRINK FIRST THING IN THE MORNING TO STEADY YOUR NERVES TO GET RID OF A HANGOVER: NO
HAVE YOU EVER FELT YOU SHOULD CUT DOWN ON YOUR DRINKING: NO
TOTAL SCORE: 0
DOES PATIENT WANT TO STOP DRINKING: NO
ALCOHOL_USE: NO
ON A TYPICAL DAY WHEN YOU DRINK ALCOHOL HOW MANY DRINKS DO YOU HAVE: 0
HOW MANY TIMES IN THE PAST YEAR HAVE YOU HAD 5 OR MORE DRINKS IN A DAY: 0
TOTAL SCORE: 0
AVERAGE NUMBER OF DAYS PER WEEK YOU HAVE A DRINK CONTAINING ALCOHOL: 0
ALCOHOL_USE: NO

## 2025-03-15 ASSESSMENT — SOCIAL DETERMINANTS OF HEALTH (SDOH)
WITHIN THE PAST 12 MONTHS, THE FOOD YOU BOUGHT JUST DIDN'T LAST AND YOU DIDN'T HAVE MONEY TO GET MORE: NEVER TRUE
WITHIN THE LAST YEAR, HAVE YOU BEEN KICKED, HIT, SLAPPED, OR OTHERWISE PHYSICALLY HURT BY YOUR PARTNER OR EX-PARTNER?: NO
WITHIN THE LAST YEAR, HAVE TO BEEN RAPED OR FORCED TO HAVE ANY KIND OF SEXUAL ACTIVITY BY YOUR PARTNER OR EX-PARTNER?: NO
WITHIN THE PAST 12 MONTHS, YOU WORRIED THAT YOUR FOOD WOULD RUN OUT BEFORE YOU GOT THE MONEY TO BUY MORE: NEVER TRUE
WITHIN THE LAST YEAR, HAVE YOU BEEN AFRAID OF YOUR PARTNER OR EX-PARTNER?: NO
IN THE PAST 12 MONTHS, HAS THE ELECTRIC, GAS, OIL, OR WATER COMPANY THREATENED TO SHUT OFF SERVICE IN YOUR HOME?: NO
WITHIN THE LAST YEAR, HAVE YOU BEEN HUMILIATED OR EMOTIONALLY ABUSED IN OTHER WAYS BY YOUR PARTNER OR EX-PARTNER?: NO

## 2025-03-15 ASSESSMENT — PAIN DESCRIPTION - PAIN TYPE
TYPE: ACUTE PAIN
TYPE: SURGICAL PAIN
TYPE: ACUTE PAIN;SURGICAL PAIN
TYPE: ACUTE PAIN;SURGICAL PAIN
TYPE: ACUTE PAIN
TYPE: ACUTE PAIN;SURGICAL PAIN
TYPE: SURGICAL PAIN;ACUTE PAIN
TYPE: ACUTE PAIN
TYPE: ACUTE PAIN

## 2025-03-15 ASSESSMENT — ACTIVITIES OF DAILY LIVING (ADL): TOILETING: INDEPENDENT

## 2025-03-15 ASSESSMENT — GAIT ASSESSMENTS: GAIT LEVEL OF ASSIST: UNABLE TO PARTICIPATE

## 2025-03-15 NOTE — THERAPY
Occupational Therapy   Initial Evaluation     Patient Name: Alyssa Juan  Age:  84 y.o., Sex:  female  Medical Record #: [id number]  Today's Date: 3/15/2025     Precautions: (P) Posterior Hip Precautions, Weight Bearing As Tolerated Left Lower Extremity, Fall Risk    Assessment  Patient is 84 y.o. female admitted following GLF sustaining displaced L femoral neck fracture, S/p L hemiarthroplasty POD 1. Pt is A&Ox4, motivated for OOB/UIC. Low bp's, dizziness limits standing/transferring at this time. Education on  role of OT, hip prec's, DME/AE use during ADL's. Tolerates EOB activity (2x), 1 STS. MaxA with sup><sit, donning socks, CGA with seated grooming. Incontinent bm; MaxA with hygiene. See below for CLOF. OT will follow while in house.              Plan  Occupational Therapy Initial Treatment Plan   Treatment Interventions: Self Care / Activities of Daily Living, Neuro Re-Education / Balance, Therapeutic Activity  Treatment Frequency: 3 Times per Week  Duration: Until Therapy Goals Met    DC Equipment Recommendations: Unable to determine at this time  Discharge Recommendations: Recommend post-acute placement for additional occupational therapy services prior to discharge home ([facility] if possible; pt was there 2 yrs ago)     Subjective  Agreeable     Objective   03/15/25 1102   Prior Living Situation   Prior Services Intermittent Physical Support for ADL Per Service   Housing / Facility 1 Story House   Steps Into Home 2   Steps In Home 0   Bathroom Set up Walk In Shower;Shower Chair;Grab Bars   Equipment Owned Tub / Shower Seat;Grab Bar(s) In Tub / Shower;Grab Bar(s) By Toilet;Front-Wheel Walker;Hand Held Shower;Sock Aid;Reacher;Long Handled Shoehorn;Oxygen   Lives with - Patient's Self Care Capacity Alone and Able to Care For Self   Comments Caregivers visits 2 hrs in AM, 3 hrs in PM. Neighbors also help when needed. Pt reports she stays mostly inside.   Prior Level of ADL Function   Self Feeding Independent    Grooming / Hygiene Independent   Bathing Requires Assist   Dressing Independent   Toileting Independent   Prior Level of IADL Function   Medication Management Unable To Determine At This Time   Laundry Requires Assist   Kitchen Mobility Independent   Finances Unable To Determine At This Time   Home Management Requires Assist   Shopping Requires Assist   Prior Level Of Mobility Independent With Device in Home   Driving / Transportation Relatives / Others Provide Transportation   Occupation (Pre-Hospital Vocational) Retired Due To Age   Leisure Interests Reading;[employer]   History of Falls   History of Falls Yes   Precautions   Precautions Posterior Hip Precautions;Weight Bearing As Tolerated Left Lower Extremity;Fall Risk   Vitals   Patient BP Position Sitting   Blood Pressure  (!) 88/55   O2 (LPM) 2   O2 Delivery Device Silicone Nasal Cannula   Pain 0 - 10 Group   Location Hip   Location Orientation Left   Therapist Pain Assessment Post Activity Pain Same as Prior to Activity;Nurse Notified   Cognition    Cognition / Consciousness WDL   Level of Consciousness Alert   Comments Ox4   Passive ROM Upper Body   Passive ROM Upper Body WDL   Active ROM Upper Body   Active ROM Upper Body  WDL   Dominant Hand Right   Comments L shoulder pain; slow with ROM   Strength Upper Body   Upper Body Strength  X   Sensation Upper Body   Upper Extremity Sensation  WDL   Upper Body Muscle Tone   Upper Body Muscle Tone  WDL   Coordination Upper Body   Coordination WDL   Balance Assessment   Sitting Balance (Static) Fair   Sitting Balance (Dynamic) Fair -   Standing Balance (Static) Fair -   Weight Shift Sitting Poor   Bed Mobility    Supine to Sit Maximal Assist   Sit to Supine Maximal Assist   Scooting Moderate Assist   ADL Assessment   Grooming Contact Guard Assist;Seated   Lower Body Dressing Maximal Assist   Toileting Total Assist   Comments incontinent bm; pt's low bp may have been vaso vagal response   How much help from another  person does the patient currently need...   Putting on and taking off regular lower body clothing? 2   Bathing (including washing, rinsing, and drying)? 2   Toileting, which includes using a toilet, bedpan, or urinal? 2   Putting on and taking off regular upper body clothing? 3   Taking care of personal grooming such as brushing teeth? 3   Eating meals? 4   6 Clicks Daily Activity Score 16   Functional Mobility   Sit to Stand Minimal Assist   Bed, Chair, Wheelchair Transfer Unable to Participate   Comments Low BP's, dizziness in sitting; 2 attempts for UIC. Will try a later time.   Visual Perception   Visual Perception  WDL   Activity Tolerance   Sitting Edge of Bed 14 total   Standing <10 secs for bedding change   Patient / Family Goals   Patient / Family Goal #1 To rehab prior to return home   Short Term Goals   Short Term Goal # 1 ADL transfers with CGA within 5 days   Short Term Goal # 2 Toileting in br with CGA within 5 days   Short Term Goal # 3 LB dressing with AE, Charito within 5 days   Education Group   Education Provided Hip Precautions;Transfers;Activities of Daily Living;Adaptive Equipment   Role of Occupational Therapist Patient Response Patient;Acceptance;Explanation;Verbal Demonstration   Hip Precautions Patient Response Patient;Acceptance;Explanation;Verbal Demonstration;Demonstration;Handout   Transfers Patient Response Patient;Acceptance;Explanation;Verbal Demonstration   ADL Patient Response Patient;Explanation;Verbal Demonstration;Acceptance;Action Demonstration   Adaptive Equipment Patient Response Patient;Nonacceptance;Explanation;Verbal Demonstration   Occupational Therapy Initial Treatment Plan    Treatment Interventions Self Care / Activities of Daily Living;Neuro Re-Education / Balance;Therapeutic Activity   Treatment Frequency 3 Times per Week   Duration Until Therapy Goals Met   Problem List   Problem List Decreased Active Daily Living Skills;Decreased Homemaking Skills;Decreased Functional  Mobility;Decreased Activity Tolerance;Impaired Postural Control / Balance   Anticipated Discharge Equipment and Recommendations   DC Equipment Recommendations Unable to determine at this time   Discharge Recommendations Recommend post-acute placement for additional occupational therapy services prior to discharge home  ([facility] if possible; pt was there 2 yrs ago)   Interdisciplinary Plan of Care Collaboration   IDT Collaboration with  Nursing;Physician Assistant   Patient Position at End of Therapy In Bed;Bed Alarm On;Call Light within Reach;Tray Table within Reach;Phone within Reach   Collaboration Comments OT Damian. DC planning. Low BP's.

## 2025-03-15 NOTE — ASSESSMENT & PLAN NOTE
I reviewed labs, Na 124, checking urine. She follows [facility] nephrology, takes sodium tablets every Monday. 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 She drinks a lot of free water at home.  Checking urine electrolytes.  Fluid restrictions ordered: total fluids in 24 hours 1.5L including free water 500mL.

## 2025-03-15 NOTE — CARE PLAN
Problem: Post-Operative Hip Replacement  Goal: Patient's neurovascular status will be maintained or improve  Outcome: Progressing   The patient is Stable - Low risk of patient condition declining or worsening    Shift Goals  Clinical Goals: pain control,ambulate,void,remain free from falls  Patient Goals: rest,pain control    Progress made toward(s) clinical / shift goals:  Pain at tolerable level after nursing interventions and PRN medications.    Patient is not progressing towards the following goals:

## 2025-03-15 NOTE — PROGRESS NOTES
Hospital Medicine Daily Progress Note    Date of Service  3/15/2025    Chief Complaint  Alyssa Juan is a 84 y.o. female admitted 3/14/2025 with GLF with left hip pains    Hospital Course  No notes on file    Interval Problem Update  3/15:  Pt stated her hip pain is manageable.  I reviewed labs, Na 124, checking urine. She follows [facility] nephrology, takes sodium tablets every Monday. 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 She drinks a lot of free water at home.  Checking urine electrolytes.  Fluid restrictions ordered    3/14:  S/p left hip hemiarthroplasty surgery today  Pt seen in PACU, states she is in severe pain.  Bedside RN stated feet were dusky, I checked pulses and needed a doppler to identify dorsalis pedis bilaterally.  Pt agreeable to SNF or [facility].    I have discussed this patient's plan of care and discharge plan at IDT rounds today with Case Management, Nursing, Nursing leadership, and other members of the IDT team.    Consultants/Specialty  orthopedics    Code Status  DNAR/DNI    Disposition  The patient is not medically cleared for discharge to home or a post-acute facility.  Anticipate discharge to: an inpatient rehabilitation hospital    I have placed the appropriate orders for post-discharge needs.    Review of Systems  Review of Systems   Musculoskeletal:  Positive for joint pain (left hip pain).   All other systems reviewed and are negative.       Physical Exam  Temp:  [35.8 °C (96.5 °F)-36.6 °C (97.9 °F)] 36.3 °C (97.4 °F)  Pulse:  [51-67] 65  Resp:  [14-18] 17  BP: ()/() 107/77  SpO2:  [91 %-100 %] 95 %    Physical Exam  Vitals and nursing note reviewed.   Constitutional:       General: She is not in acute distress.     Appearance: She is not ill-appearing.      Comments: Frail appearing elderly female   HENT:      Head: Normocephalic and atraumatic.      Comments: Temporal muscle wasting positive     Mouth/Throat:      Mouth: Mucous membranes are dry.      Pharynx: No oropharyngeal  exudate.   Eyes:      General: No scleral icterus.     Extraocular Movements: Extraocular movements intact.   Cardiovascular:      Rate and Rhythm: Normal rate and regular rhythm.      Pulses: Normal pulses.      Heart sounds: Normal heart sounds. No murmur heard.  Pulmonary:      Effort: Pulmonary effort is normal. No respiratory distress.      Breath sounds: Normal breath sounds. No wheezing.   Abdominal:      General: Abdomen is flat. Bowel sounds are normal. There is no distension.      Palpations: Abdomen is soft.      Tenderness: There is no abdominal tenderness.   Musculoskeletal:         General: No swelling or tenderness.      Cervical back: Normal range of motion. No rigidity.      Comments: Sarcopenic. Bilateral hand muscle atrophy noted. Surgery site intact.   Skin:     General: Skin is warm.      Capillary Refill: Capillary refill takes less than 2 seconds.      Coloration: Skin is not jaundiced.      Findings: No erythema.   Neurological:      Mental Status: She is alert and oriented to person, place, and time. Mental status is at baseline.      Motor: Weakness present.   Psychiatric:         Mood and Affect: Mood normal.         Behavior: Behavior normal.         Thought Content: Thought content normal.         Judgment: Judgment normal.         Fluids    Intake/Output Summary (Last 24 hours) at 3/15/2025 0856  Last data filed at 3/15/2025 0829  Gross per 24 hour   Intake 710 ml   Output 1000 ml   Net -290 ml        Laboratory  Recent Labs     03/14/25  0335 03/15/25  0105   WBC 4.0* 6.7   RBC 3.50* 3.38*   HEMOGLOBIN 11.6* 11.4*   HEMATOCRIT 34.5* 33.8*   MCV 98.6* 100.0*   MCH 33.1* 33.7*   MCHC 33.6 33.7   RDW 47.0 49.3   PLATELETCT 250 248   MPV 8.8* 9.4     Recent Labs     03/14/25 0335 03/15/25  0105   SODIUM 129* 124*   POTASSIUM 3.5* 5.3   CHLORIDE 97 90*   CO2 24 25   GLUCOSE 85 126*   BUN 16 15   CREATININE 0.47* 0.53   CALCIUM 7.2* 8.9     Recent Labs     03/14/25 0335   INR 0.90                Imaging  DX-PELVIS-1 OR 2 VIEWS   Final Result         1. Status post left hip arthroplasty without evidence of early hardware complication.      DX-CHEST-PORTABLE (1 VIEW)   Final Result         1.  Interstitial pulmonary parenchymal prominence suggest chronic underlying lung disease, component of interstitial edema and/or infiltrates not excluded.   2.  Trace bilateral pleural effusions   3.  Atherosclerosis      DX-HIP-COMPLETE - UNILATERAL 2+ LEFT   Final Result         1.  Impacted left femoral neck fracture   2.  Left superior and inferior pubic ramus fracture is at the symphysis           Assessment/Plan  * Hip fracture requiring operative repair, left, closed, initial encounter ([facility])- (present on admission)  Assessment & Plan  S/p GLF  X-ray left hip: Impacted femoral neck fracture, left superior and inferior pubic ramus fracture    Pain and BM regimen in place  PT/OT post surgery evaluation pending  I sent referrals for post acute placement, SNF vs IRF.  Lovenox DVT prophylaxis    Hyponatremia- (present on admission)  Assessment & Plan  I reviewed labs, Na 124, checking urine. She follows [facility] nephrology, takes sodium tablets every Monday. 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 She drinks a lot of free water at home.  Checking urine electrolytes.  Fluid restrictions ordered: total fluids in 24 hours 1.5L including free water 500mL.    Age-related physical debility- (present on admission)  Assessment & Plan  PT/OT pending  Post acute placement referrals ordered    Encounter for preoperative assessment- (present on admission)  Assessment & Plan  resolved    Hypocalcemia- (present on admission)  Assessment & Plan  CCa 9.2 after IV replacement    ACP (advance care planning)- (present on admission)  Assessment & Plan  DNR/DNI    Hypokalemia- (present on admission)  Assessment & Plan  K 5.3, hold off on further replacements    Arrhythmia- (present on admission)  Assessment & Plan  Continue home Flecainide  She is not on  anticoagulation    Closed fracture of ramus of left pubis (HCC)- (present on admission)  Assessment & Plan  Supportive pain control  PT/OT pending    Peripheral polyneuropathy- (present on admission)  Assessment & Plan  Continue home gabapentin, titrate up if needed    Hypothyroidism- (present on admission)  Assessment & Plan  Continue levothyroxine    Gastroesophageal reflux disease- (present on admission)  Assessment & Plan  Continue omperazole    HTN (hypertension)- (present on admission)  Assessment & Plan  Hold lisinopril, potassium 5.3 upper normal limit         VTE prophylaxis:    enoxaparin ppx      I have performed a physical exam and reviewed and updated ROS and Plan today (3/15/2025). In review of yesterday's note (3/14/2025), there are no changes except as documented above.      Total time spent 51 minutes. I spent greater than 50% of the time for patient care, including unit/floor time, multiple face-to-face encounters with the patient, counseling on treatment plan and discussion with bedside RN.  Further, I spent time on my own review of patient's overnight events, RN notes, imaging and lab analysis, and developing my assessment and plan above.  In addition, working with , social workers, and Charge RN on case coordination on this date.    This note was generated using voice recognition software which has a chance of producing errors of grammar and content.  I have made every reasonable attempt to find and correct any errors, but it should be expected that some may not be found prior to finalization of this note.

## 2025-03-15 NOTE — OR NURSING
1435- Patient arrived from OR via [facility].  L hip dressing CDI; pedal pulse 2+. Cold pack applied to the L hip.       Sedation/Resp Status: OPA out for return of spontaneous eye opening/gag reflex - respirations continue spontaneous and non-labored.   Respirations spontaneous and non-labored.    HR 58SB; VSS on 10L 02 via simple mask.  No pain or nausea reported.     1450- No pain or nausea reported. The L hip dressing is CDI.    1505- No pain or nausea reported. The dressing is free of drainage.    1513-  at bedside assessing the patient. Doppler used to hear pulses. The can be felt but they are easily obliterated    1520- 6/10 pain reported and no nausea. Will grab meds. The dressing is unchanged.    1530- Medicated per MAR for 6/10 pain    1535- 6/10 pain and no nausea reported. The dressing is CDI.    1539- Called the patients daughter and gave updates.    1550- Tolerable 5/10 pain and no nausea reported. The dressing is CDI.    1551- Xray tech at bedside getting images of the L hip.    1605- Tolerable 4/10 pain and no nausea. The dressing is CDI.    1620- Tolerable pain and no nausea reported. The dressing is CDI    1635- Tolerable 5/10 pain and no nausea reported. The dressing has no drainage.    1644- Medicated per MAR for 6/10 pain.    1705- Tolerable pain and no nausea reported. The dressing is CDI.    1712- Called the patients friend and gave an update with the assigned room number.    1722- Gave report to Jovan WOODS    1724- Called the patients daughter and gave another update.    1728- The patient was transported to room 204 via bed by Richard. The patient was on 1L via NC. 500 left in the O2 tank. All belongings with the patient.    1742- Called the patients friend Lyndsey and left her know the patient was in room and she could visit her

## 2025-03-15 NOTE — ANESTHESIA POSTPROCEDURE EVALUATION
Patient: Alyssa Juan    Procedure Summary       Date: 03/14/25 Room / Location:  OR  / SURGERY [facility]    Anesthesia Start: 1338 Anesthesia Stop: 1438    Procedure: HEMIARTHROPLASTY, HIP (Left: Hip) Diagnosis: (DISPLACED LEFT FEMORAL NECK FRACTURE)    Surgeons: Radha Cota M.D. Responsible Provider: Juanito Sykes M.D.    Anesthesia Type: general ASA Status: 2            Final Anesthesia Type: general  Last vitals  BP   Blood Pressure : (!) 160/77    Temp   36 °C (96.8 °F)    Pulse   (!) 54   Resp   16    SpO2   98 %      Anesthesia Post Evaluation    Patient location during evaluation: PACU  Patient participation: complete - patient participated  Level of consciousness: awake and alert    Airway patency: patent  Anesthetic complications: no  Cardiovascular status: hemodynamically stable  Respiratory status: acceptable  Hydration status: euvolemic    PONV: none          There were no known notable events for this encounter.     Nurse Pain Score: 5 (NPRS)

## 2025-03-15 NOTE — PROGRESS NOTES
4 Eyes Skin Assessment Completed by Luis M RN and Vladislav RN.    Head WDL  Ears WDL  Nose WDL  Mouth WDL  Neck WDL  Breast/Chest WDL  Shoulder Blades WDL  Spine WDL  (R) Arm/Elbow/Hand WDL  (L) Arm/Elbow/Hand WDL  Abdomen WDL  Groin WDL  Scrotum/Coccyx/Buttocks WDL  (R) Leg varicose veins  (L) Leg varicose veins,L hip dressing in place  (R) Heel/Foot/Toe Redness and Blanching  (L) Heel/Foot/Toe Redness and Blanching          Devices In Places Blood Pressure Cuff, Pulse Ox, and SCD's,nasal cannula      Interventions In Place Pillows, Dri-Manpreet Pads, and Heels Loaded W/Pillows,gray ear foams    Possible Skin Injury No    Pictures Uploaded Into Epic N/A  Wound Consult Placed N/A  RN Wound Prevention Protocol Ordered No

## 2025-03-15 NOTE — PROGRESS NOTES
Patient to floor. L hip dressing CDI, patient able to plantar and dorsi flex to LLE. SCDs on. Pain tolerable, patient awake but drowsy. Currently on 2 L, patient states she wears 2L at night. Denies nausea. Updated with plan of care.

## 2025-03-15 NOTE — PROGRESS NOTES
Orthopaedic Progress Note    Author: Coby Gonzalez P.A.-C. Date & Time created: 3/15/2025   8:51 AM     Interval Events:  None    Alyssa is a 84 year old F POD #1 s/p L hemiarthroplasty for hx of displaced femoral neck fx. Patient is doing very well in regard to hip. Pain well controlled. Voiding appropriately. They have not yet ambulated with walker. She was about to get up with OT. She states she thinks that she had left shoulder pain after the fall but it has worsened since this yesterday evening. Pain is located along the anterior shoulder and radiates to the deltoid. No numbness or tingling. She has a lidocaine patch in place. Xrays were not obtained of the shoulder. Denies fever, chills, nausea, vomiting, SOB, chest pain.     Constitutional: No weight loss, fever, chills, weakness or fatigue.  HEENT: No visual loss, blurred vision, double vision or yellow sclera. No hearing loss, sneezing, congestion, runny nose or sore throat.  Skin: No rash or itching.  Cardiovascular: No chest pain, chest pressure or chest discomfort. No palpitations or pedal edema.  Respiratory: No shortness of breath, cough or sputum production.  Gastrointestinal: No anorexia, nausea, vomiting or diarrhea. No abdominal pain or blood in stool.  Genitourinary: No burning micturition. No urinary frequency or incontinence.  Neurologic: No headache, dizziness, syncope, unilateral weakness, ataxia, numbness or tingling in the extremities. No change in bowel or bladder control.  Musculoskeletal: mild left hip pain, left shoulder pain  Hematologic: No bleeding or bruising.  Psychiatric: No depression or anxiety.  Endocrine: No reports of sweating. No cold or heat intolerance. No polyuria or polydipsia.     Hemodynamics:  /77   Pulse 65   Temp 36.3 °C (97.4 °F) (Temporal)   Resp 17   Ht 1.524 m (5')   Wt 45.4 kg (100 lb)   SpO2 95%      Current Precaution Orders   Procedures    Aspiration Precautions     Standing Status:   Standing      Number of Occurrences:   1    Fall Precautions     Standing Status:   Standing     Number of Occurrences:   1     Respiratory:    Respiration: 17, Pulse Oximetry: 95 %           Fluids:    Intake/Output Summary (Last 24 hours) at 3/15/2025 0851  Last data filed at 3/14/2025 2210  Gross per 24 hour   Intake 590 ml   Output 1000 ml   Net -410 ml     Admit Weight: 44.5 kg (98 lb)  Current Weight: 45.4 kg (100 lb)    Physical Exam   General: Alert, in no acute cardiopulmonary distress.   Mental Status: Oriented to person, place and time. Normal affect.   Neurologic: No focal neurological deficits. Flexor plantar response. Moves all extremities spontaneously. Sensation intact bilaterally.   Skin: Incision bandage is CDI. No rashes or lesions. No petechiae or purpura. No edema.   Musculoskeletal: Normal ROM. 4/5 strength with hip flex. 5/5 strength hamstrings, quadriceps, EHL, FHL, ant tib, gastrocsoleus.     Left shoulder:  Mild pain to palpation over humeral head and deltoid.  Able to flex to approx 100 degrees    Labs:  Recent Labs     03/14/25  0335 03/15/25  0105   WBC 4.0* 6.7   RBC 3.50* 3.38*   HEMOGLOBIN 11.6* 11.4*   HEMATOCRIT 34.5* 33.8*   MCV 98.6* 100.0*   MCH 33.1* 33.7*   MCHC 33.6 33.7   RDW 47.0 49.3   PLATELETCT 250 248   MPV 8.8* 9.4     Recent Labs     03/14/25  0335 03/15/25  0105   SODIUM 129* 124*   POTASSIUM 3.5* 5.3   CHLORIDE 97 90*   CO2 24 25   GLUCOSE 85 126*   BUN 16 15   CREATININE 0.47* 0.53   CALCIUM 7.2* 8.9       Medical Decision Making/Problem List:    Active Hospital Problems    Diagnosis     *Hip fracture requiring operative repair, left, closed, initial encounter ([facility]) [S72.002A]     Closed fracture of ramus of left pubis ([facility]) [S32.592A]     Arrhythmia [I49.9]     Hypokalemia [E87.6]     ACP (advance care planning) [Z71.89]     Hypocalcemia [E83.51]     Encounter for preoperative assessment [Z01.818]     Age-related physical debility [R54]     Peripheral polyneuropathy [G62.9]      HTN (hypertension) [I10]     Hypothyroidism [E03.9]     Gastroesophageal reflux disease [K21.9]        Alyssa is a 84 year-old F who is POD#1 s/p L hip steph d/t hx of GLF sustaining femoral neck fx. Doing well. They have ambulated with walker. They are voiding appropriately. Anticipate d/c in next couple day to SNF/rehab; per hospitalist. Today we will obtain xray left shoulder to eval for fx vs OA vs tendinopathy.    Patient is to f/u in our office in 2 weeks, they are to leave bandage in place, start DVT ppx today. Follow posterior hip precautions. They are to call our office with any questions/concerns at [facility]    Core Measures & Quality Metrics:  Current DVT prophylaxis: per hospital team/ recommend starting today  Discussed patient condition with Patient, Dr. Cota  Clearance for lovenox/heparin: per hospital team  Weight Bearing Status: WBAT  Wounds & Drains: left posterior hip incision, CDI, reinforce prn  Disposition and Follow-up: anticipate d/c in the next couple days ro SNF/[facility]; per hospital team. F/u at [facility] for wound check in 2 weeks

## 2025-03-15 NOTE — DISCHARGE PLANNING
"Met with pt at bedside to complete assessment.   Verified information listed on facesheet.   Pt reports she lives alone in a single story home. Pt uses a walker at baseline and states she's in the process of obtaining a new one. Pt uses nocturnal O2 through [employer]. Pt is independent with her self care. Pt states she has good support from her neighbors, especially her neighbor Lyndsey who helps her with grocery shopping and transportation to MD appointments/pharmacy. Pt states she also has \"two girls\" who come to her home everyday, once in the morning and once in the evening to assist with house cleaning, meals and anything else she needs help with.   Discussed discharge planning and pt wants [facility]. She went there two years ago. Pt states if needed, she's able to hire caretakers through [employer] to assist with transition home from rehab.   Rehab consult pending. SNF choice form left with pt in case rehab denies.     Care Transition Team Assessment    Information Source  Orientation Level: Oriented X4  Information Given By: Patient  Who is responsible for making decisions for patient? : Patient    Elopement Risk  Legal Hold: No  Ambulatory or Self Mobile in Wheelchair: No-Not an Elopement Risk  Elopement Risk: Not at Risk for Elopement    Interdisciplinary Discharge Planning  Lives with - Patient's Self Care Capacity: Alone and Unable to Care For Self  Support Systems: Friends / Neighbors  Housing / Facility: 1 Story House  Prior Services: Intermittent Physical Support for ADL Per Service    Discharge Preparedness  What is your plan after discharge?: Skilled nursing facility  What are your discharge supports?: Other (comment) (neighbors)  Prior Functional Level: Ambulatory, Independent with Activities of Daily Living, Independent with Medication Management  Difficulity with ADLs: None  Difficulity with IADLs: Cooking, Driving, Laundry, Shopping    Functional Assesment  Prior Functional Level: Ambulatory, Independent " with Activities of Daily Living, Independent with Medication Management    Finances  Financial Barriers to Discharge: No  Prescription Coverage: Yes    Vision / Hearing Impairment  Vision Impairment : Yes  Right Eye Vision: Impaired, Wears Glasses  Left Eye Vision: Impaired, Wears Glasses  Hearing Impairment: Hearing Device Not Available, Both Ears    Domestic Abuse  Have you ever been the victim of abuse or violence?: No  Possible Abuse/Neglect Reported to:: Not Applicable    Psychological Assessment  History of Substance Abuse: None  History of Psychiatric Problems: No  Non-compliant with Treatment: No  Newly Diagnosed Illness: Yes    Discharge Risks or Barriers  Discharge risks or barriers?: No    Anticipated Discharge Information  Discharge Disposition: Disch to  rehab facility or distinct part unit (62)  Discharge Address: [address] [place] [id number]  Discharge Contact Phone Number: [phone]

## 2025-03-15 NOTE — DISCHARGE PLANNING
[facility] Transitional Care Coordination    Referral from: Dr. Madrid    Insurance Provider on Facesheet: [employer]    Potential Rehab Diagnosis: left femoral neck fracture     Chart review indicates patient may have on going medical management and may have therapy needs to possibly meet inpatient rehab facility criteria with the goal of returning to community.    D/C support will need to be verified: Daughter    Physiatry consultation pended per protocol.  POD 1 left hip hemiarthroplasty. TX pending.     Thank you for the referral.

## 2025-03-15 NOTE — CARE PLAN
The patient is Watcher - Medium risk of patient condition declining or worsening    Shift Goals  Clinical Goals: monitor labs, pain, fall precautions, increase activity with comfort  Patient Goals: rest,pain control    Progress made toward(s) clinical / shift goals:  in progress     Patient is not progressing towards the following goals:

## 2025-03-15 NOTE — THERAPY
Physical Therapy   Initial Evaluation     Patient Name: Alyssa Juan  Age:  84 y.o., Sex:  female  Medical Record #: [id number]  Today's Date: 3/15/2025     Precautions  Precautions: Fall Risk;Posterior Hip Precautions;Weight Bearing As Tolerated Left Lower Extremity    Assessment  Patient is 84 y.o. female with a diagnosis of a L  displaced femoral neck  fracture following a GLF at home. .  Additional factors influencing patient status / progress include the fact pt lives alone and is unable to care for herself at this time. Pt has a PMH of HPTN, GERD, hypothyroid,  she is now POD 2Hemiarthroplasty  of the L hip.      Plan    Physical Therapy Initial Treatment Plan   Treatment Plan : Bed Mobility, Therapeutic Activities, Therapeutic Exercise  Treatment Frequency: 5 Times per Week  Duration: Until Therapy Goals Met    DC Equipment Recommendations: Front-Wheel Walker  Discharge Recommendations: Recommend post-acute placement for additional physical therapy services prior to discharge home       Subjective  Pt is pleasant and cooperative and willing to work with PT. She states she fell and fractured her R hip a few years ago and had the same surgery on the R side.        Objective     03/15/25 1501   Prior Living Situation   Prior Services Intermittent Physical Support for ADL Per Service   Housing / Facility 1 Story House   Steps Into Home 2   Steps In Home 0   Bathroom Set up Walk In Shower;Shower Chair;Grab Bars   Equipment Owned Front-Wheel Walker;Tub / Shower Seat;Grab Bar(s) In Tub / Shower;Grab Bar(s) By Toilet;Hand Held Shower;Oxygen   Lives with - Patient's Self Care Capacity Alone and Unable to Care For Self   Comments pt has caregivers in AM (2 hours) and PM ( 3 hours)   Prior Level of Functional Mobility   Bed Mobility Independent   Transfer Status Independent   Ambulation Independent   Ambulation Distance household   Assistive Devices Used Front-Wheel Walker   Comments pt stays home and does not  leave her home much.   Gait Analysis   Gait Level Of Assist Unable to Participate   Comments pt became light headed after 30 seconds of standing and required a Max A back to bed   Functional Mobility   Sit to Stand Minimal Assist   Bed, Chair, Wheelchair Transfer Unable to Participate   Comments pt was seen in the AM and experienced a similar event with  light headedness after standing.   Patient / Family Goals    Patient / Family Goal #1 go to post acute facility to get stron prior to returning home   Short Term Goals    Short Term Goal # 1 pt will get to EOB with Min assist with HOB elevated and railing up in 4 Tx's   Short Term Goal # 2 pt will transfer with a FWW and CGA for pregait activity in  Tx's for safe transfers at home.   Short Term Goal # 3 pt will ambulate in room 15-25 ' with a FWW and CGA in 5 Tx' s for safety with ambulation at home.   Anticipated Discharge Equipment and Recommendations   DC Equipment Recommendations Front-Wheel Walker   Discharge Recommendations Recommend post-acute placement for additional physical therapy services prior to discharge home   Interdisciplinary Plan of Care Collaboration   Collaboration Comments PT izabela guajardo notified of PT rec's

## 2025-03-16 ENCOUNTER — APPOINTMENT (OUTPATIENT)
Dept: RADIOLOGY | Facility: MEDICAL CENTER | Age: 85
DRG: 956 | End: 2025-03-16
Attending: INTERNAL MEDICINE
Payer: MEDICARE

## 2025-03-16 LAB
ALBUMIN SERPL BCP-MCNC: 3.6 G/DL (ref 3.2–4.9)
BUN SERPL-MCNC: 16 MG/DL (ref 8–22)
CALCIUM ALBUM COR SERPL-MCNC: 9.1 MG/DL (ref 8.5–10.5)
CALCIUM SERPL-MCNC: 8.8 MG/DL (ref 8.4–10.2)
CHLORIDE SERPL-SCNC: 94 MMOL/L (ref 96–112)
CO2 SERPL-SCNC: 26 MMOL/L (ref 20–33)
CREAT SERPL-MCNC: 0.59 MG/DL (ref 0.5–1.4)
ERYTHROCYTE [DISTWIDTH] IN BLOOD BY AUTOMATED COUNT: 50.8 FL (ref 35.9–50)
GFR SERPLBLD CREATININE-BSD FMLA CKD-EPI: 88 ML/MIN/1.73 M 2
GLUCOSE SERPL-MCNC: 82 MG/DL (ref 65–99)
HCT VFR BLD AUTO: 33.3 % (ref 37–47)
HGB BLD-MCNC: 11 G/DL (ref 12–16)
MAGNESIUM SERPL-MCNC: 1.9 MG/DL (ref 1.5–2.5)
MCH RBC QN AUTO: 33.4 PG (ref 27–33)
MCHC RBC AUTO-ENTMCNC: 33 G/DL (ref 32.2–35.5)
MCV RBC AUTO: 101.2 FL (ref 81.4–97.8)
PHOSPHATE SERPL-MCNC: 2.6 MG/DL (ref 2.5–4.5)
PLATELET # BLD AUTO: 229 K/UL (ref 164–446)
PMV BLD AUTO: 9.5 FL (ref 9–12.9)
POTASSIUM SERPL-SCNC: 4.6 MMOL/L (ref 3.6–5.5)
RBC # BLD AUTO: 3.29 M/UL (ref 4.2–5.4)
SODIUM SERPL-SCNC: 128 MMOL/L (ref 135–145)
WBC # BLD AUTO: 5.4 K/UL (ref 4.8–10.8)

## 2025-03-16 PROCEDURE — 93922 UPR/L XTREMITY ART 2 LEVELS: CPT

## 2025-03-16 PROCEDURE — 99233 SBSQ HOSP IP/OBS HIGH 50: CPT

## 2025-03-16 PROCEDURE — 83735 ASSAY OF MAGNESIUM: CPT

## 2025-03-16 PROCEDURE — 770001 HCHG ROOM/CARE - MED/SURG/GYN PRIV*

## 2025-03-16 PROCEDURE — 85027 COMPLETE CBC AUTOMATED: CPT

## 2025-03-16 PROCEDURE — 36415 COLL VENOUS BLD VENIPUNCTURE: CPT

## 2025-03-16 PROCEDURE — A9270 NON-COVERED ITEM OR SERVICE: HCPCS | Performed by: STUDENT IN AN ORGANIZED HEALTH CARE EDUCATION/TRAINING PROGRAM

## 2025-03-16 PROCEDURE — 94760 N-INVAS EAR/PLS OXIMETRY 1: CPT

## 2025-03-16 PROCEDURE — 700102 HCHG RX REV CODE 250 W/ 637 OVERRIDE(OP): Performed by: INTERNAL MEDICINE

## 2025-03-16 PROCEDURE — 700101 HCHG RX REV CODE 250: Performed by: STUDENT IN AN ORGANIZED HEALTH CARE EDUCATION/TRAINING PROGRAM

## 2025-03-16 PROCEDURE — 700111 HCHG RX REV CODE 636 W/ 250 OVERRIDE (IP): Mod: JZ | Performed by: STUDENT IN AN ORGANIZED HEALTH CARE EDUCATION/TRAINING PROGRAM

## 2025-03-16 PROCEDURE — A9270 NON-COVERED ITEM OR SERVICE: HCPCS | Performed by: INTERNAL MEDICINE

## 2025-03-16 PROCEDURE — 700102 HCHG RX REV CODE 250 W/ 637 OVERRIDE(OP): Performed by: STUDENT IN AN ORGANIZED HEALTH CARE EDUCATION/TRAINING PROGRAM

## 2025-03-16 PROCEDURE — 80069 RENAL FUNCTION PANEL: CPT

## 2025-03-16 RX ADMIN — MAGNESIUM 64 MG (MAGNESIUM CHLORIDE) TABLET,DELAYED RELEASE 64 MG: at 14:18

## 2025-03-16 RX ADMIN — LEVOTHYROXINE SODIUM 50 MCG: 0.05 TABLET ORAL at 04:45

## 2025-03-16 RX ADMIN — OMEGA-3 FATTY ACIDS CAP 1000 MG 1000 MG: 1000 CAP at 04:46

## 2025-03-16 RX ADMIN — POLYETHYLENE GLYCOL 3350 1 PACKET: 17 POWDER, FOR SOLUTION ORAL at 20:26

## 2025-03-16 RX ADMIN — ENOXAPARIN SODIUM 40 MG: 100 INJECTION SUBCUTANEOUS at 17:04

## 2025-03-16 RX ADMIN — Medication 1000 UNITS: at 04:46

## 2025-03-16 RX ADMIN — ACETAMINOPHEN 650 MG: 325 TABLET ORAL at 14:18

## 2025-03-16 RX ADMIN — OMEPRAZOLE 20 MG: 20 CAPSULE, DELAYED RELEASE ORAL at 04:46

## 2025-03-16 RX ADMIN — OXYCODONE HYDROCHLORIDE 10 MG: 10 TABLET ORAL at 14:19

## 2025-03-16 RX ADMIN — FLECAINIDE ACETATE 25 MG: 50 TABLET ORAL at 04:46

## 2025-03-16 RX ADMIN — ACETAMINOPHEN 650 MG: 325 TABLET ORAL at 04:45

## 2025-03-16 RX ADMIN — LIDOCAINE 1 PATCH: 4 PATCH TOPICAL at 04:45

## 2025-03-16 RX ADMIN — OXYCODONE HYDROCHLORIDE 10 MG: 10 TABLET ORAL at 04:46

## 2025-03-16 RX ADMIN — FLECAINIDE ACETATE 25 MG: 50 TABLET ORAL at 17:04

## 2025-03-16 RX ADMIN — ACETAMINOPHEN 650 MG: 325 TABLET ORAL at 20:25

## 2025-03-16 RX ADMIN — SENNOSIDES AND DOCUSATE SODIUM 2 TABLET: 50; 8.6 TABLET ORAL at 17:04

## 2025-03-16 ASSESSMENT — ENCOUNTER SYMPTOMS
SHORTNESS OF BREATH: 0
DOUBLE VISION: 0
ABDOMINAL PAIN: 0
WHEEZING: 0
SORE THROAT: 0
NAUSEA: 0
CONSTIPATION: 0
FEVER: 0
CHILLS: 0
VOMITING: 0
MYALGIAS: 0
COUGH: 0
PALPITATIONS: 0
DIZZINESS: 0
HEADACHES: 0
DIARRHEA: 0

## 2025-03-16 ASSESSMENT — PATIENT HEALTH QUESTIONNAIRE - PHQ9
SUM OF ALL RESPONSES TO PHQ9 QUESTIONS 1 AND 2: 0
1. LITTLE INTEREST OR PLEASURE IN DOING THINGS: NOT AT ALL

## 2025-03-16 ASSESSMENT — FIBROSIS 4 INDEX: FIB4 SCORE: 2.62

## 2025-03-16 ASSESSMENT — PAIN DESCRIPTION - PAIN TYPE
TYPE: SURGICAL PAIN

## 2025-03-16 NOTE — HOSPITAL COURSE
Patient is a 84-year-old female with past medical history of chronic hyponatremia who presented due to mechanical ground-level fall found to have left femoral neck fracture status post left hip hemiarthroplasty 3/14.  Patient currently pending placement for postacute rehab.  Sodium up trended to 129 likely secondary to SIADH and improved on fluid restriction.

## 2025-03-16 NOTE — PROGRESS NOTES
Plan of care on going.Bedside report received from , RN. Assumed care of patient. Daily plan of care discussed. Pt resting comfortably in bed with no signs of distress noted. Breathing even and unlabored. Call light within reach. Bed locked in lowest position. No further needs at this time.

## 2025-03-16 NOTE — PROGRESS NOTES
Hospital Medicine Daily Progress Note    Date of Service  3/16/2025    Chief Complaint  Alyssa Juan is a 84 y.o. female admitted 3/14/2025 with mechanical ground-level fall with left hip pain found to have left femoral neck fracture    Hospital Course  Patient is a 84-year-old female with past medical history of chronic hyponatremia who presented due to mechanical ground-level fall found to have left femoral neck fracture status post left hip hemiarthroplasty 3/14.  Patient currently pending placement for postacute rehab.  Sodium up trended to 129 likely secondary to SIADH and improved on fluid restriction.    Interval Problem Update  Doing well, encourage incentive spirometer and pending placement    I have discussed this patient's plan of care and discharge plan at IDT rounds today with Case Management, Nursing, Nursing leadership, and other members of the IDT team.    Consultants/Specialty  orthopedics    Code Status  DNAR/DNI    Disposition  The patient is medically cleared for discharge to home or a post-acute facility.  Anticipate discharge to: skilled nursing facility    I have placed the appropriate orders for post-discharge needs.    Review of Systems  Review of Systems   Constitutional:  Negative for chills and fever.   HENT:  Negative for congestion and sore throat.    Eyes:  Negative for double vision.   Respiratory:  Negative for cough, shortness of breath and wheezing.    Cardiovascular:  Negative for chest pain, palpitations and leg swelling.   Gastrointestinal:  Negative for abdominal pain, constipation, diarrhea, nausea and vomiting.   Genitourinary:  Negative for dysuria.   Musculoskeletal:  Negative for myalgias.   Skin:  Negative for rash.   Neurological:  Negative for dizziness and headaches.        Physical Exam  Temp:  [36.3 °C (97.4 °F)-36.8 °C (98.2 °F)] 36.8 °C (98.2 °F)  Pulse:  [61-82] 63  Resp:  [16-18] 17  BP: (102-156)/(58-78) 102/58  SpO2:  [85 %-100 %] 100 %    Physical  Exam  Vitals and nursing note reviewed.   Constitutional:       General: She is not in acute distress.  HENT:      Head: Normocephalic and atraumatic.      Nose: Nose normal.      Mouth/Throat:      Mouth: Mucous membranes are moist.   Eyes:      General: No scleral icterus.     Extraocular Movements: Extraocular movements intact.   Cardiovascular:      Rate and Rhythm: Normal rate and regular rhythm.      Heart sounds: No murmur heard.     No friction rub. No gallop.   Pulmonary:      Breath sounds: No wheezing, rhonchi or rales.   Abdominal:      General: Abdomen is flat. Bowel sounds are normal. There is no distension.      Palpations: Abdomen is soft.      Tenderness: There is no abdominal tenderness.   Musculoskeletal:         General: No swelling or tenderness.   Skin:     General: Skin is warm.      Capillary Refill: Capillary refill takes less than 2 seconds.   Neurological:      General: No focal deficit present.      Mental Status: She is alert.   Psychiatric:         Mood and Affect: Mood normal.         Fluids    Intake/Output Summary (Last 24 hours) at 3/16/2025 1649  Last data filed at 3/16/2025 1520  Gross per 24 hour   Intake 1360 ml   Output 780 ml   Net 580 ml        Laboratory  Recent Labs     03/14/25 0335 03/15/25  0105 03/16/25 0228   WBC 4.0* 6.7 5.4   RBC 3.50* 3.38* 3.29*   HEMOGLOBIN 11.6* 11.4* 11.0*   HEMATOCRIT 34.5* 33.8* 33.3*   MCV 98.6* 100.0* 101.2*   MCH 33.1* 33.7* 33.4*   MCHC 33.6 33.7 33.0   RDW 47.0 49.3 50.8*   PLATELETCT 250 248 229   MPV 8.8* 9.4 9.5     Recent Labs     03/14/25  0335 03/15/25  0105 03/16/25 0228   SODIUM 129* 124* 128*   POTASSIUM 3.5* 5.3 4.6   CHLORIDE 97 90* 94*   CO2 24 25 26   GLUCOSE 85 126* 82   BUN 16 15 16   CREATININE 0.47* 0.53 0.59   CALCIUM 7.2* 8.9 8.8     Recent Labs     03/14/25  0335   INR 0.90               Imaging  US-FLOWER SINGLE LEVEL BILAT   Final Result      DX-SHOULDER 2+ LEFT   Final Result      1.  There is no evidence of acute  fracture.      2.  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      DX-PELVIS-1 OR 2 VIEWS   Final Result         1. Status post left hip arthroplasty without evidence of early hardware complication.      DX-CHEST-PORTABLE (1 VIEW)   Final Result         1.  Interstitial pulmonary parenchymal prominence suggest chronic underlying lung disease, component of interstitial edema and/or infiltrates not excluded.   2.  Trace bilateral pleural effusions   3.  Atherosclerosis      DX-HIP-COMPLETE - UNILATERAL 2+ LEFT   Final Result         1.  Impacted left femoral neck fracture   2.  Left superior and inferior pubic ramus fracture is at the symphysis           Assessment/Plan  * Hip fracture requiring operative repair, left, closed, initial encounter ([facility])- (present on admission)  Assessment & Plan  S/p GLF  X-ray left hip: Impacted femoral neck fracture, left superior and inferior pubic ramus fracture    Pain and BM regimen in place  PT/OT post surgery evaluation pending  I sent referrals for post acute placement, SNF vs IRF.  Lovenox DVT prophylaxis    Age-related physical debility- (present on admission)  Assessment & Plan  PT/OT pending  Post acute placement referrals ordered    Encounter for preoperative assessment- (present on admission)  Assessment & Plan  resolved    Hypocalcemia- (present on admission)  Assessment & Plan  CCa 9.2 after IV replacement    ACP (advance care planning)- (present on admission)  Assessment & Plan  DNR/DNI    Hypokalemia- (present on admission)  Assessment & Plan  K 5.3, hold off on further replacements    Arrhythmia- (present on admission)  Assessment & Plan  Continue home Flecainide  She is not on anticoagulation    Closed fracture of ramus of left pubis ([facility])- (present on admission)  Assessment & Plan  Supportive pain control  PT/OT pending    Peripheral polyneuropathy- (present on admission)  Assessment & Plan  Continue home gabapentin, titrate up if  needed    Hyponatremia- (present on admission)  Assessment & Plan  I reviewed labs, Na 124, checking urine. She follows [facility], takes sodium tablets every Monday. 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 She drinks a lot of free water at home.  Checking urine electrolytes.  Fluid restrictions ordered: total fluids in 24 hours 1.5L including free water 500mL.    Hypothyroidism- (present on admission)  Assessment & Plan  Continue levothyroxine    Gastroesophageal reflux disease- (present on admission)  Assessment & Plan  Continue omperazole    HTN (hypertension)- (present on admission)  Assessment & Plan  Hold lisinopril, potassium 5.3 upper normal limit         VTE prophylaxis:    enoxaparin ppx      I have performed a physical exam and reviewed and updated ROS and Plan today (3/16/2025). In review of yesterday's note (3/15/2025), there are no changes except as documented above.

## 2025-03-16 NOTE — CARE PLAN
The patient is Stable - Low risk of patient condition declining or worsening    Shift Goals  Clinical Goals: Pain control, mobilize, no falls  Patient Goals: Rest    Progress made toward(s) clinical / shift goals:  Pain was controlled with rest, scheduled and PRN medications, no falls or injuries, pt was able to stand edge of bed and take a few side steps.    Patient is not progressing towards the following goals:

## 2025-03-16 NOTE — PROGRESS NOTES
4 Eyes Skin Assessment Completed by Luis M RN and Vladislav RN.    Head WDL  Ears WDL  Nose WDL  Mouth WDL  Neck WDL  Breast/Chest WDL  Shoulder Blades WDL  Spine WDL  (R) Arm/Elbow/Hand Bruising elbow  (L) Arm/Elbow/Hand Redness and Blanching elbow  Abdomen WDL  Groin WDL  Scrotum/Coccyx/Buttocks WDL  (R) Leg varicose veins  (L) Leg varicose veins,L hip dressing in place  (R) Heel/Foot/Toe Redness and Blanching  (L) Heel/Foot/Toe Redness and Blanching          Devices In Places Pulse Ox, SCD's, and Nasal Cannula,Female wick      Interventions In Place NC W/Ear Foams, Heel Mepilex, Pillows, Elbow Mepilex, Dri-Manpreet Pads, and Heels Loaded W/Pillows    Possible Skin Injury No    Pictures Uploaded Into Epic N/A  Wound Consult Placed N/A  RN Wound Prevention Protocol Ordered No

## 2025-03-16 NOTE — CARE PLAN
Problem: Fall Risk  Goal: Patient will remain free from falls  Outcome: Progressing     Problem: Pain - Post Surgery  Goal: Alleviation or reduction of pain post surgery  Outcome: Progressing   The patient is Stable - Low risk of patient condition declining or worsening    Shift Goals  Clinical Goals: pain control,remain free from falls,monitopr labs  Patient Goals: pain control,rest    Progress made toward(s) clinical / shift goals:  pain at tolerable level.Remained free from falls or injuries    Patient is not progressing towards the following goals:

## 2025-03-16 NOTE — PROGRESS NOTES
4 Eyes Skin Assessment Completed by PEGGY Osman and PEGGY Scott.    Head WDL  Ears WDL  Nose WDL  Mouth WDL  Neck WDL  Breast/Chest WDL  Shoulder Blades WDL  Spine Redness and Blanching  (R) Arm/Elbow/Hand Redness, Blanching, and Bruising  (L) Arm/Elbow/Hand Redness, Blanching, and Bruising  Abdomen WDL  Groin WDL  Scrotum/Coccyx/Buttocks WDL  (R) Leg Discoloration,   (L) Leg Hip incision   (R) Heel/Foot/Toe Redness and Blanching  (L) Heel/Foot/Toe Redness and Blanching          Devices In Places Pulse Ox      Interventions In Place Heel Mepilex, TAP System, Pillows, Low Air Loss Mattress, Barrier Cream, and Dri-Manpreet Pads    Possible Skin Injury No    Pictures Uploaded Into Epic N/A  Wound Consult Placed N/A  RN Wound Prevention Protocol Ordered No

## 2025-03-17 PROBLEM — D50.0 BLOOD LOSS ANEMIA: Status: ACTIVE | Noted: 2023-02-02

## 2025-03-17 LAB
ANION GAP SERPL CALC-SCNC: 9 MMOL/L (ref 7–16)
BUN SERPL-MCNC: 13 MG/DL (ref 8–22)
CALCIUM SERPL-MCNC: 8.3 MG/DL (ref 8.4–10.2)
CHLORIDE SERPL-SCNC: 94 MMOL/L (ref 96–112)
CO2 SERPL-SCNC: 24 MMOL/L (ref 20–33)
CREAT SERPL-MCNC: 0.47 MG/DL (ref 0.5–1.4)
ERYTHROCYTE [DISTWIDTH] IN BLOOD BY AUTOMATED COUNT: 49.8 FL (ref 35.9–50)
FERRITIN SERPL-MCNC: 109 NG/ML (ref 10–291)
GFR SERPLBLD CREATININE-BSD FMLA CKD-EPI: 93 ML/MIN/1.73 M 2
GLUCOSE SERPL-MCNC: 90 MG/DL (ref 65–99)
HCT VFR BLD AUTO: 27.5 % (ref 37–47)
HGB BLD-MCNC: 9.2 G/DL (ref 12–16)
HGB RETIC QN AUTO: 36.5 PG/CELL (ref 29–35)
IMM RETICS NFR: 10.2 % (ref 2.6–16.1)
IRON SATN MFR SERPL: 8 % (ref 15–55)
IRON SERPL-MCNC: 21 UG/DL (ref 40–170)
MAGNESIUM SERPL-MCNC: 1.9 MG/DL (ref 1.5–2.5)
MCH RBC QN AUTO: 33.3 PG (ref 27–33)
MCHC RBC AUTO-ENTMCNC: 33.5 G/DL (ref 32.2–35.5)
MCV RBC AUTO: 99.6 FL (ref 81.4–97.8)
PLATELET # BLD AUTO: 198 K/UL (ref 164–446)
PMV BLD AUTO: 9.1 FL (ref 9–12.9)
POTASSIUM SERPL-SCNC: 4 MMOL/L (ref 3.6–5.5)
RBC # BLD AUTO: 2.76 M/UL (ref 4.2–5.4)
RETICS # AUTO: 0.05 M/UL (ref 0.04–0.12)
RETICS/RBC NFR: 1.9 % (ref 0.8–2.6)
SODIUM SERPL-SCNC: 127 MMOL/L (ref 135–145)
TIBC SERPL-MCNC: 262 UG/DL (ref 250–450)
TSH SERPL DL<=0.005 MIU/L-ACNC: 1.3 UIU/ML (ref 0.38–5.33)
UIBC SERPL-MCNC: 241 UG/DL (ref 110–370)
VIT B12 SERPL-MCNC: 1307 PG/ML (ref 211–911)
WBC # BLD AUTO: 6 K/UL (ref 4.8–10.8)

## 2025-03-17 PROCEDURE — 99233 SBSQ HOSP IP/OBS HIGH 50: CPT

## 2025-03-17 PROCEDURE — 97530 THERAPEUTIC ACTIVITIES: CPT

## 2025-03-17 PROCEDURE — 82728 ASSAY OF FERRITIN: CPT

## 2025-03-17 PROCEDURE — 85027 COMPLETE CBC AUTOMATED: CPT

## 2025-03-17 PROCEDURE — 36415 COLL VENOUS BLD VENIPUNCTURE: CPT

## 2025-03-17 PROCEDURE — 94760 N-INVAS EAR/PLS OXIMETRY 1: CPT

## 2025-03-17 PROCEDURE — 84443 ASSAY THYROID STIM HORMONE: CPT

## 2025-03-17 PROCEDURE — 700102 HCHG RX REV CODE 250 W/ 637 OVERRIDE(OP): Performed by: INTERNAL MEDICINE

## 2025-03-17 PROCEDURE — 700102 HCHG RX REV CODE 250 W/ 637 OVERRIDE(OP): Performed by: STUDENT IN AN ORGANIZED HEALTH CARE EDUCATION/TRAINING PROGRAM

## 2025-03-17 PROCEDURE — 700101 HCHG RX REV CODE 250: Performed by: STUDENT IN AN ORGANIZED HEALTH CARE EDUCATION/TRAINING PROGRAM

## 2025-03-17 PROCEDURE — 770001 HCHG ROOM/CARE - MED/SURG/GYN PRIV*

## 2025-03-17 PROCEDURE — A9270 NON-COVERED ITEM OR SERVICE: HCPCS | Performed by: STUDENT IN AN ORGANIZED HEALTH CARE EDUCATION/TRAINING PROGRAM

## 2025-03-17 PROCEDURE — A9270 NON-COVERED ITEM OR SERVICE: HCPCS | Performed by: INTERNAL MEDICINE

## 2025-03-17 PROCEDURE — 97112 NEUROMUSCULAR REEDUCATION: CPT

## 2025-03-17 PROCEDURE — 83550 IRON BINDING TEST: CPT

## 2025-03-17 PROCEDURE — 97116 GAIT TRAINING THERAPY: CPT

## 2025-03-17 PROCEDURE — 85046 RETICYTE/HGB CONCENTRATE: CPT

## 2025-03-17 PROCEDURE — 97535 SELF CARE MNGMENT TRAINING: CPT

## 2025-03-17 PROCEDURE — 82607 VITAMIN B-12: CPT

## 2025-03-17 PROCEDURE — 83735 ASSAY OF MAGNESIUM: CPT

## 2025-03-17 PROCEDURE — 83540 ASSAY OF IRON: CPT

## 2025-03-17 PROCEDURE — 700111 HCHG RX REV CODE 636 W/ 250 OVERRIDE (IP): Performed by: STUDENT IN AN ORGANIZED HEALTH CARE EDUCATION/TRAINING PROGRAM

## 2025-03-17 PROCEDURE — 80048 BASIC METABOLIC PNL TOTAL CA: CPT

## 2025-03-17 RX ORDER — ENOXAPARIN SODIUM 100 MG/ML
40 INJECTION SUBCUTANEOUS DAILY
Status: DISCONTINUED | OUTPATIENT
Start: 2025-03-18 | End: 2025-03-18 | Stop reason: HOSPADM

## 2025-03-17 RX ADMIN — FLECAINIDE ACETATE 25 MG: 50 TABLET ORAL at 18:18

## 2025-03-17 RX ADMIN — LIDOCAINE 1 PATCH: 4 PATCH TOPICAL at 04:42

## 2025-03-17 RX ADMIN — ACETAMINOPHEN 650 MG: 325 TABLET ORAL at 05:05

## 2025-03-17 RX ADMIN — LEVOTHYROXINE SODIUM 50 MCG: 0.05 TABLET ORAL at 04:45

## 2025-03-17 RX ADMIN — ACETAMINOPHEN 650 MG: 325 TABLET ORAL at 02:01

## 2025-03-17 RX ADMIN — SENNOSIDES AND DOCUSATE SODIUM 2 TABLET: 50; 8.6 TABLET ORAL at 18:20

## 2025-03-17 RX ADMIN — ACETAMINOPHEN 650 MG: 325 TABLET ORAL at 11:55

## 2025-03-17 RX ADMIN — Medication 1000 UNITS: at 04:45

## 2025-03-17 RX ADMIN — OMEGA-3 FATTY ACIDS CAP 1000 MG 1000 MG: 1000 CAP at 04:45

## 2025-03-17 RX ADMIN — ALENDRONATE SODIUM 70 MG: 70 TABLET ORAL at 05:05

## 2025-03-17 RX ADMIN — LABETALOL HYDROCHLORIDE 10 MG: 5 INJECTION INTRAVENOUS at 05:05

## 2025-03-17 RX ADMIN — ACETAMINOPHEN 650 MG: 325 TABLET ORAL at 23:55

## 2025-03-17 RX ADMIN — MAGNESIUM 64 MG (MAGNESIUM CHLORIDE) TABLET,DELAYED RELEASE 64 MG: at 11:55

## 2025-03-17 RX ADMIN — OMEPRAZOLE 20 MG: 20 CAPSULE, DELAYED RELEASE ORAL at 04:45

## 2025-03-17 RX ADMIN — OXYCODONE 5 MG: 5 TABLET ORAL at 18:20

## 2025-03-17 RX ADMIN — ACETAMINOPHEN 650 MG: 325 TABLET ORAL at 18:18

## 2025-03-17 RX ADMIN — OXYCODONE HYDROCHLORIDE 10 MG: 10 TABLET ORAL at 04:45

## 2025-03-17 RX ADMIN — FLECAINIDE ACETATE 25 MG: 50 TABLET ORAL at 04:45

## 2025-03-17 ASSESSMENT — COGNITIVE AND FUNCTIONAL STATUS - GENERAL
STANDING UP FROM CHAIR USING ARMS: A LITTLE
DAILY ACTIVITIY SCORE: 19
MOVING TO AND FROM BED TO CHAIR: A LITTLE
TOILETING: A LITTLE
HELP NEEDED FOR BATHING: A LOT
CLIMB 3 TO 5 STEPS WITH RAILING: A LITTLE
DRESSING REGULAR LOWER BODY CLOTHING: A LOT
WALKING IN HOSPITAL ROOM: A LITTLE
MOBILITY SCORE: 18
SUGGESTED CMS G CODE MODIFIER DAILY ACTIVITY: CK
MOVING FROM LYING ON BACK TO SITTING ON SIDE OF FLAT BED: A LOT
SUGGESTED CMS G CODE MODIFIER MOBILITY: CK

## 2025-03-17 ASSESSMENT — ENCOUNTER SYMPTOMS
DIARRHEA: 0
NAUSEA: 0
FEVER: 0
SORE THROAT: 0
CHILLS: 0
COUGH: 0
WHEEZING: 0
VOMITING: 0
CONSTIPATION: 0
SHORTNESS OF BREATH: 0
PALPITATIONS: 0
DOUBLE VISION: 0
MYALGIAS: 0
DIZZINESS: 0
ABDOMINAL PAIN: 0
HEADACHES: 0

## 2025-03-17 ASSESSMENT — PAIN DESCRIPTION - PAIN TYPE
TYPE: SURGICAL PAIN
TYPE: ACUTE PAIN;SURGICAL PAIN
TYPE: SURGICAL PAIN
TYPE: SURGICAL PAIN
TYPE: ACUTE PAIN
TYPE: SURGICAL PAIN
TYPE: ACUTE PAIN;SURGICAL PAIN
TYPE: ACUTE PAIN;SURGICAL PAIN

## 2025-03-17 ASSESSMENT — GAIT ASSESSMENTS
DISTANCE (FEET): 80
ASSISTIVE DEVICE: FRONT WHEEL WALKER
GAIT LEVEL OF ASSIST: CONTACT GUARD ASSIST
DISTANCE (FEET): 10
DEVIATION: ANTALGIC;STEP TO;DECREASED HEEL STRIKE;DECREASED TOE OFF

## 2025-03-17 NOTE — CARE PLAN
Problem: Fall Risk  Goal: Patient will remain free from falls  Outcome: Progressing     Problem: Post-Operative Hip Replacement  Goal: Patient's neurovascular status will be maintained or improve  Outcome: Progressing   The patient is Stable - Low risk of patient condition declining or worsening    Shift Goals  Clinical Goals: pain control,remain free from falls  Patient Goals: Rest    Progress made toward(s) clinical / shift goals:  No falls sustained.Pain at tolerable level.    Patient is not progressing towards the following goals:

## 2025-03-17 NOTE — THERAPY
Physical Therapy   Daily Treatment     Patient Name: Alyssa Juan  Age:  84 y.o., Sex:  female  Medical Record #: [id number]  Today's Date: 3/17/2025     Precautions  Precautions: (P) Fall Risk;Posterior Hip Precautions;Weight Bearing As Tolerated Left Lower Extremity    Assessment    Pt is progressing very well and demonstrating with improved activity tolerance and no reports of dizziness or lightheadedness. Pt was able to ambulate for about 80 ft w/FWW use w/CGA. Pt was able to participate sit<>stands and tranfers requiring less assist from therapist. Pt continues to require reminder/VC for posterior hip precautions and maintaining them during sit<>stand transitions. Pt continues to have the most difficulty with bed mobility and required Mod A to return back to supine. Pt was able to participate in scooting up HOB with Min A from therapist, however, pt has poor use of B UE due to chronic shoulder pain in assist up HOB. Recommend post-acute placement for continued physical therapy services prior to discharge home. Will benefit from post acute rehab.       Plan    Treatment Plan Status: (P) Continue Current Treatment Plan  Type of Treatment: Bed Mobility, Therapeutic Activities, Therapeutic Exercise  Treatment Frequency: 5 Times per Week  Treatment Duration: Until Therapy Goals Met    DC Equipment Recommendations: (P) Front-Wheel Walker  Discharge Recommendations: (P) Recommend post-acute placement for additional physical therapy services prior to discharge home    Objective       03/17/25 1321   Precautions   Precautions Fall Risk;Posterior Hip Precautions;Weight Bearing As Tolerated Left Lower Extremity   Vitals   O2 (LPM) 1   O2 Delivery Device Silicone Nasal Cannula   Pain 0 - 10 Group   Location Hip   Location Orientation Left   Description Tender;Sore   Therapist Pain Assessment Prior to Activity;During Activity;Post Activity;Nurse Notified;6   Cognition    Cognition / Consciousness WDL   Level of  Consciousness Alert   Other Treatments   Other Treatments Provided provided with reminder of posterior hip precautions and requires VC for L LE placement upon standing   Balance   Sitting Balance (Static) Good   Sitting Balance (Dynamic) Fair +   Standing Balance (Static) Fair   Standing Balance (Dynamic) Fair -   Weight Shift Sitting Good   Weight Shift Standing Fair   Skilled Intervention Verbal Cuing;Postural Facilitation;Facilitation   Comments w/fww use   Bed Mobility    Sit to Supine Moderate Assist   Scooting Minimal Assist   Skilled Intervention Verbal Cuing;Sequencing;Compensatory Strategies   Comments HOB flat and rails up   Gait Analysis   Gait Level Of Assist Contact Guard Assist   Assistive Device Front Wheel Walker   Distance (Feet) 80   # of Times Distance was Traveled 1   Deviation Antalgic;Step To;Decreased Heel Strike;Decreased Toe Off   Weight Bearing Status WBAT L LE   Skilled Intervention Verbal Cuing;Sequencing;Facilitation   Functional Mobility   Sit to Stand Contact Guard Assist   Bed, Chair, Wheelchair Transfer Contact Guard Assist   Transfer Method Stand Step   Mobility w/fww use   Skilled Intervention Verbal Cuing;Facilitation;Compensatory Strategies   6 Clicks Assessment - How much HELP from from another person do you currently need... (If the patient hasn't done an activity recently, how much help from another person do you think he/she would need if he/she tried?)   Turning from your back to your side while in a flat bed without using bedrails? 4   Moving from lying on your back to sitting on the side of a flat bed without using bedrails? 2   Moving to and from a bed to a chair (including a wheelchair)? 3   Standing up from a chair using your arms (e.g., wheelchair, or bedside chair)? 3   Walking in hospital room? 3   Climbing 3-5 steps with a railing? 3   6 clicks Mobility Score 18   Activity Tolerance   Sitting in Chair found up in chair   Sitting Edge of Bed 5 mins   Standing 8 mins    Comments limited due to pain primarily   Patient / Family Goals    Patient / Family Goal #1 go to post acute facility to get stron prior to returning home   Short Term Goals    Short Term Goal # 1 pt will get to EOB with Min assist with HOB elevated and railing up in 4 Tx's   Goal Outcome # 1 Progressing as expected   Short Term Goal # 2 pt will transfer with a FWW and CGA for pregait activity in  Tx's for safe transfers at home.   Goal Outcome # 2 Goal met, new goal added   Short Term Goal # 2 B  pt will transfer bed<>chair w/fww w/SBA in 6tx for meals   Goal Outcome # 2 B Progressing as expected   Short Term Goal # 3 pt will ambulate in room 15-25 ' with a FWW and CGA in 5 Tx' s for safety with ambulation at home.   Goal Outcome # 3 Goal met, new goal added   Short Term Goal # 3 B pt will ambulate for 150ft w/fww w/CGA in 6x for progression towards household ambulation   Education Group   Hip Precautions Posterior Patient Response Patient;Acceptance;Explanation;Demonstration;Verbal Demonstration;Action Demonstration;Reinforcement Needed   Role of Physical Therapist Patient Response Patient;Acceptance;Explanation;Demonstration;Verbal Demonstration;Action Demonstration   Exercises - Supine Patient Response Patient;Acceptance;Explanation;Demonstration;Handout;* * Response * *;Action Demonstration;Reinforcement Needed   Exercises - Seated Patient Response Patient;Acceptance;Explanation;Demonstration;Handout;Verbal Demonstration;Action Demonstration;Reinforcement Needed   Weight Bearing Precautions Patient Response Patient;Acceptance;Demonstration;Explanation;Verbal Demonstration;Action Demonstration;Reinforcement Needed   Physical Therapy Treatment Plan   Physical Therapy Treatment Plan Continue Current Treatment Plan   Anticipated Discharge Equipment and Recommendations   DC Equipment Recommendations Front-Wheel Walker   Discharge Recommendations Recommend post-acute placement for additional physical therapy  services prior to discharge home   Interdisciplinary Plan of Care Collaboration   IDT Collaboration with  Nursing   Patient Position at End of Therapy In Bed;Bed Alarm On;Call Light within Reach;Tray Table within Reach;Phone within Reach   Collaboration Comments aware of visit and recs   Session Information   Date / Session Number  3/17-2 (2/5, 3/21)

## 2025-03-17 NOTE — DISCHARGE PLANNING
Spoke with Alyssa regarding [facility] & D/C resources/support.  She is hopeful for an admission as she has been there in the past and had a good outcome.  She will return to her 1LV home with 2ST to enter alone.  She has 2 Caregivers that provide 2 hours of care in the am and 3.5 hours of care in the pm on the daily.  She has the option of increasing their services.  She also has a good friend that will likely be able to stay with her to provide 24/7 assist upon D/C home.  Following for an updated PT eval.     1330-Updated OT eval reflects improvement.  Following for an updated PT eval. Submitted to insurance.     1333-Updated PT eval is up.

## 2025-03-17 NOTE — ASSESSMENT & PLAN NOTE
Since admission, patient's hemoglobin slightly downtrending from 11.6 down to 11.4, 11.0, now 9.2  - Patient is doing much better since her surgery, asymptomatic, pain improving in her left hip  - Anticipate likely blood loss anemia from surgery  - Trend hemoglobin in a.m., if continues to drop may need CT scan for evaluation of potential hematoma in her left hip  - Ordered ferritin, TIBC, reticulocyte count, TSH, B12

## 2025-03-17 NOTE — PROGRESS NOTES
Hospital Medicine Daily Progress Note    Date of Service  3/17/2025    Chief Complaint  Alyssa Juan is a 84 y.o. female admitted 3/14/2025 with mechanical ground-level fall with left hip pain found to have left femoral neck fracture    Hospital Course  Patient is a 84-year-old female with past medical history of chronic hyponatremia who presented due to mechanical ground-level fall found to have left femoral neck fracture status post left hip hemiarthroplasty 3/14.  Patient currently pending placement for postacute rehab.  Sodium up trended to 129 likely secondary to SIADH and improved on fluid restriction.    Interval Problem Update  3/17: Patient hemoglobin dropped to 9.2.  However, patient appears to be doing much better at bedside.  Left hip unrevealing for potential hematoma.  Converted DVT prophylaxis and held for today pending repeat hemoglobin in AM.  Anemia panel.    3/16: Doing well, encourage incentive spirometer and pending placement    I have discussed this patient's plan of care and discharge plan at IDT rounds today with Case Management, Nursing, Nursing leadership, and other members of the IDT team.    Consultants/Specialty  orthopedics    Code Status  DNAR/DNI    Disposition  The patient is not medically cleared for discharge to home or a post-acute facility.  Anticipate discharge to: skilled nursing facility    I have placed the appropriate orders for post-discharge needs.    Review of Systems  Review of Systems   Constitutional:  Negative for chills and fever.   HENT:  Negative for congestion and sore throat.    Eyes:  Negative for double vision.   Respiratory:  Negative for cough, shortness of breath and wheezing.    Cardiovascular:  Negative for chest pain, palpitations and leg swelling.   Gastrointestinal:  Negative for abdominal pain, constipation, diarrhea, nausea and vomiting.   Genitourinary:  Negative for dysuria.   Musculoskeletal:  Negative for myalgias.   Skin:  Negative for  rash.   Neurological:  Negative for dizziness and headaches.        Physical Exam  Temp:  [36.4 °C (97.6 °F)-37.1 °C (98.7 °F)] 37.1 °C (98.7 °F)  Pulse:  [62-68] 63  Resp:  [16-18] 17  BP: (102-192)/(58-97) 113/59  SpO2:  [85 %-100 %] 94 %    Physical Exam  Vitals and nursing note reviewed.   Constitutional:       General: She is not in acute distress.  HENT:      Head: Normocephalic and atraumatic.      Nose: Nose normal.      Mouth/Throat:      Mouth: Mucous membranes are moist.   Eyes:      General: No scleral icterus.     Extraocular Movements: Extraocular movements intact.   Cardiovascular:      Rate and Rhythm: Normal rate and regular rhythm.      Heart sounds: No murmur heard.     No friction rub. No gallop.   Pulmonary:      Breath sounds: No wheezing, rhonchi or rales.   Abdominal:      General: Abdomen is flat. Bowel sounds are normal. There is no distension.      Palpations: Abdomen is soft.      Tenderness: There is no abdominal tenderness.   Musculoskeletal:         General: No swelling or tenderness.   Skin:     General: Skin is warm.      Capillary Refill: Capillary refill takes less than 2 seconds.   Neurological:      General: No focal deficit present.      Mental Status: She is alert.   Psychiatric:         Mood and Affect: Mood normal.         Fluids    Intake/Output Summary (Last 24 hours) at 3/17/2025 1414  Last data filed at 3/17/2025 1300  Gross per 24 hour   Intake 180 ml   Output 1130 ml   Net -950 ml        Laboratory  Recent Labs     03/15/25  0105 03/16/25 0228 03/17/25  0126   WBC 6.7 5.4 6.0   RBC 3.38* 3.29* 2.76*   HEMOGLOBIN 11.4* 11.0* 9.2*   HEMATOCRIT 33.8* 33.3* 27.5*   .0* 101.2* 99.6*   MCH 33.7* 33.4* 33.3*   MCHC 33.7 33.0 33.5   RDW 49.3 50.8* 49.8   PLATELETCT 248 229 198   MPV 9.4 9.5 9.1     Recent Labs     03/15/25  0105 03/16/25 0228 03/17/25  0126   SODIUM 124* 128* 127*   POTASSIUM 5.3 4.6 4.0   CHLORIDE 90* 94* 94*   CO2 25 26 24   GLUCOSE 126* 82 90   BUN  15 16 13   CREATININE 0.53 0.59 0.47*   CALCIUM 8.9 8.8 8.3*                     Imaging  US-FLOWER SINGLE LEVEL BILAT   Final Result      DX-SHOULDER 2+ LEFT   Final Result      1.  There is no evidence of acute fracture.      2.  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      DX-PELVIS-1 OR 2 VIEWS   Final Result         1. Status post left hip arthroplasty without evidence of early hardware complication.      DX-CHEST-PORTABLE (1 VIEW)   Final Result         1.  Interstitial pulmonary parenchymal prominence suggest chronic underlying lung disease, component of interstitial edema and/or infiltrates not excluded.   2.  Trace bilateral pleural effusions   3.  Atherosclerosis      DX-HIP-COMPLETE - UNILATERAL 2+ LEFT   Final Result         1.  Impacted left femoral neck fracture   2.  Left superior and inferior pubic ramus fracture is at the symphysis           Assessment/Plan  * Hip fracture requiring operative repair, left, closed, initial encounter ([facility])- (present on admission)  Assessment & Plan  S/p GLF  X-ray left hip: Impacted femoral neck fracture, left superior and inferior pubic ramus fracture    Pain and BM regimen in place  PT/OT post surgery evaluation pending  I sent referrals for post acute placement, SNF vs IRF.  Lovenox DVT prophylaxis    Blood loss anemia- (present on admission)  Assessment & Plan  Since admission, patient's hemoglobin slightly downtrending from 11.6 down to 11.4, 11.0, now 9.2  - Patient is doing much better since her surgery, asymptomatic, pain improving in her left hip  - Anticipate likely blood loss anemia from surgery  - Trend hemoglobin in a.m., if continues to drop may need CT scan for evaluation of potential hematoma in her left hip  - Ordered ferritin, TIBC, reticulocyte count, TSH, B12    Age-related physical debility- (present on admission)  Assessment & Plan  PT/OT pending  Post acute placement referrals ordered    Encounter for preoperative assessment-  (present on admission)  Assessment & Plan  resolved    Hypocalcemia- (present on admission)  Assessment & Plan  CCa 9.2 after IV replacement    ACP (advance care planning)- (present on admission)  Assessment & Plan  DNR/DNI    Hypokalemia- (present on admission)  Assessment & Plan  K 5.3, hold off on further replacements    Arrhythmia- (present on admission)  Assessment & Plan  Continue home Flecainide  She is not on anticoagulation    Closed fracture of ramus of left pubis (HCC)- (present on admission)  Assessment & Plan  Supportive pain control  PT/OT pending    Peripheral polyneuropathy- (present on admission)  Assessment & Plan  Continue home gabapentin, titrate up if needed    Hyponatremia- (present on admission)  Assessment & Plan  I reviewed labs, Na 124, checking urine. She follows [facility], takes sodium tablets every Monday. 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 She drinks a lot of free water at home.  Checking urine electrolytes.  Fluid restrictions ordered: total fluids in 24 hours 1.5L including free water 500mL.    Hypothyroidism- (present on admission)  Assessment & Plan  Continue levothyroxine    Gastroesophageal reflux disease- (present on admission)  Assessment & Plan  Continue omperazole    HTN (hypertension)- (present on admission)  Assessment & Plan  Hold lisinopril, potassium 5.3 upper normal limit         VTE prophylaxis:    enoxaparin ppx  Holding lovenox for today    I have performed a physical exam and reviewed and updated ROS and Plan today (3/17/2025). In review of yesterday's note (3/16/2025), there are no changes except as documented above.

## 2025-03-17 NOTE — THERAPY
Occupational Therapy  Daily Treatment     Patient Name: Alyssa Juan  Age:  84 y.o., Sex:  female  Medical Record #: [id number]  Today's Date: 3/17/2025     Precautions: (P) Posterior Hip Precautions, Weight Bearing As Tolerated Left Lower Extremity, Fall Risk    Assessment  Pt seen for OT treatment. Shows good progress towards goals. Improved activity tolerance, balance, functional mobility during ADL's. Tolerates UIC most of morning, able to walk towards br using FWW, CGA (used BSC due to urgency). Reviewed posterior hip prec's, AE/DME use. LB dressing with ModA, grooming and UB dressing with SBA. See below for CLOF. Friend in to visit. Pt will benefit from continued acute OT as well as post acute OT services prior to returning home.        Plan  Treatment Plan Status: (P) Continue Current Treatment Plan  Type of Treatment: (P) Self Care / Activities of Daily Living, Neuro Re-Education / Balance, Therapeutic Activity  Treatment Frequency: (P) 3 Times per Week  Treatment Duration: (P) Until Therapy Goals Met    DC Equipment Recommendations: (P) Unable to determine at this time  Discharge Recommendations: (P) Recommend post-acute placement for additional occupational therapy services prior to discharge home    Subjective  Agreeable     Objective   03/17/25 1221   Precautions   Precautions Posterior Hip Precautions;Weight Bearing As Tolerated Left Lower Extremity;Fall Risk   Vitals   Pulse Oximetry 96 %   O2 (LPM) 1   O2 Delivery Device Silicone Nasal Cannula   Pain 0 - 10 Group   Location Hip   Location Orientation Left   Therapist Pain Assessment Post Activity Pain Same as Prior to Activity;Nurse Notified   Cognition    Cognition / Consciousness WDL   Level of Consciousness Alert   Comments motivated for OOB activity   Balance   Sitting Balance (Static) Good   Sitting Balance (Dynamic) Fair +   Standing Balance (Static) Fair   Standing Balance (Dynamic) Fair -   Weight Shift Sitting Good   Weight Shift  Standing Fair   Skilled Intervention Verbal Cuing;Tactile Cuing   Activities of Daily Living   Grooming Supervision;Seated   Lower Body Dressing Moderate Assist   Toileting Minimal Assist   Skilled Intervention Verbal Cuing;Compensatory Strategies   How much help from another person does the patient currently need...   Putting on and taking off regular lower body clothing? 2   Bathing (including washing, rinsing, and drying)? 2   Toileting, which includes using a toilet, bedpan, or urinal? 3   Putting on and taking off regular upper body clothing? 4   Taking care of personal grooming such as brushing teeth? 4   Eating meals? 4   6 Clicks Daily Activity Score 19   Functional Mobility   Sit to Stand Contact Guard Assist   Bed, Chair, Wheelchair Transfer Contact Guard Assist   Toilet Transfers Minimal Assist   Transfer Method Stand Step   Distance (Feet) 10   # of Times Distance was Traveled 2   Comments ambulates with fww ~10' chair to [facility] and returns to chair.fww   Activity Tolerance   Sitting in Chair pre/post session.   Standing 6   Patient / Family Goals   Goal #1 Outcome Progressing as expected   Short Term Goals   Goal Outcome # 1 Progressing as expected   Goal Outcome # 2 Progressing as expected   Goal Outcome # 3 Progressing as expected   Education Group   Role of Occupational Therapist Patient Response Patient;Acceptance;Explanation;Verbal Demonstration   Hip Precautions Patient Response Patient;Acceptance;Explanation;Verbal Demonstration   Transfers Patient Response Patient;Acceptance;Explanation;Verbal Demonstration;Action Demonstration   ADL Patient Response Patient;Acceptance;Explanation;Verbal Demonstration;Action Demonstration   Adaptive Equipment Patient Response Patient;Acceptance;Explanation;Verbal Demonstration   Occupational Therapy Treatment Plan    O.T. Treatment Plan Continue Current Treatment Plan   Treatment Interventions Self Care / Activities of Daily Living;Neuro Re-Education /  Balance;Therapeutic Activity   Treatment Frequency 3 Times per Week   Duration Until Therapy Goals Met   Anticipated Discharge Equipment and Recommendations   DC Equipment Recommendations Unable to determine at this time   Discharge Recommendations Recommend post-acute placement for additional occupational therapy services prior to discharge home   Interdisciplinary Plan of Care Collaboration   IDT Collaboration with  Nursing;Family / Caregiver   Patient Position at End of Therapy Seated;Chair Alarm On;Call Light within Reach;Tray Table within Reach;Phone within Reach;Family / Friend in Room   Collaboration Comments Aware of visit. Pt shows improvements.

## 2025-03-17 NOTE — CARE PLAN
The patient is Watcher - Medium risk of patient condition declining or worsening    Shift Goals  Clinical Goals: monitor labs, pain management, increase activity with comfort  Patient Goals: increase activity    Progress made toward(s) clinical / shift goals:  post op monitor labs, improve mobility     Patient is not progressing towards the following goals:

## 2025-03-17 NOTE — PROGRESS NOTES
4 Eyes Skin Assessment Completed by Luis M RN and Joselyn RN.    Head WDL  Ears WDL  Nose WDL  Mouth WDL  Neck WDL  Breast/Chest WDL  Shoulder Blades WDL  Spine WDL  (R) Arm/Elbow/Hand Bruising elbow  (L) Arm/Elbow/Hand Redness and Blanching elbow  Abdomen WDL  Groin WDL  Scrotum/Coccyx/Buttocks WDL  (R) Leg varicose veins,redness pinch posterior upper leg  (L) Leg varicose veins,Left hip dressing in place  (R) Heel/Foot/Toe Redness and Blanching  (L) Heel/Foot/Toe Redness and Blanching          Devices In Places Pulse Ox, SCD's, and Nasal Cannula,Female wick      Interventions In Place NC W/Ear Foams, Heel Mepilex, Pillows, Elbow Mepilex, Low Air Loss Mattress, Dri-Manpreet Pads, and Heels Loaded W/Pillows    Possible Skin Injury No    Pictures Uploaded Into Epic N/A  Wound Consult Placed N/A  RN Wound Prevention Protocol Ordered No

## 2025-03-18 ENCOUNTER — HOSPITAL ENCOUNTER (INPATIENT)
Facility: REHABILITATION | Age: 85
LOS: 10 days | DRG: 560 | End: 2025-03-28
Attending: PHYSICAL MEDICINE & REHABILITATION | Admitting: PHYSICAL MEDICINE & REHABILITATION
Payer: MEDICARE

## 2025-03-18 VITALS
SYSTOLIC BLOOD PRESSURE: 101 MMHG | DIASTOLIC BLOOD PRESSURE: 53 MMHG | HEART RATE: 62 BPM | OXYGEN SATURATION: 93 % | TEMPERATURE: 97 F | HEIGHT: 60 IN | RESPIRATION RATE: 17 BRPM | BODY MASS INDEX: 21.38 KG/M2 | WEIGHT: 108.91 LBS

## 2025-03-18 DIAGNOSIS — G47.09 OTHER INSOMNIA: ICD-10-CM

## 2025-03-18 DIAGNOSIS — E03.9 HYPOTHYROIDISM, UNSPECIFIED TYPE: Chronic | ICD-10-CM

## 2025-03-18 DIAGNOSIS — I48.91 ATRIAL FIBRILLATION, UNSPECIFIED TYPE (HCC): ICD-10-CM

## 2025-03-18 DIAGNOSIS — S72.002A CLOSED LEFT HIP FRACTURE, INITIAL ENCOUNTER (HCC): ICD-10-CM

## 2025-03-18 DIAGNOSIS — I10 PRIMARY HYPERTENSION: Chronic | ICD-10-CM

## 2025-03-18 DIAGNOSIS — R54 AGE-RELATED PHYSICAL DEBILITY: ICD-10-CM

## 2025-03-18 DIAGNOSIS — I49.9 CARDIAC ARRHYTHMIA, UNSPECIFIED CARDIAC ARRHYTHMIA TYPE: ICD-10-CM

## 2025-03-18 DIAGNOSIS — Z96.649 S/P HIP HEMIARTHROPLASTY: ICD-10-CM

## 2025-03-18 LAB
ERYTHROCYTE [DISTWIDTH] IN BLOOD BY AUTOMATED COUNT: 50.2 FL (ref 35.9–50)
HCT VFR BLD AUTO: 30.1 % (ref 37–47)
HGB BLD-MCNC: 10.1 G/DL (ref 12–16)
MCH RBC QN AUTO: 33.8 PG (ref 27–33)
MCHC RBC AUTO-ENTMCNC: 33.6 G/DL (ref 32.2–35.5)
MCV RBC AUTO: 100.7 FL (ref 81.4–97.8)
PLATELET # BLD AUTO: 223 K/UL (ref 164–446)
PMV BLD AUTO: 9 FL (ref 9–12.9)
RBC # BLD AUTO: 2.99 M/UL (ref 4.2–5.4)
WBC # BLD AUTO: 5.8 K/UL (ref 4.8–10.8)

## 2025-03-18 PROCEDURE — A9270 NON-COVERED ITEM OR SERVICE: HCPCS | Performed by: STUDENT IN AN ORGANIZED HEALTH CARE EDUCATION/TRAINING PROGRAM

## 2025-03-18 PROCEDURE — 770010 HCHG ROOM/CARE - REHAB SEMI PRIVAT*

## 2025-03-18 PROCEDURE — 700102 HCHG RX REV CODE 250 W/ 637 OVERRIDE(OP): Performed by: STUDENT IN AN ORGANIZED HEALTH CARE EDUCATION/TRAINING PROGRAM

## 2025-03-18 PROCEDURE — 36415 COLL VENOUS BLD VENIPUNCTURE: CPT

## 2025-03-18 PROCEDURE — 700102 HCHG RX REV CODE 250 W/ 637 OVERRIDE(OP): Performed by: PHYSICAL MEDICINE & REHABILITATION

## 2025-03-18 PROCEDURE — A9270 NON-COVERED ITEM OR SERVICE: HCPCS | Performed by: PHYSICAL MEDICINE & REHABILITATION

## 2025-03-18 PROCEDURE — 85027 COMPLETE CBC AUTOMATED: CPT

## 2025-03-18 PROCEDURE — 700111 HCHG RX REV CODE 636 W/ 250 OVERRIDE (IP): Mod: JZ | Performed by: PHYSICAL MEDICINE & REHABILITATION

## 2025-03-18 PROCEDURE — 700101 HCHG RX REV CODE 250: Performed by: STUDENT IN AN ORGANIZED HEALTH CARE EDUCATION/TRAINING PROGRAM

## 2025-03-18 PROCEDURE — 99223 1ST HOSP IP/OBS HIGH 75: CPT | Performed by: PHYSICAL MEDICINE & REHABILITATION

## 2025-03-18 PROCEDURE — 94760 N-INVAS EAR/PLS OXIMETRY 1: CPT

## 2025-03-18 PROCEDURE — 99239 HOSP IP/OBS DSCHRG MGMT >30: CPT | Performed by: INTERNAL MEDICINE

## 2025-03-18 RX ORDER — ONDANSETRON 2 MG/ML
4 INJECTION INTRAMUSCULAR; INTRAVENOUS 4 TIMES DAILY PRN
Status: DISCONTINUED | OUTPATIENT
Start: 2025-03-18 | End: 2025-03-28 | Stop reason: HOSPADM

## 2025-03-18 RX ORDER — POLYETHYLENE GLYCOL 3350 17 G/17G
1 POWDER, FOR SOLUTION ORAL
Status: DISCONTINUED | OUTPATIENT
Start: 2025-03-18 | End: 2025-03-23

## 2025-03-18 RX ORDER — ENOXAPARIN SODIUM 100 MG/ML
40 INJECTION SUBCUTANEOUS DAILY
Status: DISCONTINUED | OUTPATIENT
Start: 2025-03-18 | End: 2025-03-28 | Stop reason: HOSPADM

## 2025-03-18 RX ORDER — LEVOTHYROXINE SODIUM 50 UG/1
50 TABLET ORAL
Status: CANCELLED | OUTPATIENT
Start: 2025-03-19

## 2025-03-18 RX ORDER — ALENDRONATE SODIUM 70 MG/1
70 TABLET ORAL
Status: DISCONTINUED | OUTPATIENT
Start: 2025-03-24 | End: 2025-03-28 | Stop reason: HOSPADM

## 2025-03-18 RX ORDER — LIDOCAINE 4 G/G
1 PATCH TOPICAL EVERY 24 HOURS
Status: DISCONTINUED | OUTPATIENT
Start: 2025-03-19 | End: 2025-03-28 | Stop reason: HOSPADM

## 2025-03-18 RX ORDER — VITAMIN B COMPLEX
1000 TABLET ORAL DAILY
Status: CANCELLED | OUTPATIENT
Start: 2025-03-19

## 2025-03-18 RX ORDER — AMOXICILLIN 250 MG
2 CAPSULE ORAL EVERY EVENING
Status: DISCONTINUED | OUTPATIENT
Start: 2025-03-18 | End: 2025-03-23

## 2025-03-18 RX ORDER — VITAMIN B COMPLEX
1000 TABLET ORAL DAILY
Status: DISCONTINUED | OUTPATIENT
Start: 2025-03-19 | End: 2025-03-28 | Stop reason: HOSPADM

## 2025-03-18 RX ORDER — LIDOCAINE 4 G/G
1 PATCH TOPICAL EVERY 24 HOURS
Status: CANCELLED | OUTPATIENT
Start: 2025-03-19

## 2025-03-18 RX ORDER — OXYCODONE HYDROCHLORIDE 5 MG/1
5 TABLET ORAL EVERY 4 HOURS PRN
Refills: 0 | Status: DISCONTINUED | OUTPATIENT
Start: 2025-03-18 | End: 2025-03-28 | Stop reason: HOSPADM

## 2025-03-18 RX ORDER — OXYCODONE HYDROCHLORIDE 10 MG/1
10 TABLET ORAL EVERY 4 HOURS PRN
Refills: 0 | Status: DISCONTINUED | OUTPATIENT
Start: 2025-03-18 | End: 2025-03-28 | Stop reason: HOSPADM

## 2025-03-18 RX ORDER — SODIUM PHOSPHATE,MONO-DIBASIC 19G-7G/118
1 ENEMA (ML) RECTAL
Status: DISCONTINUED | OUTPATIENT
Start: 2025-03-18 | End: 2025-03-28 | Stop reason: HOSPADM

## 2025-03-18 RX ORDER — FLECAINIDE ACETATE 50 MG/1
25 TABLET ORAL 2 TIMES DAILY
Status: CANCELLED | OUTPATIENT
Start: 2025-03-18

## 2025-03-18 RX ORDER — LEVOTHYROXINE SODIUM 50 UG/1
50 TABLET ORAL
Status: DISCONTINUED | OUTPATIENT
Start: 2025-03-19 | End: 2025-03-28 | Stop reason: HOSPADM

## 2025-03-18 RX ORDER — ONDANSETRON 4 MG/1
4 TABLET, ORALLY DISINTEGRATING ORAL 4 TIMES DAILY PRN
Status: DISCONTINUED | OUTPATIENT
Start: 2025-03-18 | End: 2025-03-28 | Stop reason: HOSPADM

## 2025-03-18 RX ORDER — ACETAMINOPHEN 325 MG/1
650 TABLET ORAL EVERY 6 HOURS
Status: DISPENSED | OUTPATIENT
Start: 2025-03-18 | End: 2025-03-19

## 2025-03-18 RX ORDER — GABAPENTIN 100 MG/1
100 CAPSULE ORAL 3 TIMES DAILY PRN
Status: DISCONTINUED | OUTPATIENT
Start: 2025-03-18 | End: 2025-03-19

## 2025-03-18 RX ORDER — LACTULOSE 10 G/15ML
30 SOLUTION ORAL
Status: DISCONTINUED | OUTPATIENT
Start: 2025-03-18 | End: 2025-03-28 | Stop reason: HOSPADM

## 2025-03-18 RX ORDER — ECHINACEA PURPUREA EXTRACT 125 MG
2 TABLET ORAL PRN
Status: DISCONTINUED | OUTPATIENT
Start: 2025-03-18 | End: 2025-03-28 | Stop reason: HOSPADM

## 2025-03-18 RX ORDER — OXYCODONE HYDROCHLORIDE 5 MG/1
5 TABLET ORAL EVERY 4 HOURS PRN
Refills: 0 | Status: CANCELLED | OUTPATIENT
Start: 2025-03-18

## 2025-03-18 RX ORDER — FLECAINIDE ACETATE 50 MG/1
25 TABLET ORAL 2 TIMES DAILY
Status: DISCONTINUED | OUTPATIENT
Start: 2025-03-18 | End: 2025-03-28 | Stop reason: HOSPADM

## 2025-03-18 RX ORDER — ALENDRONATE SODIUM 70 MG/1
70 TABLET ORAL
Status: CANCELLED | OUTPATIENT
Start: 2025-03-24

## 2025-03-18 RX ORDER — ASPIRIN 81 MG/1
81 TABLET ORAL 2 TIMES DAILY
Qty: 60 TABLET | Refills: 0 | Status: ON HOLD | OUTPATIENT
Start: 2025-03-18 | End: 2025-03-27

## 2025-03-18 RX ORDER — ACETAMINOPHEN 325 MG/1
650 TABLET ORAL EVERY 6 HOURS PRN
Status: CANCELLED | OUTPATIENT
Start: 2025-03-19

## 2025-03-18 RX ORDER — CHLORAL HYDRATE 500 MG
1000 CAPSULE ORAL DAILY
Status: CANCELLED | OUTPATIENT
Start: 2025-03-19

## 2025-03-18 RX ORDER — GABAPENTIN 100 MG/1
100 CAPSULE ORAL 3 TIMES DAILY PRN
Status: CANCELLED | OUTPATIENT
Start: 2025-03-18

## 2025-03-18 RX ORDER — ACETAMINOPHEN 325 MG/1
650 TABLET ORAL EVERY 6 HOURS PRN
Status: DISCONTINUED | OUTPATIENT
Start: 2025-03-19 | End: 2025-03-28 | Stop reason: HOSPADM

## 2025-03-18 RX ORDER — LIDOCAINE 4 G/G
1 PATCH TOPICAL EVERY 24 HOURS
Status: ON HOLD
Start: 2025-03-19 | End: 2025-03-27

## 2025-03-18 RX ORDER — OMEPRAZOLE 20 MG/1
20 CAPSULE, DELAYED RELEASE ORAL DAILY
Status: DISCONTINUED | OUTPATIENT
Start: 2025-03-19 | End: 2025-03-28 | Stop reason: HOSPADM

## 2025-03-18 RX ORDER — OMEPRAZOLE 20 MG/1
20 CAPSULE, DELAYED RELEASE ORAL DAILY
Status: DISCONTINUED | OUTPATIENT
Start: 2025-03-18 | End: 2025-03-18

## 2025-03-18 RX ORDER — CHLORAL HYDRATE 500 MG
1000 CAPSULE ORAL DAILY
Status: DISCONTINUED | OUTPATIENT
Start: 2025-03-19 | End: 2025-03-19

## 2025-03-18 RX ORDER — CARBOXYMETHYLCELLULOSE SODIUM 5 MG/ML
1 SOLUTION/ DROPS OPHTHALMIC PRN
Status: DISCONTINUED | OUTPATIENT
Start: 2025-03-18 | End: 2025-03-28 | Stop reason: HOSPADM

## 2025-03-18 RX ORDER — ACETAMINOPHEN 325 MG/1
650 TABLET ORAL EVERY 6 HOURS
Status: CANCELLED | OUTPATIENT
Start: 2025-03-18 | End: 2025-03-19

## 2025-03-18 RX ORDER — FLECAINIDE ACETATE 50 MG/1
25 TABLET ORAL 2 TIMES DAILY
Status: ON HOLD
Start: 2025-03-18 | End: 2025-03-27

## 2025-03-18 RX ORDER — HYDRALAZINE HYDROCHLORIDE 25 MG/1
25 TABLET, FILM COATED ORAL EVERY 8 HOURS PRN
Status: DISCONTINUED | OUTPATIENT
Start: 2025-03-18 | End: 2025-03-28 | Stop reason: HOSPADM

## 2025-03-18 RX ORDER — OXYCODONE HYDROCHLORIDE 10 MG/1
10 TABLET ORAL EVERY 4 HOURS PRN
Refills: 0 | Status: CANCELLED | OUTPATIENT
Start: 2025-03-18

## 2025-03-18 RX ORDER — BISACODYL 5 MG
5 TABLET, DELAYED RELEASE (ENTERIC COATED) ORAL
Status: DISCONTINUED | OUTPATIENT
Start: 2025-03-18 | End: 2025-03-28 | Stop reason: HOSPADM

## 2025-03-18 RX ORDER — LIDOCAINE 4 G/G
1-2 PATCH TOPICAL
Status: DISCONTINUED | OUTPATIENT
Start: 2025-03-18 | End: 2025-03-28 | Stop reason: HOSPADM

## 2025-03-18 RX ORDER — ALUMINA, MAGNESIA, AND SIMETHICONE 2400; 2400; 240 MG/30ML; MG/30ML; MG/30ML
20 SUSPENSION ORAL
Status: DISCONTINUED | OUTPATIENT
Start: 2025-03-18 | End: 2025-03-28 | Stop reason: HOSPADM

## 2025-03-18 RX ORDER — TRAZODONE HYDROCHLORIDE 50 MG/1
50 TABLET ORAL
Status: DISCONTINUED | OUTPATIENT
Start: 2025-03-18 | End: 2025-03-20

## 2025-03-18 RX ADMIN — ACETAMINOPHEN 650 MG: 325 TABLET ORAL at 14:27

## 2025-03-18 RX ADMIN — ENOXAPARIN SODIUM 40 MG: 100 INJECTION SUBCUTANEOUS at 17:46

## 2025-03-18 RX ADMIN — OMEPRAZOLE 20 MG: 20 CAPSULE, DELAYED RELEASE ORAL at 05:45

## 2025-03-18 RX ADMIN — OXYCODONE 5 MG: 5 TABLET ORAL at 23:08

## 2025-03-18 RX ADMIN — CARBOXYMETHYLCELLULOSE SODIUM 1 DROP: 5 SOLUTION/ DROPS OPHTHALMIC at 14:37

## 2025-03-18 RX ADMIN — LEVOTHYROXINE SODIUM 50 MCG: 0.05 TABLET ORAL at 05:45

## 2025-03-18 RX ADMIN — GABAPENTIN 100 MG: 100 CAPSULE ORAL at 23:08

## 2025-03-18 RX ADMIN — Medication 1000 UNITS: at 05:45

## 2025-03-18 RX ADMIN — FLECAINIDE ACETATE 25 MG: 50 TABLET ORAL at 20:55

## 2025-03-18 RX ADMIN — FLECAINIDE ACETATE 25 MG: 50 TABLET ORAL at 05:45

## 2025-03-18 RX ADMIN — ACETAMINOPHEN 650 MG: 325 TABLET ORAL at 23:08

## 2025-03-18 RX ADMIN — SENNOSIDES AND DOCUSATE SODIUM 2 TABLET: 50; 8.6 TABLET ORAL at 20:55

## 2025-03-18 RX ADMIN — ACETAMINOPHEN 650 MG: 325 TABLET ORAL at 05:44

## 2025-03-18 RX ADMIN — OXYCODONE HYDROCHLORIDE 10 MG: 10 TABLET ORAL at 03:59

## 2025-03-18 RX ADMIN — LIDOCAINE 1 PATCH: 4 PATCH TOPICAL at 05:42

## 2025-03-18 ASSESSMENT — LIFESTYLE VARIABLES
HAVE YOU EVER FELT YOU SHOULD CUT DOWN ON YOUR DRINKING: NO
HAVE PEOPLE ANNOYED YOU BY CRITICIZING YOUR DRINKING: NO
DOES PATIENT WANT TO STOP DRINKING: NO
ALCOHOL_USE: YES
EVER HAD A DRINK FIRST THING IN THE MORNING TO STEADY YOUR NERVES TO GET RID OF A HANGOVER: NO
TOTAL SCORE: 0
TOTAL SCORE: 0
ON A TYPICAL DAY WHEN YOU DRINK ALCOHOL HOW MANY DRINKS DO YOU HAVE: 1
EVER FELT BAD OR GUILTY ABOUT YOUR DRINKING: NO
CONSUMPTION TOTAL: NEGATIVE
TOTAL SCORE: 0
HOW MANY TIMES IN THE PAST YEAR HAVE YOU HAD 5 OR MORE DRINKS IN A DAY: 0
AVERAGE NUMBER OF DAYS PER WEEK YOU HAVE A DRINK CONTAINING ALCOHOL: 1

## 2025-03-18 ASSESSMENT — PATIENT HEALTH QUESTIONNAIRE - PHQ9
1. LITTLE INTEREST OR PLEASURE IN DOING THINGS: NOT AT ALL
SUM OF ALL RESPONSES TO PHQ9 QUESTIONS 1 AND 2: 0
SUM OF ALL RESPONSES TO PHQ9 QUESTIONS 1 AND 2: 0
1. LITTLE INTEREST OR PLEASURE IN DOING THINGS: NOT AT ALL
2. FEELING DOWN, DEPRESSED, IRRITABLE, OR HOPELESS: NOT AT ALL
2. FEELING DOWN, DEPRESSED, IRRITABLE, OR HOPELESS: NOT AT ALL

## 2025-03-18 ASSESSMENT — FIBROSIS 4 INDEX
FIB4 SCORE: 2.7
FIB4 SCORE: 2.7
FIB4 SCORE: 3.04

## 2025-03-18 ASSESSMENT — PAIN DESCRIPTION - PAIN TYPE
TYPE: ACUTE PAIN
TYPE: SURGICAL PAIN
TYPE: SURGICAL PAIN
TYPE: ACUTE PAIN;SURGICAL PAIN
TYPE: SURGICAL PAIN
TYPE: ACUTE PAIN;SURGICAL PAIN

## 2025-03-18 NOTE — PROGRESS NOTES
1219 Pt arrived at [facility] from [facility] via transport. Dr. Alatorre to follow. Initial assessment initiated. Pt oriented to room and facility routine and safety measures; pt education binder provided and discussed. Pt A/O x 4, continent of bowel and bladder. Moderate assist for transfers. All wounds photographed and documented; photos uploaded to . Pt denies pain or discomfort at this time. Pt positioned for comfort in bed. Call light within reach, safety measures in place. Will continue to monitor.

## 2025-03-18 NOTE — DISCHARGE PLANNING
Insurance remains pending.  Msg placed to Dr. Burnett-seeking medical clearance.     0820-Insurance has authorized.  Dr. Burnett has medically cleared.  Will discuss this case with Administration this .    1002-Case is under review by Dr. Alatorre.     1010-Dr. Alatorre has accepted.  Transport has been arranged via GMT between [id number]. Msg placed to Dr. Burnett, Arely MCCLELLAND & Pina BSN.

## 2025-03-18 NOTE — PROGRESS NOTES
Received report from night shift RN. Assumed pt care 0700  Pt is A&Ox4, resting comfortably in bed. Plan of care reviewed for activities and goals this shift. Medication eduction provided.  Pt verbalizes understanding of plan of care for this shift.  Patients needs attended well. Fall precautions in place. Bed at lowest position. Call light and personal belongings within reach.   Hourly rounding in progress.  Discharge instructions reviewed. Medication education, follow up and home care education provided. Pt verbalizes understanding of instructions.   Level of comfort increased prior to discharge. VSS. Belongings gathered to take home.   Transport arrived for  w/c

## 2025-03-18 NOTE — PROGRESS NOTES
4 Eyes Skin Assessment Completed by David RN and PEGGY Vu.    Head WDL  Ears WDL  Nose WDL  Mouth WDL  Neck WDL  Breast/Chest WDL  Shoulder Blades WDL  Spine WDL  (R) Arm/Elbow/Hand Abrasion small tear on elbow from fall  (L) Arm/Elbow/Hand WDL  Abdomen WDL  Groin WDL  Scrotum/Coccyx/Buttocks WDL  (R) Leg WDL  (L) Leg Incision surgical incision   (R) Heel/Foot/Toe Redness and Blanching  (L) Heel/Foot/Toe Redness and Blanching        Interventions In Place Waffle Overlay and Pillows    Possible Skin Injury No    Pictures Uploaded Into Epic Yes  Wound Consult Placed N/A  RN Wound Prevention Protocol Ordered Yes

## 2025-03-18 NOTE — PROGRESS NOTES
NEW ADMIT FALL PREVENTION EDUCATION                                    AND INTERVENTION       Fall Prevention Education Video watched: Yes  Strip Alarm in place: Yes  Alarming seatbelt No   Does patient need a posey bed?: No   Does patient have the right size non-skid sock?: Yes      Admitting RN:      David Hoang

## 2025-03-18 NOTE — CARE PLAN
The patient is Stable - Low risk of patient condition declining or worsening    Shift Goals  Clinical Goals: monitor labs, pain management, increase activity with comfort  Patient Goals: rest and sleep  Family Goals: n/a    Progress made toward(s) clinical / shift goals:  in progerss    Patient is not progressing towards the following goals:

## 2025-03-18 NOTE — PROGRESS NOTES
Received bedside report from day shift nurse, Pina WOODS. Assumed pt care at 1915.  Pt is A&Ox4, resting comfortably in bed. Pt on oxygen at 1 Lpm via nasal cannula; no signs of SOB/respiratory distress. Labs noted, VSS. Pt states pain tolerable at 5/10; declines need of prn pain medication at this moment. Needs attended well. Fall precautions in place. Bed at lowest position. Call light and personal belongings within reach. Bed alarm on. Encouraged to call for assistance. Plan of care on going, no further concerns as of present.   Hourly rounding in progress.

## 2025-03-18 NOTE — H&P
Physical Medicine & Rehabilitation  History and Physical (H&P)  &     Post Admission Physician Evaluation (PARMJIT)       Date of Admission: 3/18/2025  Date of Service: 3/18/2025   Alyssa Juan  RH15/02    [facility] Code to Support Admission: 0008.11 - Orthopaedic Disorders: Status Post Unilateral Hip Fracture  Etiologic Diagnosis: Closed left hip fracture, initial encounter (Formerly [facility])    _______________________________________________    Chief Complaint: Hip fracture     History of Present Illness:    85 yo female with PMH significant for hyponatremia presented to [facility] [place] 3/14/25 after GLF. Has L hip and pubic symphysis fractures. Underwent hemiarthroplasty 3/14 Dr. Cota and is WBAT. Course c/b hypertension and hyponatremia. Is on fluid restrictions. Admitted to ARU 3/18/2025. States pain has been controlled. Eye is bothering her seems to have a little abrasion lower inside lid. Uses Refresh drops at home. Otherwise is knowledgeable of Rehab process. Had been here a couple of years ago.     Review of Systems:     14 point ROS reviewed and negative except as stated above.      Past Medical History:  Past Medical History:   Diagnosis Date    Cold 04/24/2015    recent cold or allergies    Colitis     Disease of thyroid gland     Hypertension     Pain 04/24/2015    right shoulder       Past Surgical History:  Past Surgical History:   Procedure Laterality Date    PB PARTIAL HIP REPLACEMENT Left 3/14/2025    Procedure: HEMIARTHROPLASTY, HIP;  Surgeon: Radha Cota M.D.;  Location: [facility];  Service: Orthopedics    PB PARTIAL HIP REPLACEMENT Right 1/31/2023    Procedure: HEMIARTHROPLASTY, HIP;  Surgeon: Lv Rasmussen M.D.;  Location: [facility];  Service: Orthopedics    SHOULDER ARTHROPLASTY TOTAL Right 5/5/2015    Procedure: SHOULDER ARTHROPLASTY TOTAL;  Surgeon: Fernadna Mendosa M.D.;  Location: [facility];  Service:     ROTATOR CUFF REPAIR  2014    right    CATARACT EXTRACTION WITH  IOL  2013    b/l    MAMMOPLASTY AUGMENTATION  2001    HYSTERECTOMY, VAGINAL  1978    OTHER  1975    lumbar laminectomy       Family History:  Family History   Problem Relation Age of Onset    Diabetes Other     Hypertension Other     Stroke Other     Cancer Other        Medications:  Current Facility-Administered Medications   Medication Dose    Pharmacy Consult Request ...Pain Management Review 1 Each  1 Each    lidocaine (Asperflex) 4 % patch 1-2 Patch  1-2 Patch    hydrALAZINE (Apresoline) tablet 25 mg  25 mg    senna-docusate (Pericolace Or Senokot S) 8.6-50 MG per tablet 2 Tablet  2 Tablet    And    polyethylene glycol/lytes (Miralax) Packet 1 Packet  1 Packet    lactulose 20 GM/30ML solution 30 mL  30 mL    docusate sodium (Enemeez) enema 283 mg  283 mg    bisacodyl EC (Dulcolax) tablet 5 mg  5 mg    magnesium hydroxide (Milk Of Magnesia) suspension 30 mL  30 mL    sodium phosphate enema 1 Enema  1 Enema    carboxymethylcellulose (Refresh Tears) 0.5 % ophthalmic drops 1 Drop  1 Drop    benzocaine-menthol (Cepacol) lozenge 1 Lozenge  1 Lozenge    mag hydrox-al hydrox-simeth (Maalox Plus Es Or Mylanta Ds) suspension 20 mL  20 mL    ondansetron (Zofran ODT) dispertab 4 mg  4 mg    Or    ondansetron (Zofran) syringe/vial injection 4 mg  4 mg    traZODone (Desyrel) tablet 50 mg  50 mg    sodium chloride (Ocean) 0.65 % nasal spray 2 Spray  2 Spray    acetaminophen (Tylenol) tablet 650 mg  650 mg    Followed by    [START ON 3/19/2025] acetaminophen (Tylenol) tablet 650 mg  650 mg    [START ON 3/24/2025] alendronate (Fosamax) tablet 70 mg  70 mg    [START ON 3/19/2025] fish oil capsule 1,000 mg  1,000 mg    flecainide (Tambocor) tablet 25 mg  25 mg    [START ON 3/19/2025] levothyroxine (Synthroid) tablet 50 mcg  50 mcg    [START ON 3/19/2025] lidocaine (Asperflex) 4 % patch 1 Patch  1 Patch    [START ON 3/19/2025] magnesium chloride (Mag-64) delayed-release tablet 64 mg  64 mg    [START ON 3/19/2025] vitamin D3  (Cholecalciferol) tablet 1,000 Units  1,000 Units    gabapentin (Neurontin) capsule 100 mg  100 mg    oxyCODONE immediate-release (Roxicodone) tablet 5 mg  5 mg    Or    oxyCODONE immediate release (Roxicodone) tablet 10 mg  10 mg    [START ON 3/19/2025] omeprazole (PriLOSEC) capsule 20 mg  20 mg       Allergies:  Sulfa drugs and Codeine    Psychosocial History:  Housing / Facility: 1 [facility]  Steps Into Home: 2  Steps In Home: 0  Lives with - Patient's Self Care Capacity: Alone and Unable to Care For Self  Equipment Owned: Front-Wheel Walker, Tub / Shower Seat, Grab Bar(s) In Tub / Shower, Grab Bar(s) By Toilet, Hand Held Shower, Oxygen    Level of Function Prior to Disability:  Housing / Facility: 1 [facility]  Steps Into Home: 2  Steps In Home: 0  Bathroom Set up: Walk In Shower, Shower Chair, Grab Bars  Equipment Owned: Front-Wheel Walker, Tub / Shower Seat, Grab Bar(s) In Tub / Shower, Grab Bar(s) By Toilet, Hand Held Shower, Oxygen  Lives with - Patient's Self Care Capacity: Alone and Unable to Care For Self  Bed Mobility: Independent  Transfer Status: Independent  Ambulation: Independent  Assistive Devices Used: Front-Wheel Walker    Self Feeding: Independent  Grooming / Hygiene: Independent  Bathing: Requires Assist  Dressing: Independent  Toileting: Independent  Medication Management: Unable To Determine At This Time  Laundry: Requires Assist  Kitchen Mobility: Independent  Finances: Unable To Determine At This Time  Home Management: Requires Assist  Shopping: Requires Assist  Prior Level Of Mobility: Independent With Device in Home  Driving / Transportation: Relatives / Others Provide Transportation  Prior Services: Intermittent Physical Support for ADL Per Service  Housing / Facility: 1 [facility]  Occupation (Pre-Hospital Vocational): Retired Due To Age  Leisure Interests: Reading, Crafts    Level of Function Prior to Admission to [facility]:  Gait Level Of Assist: Contact Guard  "Assist  Assistive Device: Front Wheel Walker  Distance (Feet): 80  Deviation: Antalgic, Step To, Decreased Heel Strike, Decreased Toe Off  Weight Bearing Status: WBAT L LE  Skilled Intervention: Verbal Cuing, Sequencing, Facilitation  Supine to Sit: Maximal Assist  Sit to Supine: Moderate Assist  Scooting: Minimal Assist  Skilled Intervention: Verbal Cuing, Sequencing, Compensatory Strategies  Comments: HOB flat and rails up  Sit to Stand: Contact Guard Assist  Bed, Chair, Wheelchair Transfer: Contact Guard Assist  Toilet Transfers: Minimal Assist  Transfer Method: Stand Step  Skilled Intervention: Verbal Cuing, Facilitation, Compensatory Strategies  Sitting in Chair: found up in chair  Sitting Edge of Bed: 5 mins  Standin mins    Lower Body Dressing: Moderate Assist  Toileting: Minimal Assist  Skilled Intervention: Verbal Cuing, Compensatory Strategies    CURRENT LEVEL OF FUNCTION:   Same as level of function prior to admission to [facility]    Physical Examination:     VITAL SIGNS:   height is 1.549 m (5' 1\") and weight is 48.5 kg (106 lb 14.8 oz). Her oral temperature is 36.7 °C (98.1 °F). Her blood pressure is 146/65 (abnormal) and her pulse is 73. Her respiration is 17 and oxygen saturation is 90%.   GENERAL: No apparent distress  HEENT: Normocephalic/atraumatic and EOMI. Small R eye inner lower lid abrasion.   CARDIAC: Ext WWP. No edema BL  LUNGS: Breathing non labored on RA  ABDOMINAL: bowel sounds present, soft, and nontender    NEURO:  Mental status:  A&Ox4 (person, place, date, situation) answers questions appropriately follows commands  Speech: fluent, no aphasia or dysarthria    Motor Exam Upper Extremities   ? Myotome R L   Shoulder Abduction C5 5 5   Elbow flexion C5 5 5   Wrist extension C6 5 5   Elbow extension C7 5 5   Finger flexion C8 5 5   Finger abduction T1 5 5     Motor Exam Lower Extremities  ? Myotome R L   Hip flexion L2 5 3   Knee extension L3 5 3   Ankle " dorsiflexion L4 5 5   Toe extension L5 5 5   Ankle plantarflexion S1 5 5       Radiology:  US-FLOWER SINGLE LEVEL BILAT   Final Result       DX-SHOULDER 2+ LEFT   Final Result       1.  There is no evidence of acute fracture.       2.  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       DX-PELVIS-1 OR 2 VIEWS   Final Result           1. Status post left hip arthroplasty without evidence of early hardware complication.       DX-CHEST-PORTABLE (1 VIEW)   Final Result           1.  Interstitial pulmonary parenchymal prominence suggest chronic underlying lung disease, component of interstitial edema and/or infiltrates not excluded.   2.  Trace bilateral pleural effusions   3.  Atherosclerosis       DX-HIP-COMPLETE - UNILATERAL 2+ LEFT   Final Result           1.  Impacted left femoral neck fracture   2.  Left superior and inferior pubic ramus fracture is at the symphysis           Laboratory Values:  Recent Labs     03/16/25 0228 03/17/25  0126   SODIUM 128* 127*   POTASSIUM 4.6 4.0   CHLORIDE 94* 94*   CO2 26 24   GLUCOSE 82 90   BUN 16 13   CREATININE 0.59 0.47*   CALCIUM 8.8 8.3*     Recent Labs     03/16/25 0228 03/17/25  0126 03/18/25  0358   WBC 5.4 6.0 5.8   RBC 3.29* 2.76* 2.99*   HEMOGLOBIN 11.0* 9.2* 10.1*   HEMATOCRIT 33.3* 27.5* 30.1*   .2* 99.6* 100.7*   MCH 33.4* 33.3* 33.8*   MCHC 33.0 33.5 33.6   RDW 50.8* 49.8 50.2*   PLATELETCT 229 198 223   MPV 9.5 9.1 9.0           Primary Rehabilitation Diagnosis:    This patient is a 84 y.o. female admitted for acute inpatient rehabilitation with Closed left hip fracture, initial encounter (formerly [facility]).    Impairments:   ADLs/IADLs  Mobility    Secondary Diagnosis/Medical Co-morbidities Affecting Function  Impaired ADLs, impaired mobility, hyponatremia, ABLA, acute pain, surgical pain, hypertension     Relevant Changes Since Preadmission Evaluation:    Status unchanged    The patient's rehabilitation potential is Excellent  The patient's medical  prognosis is excellent    Rehabilitation Plan:   Discussion and Recommendations:   1. The patient requires an acute inpatient rehabilitation program with a coordinated program of care at an intensity and frequency not available at a lower level of care. This recommendation is substantiated by the patient's medical physicians who recommend that the patient's intervention and assessment of medical issues needs to be done at an acute level of care for patient's safety and maximum outcome.   2. A coordinated program of care will be supplied by an interdisciplinary team of physical therapy, occupational therapy, rehab physician, rehab nursing, and, if needed, speech therapy and rehab psychology. Rehab team presents a patient-specific rehabilitation and education program concentrating on prevention of future problems related to accessibility, mobility, skin, bowel, bladder, sexuality, and psychosocial and medical/surgical problems.   3. Need for Rehabilitation Physician: The rehab physician will be evaluating the patient on a multi-weekly basis to help coordinate the program of care. The rehab physician communicates between medical physicians, therapists, and nurses to maximize the patient's potential outcome. Specific areas in which the rehab physician will be providing daily assessment include the following:   A. Assessing the patient's heart rate and blood pressure response (vitals monitoring) to activity and making adjustments in medications or conservative measures as needed.   B. The rehab physician will be assessing the frequency at which the program can be increased to allow the patient to reach optimal functional outcome.   C. The rehab physician will also provide assessments in daily skin care, especially in light of patient's impairments in mobility.   D. The rehab physician will provide special expertise in understanding how to work with functional impairment and recommend appropriate interventions,  compensatory techniques, and education that will facilitate the patient's outcome.   4. Rehab R.N.   The rehab RN will be working with patient to carry over in room mobility and activities of daily living when the patient is not in 3 hours of skilled therapy. Rehab nursing will be working in conjunction with rehab physician to address all the medical issues above and continue to assess laboratory work and discuss abnormalities with the treating physicians, assess vitals, and response to activity, and discuss and report abnormalities with the rehab physician. Rehab RN will also continue daily skin care, supervise bladder/bowel program, instruct in medication administration, and ensure patient safety.   5. Rehab Therapy: Therapies to treat at intensity and frequency of (may change after completion of evaluation by all therapeutic disciplines):       PT:  Physical therapy to address mobility, transfer, gait training and evaluation for adaptive equipment needs 1.5 hour/day at least 5 days/week for the duration of the ELOS (see below)       OT:  Occupational therapy to address ADLs, self-care, home management training, functional mobility/transfers and assistive device evaluation, and community re-integration 1.5 hour/day at least 5 days/week for the duration of the ELOS (see below).        ST/Dysphagia:  Speech therapy to address speech, language, and cognitive deficits as well as swallowing difficulties with retraining/dysphagia management and community re-integration with comprehension, expression, cognitive training 0 hour/day at least 5 days/week for the duration of the ELOS (see below).     Medical management / Rehabilitation Issues/ Adverse Potential as part of rehabilitation plan     Rehabilitation Issues/Adverse Potential  1.  L hip fracture: Patient demonstrates functional deficits in strength, balance, coordination, and ADL's. Patient is admitted to [facility] for comprehensive  rehabilitation therapy as described below.   Rehabilitation nursing monitors bowel and bladder control, educates on medication administration, co-morbidities and monitors patient safety.    2.  Neurostimulants: None at this time but continue to assess daily for need to initiate should status change.    3.  DVT prophylaxis:  Patient was not on chemoprophylaxis for anticoagulation upon transfer. Encourage OOB. Monitor daily for signs and symptoms of DVT including but not limited to swelling and pain to prevent the development of DVT that may interfere with therapies.    4.  GI prophylaxis:  On prilosec to prevent gastritis/dyspepsia which may interfere with therapies.    5.  Pain: Controlled with Tylenol     6.  Nutrition/Dysphagia: Dietician monitors nutrient intake, recommend supplements prn and provide nutrition education to pt/family to promote optimal nutrition for wound healing/recovery.     7.  Bladder/bowel:  Start bowel and bladder program as described below, to prevent constipation, urinary retention (which may lead to UTI), and urinary incontinence (which will impact upon pt's functional independence).   - TV Q3h while awake with post void bladder scans, I&O cath for PVRs >400  - up to commode after meal     8.  Skin/dermal ulcer prophylaxis: Monitor for new skin conditions with q.2 h. turns as required to prevent the development of skin breakdown.     9.  Cognition/Behavior: As needed psychologist provides adjustment counseling to illness and psychosocial barriers that may be potential barriers to rehabilitation.     10. Respiratory therapy: RT performs O2 management prn, breathing retraining, pulmonary hygiene and bronchospasm management prn to optimize participation in therapies.     Medical Co-Morbidities/Adverse Potential Affecting Function:    Imapacted L Femoral Neck Fracture s/p Hemiarthroplasty 3/14/25 Dr. Cota   Left superior and inferior pubic ramus fracture non-op  PT and OT for mobility and  ADLs. Per guidelines, 15 hours per week between PT, OT and/or SLP.  Follow-up Ortho  WBAT LLE    Pain - Tylenol, oxycodone    ABLA - AM labs    Arrhythmia - Continue Flecainide. Follows with Dr. Vail    Peripheral Neuropathy - Continue Gabapentin    Hyponatremia - Continue fluid restriction, improving.     GERD - Continue PPI    Hypothyroidism - Continue Synthroid    Hypertension - Lisinopril HELD due to hyperkalemia    Hyperkalemia - AM labs    Hypomagnesemia - Continue supplement     Vitamin D Deficiency - Supplement     Bowel - Patient on Senna-docusate for constipation prophylaxis.     Bladder - TV/PVR/BS PRN      Upcoming Labs/imaging: Admission Labs    DVT PROPHYLAXIS: Start Lovenox 40mg SQ nightly on admission to ARU, held at main due to slight drop in Hgb, however repeat improved in 10's. No active bleeding, incision looks good.     HOSPITALIST FOLLOWING: No    CODE STATUS: DNAR/DNI    DISPO: Home     GUS: TBD    MEDS SENT TO: TBD    DISCHARGE SPECIALIST FOLLOW UP:   Ortho    Patient to scheduled follow up with their PCP within 2 weeks from discharge from the [facility].     I personally performed a complete drug regimen review and no potential clinically significant medication issues were identified.     Goals/Expected Level of Function Based on Current Medical and Functional Status:  (may change based on patient's medical status and rate of impairment recovery)  Transfers:   Modified Independent  Mobility/Gait:   Modified Independent  ADL's:   Modified Independent    DISPOSITION: Discharge to pre-morbid independent living setting with the supportive care of patient's family.    ELOS: 7-10 days  ____________________________________    Dr. Jacqui Alatorre DO, MS  [facility] - Physical Medicine & Rehabilitation   ____________________________________    Pt was seen today for 75 min, and entire time spent in face-to-face contact was >50% in counseling and coordination of care as detailed in  A/P above.

## 2025-03-18 NOTE — PREADMISSION SCREENING NOTE
Pre-Admission Screening Form    Patient Information:   Name: Alyssa Juan     MRN: [id number]       : 1940      Age: 84 y.o.   Gender: female      Race: White [7]       Marital Status:  [2]  Family Contact: Yessica,Vickie Juan,Grecia Juan,DungAnand Pitts        Relationship: Daughter [2]  Grandchild [6]  Son [15]  Spouse [17]  Home Phone: [phone]                 Cell Phone: [phone]  Advanced Directives:  Yes  Code Status:  DNAR/DNI  Current Attending Provider: Savita Burnett D.O.  Referring Physician: Dr. Madrid  Physiatrist Consult: Dr. Alatorre   Referral Date: 03-15-25  Primary Payor Source:  [facility] [employer]  Secondary Payor Source:      Medical Information:   Date of Admission to Acute Care Setting:3/14/2025  Room Number: 2204/01  Rehabilitation Diagnosis: 0008.11 - Orthopaedic Disorders: Status Post Unilateral Hip Fracture  Immunization History   Administered Date(s) Administered    Covid-19 Mrna (Spikevax) Moderna 12+ Years 2023, 2024    INFLUENZA TIV (IM) 2014    Influenza Vaccine Adult HD 10/22/2016, 10/17/2017, 10/19/2018, 2021, 10/09/2022    Influenza Vaccine Quad Recombinant 2020    Influenza Vaccine, Quadrivalent, Adjuvanted (Pf) 2023    Influenza split virus trivalent (PF) 10/05/2011    Influenza, unspecified formulation 2019    MODERNA BIVALENT BOOSTER SARS-COV-2 VACCINE (6+) 10/09/2022    MODERNA SARS-COV-2 VACCINE (12+) 2021, 2021, 10/28/2021, 04/15/2022    Pneumococcal Conjugate Vaccine (PCV20) 2023    Pneumococcal Conjugate Vaccine (Prevnar/PCV-13) 2016    Pneumococcal Conjugate, unspecified formulation 2010    Pneumococcal polysaccharide vaccine (PPSV-23) 2018    RSV ABRYSVO VACCINE 10/27/2023    Tdap Vaccine 2016, 2024    Zoster Vaccine Live (ZVL) (Zostavax) - HISTORICAL DATA 2015    Zoster Vaccine Recombinant  (RZV) (SHINGRIX) 01/24/2020, 06/03/2020     Allergies   Allergen Reactions    Sulfa Drugs Hives    Codeine Vomiting and Nausea     Past Medical History:   Diagnosis Date    Cold 04/24/2015    recent cold or allergies    Colitis     Disease of thyroid gland     Hypertension     Pain 04/24/2015    right shoulder     Past Surgical History:   Procedure Laterality Date    PB PARTIAL HIP REPLACEMENT Left 3/14/2025    Procedure: HEMIARTHROPLASTY, HIP;  Surgeon: Radha Cota M.D.;  Location: SURGERY [facility];  Service: Orthopedics    PB PARTIAL HIP REPLACEMENT Right 1/31/2023    Procedure: HEMIARTHROPLASTY, HIP;  Surgeon: Lv Rasmussen M.D.;  Location: SURGERY [facility];  Service: Orthopedics    SHOULDER ARTHROPLASTY TOTAL Right 5/5/2015    Procedure: SHOULDER ARTHROPLASTY TOTAL;  Surgeon: Fernanda Mendosa M.D.;  Location: [facility];  Service:     ROTATOR CUFF REPAIR  2014    right    CATARACT EXTRACTION WITH IOL  2013    b/l    MAMMOPLASTY AUGMENTATION  2001    HYSTERECTOMY, VAGINAL  1978    OTHER  1975    lumbar laminectomy       History Leading to Admission, Conditions that Caused the Need for Rehab (CMS):     Dr. Fernández H&P:  Chief Complaint  Patient presents with   Fall      Pt  was getting up to get a drink of water tripped & fell   Pain to L.hip  Slight reduction/rotation noted pt received   50mcg fentanyl by FIRE  25mcg Fentanyl PTA      History of Presenting Illness  Alyssa Juan is a 84 y.o. female with history of hypertension, GERD, hypothyroidism who presented 3/14/2025 with evaluation for ground-level fall, left hip pain.  Patient reported having difficulty sleeping earlier, got up to go to kitchen to get a drink.  After drinking fluid, patient reported to have ground-level fall as she was attempting to sit in a chair.  Denies head injury.  X-ray of left hip noted to have impacted left femoral neck fracture, left superior and inferior pubic rami fracture.  Case was discussed  with orthopedic surgery, tentative plan for operative intervention.  Therefore, admitted to medicine service for further evaluation and treatment.     Assessment/Plan:  Justification for Admission Status  I anticipate this patient will require at least 2 midnights of hospitalization, therefore appropriate for inpatient status.     * Hip fracture requiring operative repair, left, closed, initial encounter ([facility])- (present on admission)  Assessment & Plan  S/p GLF  X-ray left hip: Impacted femoral neck fracture, left superior and inferior pubic ramus fracture     Supportive pain control-multimodal pain management  PT/OT after the OR  Orthopedic surgery consulted, tentative plan for operative intervention in AM     Closed fracture of ramus of left pubis ([facility])  Assessment & Plan  Supportive pain control  PT/OT     Age-related physical debility  Assessment & Plan  PT/OT     Encounter for preoperative assessment  Assessment & Plan  Admit to:    Orthopedic/Med-Surg floor since no major co-morbidities.      Cardiovascular:   Patient does not have history of CHF  Pre-op EKG: Yes     Pulmonary:  Oxygen per protocol  Incentive Spirometer     GI:   No history of cirrhosis. Standard bowel regimen. Hold for loose stools.     Renal:   IV fluids: -150 mL/hr for 2 days   Labs: Metabolic Panel with AM labs     Musculoskeletal:   Check 25 OH vitamin D level. If 31-40 pg/mL, consider starting vitamin D3 1000 IU PO daily. If 20-30 pg/mL, consider vitamin D3 2000 IU PO daily. If <20 pg/mL, vitamin D2 50,000 IU weekly x 8 weeks then 2000 IU PO daily.     Neurologic:   Pain Control: Neuro checks every 4 hours  Avoid fentanyl (short-acting)  Acetaminophen 1000 mg PO TID; 650 mg PO TID if liver problems     Hematologic:  ? Plan on pharmacologic DVT prophylaxis post operative day #1. Hold for decreasing hemoglobin. Notify provider for hemoglobin less than 8.  ? Order preoperative type and cross.   ? Hgb every 6 hours if high bleeding  risk.   ? If patient was on anticoagulation prior to arrival risks and benefits will be weighed by teams including surgery, hospitalist, geriatrics, and anesthesia for delaying surgery more than 24 hours.   ? On anticoagulation prior to arrival: No     Medical Assessment Risk:  Intermediate     Surgical Risk:   Intermediate        Hypocalcemia  Assessment & Plan  Replace as appropriate     Hypokalemia  Assessment & Plan  Replace as appropriate  Replace Mg     Arrhythmia  Assessment & Plan  Continue flecainide  She is not on anticoagulation     Peripheral polyneuropathy- (present on admission)  Assessment & Plan  Gabapentin     Hypothyroidism- (present on admission)  Assessment & Plan  Continue Synthroid     Gastroesophageal reflux disease- (present on admission)  Assessment & Plan  PPI     HTN (hypertension)- (present on admission)  Assessment & Plan  Continue lisinopril     ACP (advance care planning)  Assessment & Plan  Goal of care discussed with patient in emergency room.  She stated she does not wish to have aggressive/heroic life-sustaining measures-no CPR/defibrillation/intubation or mechanical ventilation.  She is however agreeable for hospitalization, treatment of her pain, proposed operative orthopedic surgery by orthopedic surgery service as clinically warranted.  She is also agreeable for temporary reversal of CODE STATUS to full code to undergo orthopedic surgery.  However, agreeable for DNR/DNI status for the purpose of current hospitalization.  Diagnosis, prognosis, question and concern addressed.  ACP: 17 minutes     VTE prophylaxis: enoxaparin ppx    Dr. Cota (Surgery Orthopedic) recommendations:  Assessment/Plan:   84-year-old female suffered a ground-level fall resulting in displaced femoral neck fracture.  Discussed treatment options including operative versus nonoperative management.  Operative management includes hemiarthroplasty given age and activity status.  Risks of surgery include but are  not limited to dislocation, leg lengthening, nerve damage, bleeding, risk of anesthesia including stroke and attack and death.  Patient wished to proceed with surgery and consent was signed.     Dr. Olvera progress note:  Hospital Medicine Daily Progress Note     Date of Service  3/17/2025     Chief Complaint  Alyssa Juan is a 84 y.o. female admitted 3/14/2025 with mechanical ground-level fall with left hip pain found to have left femoral neck fracture     Hospital Course  Patient is a 84-year-old female with past medical history of chronic hyponatremia who presented due to mechanical ground-level fall found to have left femoral neck fracture status post left hip hemiarthroplasty 3/14.  Patient currently pending placement for postacute rehab.  Sodium up trended to 129 likely secondary to SIADH and improved on fluid restriction.     Interval Problem Update  3/17: Patient hemoglobin dropped to 9.2.  However, patient appears to be doing much better at bedside.  Left hip unrevealing for potential hematoma.  Converted DVT prophylaxis and held for today pending repeat hemoglobin in AM.  Anemia panel.     3/16: Doing well, encourage incentive spirometer and pending placement     I have discussed this patient's plan of care and discharge plan at IDT rounds today with Case Management, Nursing, Nursing leadership, and other members of the IDT team.     Consultants/Specialty  orthopedics     Code Status  DNAR/DNI     Disposition  The patient is not medically cleared for discharge to home or a post-acute facility.  Anticipate discharge to: skilled nursing facility     I have placed the appropriate orders for post-discharge needs.     Review of Systems  Review of Systems   Constitutional:  Negative for chills and fever.   HENT:  Negative for congestion and sore throat.    Eyes:  Negative for double vision.   Respiratory:  Negative for cough, shortness of breath and wheezing.    Cardiovascular:  Negative for chest  pain, palpitations and leg swelling.   Gastrointestinal:  Negative for abdominal pain, constipation, diarrhea, nausea and vomiting.   Genitourinary:  Negative for dysuria.   Musculoskeletal:  Negative for myalgias.   Skin:  Negative for rash.   Neurological:  Negative for dizziness and headaches.      Physical Exam  Temp:  [36.4 °C (97.6 °F)-37.1 °C (98.7 °F)] 37.1 °C (98.7 °F)  Pulse:  [62-68] 63  Resp:  [16-18] 17  BP: (102-192)/(58-97) 113/59  SpO2:  [85 %-100 %] 94 %     Physical Exam  Vitals and nursing note reviewed.   Constitutional:       General: She is not in acute distress.  HENT:      Head: Normocephalic and atraumatic.      Nose: Nose normal.      Mouth/Throat:      Mouth: Mucous membranes are moist.   Eyes:      General: No scleral icterus.     Extraocular Movements: Extraocular movements intact.   Cardiovascular:      Rate and Rhythm: Normal rate and regular rhythm.      Heart sounds: No murmur heard.     No friction rub. No gallop.   Pulmonary:      Breath sounds: No wheezing, rhonchi or rales.   Abdominal:      General: Abdomen is flat. Bowel sounds are normal. There is no distension.      Palpations: Abdomen is soft.      Tenderness: There is no abdominal tenderness.   Musculoskeletal:         General: No swelling or tenderness.   Skin:     General: Skin is warm.      Capillary Refill: Capillary refill takes less than 2 seconds.   Neurological:      General: No focal deficit present.      Mental Status: She is alert.   Psychiatric:         Mood and Affect: Mood normal.     Fluids     Intake/Output Summary (Last 24 hours) at 3/17/2025 1414  Last data filed at 3/17/2025 1300  Gross per 24 hour  Intake 180 ml  Output 1130 ml  Net -950 ml     Laboratory  Recent Labs     03/15/25  0105 03/16/25  0228 03/17/25  0126  WBC 6.7 5.4 6.0  RBC 3.38* 3.29* 2.76*  HEMOGLOBIN 11.4* 11.0* 9.2*  HEMATOCRIT 33.8* 33.3* 27.5*  .0* 101.2* 99.6*  MCH 33.7* 33.4* 33.3*  MCHC 33.7 33.0 33.5  RDW 49.3 50.8* 49.8  PLATELETCT 248 229 198  MPV 9.4 9.5 9.1     Recent Labs    03/15/25  0105 03/16/25  0228 03/17/25  0126  SODIUM 124* 128* 127*  POTASSIUM 5.3 4.6 4.0  CHLORIDE 90* 94* 94*  CO2 25 26 24  GLUCOSE 126* 82 90  BUN 15 16 13  CREATININE 0.53 0.59 0.47*  CALCIUM 8.9 8.8 8.3*     Imaging  US-FLOWER SINGLE LEVEL BILAT  Final Result     DX-SHOULDER 2+ LEFT  Final Result     1.  There is no evidence of acute fracture.     2.  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     DX-PELVIS-1 OR 2 VIEWS  Final Result     1. Status post left hip arthroplasty without evidence of early hardware complication.     DX-CHEST-PORTABLE (1 VIEW)  Final Result     1.  Interstitial pulmonary parenchymal prominence suggest chronic underlying lung disease, component of interstitial edema and/or infiltrates not excluded.  2.  Trace bilateral pleural effusions  3.  Atherosclerosis     DX-HIP-COMPLETE - UNILATERAL 2+ LEFT  Final Result     1.  Impacted left femoral neck fracture  2.  Left superior and inferior pubic ramus fracture is at the symphysis     Assessment/Plan  * Hip fracture requiring operative repair, left, closed, initial encounter (HCC)- (present on admission)  Assessment & Plan  S/p GLF  X-ray left hip: Impacted femoral neck fracture, left superior and inferior pubic ramus fracture     Pain and BM regimen in place  PT/OT post surgery evaluation pending  I sent referrals for post acute placement, SNF vs IRF.  Lovenox DVT prophylaxis     Blood loss anemia- (present on admission)  Assessment & Plan  Since admission, patient's hemoglobin slightly downtrending from 11.6 down to 11.4, 11.0, now 9.2  - Patient is doing much better since her surgery, asymptomatic, pain improving in her left  hip  - Anticipate likely blood loss anemia from surgery  - Trend hemoglobin in a.m., if continues to drop may need CT scan for evaluation of potential hematoma in her left hip  - Ordered ferritin, TIBC, reticulocyte count, TSH, B12     Age-related physical debility- (present on admission)  Assessment & Plan  PT/OT pending  Post acute placement referrals ordered     Encounter for preoperative assessment- (present on admission)  Assessment & Plan  resolved     Hypocalcemia- (present on admission)  Assessment & Plan  CCa 9.2 after IV replacement     ACP (advance care planning)- (present on admission)  Assessment & Plan  DNR/DNI     Hypokalemia- (present on admission)  Assessment & Plan  K 5.3, hold off on further replacements     Arrhythmia- (present on admission)  Assessment & Plan  Continue home Flecainide  She is not on anticoagulation     Closed fracture of ramus of left pubis (HCC)- (present on admission)  Assessment & Plan  Supportive pain control  PT/OT pending     Peripheral polyneuropathy- (present on admission)  Assessment & Plan  Continue home gabapentin, titrate up if needed     Hyponatremia- (present on admission)  Assessment & Plan  ? I reviewed labs, Na 124, checking urine. She follows [facility] nephrology, takes sodium tablets every Monday. 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 She drinks a lot of free water at home.  Checking urine electrolytes.  ? Fluid restrictions ordered: total fluids in 24 hours 1.5L including free water 500mL.     Hypothyroidism- (present on admission)  Assessment & Plan  Continue levothyroxine     Gastroesophageal reflux disease- (present on admission)  Assessment & Plan  Continue omperazole     HTN (hypertension)- (present on admission)  Assessment & Plan  Hold lisinopril, potassium 5.3 upper normal limit     VTE prophylaxis:    enoxaparin ppx  Holding lovenox for today    Referring Provider: ALAINA MONSIVAIS ME*     PCP:  Angela Trejo M.D.     Name:  Alyssa Juan     MRN: [id number]      DATE OF SURGERY: 03/14/25     SURGEON: Radha Cota MD     PREOPERATIVE DIAGNOSIS:  DISPLACED LEFT FEMORAL NECK FRACTURE      POSTOPERATIVE DIAGNOSIS:  DISPLACED LEFT FEMORAL NECK FRACTURE     PROCEDURE(s) PERFORMED:  Procedure(s):  Left - HEMIARTHROPLASTY, HIP - Wound Class: Clean - Incision Closure: Deep and Superficial Layers      Assistants:  Circulator: Tina Saleem R.N.  Limb Chong: Jolynn Funes R.N.  Scrub Person: Sheryl Orr     Assistants were mandatory to provide adequate exposure for accurate implantation of prosthetic components, while protecting vital neurovascular structures     ANESTHESIOLOGIST:  Anesthesiologist: Juanito Sykes M.D.     ANESTHESIA: General     ESTIMATED BLOOD LOSS: 200 cc     ANTIBIOTICS: 2 gm of Ancef before incision     COMPLICATIONS: * No complications entered in OR log *     IMPLANTS:   [place]  Size 4 standard Accolade 2  Metal 28 mm +4 head  47 mm outer bearing     INDICATION FOR PROCEDURE: Alyssa Juan is a 84 y.o.  female  who presented for left femoral neck fracture. The patient suffered a ground level fall resulting in a femoral neck fracture. We discussed management options including nonoperative and operative management; given displacement of the fracture, activity status and comorbidities, recommend hemiarthroplasty. Patient is a community ambulator with no pre-existing hip pain. Risks and benefits of surgery were reviewed which included but were not limited to PE, DVT, infection, hardware failure or loosening, reoperation, dislocation, leg lengthening,  the need for transfusion and death. Alternatives to surgery had previously been reviewed with the patient as well.  The patient wished to proceed with the above procedure.     3/14/2025 4:04 AM     HISTORY/REASON FOR EXAM:  Pelvic/Hip Pain Following Trauma.     TECHNIQUE/EXAM DESCRIPTION AND NUMBER OF VIEWS:  2 views of the LEFT hip.     COMPARISON: None     FINDINGS:     Surgical changes of right total  hip arthroplasty are seen. Impacted left femoral neck fracture is noted. Left superior and inferior pubic ramus fractures are seen at the symphysis.     IMPRESSION:     1.  Impacted left femoral neck fracture  2.  Left superior and inferior pubic ramus fracture is at the symphysis    Co-morbidities:  See PMH  Potential Risk - Complications: Contractures, Deep Vein Thrombosis, Incontinence, Malnutrition, Pain, Pneumonia, Pressure Ulcer, and Urinary Tract Infection  Level of Risk: High    Ongoing Medical Management Needed (Medical/Nursing Needs):   Patient Active Problem List    Diagnosis Date Noted    Hip fracture requiring operative repair, left, closed, initial encounter ([facility]) 03/14/2025    Closed fracture of ramus of left pubis ([facility]) 03/14/2025    Arrhythmia 03/14/2025    Hypokalemia 03/14/2025    ACP (advance care planning) 03/14/2025    Hypocalcemia 03/14/2025    Encounter for preoperative assessment 03/14/2025    Age-related physical debility 03/14/2025    Peripheral polyneuropathy 02/07/2023    Other insomnia 02/07/2023    Chronic abdominal pain 02/02/2023    Blood loss anemia 02/02/2023    Closed right hip fracture, sequela 02/01/2023    Closed displaced fracture of right femoral neck (HCC) 01/31/2023    HTN (hypertension) 01/31/2023    Gastroesophageal reflux disease 01/31/2023    Hypothyroidism 01/31/2023    Fall from ground level 01/31/2023    Hyponatremia 01/31/2023    Risk for falls 01/31/2023    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/05/2015     A & O    Current Vital Signs:   Temperature: 36.2 °C (97.1 °F) Pulse: 64 Respiration: 16 Blood Pressure : (!) 166/81 (rn aware)  Weight: 49.4 kg (108 lb 14.5 oz) Height: 152.4 cm (5')  Pulse Oximetry: 100 % O2 (LPM): 1      Completed Laboratory Reports:  Recent Labs     03/16/25  0228 03/17/25  0126 03/18/25  0358   WBC 5.4 6.0 5.8   HEMOGLOBIN 11.0* 9.2* 10.1*   HEMATOCRIT 33.3* 27.5* 30.1*   PLATELETCT 229 198 223   SODIUM 128* 127*  --     POTASSIUM 4.6 4.0  --    BUN 16 13  --    CREATININE 0.59 0.47*  --    ALBUMIN 3.6  --   --    GLUCOSE 82 90  --      Additional Labs: Not Applicable    Prior Living Situation:   Housing / Facility: 1 Story House  Steps Into Home: 2  Steps In Home: 0  Lives with - Patient's Self Care Capacity: Alone and Unable to Care For Self  Equipment Owned: Front-Wheel Walker, Tub / Shower Seat, Grab Bar(s) In Tub / Shower, Grab Bar(s) By Toilet, Hand Held Shower, Oxygen    Prior Level of Function / Living Situation:   Physical Therapy: Prior Services: Intermittent Physical Support for ADL Per Service  Housing / Facility: 1 Story House  Steps Into Home: 2  Steps In Home: 0  Bathroom Set up: Walk In Shower, Shower Chair, Grab Bars  Equipment Owned: Front-Wheel Walker, Tub / Shower Seat, Grab Bar(s) In Tub / Shower, Grab Bar(s) By Toilet, Hand Held Shower, Oxygen  Lives with - Patient's Self Care Capacity: Alone and Unable to Care For Self  Bed Mobility: Independent  Transfer Status: Independent  Ambulation: Independent  Assistive Devices Used: Front-Wheel Walker  Current Level of Function:   Gait Level Of Assist: Contact Guard Assist  Assistive Device: Front Wheel Walker  Distance (Feet): 80  Deviation: Antalgic, Step To, Decreased Heel Strike, Decreased Toe Off  Weight Bearing Status: WBAT L LE  Skilled Intervention: Verbal Cuing, Sequencing, Facilitation  Supine to Sit: Maximal Assist  Sit to Supine: Moderate Assist  Scooting: Minimal Assist  Skilled Intervention: Verbal Cuing, Sequencing, Compensatory Strategies  Comments: HOB flat and rails up  Sit to Stand: Contact Guard Assist  Bed, Chair, Wheelchair Transfer: Contact Guard Assist  Toilet Transfers: Minimal Assist  Transfer Method: Stand Step  Skilled Intervention: Verbal Cuing, Facilitation, Compensatory Strategies  Sitting in Chair: found up in chair  Sitting Edge of Bed: 5 mins  Standin mins  Occupational Therapy:   Self Feeding: Independent  Grooming / Hygiene:  Independent  Bathing: Requires Assist  Dressing: Independent  Toileting: Independent  Medication Management: Unable To Determine At This Time  Laundry: Requires Assist  Kitchen Mobility: Independent  Finances: Unable To Determine At This Time  Home Management: Requires Assist  Shopping: Requires Assist  Prior Level Of Mobility: Independent With Device in Home  Driving / Transportation: Relatives / Others Provide Transportation  Prior Services: Intermittent Physical Support for ADL Per Service  Housing / Facility: [address]  Occupation (Pre-Hospital Vocational): Retired Due To Age  Leisure Interests: Reading, Crafts  Current Level of Function:   Lower Body Dressing: Moderate Assist  Toileting: Minimal Assist  Skilled Intervention: Verbal Cuing, Compensatory Strategies  Speech Language Pathology:      Rehabilitation Prognosis/Potential: Good  Estimated Length of Stay: 10-12 days    Nursing:      Incontinent    Scope/Intensity of Services Recommended:  Physical Therapy: 1.5 hr / day  5 days / week. Therapeutic Interventions Required: Maximize Endurance, Mobility, Strength, and Safety  Occupational Therapy: 1.5 hr / day 5 days / week. Therapeutic Interventions Required: Maximize Self Care, ADLs, IADLs, and Energy Conservation  Rehabilitation Nursin/7. Therapeutic Interventions Required: Monitor Pain, Skin, Wound(s), Vital Signs, Intake and Output, Labs, and Safety  Rehabilitation Physician: 3 - 5 days / week. Therapeutic Interventions Required: Medical Management  Respiratory Care: Pulmonary Toileting. Therapeutic Interventions Required: Pulmonary Toileting and O2 Weaning    She requires 24-hour rehabilitation nursing to manage bowel and bladder function, skin care, surgical incision, nutrition and fluid intake, pulmonary hygiene, pain control, safety, medication management, and patient/family goals. In addition, rehabilitation nursing will reiterate and reinforce therapy skills and equipment use, including  ADLs, as well as provide education to the patient and family. Alyssa Juan is willing to participate in and is able to tolerate the proposed plan of care.    Rehabilitation Goals and Plan (Expected frequency & duration of treatment in the IRF):   Return to the Community, Minimal Assist Level of Care, and Outpatient Support  Anticipated Date of Rehabilitation Admission: 03-18-25  Patient/Family oriented IRF level of care/facility/plan: Yes  Patient/Family willing to participate in IRF care/facility/plan: Yes  Patient able to tolerate IRF level of care proposed: Yes  Patient has potential to benefit IRF level of care proposed: Yes  Comments: Not Applicable    Special Needs or Precautions - Medical Necessity:  Safety Concerns/Precautions:  Fall Risk / High Risk for Falls, Balance, and Bed / Chair Alarm  Complex Wound Care: Surgical  Precautions: (P) Fall Risk;Posterior Hip Precautions;Weight Bearing As Tolerated Left Lower Extremity   Pain Management  IV Site: Peripheral  Requires Oxygen  Current Medications:    Current Facility-Administered Medications Ordered in Epic   Medication Dose Route Frequency Provider Last Rate Last Admin    [Held by provider] enoxaparin (Lovenox) inj 40 mg  40 mg Subcutaneous DAILY AT 1800 Jaime Olvera D.O.        magnesium chloride (Mag-64) delayed-release tablet 64 mg  64 mg Oral DAILY WITH LUNCH Al Madrid M.D.   64 mg at 03/17/25 1155    LR (Bolus) infusion 500 mL  500 mL Intravenous Once PRN Fransisco Fernández M.D.        labetalol (Normodyne/Trandate) injection 10 mg  10 mg Intravenous Q4HRS PRN Fransisco Fernández M.D.   10 mg at 03/17/25 0505    ondansetron (Zofran) syringe/vial injection 4 mg  4 mg Intravenous Q4HRS PRN Fransisco Fernández M.D.   4 mg at 03/15/25 1057    Or    ondansetron (Zofran ODT) dispertab 4 mg  4 mg Oral Q4HRS PRN Fransisco Fernández M.D.        senna-docusate (Pericolace Or Senokot S) 8.6-50 MG per tablet 2 Tablet  2 Tablet Oral Q EVENING Fransisco Fernández M.D.    2 Tablet at 03/17/25 1820    And    polyethylene glycol/lytes (Miralax) Packet 1 Packet  1 Packet Oral QDAY PRN Fransisco Fernández M.D.   1 Packet at 03/16/25 2026    Pharmacy Consult Request ...Pain Management Review 1 Each  1 Each Other PHARMACY TO DOSE Fransisco Fernández M.D.        acetaminophen (Tylenol) tablet 650 mg  650 mg Oral Q6HRS Fransisco Fernández M.D.   650 mg at 03/18/25 0544    Followed by    [START ON 3/19/2025] acetaminophen (Tylenol) tablet 650 mg  650 mg Oral Q6HRS PRN Fransisco Fernández M.D.        oxyCODONE immediate-release (Roxicodone) tablet 5 mg  5 mg Oral Q3HRS PRN Fransisco Fernández M.D.   5 mg at 03/17/25 1820    Or    oxyCODONE immediate release (Roxicodone) tablet 10 mg  10 mg Oral Q3HRS PRN Fransisco Fernández M.D.   10 mg at 03/18/25 0359    Or    morphine 4 MG/ML injection 4 mg  4 mg Intravenous Q3HRS PRN Fransisco Fernández M.D.   4 mg at 03/14/25 0745    alendronate (Fosamax) tablet 70 mg  70 mg Oral Q7 DAYS Fransisco Fernández M.D.   70 mg at 03/17/25 0505    levothyroxine (Synthroid) tablet 50 mcg  50 mcg Oral AM ES Fransisco Fernández M.D.   50 mcg at 03/18/25 0545    omeprazole (PriLOSEC) capsule 20 mg  20 mg Oral DAILY Fransisco Fernández M.D.   20 mg at 03/18/25 0545    lidocaine (Asperflex) 4 % patch 1 Patch  1 Patch Transdermal Q24HR Fransisco Fernández M.D.   1 Patch at 03/18/25 0542    gabapentin (Neurontin) capsule 100 mg  100 mg Oral TID PRN Fransisco Fernández M.D.   100 mg at 03/15/25 1059    [Held by provider] lisinopril (Prinivil) tablet 20 mg  20 mg Oral DAILY Fransisco Fernández M.D.   20 mg at 03/15/25 0536    fish oil capsule 1,000 mg  1,000 mg Oral DAILY Fransisco Fernández M.D.   1,000 mg at 03/17/25 0445    vitamin D3 (Cholecalciferol) tablet 1,000 Units  1,000 Units Oral DAILY Fransisco Fernández M.D.   1,000 Units at 03/18/25 0545    flecainide (Tambocor) tablet 25 mg  25 mg Oral BID Fransisco Fernández M.D.   25 mg at 03/18/25 0545     No current [facility]-ordered outpatient medications on file.     Diet:   DIET ORDERS (From admission to next 24h)        Start     Ordered    03/17/25 1817  Diet Order Diet: Regular (total free water restriction 500mL, total fluids 1500mL per day.); Fluid modifications: (optional): 1500 ml Fluid Restriction  ALL MEALS        Question Answer Comment   Diet: Regular total free water restriction 500mL, total fluids 1500mL per day.   Fluid modifications: (optional) 1500 ml Fluid Restriction        03/17/25 1817                    Anticipated Discharge Destination / Patient/Family Goal:  Destination: Home with Assistance Support System: Attendant  Anticipated home health services: OT and PT  Previously used  service/ provider: Not Applicable  Anticipated DME Needs: [employer]  Outpatient Services: OT and PT  Alternative resources to address additional identified needs:   Future Appointments   Date Time Provider Department Center   3/26/2025  9:45 AM [facility] EXAM 8 [facility]   4/11/2025  1:00 PM [address]      Pre-Screen Completed: 3/18/2025 9:56 AM Sergei Goss L.P.N.

## 2025-03-18 NOTE — CARE PLAN
The patient is Stable - Low risk of patient condition declining or worsening    Shift Goals  Clinical Goals: Pt will remain free from falls or injury throughout the shift.  Patient Goals: rest and sleep  Family Goals: n/a    Progress made toward(s) clinical / shift goals:  Pt required 1 prn pain medication within the shift. Pt states relief post interventions. Pt seen resting with eyes closed comfortably on bed; even and unlabored breathing noted. Pt remains free from falls or injury throughout the shift. Hourly rounding in progress.       Patient is not progressing towards the following goals: n/a

## 2025-03-18 NOTE — DISCHARGE SUMMARY
Discharge Summary    CHIEF COMPLAINT ON ADMISSION  Chief Complaint   Patient presents with    Fall     Pt  was getting up to get a drink of water tripped & fell   Pain to L.hip  Slight reduction/rotation noted pt received   50mcg fentanyl by FIRE  25mcg Fentanyl PTA        Reason for Admission  EMS     Admission Date  3/14/2025    CODE STATUS  DNAR/DNI    HPI & HOSPITAL COURSE  Patient is a 84-year-old female with past medical history of chronic hyponatremia who presented due to mechanical ground-level fall found to have left femoral neck fracture.  Orthopedic surgery was consulted and they recommended a left hip hemiarthroplasty which was done on 3/14 without complication.  Postoperatively, she was weightbearing as tolerated.    While in bed, she did experience some difficulty swallowing, based on her positioning therefore her diet was modified.  Once her mobility improved and she was able to sit in the chair, her swelling significantly improved and she was changed back to regular diet.    She does have baseline hyponatremia which ranged from 124-129, and remained stable in this range with fluid/free water restriction.    On the day of discharge her hemoglobin was 10.1, creatinine and sodium were stable.  [facility]        Therefore, she is discharged in good and stable condition to an inpatient rehabilitation hospital.    The patient met 2-midnight criteria for an inpatient stay at the time of discharge.    Discharge Date  3/18/2025    FOLLOW UP ITEMS POST DISCHARGE  Follow-up with orthopedic surgery on 3/24    DISCHARGE DIAGNOSES  Principal Problem:    Hip fracture requiring operative repair, left, closed, initial encounter (HCC) (POA: Yes)  Active Problems:    HTN (hypertension) (Chronic) (POA: Yes)    Gastroesophageal reflux disease (Chronic) (POA: Yes)    Hypothyroidism (Chronic) (POA: Yes)    Hyponatremia (POA: Yes)    Blood loss anemia (POA: Yes)    Peripheral  polyneuropathy (POA: Yes)    Closed fracture of ramus of left pubis (HCC) (POA: Yes)    Arrhythmia (POA: Yes)    Hypokalemia (POA: Yes)    ACP (advance care planning) (POA: Yes)    Hypocalcemia (POA: Yes)    Encounter for preoperative assessment (POA: Yes)    Age-related physical debility (POA: Yes)  Resolved Problems:    * No resolved hospital problems. *      FOLLOW UP  Future Appointments   Date Time Provider Department Center   3/26/2025  9:45 AM [facility]   4/11/2025  1:00 PM [address]     Angela Trejo M.D.  [address] [id number] [phone]    Follow up  Please call your primary care provider to schedule a hospital follow up. Thank you.      MEDICATIONS ON DISCHARGE     Medication List        START taking these medications        Instructions   aspirin 81 MG EC tablet   Take 1 Tablet by mouth 2 times a day for 30 days.  Dose: 81 mg     lidocaine 4 % Ptch  Start taking on: March 19, 2025  Commonly known as: Asperflex   Place 1 Patch on the skin every 24 hours.  Dose: 1 Patch     magnesium chloride 64 MG Tbec  Commonly known as: Mag-64   Take 1 Tablet by mouth every day with lunch.  Dose: 64 mg            CHANGE how you take these medications        Instructions   flecainide 50 MG tablet  What changed:   how much to take  when to take this  Commonly known as: Tambocor   Take 0.5 Tablets by mouth 2 times a day. Indications: Arrythmia  Dose: 25 mg            CONTINUE taking these medications        Instructions   alendronate 70 MG Tabs  Commonly known as: Fosamax   Take 70 mg by mouth every 7 days. Indications: Osteopenia  Dose: 70 mg     ALIGN PREBIOTIC-PROBIOTIC PO   Take 1 Each Tablet by mouth every day at 6 PM. Indications: Constipation  Dose: 1 Each Tablet     Bismuth Subsalicylate 525 MG/15ML Susp   Take 30 mL by mouth 1 time a day as needed (upset stomach/diarrhea). Indications: Diarrhea, Indigestion  Dose: 30 mL     D3 PO   Take  1 Capsule by mouth every day. Indications: suppliment  Dose: 1 Capsule     docusate sodium 100 MG Caps  Commonly known as: Colace   Take 100 mg by mouth 1 time a day as needed for Constipation. Indications: Constipation  Dose: 100 mg     FIBER PO   Take 1 Scoop by mouth every day. Indications: suppliment  Dose: 1 Scoop     FISH OIL PO   Take 1 Capsule by mouth every day. Indications: Suppliment  Dose: 1 Capsule     gabapentin 100 MG Caps  Commonly known as: Neurontin   Take 100 mg by mouth 3 times a day as needed (pain). Take 1-3 times daily   Indications: Neuropathic Pain  Dose: 100 mg     Home Care Oxygen   Inhale 2 L/min at bedtime. Oxygen dose range: 2 L/min  Respiratory route via: Nasal Cannula   Oxygen supplier: [employer]      Indications: shortness of breath  Dose: 2 L/min     Iberogast Caps   Take 1 Capsule by mouth 1 time a day as needed (gut health). Indications: gut health  Dose: 1 Capsule     levothyroxine 50 MCG Tabs  Commonly known as: Synthroid   Take 50 mcg by mouth every morning on an empty stomach. Indications: Underactive Thyroid  Dose: 50 mcg     lisinopril 20 MG Tabs  Commonly known as: Prinivil   Take 20 mg by mouth every day. Indications: High Blood Pressure  Dose: 20 mg     Lubiprostone 8 MCG Caps   Take 1 Capsule by mouth every day at 6 PM. Indications: Chronic Constipation of Unknown Cause  Dose: 1 Capsule     MULTIVITAMIN PO   Take 1 Tablet by mouth every day. Indications: suppliment  Dose: 1 Tablet     omeprazole 20 MG tablet  Commonly known as: PriLOSEC OTC   Take 20 mg by mouth every day. Take before breakfast  Indications: Heartburn  Dose: 20 mg     polyethylene glycol/lytes Pack  Commonly known as: Miralax   Take 17 g by mouth every day. Indications: Constipation  Dose: 17 g     TURMERIC PO   Take 1 Capsule by mouth every day. Indications: suppliment  Dose: 1 Capsule     VITAMIN B COMPLEX PO   Take 1 Tablet by mouth every day. Indications: suppliment  Dose: 1 Tablet            STOP taking  these medications      ibuprofen 200 MG Tabs  Commonly known as: Motrin              Allergies  Allergies   Allergen Reactions    Sulfa Drugs Hives    Codeine Vomiting and Nausea       DIET  Orders Placed This Encounter   Procedures    Diet Order Diet: Regular (total free water restriction 500mL, total fluids 1500mL per day.); Fluid modifications: (optional): 1500 ml Fluid Restriction     Standing Status:   Standing     Number of Occurrences:   1     Diet::   Regular [1]              total free water restriction 500mL, total fluids 1500mL per day.     Fluid modifications: (optional):   1500 ml Fluid Restriction [9]       ACTIVITY  As tolerated.  Weight bearing as tolerated    CONSULTATIONS  Radha Cota, orthopedic surgery    PROCEDURES  3/14/2025: Left hemiarthroplasty for a displaced left femoral neck fracture    LABORATORY  Lab Results   Component Value Date    SODIUM 127 (L) 03/17/2025    POTASSIUM 4.0 03/17/2025    CHLORIDE 94 (L) 03/17/2025    CO2 24 03/17/2025    GLUCOSE 90 03/17/2025    BUN 13 03/17/2025    CREATININE 0.47 (L) 03/17/2025        Lab Results   Component Value Date    WBC 5.8 03/18/2025    HEMOGLOBIN 10.1 (L) 03/18/2025    HEMATOCRIT 30.1 (L) 03/18/2025    PLATELETCT 223 03/18/2025        Total time of the discharge process exceeds 38 minutes.

## 2025-03-19 ENCOUNTER — APPOINTMENT (OUTPATIENT)
Dept: OCCUPATIONAL THERAPY | Facility: REHABILITATION | Age: 85
DRG: 560 | End: 2025-03-19
Attending: PHYSICAL MEDICINE & REHABILITATION
Payer: MEDICARE

## 2025-03-19 ENCOUNTER — APPOINTMENT (OUTPATIENT)
Dept: PHYSICAL THERAPY | Facility: REHABILITATION | Age: 85
DRG: 560 | End: 2025-03-19
Attending: PHYSICAL MEDICINE & REHABILITATION
Payer: MEDICARE

## 2025-03-19 LAB
25(OH)D3 SERPL-MCNC: 56 NG/ML (ref 30–100)
ALBUMIN SERPL BCP-MCNC: 3.3 G/DL (ref 3.2–4.9)
ALBUMIN/GLOB SERPL: 1.1 G/DL
ALP SERPL-CCNC: 80 U/L (ref 30–99)
ALT SERPL-CCNC: 12 U/L (ref 2–50)
ANION GAP SERPL CALC-SCNC: 8 MMOL/L (ref 7–16)
AST SERPL-CCNC: 25 U/L (ref 12–45)
BASOPHILS # BLD AUTO: 0.6 % (ref 0–1.8)
BASOPHILS # BLD: 0.02 K/UL (ref 0–0.12)
BILIRUB SERPL-MCNC: 0.4 MG/DL (ref 0.1–1.5)
BUN SERPL-MCNC: 13 MG/DL (ref 8–22)
CALCIUM ALBUM COR SERPL-MCNC: 9.6 MG/DL (ref 8.5–10.5)
CALCIUM SERPL-MCNC: 9 MG/DL (ref 8.5–10.5)
CHLORIDE SERPL-SCNC: 95 MMOL/L (ref 96–112)
CO2 SERPL-SCNC: 28 MMOL/L (ref 20–33)
CREAT SERPL-MCNC: 0.47 MG/DL (ref 0.5–1.4)
EOSINOPHIL # BLD AUTO: 0.08 K/UL (ref 0–0.51)
EOSINOPHIL NFR BLD: 2.4 % (ref 0–6.9)
ERYTHROCYTE [DISTWIDTH] IN BLOOD BY AUTOMATED COUNT: 49.4 FL (ref 35.9–50)
GFR SERPLBLD CREATININE-BSD FMLA CKD-EPI: 93 ML/MIN/1.73 M 2
GLOBULIN SER CALC-MCNC: 2.9 G/DL (ref 1.9–3.5)
GLUCOSE SERPL-MCNC: 89 MG/DL (ref 65–99)
HCT VFR BLD AUTO: 31.5 % (ref 37–47)
HGB BLD-MCNC: 10.3 G/DL (ref 12–16)
IMM GRANULOCYTES # BLD AUTO: 0.03 K/UL (ref 0–0.11)
IMM GRANULOCYTES NFR BLD AUTO: 0.9 % (ref 0–0.9)
LYMPHOCYTES # BLD AUTO: 1.26 K/UL (ref 1–4.8)
LYMPHOCYTES NFR BLD: 37.5 % (ref 22–41)
MAGNESIUM SERPL-MCNC: 1.9 MG/DL (ref 1.5–2.5)
MCH RBC QN AUTO: 32.7 PG (ref 27–33)
MCHC RBC AUTO-ENTMCNC: 32.7 G/DL (ref 32.2–35.5)
MCV RBC AUTO: 100 FL (ref 81.4–97.8)
MONOCYTES # BLD AUTO: 0.53 K/UL (ref 0–0.85)
MONOCYTES NFR BLD AUTO: 15.8 % (ref 0–13.4)
NEUTROPHILS # BLD AUTO: 1.44 K/UL (ref 1.82–7.42)
NEUTROPHILS NFR BLD: 42.8 % (ref 44–72)
NRBC # BLD AUTO: 0 K/UL
NRBC BLD-RTO: 0 /100 WBC (ref 0–0.2)
PHOSPHATE SERPL-MCNC: 3.6 MG/DL (ref 2.5–4.5)
PLATELET # BLD AUTO: 264 K/UL (ref 164–446)
PMV BLD AUTO: 9.3 FL (ref 9–12.9)
POTASSIUM SERPL-SCNC: 4.7 MMOL/L (ref 3.6–5.5)
PROT SERPL-MCNC: 6.2 G/DL (ref 6–8.2)
RBC # BLD AUTO: 3.15 M/UL (ref 4.2–5.4)
SODIUM SERPL-SCNC: 131 MMOL/L (ref 135–145)
TSH SERPL DL<=0.005 MIU/L-ACNC: 1.68 UIU/ML (ref 0.38–5.33)
WBC # BLD AUTO: 3.4 K/UL (ref 4.8–10.8)

## 2025-03-19 PROCEDURE — 700102 HCHG RX REV CODE 250 W/ 637 OVERRIDE(OP): Performed by: PHYSICAL MEDICINE & REHABILITATION

## 2025-03-19 PROCEDURE — 97161 PT EVAL LOW COMPLEX 20 MIN: CPT

## 2025-03-19 PROCEDURE — 97165 OT EVAL LOW COMPLEX 30 MIN: CPT

## 2025-03-19 PROCEDURE — 700101 HCHG RX REV CODE 250: Performed by: PHYSICAL MEDICINE & REHABILITATION

## 2025-03-19 PROCEDURE — 97110 THERAPEUTIC EXERCISES: CPT

## 2025-03-19 PROCEDURE — 85025 COMPLETE CBC W/AUTO DIFF WBC: CPT

## 2025-03-19 PROCEDURE — 80053 COMPREHEN METABOLIC PANEL: CPT

## 2025-03-19 PROCEDURE — 83735 ASSAY OF MAGNESIUM: CPT

## 2025-03-19 PROCEDURE — 84443 ASSAY THYROID STIM HORMONE: CPT

## 2025-03-19 PROCEDURE — 82306 VITAMIN D 25 HYDROXY: CPT

## 2025-03-19 PROCEDURE — 770010 HCHG ROOM/CARE - REHAB SEMI PRIVAT*

## 2025-03-19 PROCEDURE — 97535 SELF CARE MNGMENT TRAINING: CPT

## 2025-03-19 PROCEDURE — 99232 SBSQ HOSP IP/OBS MODERATE 35: CPT | Performed by: PHYSICAL MEDICINE & REHABILITATION

## 2025-03-19 PROCEDURE — 97530 THERAPEUTIC ACTIVITIES: CPT

## 2025-03-19 PROCEDURE — A9270 NON-COVERED ITEM OR SERVICE: HCPCS | Performed by: PHYSICAL MEDICINE & REHABILITATION

## 2025-03-19 PROCEDURE — 36415 COLL VENOUS BLD VENIPUNCTURE: CPT

## 2025-03-19 PROCEDURE — 94760 N-INVAS EAR/PLS OXIMETRY 1: CPT

## 2025-03-19 PROCEDURE — 84100 ASSAY OF PHOSPHORUS: CPT

## 2025-03-19 PROCEDURE — 700111 HCHG RX REV CODE 636 W/ 250 OVERRIDE (IP): Mod: JZ | Performed by: PHYSICAL MEDICINE & REHABILITATION

## 2025-03-19 RX ORDER — LISINOPRIL 5 MG/1
10 TABLET ORAL
Status: DISCONTINUED | OUTPATIENT
Start: 2025-03-19 | End: 2025-03-20

## 2025-03-19 RX ORDER — GABAPENTIN 100 MG/1
100 CAPSULE ORAL 3 TIMES DAILY
Status: DISCONTINUED | OUTPATIENT
Start: 2025-03-19 | End: 2025-03-19

## 2025-03-19 RX ORDER — GABAPENTIN 100 MG/1
200 CAPSULE ORAL EVERY EVENING
Status: DISCONTINUED | OUTPATIENT
Start: 2025-03-19 | End: 2025-03-28 | Stop reason: HOSPADM

## 2025-03-19 RX ORDER — GABAPENTIN 100 MG/1
100 CAPSULE ORAL 2 TIMES DAILY
Status: DISCONTINUED | OUTPATIENT
Start: 2025-03-19 | End: 2025-03-28 | Stop reason: HOSPADM

## 2025-03-19 RX ORDER — METHOCARBAMOL 500 MG/1
250 TABLET, FILM COATED ORAL 4 TIMES DAILY PRN
Status: DISCONTINUED | OUTPATIENT
Start: 2025-03-19 | End: 2025-03-28 | Stop reason: HOSPADM

## 2025-03-19 RX ORDER — OXYCODONE HYDROCHLORIDE 5 MG/1
5 TABLET ORAL 2 TIMES DAILY
Refills: 0 | Status: DISCONTINUED | OUTPATIENT
Start: 2025-03-19 | End: 2025-03-26

## 2025-03-19 RX ADMIN — LISINOPRIL 10 MG: 5 TABLET ORAL at 11:12

## 2025-03-19 RX ADMIN — OXYCODONE 5 MG: 5 TABLET ORAL at 14:46

## 2025-03-19 RX ADMIN — FLECAINIDE ACETATE 25 MG: 50 TABLET ORAL at 08:18

## 2025-03-19 RX ADMIN — MAGNESIUM 64 MG (MAGNESIUM CHLORIDE) TABLET,DELAYED RELEASE 64 MG: at 20:26

## 2025-03-19 RX ADMIN — OMEPRAZOLE 20 MG: 20 CAPSULE, DELAYED RELEASE ORAL at 08:15

## 2025-03-19 RX ADMIN — HYDRALAZINE HYDROCHLORIDE 25 MG: 25 TABLET ORAL at 04:20

## 2025-03-19 RX ADMIN — GABAPENTIN 200 MG: 100 CAPSULE ORAL at 20:26

## 2025-03-19 RX ADMIN — OXYCODONE HYDROCHLORIDE 10 MG: 10 TABLET ORAL at 20:33

## 2025-03-19 RX ADMIN — FLECAINIDE ACETATE 25 MG: 50 TABLET ORAL at 20:33

## 2025-03-19 RX ADMIN — GABAPENTIN 100 MG: 100 CAPSULE ORAL at 14:47

## 2025-03-19 RX ADMIN — ACETAMINOPHEN 650 MG: 325 TABLET ORAL at 11:23

## 2025-03-19 RX ADMIN — Medication 1000 UNITS: at 08:15

## 2025-03-19 RX ADMIN — LIDOCAINE 1 PATCH: 4 PATCH TOPICAL at 08:18

## 2025-03-19 RX ADMIN — SENNOSIDES AND DOCUSATE SODIUM 2 TABLET: 50; 8.6 TABLET ORAL at 20:26

## 2025-03-19 RX ADMIN — ENOXAPARIN SODIUM 40 MG: 100 INJECTION SUBCUTANEOUS at 17:31

## 2025-03-19 RX ADMIN — GABAPENTIN 100 MG: 100 CAPSULE ORAL at 11:24

## 2025-03-19 RX ADMIN — LEVOTHYROXINE SODIUM 50 MCG: 0.05 TABLET ORAL at 05:34

## 2025-03-19 RX ADMIN — OXYCODONE 5 MG: 5 TABLET ORAL at 07:15

## 2025-03-19 ASSESSMENT — BRIEF INTERVIEW FOR MENTAL STATUS (BIMS)
ASKED TO RECALL BED: YES, NO CUE REQUIRED
WHAT YEAR IS IT: CORRECT
BIMS SUMMARY SCORE: 15
ASKED TO RECALL SOCK: YES, NO CUE REQUIRED
ASKED TO RECALL BLUE: YES, NO CUE REQUIRED
INITIAL REPETITION OF BED BLUE SOCK - FIRST ATTEMPT: 3
WHAT DAY OF THE WEEK IS IT: CORRECT
WHAT MONTH IS IT: ACCURATE WITHIN 5 DAYS

## 2025-03-19 ASSESSMENT — PAIN DESCRIPTION - PAIN TYPE
TYPE: ACUTE PAIN
TYPE: ACUTE PAIN;CHRONIC PAIN
TYPE: ACUTE PAIN
TYPE: ACUTE PAIN

## 2025-03-19 ASSESSMENT — ACTIVITIES OF DAILY LIVING (ADL): TOILETING: INDEPENDENT

## 2025-03-19 NOTE — IDT DISCHARGE PLANNING
CASE MANAGEMENT INITIAL ASSESSMENT    Admit Date:  3/18/2025     CM reviewed the medical chart and will meet with the patient to discuss role of case management / discharge planning / team conference.       Diagnosis: Closed left hip fracture, initial encounter ([facility]) [S72.002A]    Co-morbidities:   Patient Active Problem List    Diagnosis Date Noted    Closed left hip fracture, initial encounter ([facility]) 03/18/2025    Hip fracture requiring operative repair, left, closed, initial encounter ([facility]) 03/14/2025    Closed fracture of ramus of left pubis ([facility]) 03/14/2025    Arrhythmia 03/14/2025    Hypokalemia 03/14/2025    ACP (advance care planning) 03/14/2025    Hypocalcemia 03/14/2025    Encounter for preoperative assessment 03/14/2025    Age-related physical debility 03/14/2025    Peripheral polyneuropathy 02/07/2023    Other insomnia 02/07/2023    Chronic abdominal pain 02/02/2023    Blood loss anemia 02/02/2023    Closed right hip fracture, sequela 02/01/2023    Closed displaced fracture of right femoral neck ([facility]) 01/31/2023    HTN (hypertension) 01/31/2023    Gastroesophageal reflux disease 01/31/2023    Hypothyroidism 01/31/2023    Fall from ground level 01/31/2023    Hyponatremia 01/31/2023    Risk for falls 01/31/2023    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/05/2015     Prior Living Situation:  Housing / Facility: 1 Story House  Lives with - Patient's Self Care Capacity: Alone and Unable to Care For Self    Prior Level of Function:  Home Management: Dependent  Shopping: Dependent  Prior Level Of Mobility: Independent Without Device in Home  Driving / Transportation: Relatives / Others Provide Transportation    Support Systems:  Primary : Vickie Juan         Previous Services Utilized:   Equipment Owned: Front-Wheel Walker, Grab Bar(s) In Tub / Shower, Grab Bar(s) By Toilet  Prior Services: Intermittent Physical Support for ADL Per Service, Other (Comments) (assist from local  friends)    Other Information:  Occupation (Pre-Hospital Vocational): Retired Due To Age     Primary Payor Source: [employer], [facility]  Primary Care Practitioner : Angela Trejo    Patient / Family Goal:  Patient / Family Goal: Return home.    Plan:  1. Continue to follow patient through hospitalization and provide discharge planning in collaboration with patient, family, physicians and ancillary services.     2. Utilize community resources to ensure a safe discharge.

## 2025-03-19 NOTE — CARE PLAN
"  Problem: Fall Risk - Rehab  Goal: Patient will remain free from falls  Outcome: Progressing   Blanka Helder Fall risk Assessment Score: 18    High fall risk Interventions   - Bed and strip alarm   - Yellow sign by the door   - Yellow wrist band \"Fall risk\"  - Room near to the nurse station  - Do not leave patient unattended in the bathroom  - Fall risk education provided        Problem: Pain - Standard  Goal: Alleviation of pain or a reduction in pain to the patient’s comfort goal  Outcome: Progressing   Patient is able to rate pain on a scale of 1-10.       "

## 2025-03-19 NOTE — THERAPY
Physical Therapy   Initial Evaluation     Patient Name:  Alyssa Juan  Age:  84 y.o., Sex:  female  Medical Record #:  [id number]  Today's Date: 3/19/2025     Subjective: I was almost back to baseline and then this happens.     Objective: Patient seen from 10am-11am, and 3pm-3:30pm   03/19/25 1001   PT Charge Group   PT Therapeutic Exercise (Units) 1   PT Therapeutic Activities (Units) 2   PT Evaluation PT Evaluation Low   PT Total Time Spent   PT Individual Total Time Spent (Mins) 90   Precautions   Precautions Fall Risk   Fall Risk History of falls   Cognition/Communication Hard of hearing   Orthopedic Posterior Hip Precautions   Weight Bearing WBAT LLE   Pain 0 - 10 Group   Location Hip   Location Orientation Left   Pain Rating Scale (NPRS) 5   Description Aching   Cognition    Level of Consciousness Alert   Prior Living Situation   Prior Services Intermittent Physical Support for ADL Per Service  (friends, caregiver)   Housing / Facility 1 Story House   Steps Into Home 2   Steps In Home 0   Rail Both Rail (Steps into Home)   Equipment Owned Front-Wheel Walker;Grab Bar(s) By Toilet;Grab Bar(s) In Tub / Shower  (needs to get new walker)   Lives with - Patient's Self Care Capacity Alone and Unable to Care For Self   Comments granddaughter is flying in from [facility]   Prior Functioning: Everyday Activities   Self Care Needed some help   Indoor Mobility (Ambulation) Independent   Stairs Independent   Functional Cognition Independent   Prior Device Use None of the given options   Roll Left and Right   Assistance Needed Adaptive equipment;Physical assistance   Physical Assistance Level 25% or less   CARE Score - Roll Left and Right 3   Roll Left and Right   Level of Assist Minimal Assist   Additional Description Head of bed elevated;Use of bed rail   Sit to Lying   Assistance Needed Physical assistance;Adaptive equipment   Physical Assistance Level 25% or less   CARE Score - Sit to Lying 3   Sit to Lying    Level of Assist Minimal Assist   Additional Description Use of bed rail   Lying to Sitting on Side of Bed   Assistance Needed Adaptive equipment;Physical assistance   Physical Assistance Level 26%-50%   CARE Score - Lying to Sitting on Side of Bed 3   Lying to Sitting on Side of Bed   Level of Assist Moderate Assist   Additional Description Extra time needed   Sit to Stand   Assistance Needed Adaptive equipment;Physical assistance   Physical Assistance Level 25% or less   CARE Score - Sit to Stand 3   Sit to Stand   Level of Assist Minimal Assist   Assistive Devices FWW   Additional Description Extra time needed;Cueing needed  (hand placement)   Chair/Bed-to-Chair Transfer   Assistance Needed Adaptive equipment;Physical assistance   Physical Assistance Level 25% or less   CARE Score - Chair/Bed-to-Chair Transfer 3   Chair/Bed-to-Chair Transfer   Level of Assist Minimal Assist   Transfer Type Stand Step Transfer   Assistive Devices FWW   Additional Description Cueing needed;Extra time needed   Car Transfer   Reason if not Attempted Safety concerns  (unable to complete in high SUV; demonstrated transfer)   CARE Score - Car Transfer 88   Car Transfer   Level of Assist   (attempted, seat height too high at this time)   Walk 10 Feet   Assistance Needed Physical assistance;Adaptive equipment   Physical Assistance Level 25% or less   CARE Score - Walk 10 Feet 3   Walk 50 Feet with Two Turns   Assistance Needed Physical assistance;Adaptive equipment   Physical Assistance Level 25% or less   CARE Score - Walk 50 Feet with Two Turns 3   Walk 150 Feet   Reason if not Attempted Safety concerns   CARE Score - Walk 150 Feet 88   Walking 10 Feet on Uneven Surfaces   Reason if not Attempted Safety concerns   CARE Score - Walking 10 Feet on Uneven Surfaces 88   Ambulation   Level of Assist Minimal Assist   Assistive Device FWW   Additional Description Extra time needed   Distance 65 ft   1 Step (Curb)   Assistance Needed Adaptive  "equipment;Physical assistance   Physical Assistance Level 25% or less   CARE Score - 1 Step (Curb) 3   4 Steps   Assistance Needed Adaptive equipment;Physical assistance   Physical Assistance Level 25% or less   CARE Score - 4 Steps 3   12 Steps   Reason if not Attempted Activity not applicable   CARE Score - 12 Steps 9   Stairs   Level of Assist Contact Guard Assist   Stair Height 4\" step   Additional Description Both handrails   Stairs Amount 6   Picking Up Object   Reason if not Attempted Medical concerns  (hip precautions)   CARE Score - Picking Up Object 88   Wheel 50 Feet with Two Turns   Reason if not Attempted Activity not applicable   CARE Score - Wheel 50 Feet with Two Turns 9   Wheel 150 Feet   Reason if not Attempted Activity not applicable   CARE Score - Wheel 150 Feet 9   Timed Up and Go (TUG)   Trial 1 128 secs   Trial 2 99 secs   Trial 3 103 secs   Average Time 110 secs   Fall Risk Yes   Interdisciplinary Communication   Other Patient Needs Comments  (posterior hip precautions)   Problem List    Problems Impaired Bed Mobility;Impaired Transfers;Impaired Ambulation;Functional ROM Deficit;Decreased Activity Tolerance;Limited Knowledge of Post-Op Precautions   Strengths & Barriers   Strengths Able to follow instructions;Alert and oriented;Independent prior level of function;Manages pain appropriately;Motivated for self care and independence;Pleasant and cooperative;Supportive family;Willingly participates in therapeutic activities   Barriers Generalized weakness;Impaired activity tolerance;Decreased endurance;Fatigue;Impaired balance;Limited mobility   Current Discharge Plan   Current Discharge Plan Return to Prior Living Situation   Interdisciplinary Plan of Care Collaboration   IDT Collaboration with  Nursing   Patient Position at End of Therapy Seated;Self Releasing Lap Belt Applied;Call Light within Reach;Tray Table within Reach;Phone within Reach   Collaboration Comments request for pain medication "     Patient has been educated on the following items: physical therapy role, physical therapy plan of care, rehab expectations, mobility needs, and fall risk and use of call light    Assessment:    Patient is a 84 y.o. female with a diagnosis of Closed left hip fracture, initial encounter ([facility]) [S72.002A].     Currently, the patient has the following precautions: Fall Risk: History of falls, Cognition/Communication: Hearing Aids, Orthopedic: Posterior Hip Precautions, Weight Bearing: WBAT LLE    Patient's PMH includes:   Past Medical History:   Diagnosis Date    Cold 04/24/2015    recent cold or allergies    Colitis     Disease of thyroid gland     Hypertension     Pain 04/24/2015    right shoulder     Patient's Past Surgical History:   Past Surgical History:   Procedure Laterality Date    PB PARTIAL HIP REPLACEMENT Left 3/14/2025    Procedure: HEMIARTHROPLASTY, HIP;  Surgeon: Radha Cota M.D.;  Location: [facility];  Service: Orthopedics    PB PARTIAL HIP REPLACEMENT Right 1/31/2023    Procedure: HEMIARTHROPLASTY, HIP;  Surgeon: Lv Rasmussen M.D.;  Location: [facility];  Service: Orthopedics    SHOULDER ARTHROPLASTY TOTAL Right 5/5/2015    Procedure: SHOULDER ARTHROPLASTY TOTAL;  Surgeon: Fernanda Mendosa M.D.;  Location: [facility];  Service:     ROTATOR CUFF REPAIR  2014    right    CATARACT EXTRACTION WITH IOL  2013    b/l    MAMMOPLASTY AUGMENTATION  2001    HYSTERECTOMY, VAGINAL  1978    OTHER  1975    lumbar laminectomy       PT evaluation performed today; functional performance at today's assessment is as above. At baseline, the patient was independent with mobility, however patient had caregiver several hours a day to assist with cleaning and meal prep.  The patient is limited by the following barriers: Generalized weakness, Impaired activity tolerance, Decreased endurance, Fatigue, Impaired balance, Limited mobility    Additional factors influencing patient status  and prognosis include: prior level of function, PMH, and the above comorbidities.     The patient is performing well below their baseline level of function and will benefit from an interdisciplinary high intensity rehabilitation program to maximize functional independence, decrease burden of care, and support a safe return home with family and friend support. Granddaughter plans to fly in for support.    Plan:    Recommend Physical Therapy  minutes per day 5-7 days per week for 7-10 days for the following treatments: PT Group Therapy, PT Gait Training, PT Self Care/Home Eval, PT Therapeutic Exercises, PT Neuro Re-Ed/Balance, PT Therapeutic Activity, and PT Evaluation.    Passport items to be completed:  Get in/out of bed safely, in/out of a vehicle, safely use mobility device, walk or wheel around home/community, navigate up and down stairs, show how to get up/down from the ground, ensure home is accessible, demonstrate HEP, complete caregiver training    Goals:  Long-term and short-term goals have been discussed with patient and they are in agreement.    Physical Therapy Problems (Active)       Problem: Balance       Dates: Start:  03/19/25         Goal: STG-Within one week, patient will maintain dynamic standing x 5 minutes for ADLs with LRAD and supervised       Dates: Start:  03/19/25               Problem: Mobility       Dates: Start:  03/19/25         Goal: STG-Within one week, patient will ambulate household distance x 150 ft with LRAD, pain <3/10, supervised       Dates: Start:  03/19/25               Problem: Mobility Transfers       Dates: Start:  03/19/25         Goal: STG-Within one week, patient will perform bed mobility with supervision and LRAD       Dates: Start:  03/19/25            Goal: STG-Within one week, patient will transfer bed to chair with LRAD, SBA, adhering to spinal precautions       Dates: Start:  03/19/25               Problem: PT-Long Term Goals       Dates: Start:  03/19/25          Goal: LTG-By discharge, patient will tolerate standing x 10 minutes with LRAD, pain <2/10, Rick       Dates: Start:  03/19/25            Goal: LTG-By discharge, patient will ambulate x 300 ft with LRAD, Rick       Dates: Start:  03/19/25            Goal: LTG-By discharge, patient will transfer one surface to another Rick with LRAD, adhering to spinal precautions       Dates: Start:  03/19/25            Goal: LTG-By discharge, patient will perform home exercise program Rick with handouts       Dates: Start:  03/19/25            Goal: LTG-By discharge, patient will ambulate up/down 4-6 stairs with supervision, bilateral railings       Dates: Start:  03/19/25            Goal: LTG-By discharge, patient will complete TUG <60 seconds with LRAD       Dates: Start:  03/19/25

## 2025-03-19 NOTE — PROGRESS NOTES
NURSING DAILY NOTE    Name: Alyssa Juan   Date of Admission: 3/18/2025   Admitting Diagnosis: Closed left hip fracture, initial encounter ([facility])  Attending Physician: JAQUAN CLARK D.O.  Allergies: Sulfa drugs and Codeine    Safety  Patient Assist     Patient Precautions     Bed Transfer Status     Toilet Transfer Status      Assistive Devices     Oxygen  None - Room Air  Diet/Therapeutic Dining  Current Diet Order   Procedures    Diet Order Diet: Regular (total free water restriction 500mL, total fluids 1500mL per day.); Fluid modifications: (optional): 1500 ml Fluid Restriction     Pill Administration  whole  Agitated Behavioral Scale     ABS Level of Severity       Fall Risk  Has the patient had a fall this admission?      Blanka Pendleton Fall Risk Scoring  18, HIGH RISK  Fall Risk Safety Measures  bed alarm and chair alarm    Vitals  Temperature: 36.7 °C (98.1 °F)  Temp src: Oral  Pulse: 71  Respiration: 18  Blood Pressure : (!) 142/64  Blood Pressure MAP (Calculated): 90 MM HG  BP Location: Left, Upper Arm  Patient BP Position: Warner's Position     Oxygen  Pulse Oximetry: 95 %  O2 Delivery Device: None - Room Air    Bowel and Bladder  Last Bowel Movement  03/18/25 (Per report)  Stool Type     Bowel Device     Continent  Bladder: Did not void   Bowel: No movement  Bladder Function     Genitourinary Assessment        Skin  Ovidio Score   18  Sensory Interventions   Bed Types: Standard/Trauma Mattress  Skin Preventative Measures: Waffle Overlay, Pillows in Use for Support / Positioning  Moisture Interventions         Pain  Pain Rating Scale     Pain Location     Pain Location Orientation     Pain Interventions        ADLs    Bathing      Linen Change      Personal Hygiene     Chlorhexidine Bath      Oral Care     Teeth/Dentures     Shave     Nutrition Percentage Eaten     Environmental Precautions     Patient Turns/Positioning  Patient turns self independently side to side without assistance, to offload  sacral area  Patient Turns Assistance/Tolerance     Bed Positions     Head of Bed Elevated         Psychosocial/Neurologic Assessment  Psychosocial Assessment     Neurologic Assessment  Neuro (WDL): Within Defined Limits  Level of Consciousness: Alert  Orientation Level: Oriented X4  EENT (WDL):  WDL Except    Cardio/Pulmonary Assessment  Edema      Respiratory Breath Sounds     Cardiac Assessment   Cardiac (WDL):  WDL Except

## 2025-03-19 NOTE — PROGRESS NOTES
..NURSING DAILY NOTE    Name: Alyssa Juan   Date of Admission: 3/18/2025   Admitting Diagnosis: Closed left hip fracture, initial encounter ([facility])  Attending Physician: JAQUAN CLARK D.O.  Allergies: Sulfa drugs and Codeine    Safety  Patient Assist  Mod assist  Patient Precautions  Precautions: Fall Risk  Assistive Devices  Rails, Wheelchair  Oxygen  None - Room Air  Diet/Therapeutic Dining  Current Diet Order   Procedures    Diet Order Diet: Regular (total free water restriction 500mL, total fluids 1500mL per day.); Fluid modifications: (optional): 1500 ml Fluid Restriction     Pill Administration  whole      Fall Risk  Moscoso Helder Fall Risk Scoring  18, HIGH RISK  Fall Risk Safety Measures  bed alarm, chair alarm, low vision/hearing, and ok to leave pt in bathroom    Vitals  Temperature: 36.7 °C (98.1 °F)  Temp src: Oral  Pulse: 66  Respiration: 18  Blood Pressure : 135/61  Blood Pressure MAP (Calculated): 86 MM HG  BP Location: Right, Upper Arm  Patient BP Position: Warner's Position     Oxygen  Pulse Oximetry: 95 %  O2 Delivery Device: None - Room Air    Bowel and Bladder  Last Bowel Movement  03/18/25  Stool Type  Type 4: Like a sausage or snake, smooth and soft  Bowel Device   Toilet  Bladder Function  Urine Void (mL):  (Moderate)  Urine Color: Yellow  Genitourinary Assessment   Bladder Assessment (WDL):  Within Defined Limits  Urine Color: Yellow    Skin  Ovidio Score   19  Sensory Interventions   Bed Types: Standard/Trauma Mattress with Overlay  Skin Preventative Measures: Waffle Overlay    Pain  Pain Rating Scale  4 - Distracts me, can do usual activities  Pain Location  Back, Hip  Pain Location Orientation  Left  Pain Interventions   Cold Pack    ADLs     Patient Turns/Positioning  Patient turns self independently side to side without assistance, to offload sacral area      Psychosocial/Neurologic Assessment  Psychosocial Assessment  Psychosocial (WDL):  Within Defined Limits  Neurologic  Assessment  Neuro (WDL): Within Defined Limits  Level of Consciousness: Alert  Orientation Level: Oriented X4  EENT (WDL):  WDL Except    Cardio/Pulmonary Assessment  Cardiac Assessment   Cardiac (WDL):  WDL Except (HTN)

## 2025-03-19 NOTE — THERAPY
Occupational Therapy  Daily Treatment     Patient Name:  Alyssa Juan  Age:  84 y.o., Sex:  female  Medical Record #:  [id number]  Today's Date:  3/19/2025    Precautions  Precautions: (P) Fall Risk  Fall Risk: (P) History of falls  Cognition/Communication: (P) Hearing Aids  Orthopedic: (P) Posterior Hip Precautions  Weight Bearing: (P) WBAT LLE    Subjective: Pt agreeable to OT session, increased pain this afternoon compared to this morning.      Objective:    03/19/25 1315   OT Charge Group   OT Self Care / ADL (Units) 2   OT Total Time Spent   OT Individual Total Time Spent (Mins) 30   Precautions   Precautions Fall Risk   Fall Risk History of falls   Cognition/Communication Hearing Aids   Orthopedic Posterior Hip Precautions   Weight Bearing WBAT LLE   Vitals   O2 Delivery Device Room air w/o2 available   Oral Hygiene   Level of Assist Set Up   Patient Position Seated   Grooming   Level of Assist Set Up   Patient Position Seated   Toilet Transfer   Level of Assist Minimal Assist   Transfer Type Stand Pivot Transfer   Adaptive Equipment Grab bars   Assistive Device Wheelchair   Strengths & Barriers   Strengths Able to follow instructions;Effective communication skills;Good carryover of learning;Good insight into deficits/needs;Making steady progress towards goals;Manages pain appropriately;Motivated for self care and independence;Pleasant and cooperative;Supportive family;Willingly participates in therapeutic activities   Barriers Fatigue;Generalized weakness;Impaired balance;Pain;Limited mobility   OT DME Recommendations   Additional Equipment (NOT TYPICALLY COVERED BY INSURANCE) Other (see comments);Hip kit  (Pt believes she still has hip kit from previous admission, unsure and will have friend check for her)   Interdisciplinary Plan of Care Collaboration   IDT Collaboration with  Certified Nursing Assistant   Patient Position at End of Therapy Seated   Collaboration Comments txfr of care to CNA to  "complete toileting     Pt introduced to AE to support ADL performance and safety while compensating for mobility deficits 2/2 posterior hip precautions and pain. Pt introduced to \"hip kit\", including reacher, sock aid, and long handled sponge. Pt provided with verbal description plus visual demo of above AE. Pt with good return demo. With footwear mgmt, doffed sock with reacher and donned sock with sock aid with supervision.       Pt attempted to stand at sink to complete oral hygiene, was transferred to wheelchair as she had significant pain in standing position    Assessment:  Pt tolerated session well, demonstrated improvements in footwear mgmt after introduction to AE. She cites she did not use AE during her previous hospitalization with hip fx with posterior hip precautions and instead utilized assistance from caregivers. She asserts that she is more motivated toward independence during this admission. Receptive to all education thus far. Her primary deficit during this session was significant pain.   Strengths: (P) Able to follow instructions, Effective communication skills, Good carryover of learning, Good insight into deficits/needs, Making steady progress towards goals, Manages pain appropriately, Motivated for self care and independence, Pleasant and cooperative, Supportive family, Willingly participates in therapeutic activities  Barriers: (P) Fatigue, Generalized weakness, Impaired balance, Pain, Limited mobility    Plan:  ADLs with AD/AE PRN, strengthening    DME  OT DME Recommendations  Additional Equipment (NOT TYPICALLY COVERED BY INSURANCE): (P) Other (see comments), Hip kit (Pt believes she still has hip kit from previous admission, unsure and will have friend check for her)    Passport items to be completed:  Perform bathroom transfers, complete dressing, complete feeding, get ready for the day, prepare a simple meal, participate in household tasks, adapt home for safety needs, demonstrate home " exercise program, complete caregiver training     Occupational Therapy Goals (Active)       Problem: Bathing       Dates: Start:  03/19/25         Goal: STG-Within one week, patient will bathe with CGA.       Dates: Start:  03/19/25               Problem: Dressing       Dates: Start:  03/19/25         Goal: STG-Within one week, patient will dress LB with CGA and AE PRN.        Dates: Start:  03/19/25               Problem: Functional Transfers       Dates: Start:  03/19/25         Goal: STG-Within one week, patient will transfer to step in shower to simulate home environment with CGA.        Dates: Start:  03/19/25               Problem: Grooming       Dates: Start:  03/19/25         Goal: STG-Within one week, patient will complete grooming in standing with LRAD with CGA.       Dates: Start:  03/19/25               Problem: OT Long Term Goals       Dates: Start:  03/19/25         Goal: LTG-By discharge, patient will complete basic self care tasks with supervision.        Dates: Start:  03/19/25            Goal: LTG-By discharge, patient will perform bathroom transfers with supervision.        Dates: Start:  03/19/25               Problem: Toileting       Dates: Start:  03/19/25         Goal: STG-Within one week, patient will complete toileting tasks with CGA.        Dates: Start:  03/19/25

## 2025-03-19 NOTE — THERAPY
Occupational Therapy   Initial Evaluation     Patient Name:  Alyssa Juan  Age:  84 y.o., Sex:  female  Medical Record #:  [id number]  Today's Date: 3/19/2025     Subjective: Pt pleasant and agreeable to OT session.      Objective:    03/19/25 0701   OT Charge Group   Charges Yes   OT Self Care / ADL (Units) 2   OT Evaluation OT Evaluation Low   OT Total Time Spent   OT Individual Total Time Spent (Mins) 75   Precautions   Precautions Fall Risk   Cognition/Communication Hearing Aids   Orthopedic Posterior Hip Precautions   Weight Bearing WBAT LLE   Prior Living Situation   Prior Services Intermittent Physical Support for ADL Per Service;Other (Comments)  (assist from local friends plus caregiver service twice daily)   Housing / Facility 1 Story House   Rail Both Rail (Steps into Home)   Bathroom Set up Walk In Shower;Grab Bars;Shower Chair   Equipment Owned Front-Wheel Walker;Grab Bar(s) In Tub / Shower;Grab Bar(s) By Toilet;Oxygen;Tub / Shower Seat   Lives with - Patient's Self Care Capacity Alone and Unable to Care For Self   Prior Level of ADL Function   Self Feeding Independent   Grooming / Hygiene Independent   Bathing Requires Assist   Dressing Requires Assist   Toileting Independent   Comments supervsiion from caregiver service   Prior Level of IADL Function   Laundry Requires Assist   Kitchen Mobility Independent   Home Management Dependent   Shopping Dependent   Prior Level Of Mobility Independent Without Device in Home   Driving / Transportation Relatives / Others Provide Transportation   Occupation (Pre-Hospital Vocational) Retired Due To Age   Prior Functioning: Everyday Activities   Self Care Needed some help   Indoor Mobility (Ambulation) Independent   Stairs Independent   Functional Cognition Independent   Prior Device Use None of the given options   Cognitive Pattern Assessment   Cognitive Pattern Assessment Used BIMS   Brief Interview for Mental Status (BIMS)   Repetition of Three Words (First  "Attempt) 3   Temporal Orientation: Year Correct   Temporal Orientation: Month Accurate within 5 days   Temporal Orientation: Day Correct   Recall: \"Sock\" Yes, no cue required   Recall: \"Blue\" Yes, no cue required   Recall: \"Bed\" Yes, no cue required   BIMS Summary Score 15   Confusion Assessment Method (CAM)   Is there evidence of an acute change in mental status from the patient's baseline? No   Inattention Behavior not present   Disorganized thinking Behavior not present   Altered level of consciousness Behavior not present   Hearing, Speech, and Vision   Ability to Hear Minimal difficulty   Ability to See in Adequate Light Impaired   Expression of Ideas and Wants Without difficulty   Understanding Verbal and Non-Verbal Content Understands   Eating   Assistance Needed Set-up / clean-up   Physical Assistance Level No physical assistance   CARE Score - Eating 5   Eating   Level of Assist Set Up   Oral Hygiene   Assistance Needed Set-up / clean-up;Adaptive equipment   Physical Assistance Level No physical assistance   CARE Score - Oral Hygiene 5   Oral Hygiene   Level of Assist Set Up   Patient Position Seated   Grooming   Level of Assist Set Up   Patient Position Seated   Additional Description Hair care   Upper Body Dressing   Assistance Needed Set-up / clean-up   Physical Assistance Level No physical assistance   CARE Score - Upper Body Dressing 5   Upper Body Dressing   Level of Assist Set Up   Patient Position Seated   Clothing Item(s) Pullover shirt   Lower Body Dressing   Assistance Needed Physical assistance   Physical Assistance Level 51%-75%   CARE Score - Lower Body Dressing 2   Lower Body Dressing   Level of Assist Maximal Assist   Patient Position Standing;Seated   Clothing Item(s) Pants;Disposable Brief   Additional Description Grab bars;Assist with threading pant leg;Assist with draw up;Assist with draw down   Putting On/Taking Off Footwear   Assistance Needed Physical assistance   Physical Assistance " Level Total assistance   CARE Score - Putting On/Taking Off Footwear 1   Putting On/Taking Off Footwear   Level of Assist Total Assist   Patient Position Seated   Footwear Type Treaded Socks   Shower/Bathe Self   Assistance Needed Physical assistance;Adaptive equipment   Physical Assistance Level 25% or less   CARE Score - Shower/Bathe Self 3   Shower/Bathe Self   Level of Assist Minimal Assist   Patient Position Seated   Adaptive Equipment Fold Down Shower Bench;Handheld Shower Head   Tub/Shower Transfer   Level of Assist Contact Guard Assist   Transfer Type Stand pivot transfer   Adaptive Equipment Grab bars;Use of showerchair   Assistive Device Wheelchair   Additional Description Cueing needed;Extra time needed   Toilet Transfer   Assistance Needed Adaptive equipment;Incidental touching   CARE Score - Toilet Transfer 4   Toilet Transfer   Level of Assist Contact Guard Assist   Transfer Type Stand Pivot Transfer   Adaptive Equipment Grab bars   Assistive Device Wheelchair   Toileting Hygiene   Assistance Needed Physical assistance;Adaptive equipment   Physical Assistance Level 51%-75%   CARE Score - Toileting Hygiene 2   Toileting Hygiene   Level of Assist Maximal Assist   Additional Description Assist for hygiene;Assist to pull pants up;Assist to pull pants down;Grab bars;Cueing needed   Lying to Sitting on Side of Bed   Assistance Needed Physical assistance   Physical Assistance Level 51%-75%   CARE Score - Lying to Sitting on Side of Bed 2   Lying to Sitting on Side of Bed   Level of Assist Maximal Assist   Chair/Bed-to-Chair Transfer   Assistance Needed Physical assistance   Physical Assistance Level 25% or less   CARE Score - Chair/Bed-to-Chair Transfer 3   Chair/Bed-to-Chair Transfer   Level of Assist Minimal Assist   Transfer Type Stand Pivot Transfer   Assistive Devices Wheelchair;No AD   Additional Description Cueing needed;Extra time needed   Interdisciplinary Communication   Other Patient Needs Up in  "wheelchair for all meals   Patient Scheduling Information   PT Time 30/60 minutes   Primary or Coverage PT [place]   OT Time 30/60 minutes   Primary or Coverage OT [place]   Strengths & Barriers   Strengths Able to follow instructions;Effective communication skills;Good carryover of learning;Good insight into deficits/needs;Making steady progress towards goals;Motivated for self care and independence;Pleasant and cooperative;Supportive family;Willingly participates in therapeutic activities   Benefit    Therapy Benefit Patient Would Benefit from Inpatient Rehab Occupational Therapy to Maximize [place] with ADLs, IADLs and Functional Mobility.   Current Discharge Plan   Current Discharge Plan Return to Prior Living Situation  (with assist from local friend who wtill stay \"as long as I need her to.\")   OT DME Recommendations   Additional Equipment (NOT TYPICALLY COVERED BY INSURANCE) Hip kit   Interdisciplinary Plan of Care Collaboration   IDT Collaboration with  Nursing   Patient Position at End of Therapy Seated;Chair Alarm On;Self Releasing Lap Belt Applied;Call Light within Reach;Tray Table within Reach;Phone within Reach   Collaboration Comments RN administered pain meds at start of session,     Patient has been educated on the following items: occupational therapy role, occupational therapy plan of care, rehab expectations, goal setting, and fall risk and use of call light    Assessment:   Patient is a 84 y.o. female with a diagnosis of Closed left hip fracture, initial encounter ([facility]) [S72.002A].    Currently, the patient has the following precautions: Fall Risk: History of falls, Cognition/Communication: Hard of hearing, Orthopedic: Posterior Hip Precautions, Weight Bearing: WBAT LLE    Patient's PMH includes:   Past Medical History:   Diagnosis Date    Cold 04/24/2015    recent cold or allergies    Colitis     Disease of thyroid gland     Hypertension     Pain 04/24/2015    right shoulder     Patient's " Past Surgical History:   Past Surgical History:   Procedure Laterality Date    PB PARTIAL HIP REPLACEMENT Left 3/14/2025    Procedure: HEMIARTHROPLASTY, HIP;  Surgeon: Radha Cota M.D.;  Location: SURGERY [facility];  Service: Orthopedics    PB PARTIAL HIP REPLACEMENT Right 1/31/2023    Procedure: HEMIARTHROPLASTY, HIP;  Surgeon: Lv Rasmussen M.D.;  Location: [facility];  Service: Orthopedics    SHOULDER ARTHROPLASTY TOTAL Right 5/5/2015    Procedure: SHOULDER ARTHROPLASTY TOTAL;  Surgeon: Fernanda Mendosa M.D.;  Location: [facility];  Service:     ROTATOR CUFF REPAIR  2014    right    CATARACT EXTRACTION WITH IOL  2013    b/l    MAMMOPLASTY AUGMENTATION  2001    HYSTERECTOMY, VAGINAL  1978    OTHER  1975    lumbar laminectomy       OT evaluation performed today; functional performance at today's assessment is as above. At baseline, the patient required assistance with ADLs/IADLs. The patient is limited by the following barriers:      Additional factors influencing patient status and prognosis include: prior level of function, PMH, and the above comorbidities.    The patient is performing well below their baseline level of function and will benefit from an interdisciplinary high intensity rehabilitation program to maximize functional independence with ADL's, IADL's and functional mobility, decrease burden of care, and support a safe return home with friend support.    Plan:   Recommend Occupational Therapy  minutes per day 5-7 days per week for 10 days for the following treatments:  OT Self Care/ADL, OT Therapeutic Activity, and OT Therapeutic Exercise.    Passport items to be completed:  Perform bathroom transfers, complete dressing, complete feeding, get ready for the day, prepare a simple meal, participate in household tasks, adapt home for safety needs, demonstrate home exercise program, complete caregiver training     Goals: Long term and short term goals have been  discussed with patient and they are in agreement.    Occupational Therapy Goals (Active)       Problem: Bathing       Dates: Start:  03/19/25         Goal: STG-Within one week, patient will bathe with CGA.       Dates: Start:  03/19/25               Problem: Dressing       Dates: Start:  03/19/25         Goal: STG-Within one week, patient will dress LB with CGA and AE PRN.        Dates: Start:  03/19/25               Problem: Functional Transfers       Dates: Start:  03/19/25         Goal: STG-Within one week, patient will transfer to step in shower to simulate home environment with CGA.        Dates: Start:  03/19/25               Problem: Grooming       Dates: Start:  03/19/25         Goal: STG-Within one week, patient will complete grooming in standing with LRAD with CGA.       Dates: Start:  03/19/25               Problem: OT Long Term Goals       Dates: Start:  03/19/25         Goal: LTG-By discharge, patient will complete basic self care tasks with supervision.        Dates: Start:  03/19/25            Goal: LTG-By discharge, patient will perform bathroom transfers with supervision.        Dates: Start:  03/19/25               Problem: Toileting       Dates: Start:  03/19/25         Goal: STG-Within one week, patient will complete toileting tasks with CGA.        Dates: Start:  03/19/25

## 2025-03-19 NOTE — PROGRESS NOTES
"  Physical Medicine & Rehabilitation Progress Note    Encounter Date: 3/19/2025    Chief Complaint: Hip Fracture    Interval Events (Subjective):  VS: Increased BP, on 1L O2  Last BM: 3/19  Bladder: Voiding low PVRs  Schedule Meds Not Given: None  PRN Meds Taken: Gabapentin, Hydralazine, Oxycodone    Patient seen and examined in her room. Doesn't want fish oil. Would like gael and oxy scheduled. Will liberalize fluids and recheck labs. Add muscle relaxer as needed.       ROS: 14 point ROS negative unless otherwise specified in the HPI    Objective:  VITAL SIGNS: BP (!) 160/66   Pulse 64   Temp 36.5 °C (97.7 °F) (Temporal)   Resp 17   Ht 1.549 m (5' 1\")   Wt 48.5 kg (106 lb 14.8 oz)   SpO2 99%   BMI 20.20 kg/m²     GEN: No apparent distress  HEENT: Head normocephalic, atraumatic.  Sclera nonicteric bilaterally, no ocular discharge appreciated bilaterally.  CV: Extremities warm and well-perfused, no peripheral edema appreciated bilaterally.  PULMONARY: Breathing nonlabored on room air, no respiratory accessory muscle use.  Not requiring supplemental oxygen.  SKIN: No appreciable skin breakdown on exposed areas of skin.  PSYCH: Mood and affect within normal limits.  NEURO: Awake alert.  Conversational.  Logical thought content.      Laboratory Values:  Recent Results (from the past 72 hours)   CBC WITHOUT DIFFERENTIAL    Collection Time: 03/17/25  1:26 AM   Result Value Ref Range    WBC 6.0 4.8 - 10.8 K/uL    RBC 2.76 (L) 4.20 - 5.40 M/uL    Hemoglobin 9.2 (L) 12.0 - 16.0 g/dL    Hematocrit 27.5 (L) 37.0 - 47.0 %    MCV 99.6 (H) 81.4 - 97.8 fL    MCH 33.3 (H) 27.0 - 33.0 pg    MCHC 33.5 32.2 - 35.5 g/dL    RDW 49.8 35.9 - 50.0 fL    Platelet Count 198 164 - 446 K/uL    MPV 9.1 9.0 - 12.9 fL   Basic Metabolic Panel    Collection Time: 03/17/25  1:26 AM   Result Value Ref Range    Sodium 127 (L) 135 - 145 mmol/L    Potassium 4.0 3.6 - 5.5 mmol/L    Chloride 94 (L) 96 - 112 mmol/L    Co2 24 20 - 33 mmol/L    " Glucose 90 65 - 99 mg/dL    Bun 13 8 - 22 mg/dL    Creatinine 0.47 (L) 0.50 - 1.40 mg/dL    Calcium 8.3 (L) 8.4 - 10.2 mg/dL    Anion Gap 9.0 7.0 - 16.0   MAGNESIUM    Collection Time: 03/17/25  1:26 AM   Result Value Ref Range    Magnesium 1.9 1.5 - 2.5 mg/dL   ESTIMATED GFR    Collection Time: 03/17/25  1:26 AM   Result Value Ref Range    GFR (CKD-EPI) 93 >60 mL/min/1.73 m 2   FERRITIN    Collection Time: 03/17/25  1:26 AM   Result Value Ref Range    Ferritin 109.0 10.0 - 291.0 ng/mL   RETICULOCYTES COUNT    Collection Time: 03/17/25  1:26 AM   Result Value Ref Range    Reticulocyte Count 1.9 0.8 - 2.6 %    Retic, Absolute 0.05 0.04 - 0.12 M/uL    Imm. Reticulocyte Fraction 10.2 2.6 - 16.1 %    Retic Hgb Equivalent 36.5 (H) 29.0 - 35.0 pg/cell   IRON/TOTAL IRON BIND    Collection Time: 03/17/25  1:26 AM   Result Value Ref Range    Iron 21 (L) 40 - 170 ug/dL    Total Iron Binding 262 250 - 450 ug/dL    Unsat Iron Binding 241 110 - 370 ug/dL    % Saturation 8 (L) 15 - 55 %   VITAMIN B12    Collection Time: 03/17/25  1:26 AM   Result Value Ref Range    Vitamin B12 -True Cobalamin 1307 (H) 211 - 911 pg/mL   TSH WITH REFLEX TO FT4    Collection Time: 03/17/25  1:26 AM   Result Value Ref Range    TSH 1.300 0.380 - 5.330 uIU/mL   CBC WITHOUT DIFFERENTIAL    Collection Time: 03/18/25  3:58 AM   Result Value Ref Range    WBC 5.8 4.8 - 10.8 K/uL    RBC 2.99 (L) 4.20 - 5.40 M/uL    Hemoglobin 10.1 (L) 12.0 - 16.0 g/dL    Hematocrit 30.1 (L) 37.0 - 47.0 %    .7 (H) 81.4 - 97.8 fL    MCH 33.8 (H) 27.0 - 33.0 pg    MCHC 33.6 32.2 - 35.5 g/dL    RDW 50.2 (H) 35.9 - 50.0 fL    Platelet Count 223 164 - 446 K/uL    MPV 9.0 9.0 - 12.9 fL   CBC with Differential    Collection Time: 03/19/25  5:34 AM   Result Value Ref Range    WBC 3.4 (L) 4.8 - 10.8 K/uL    RBC 3.15 (L) 4.20 - 5.40 M/uL    Hemoglobin 10.3 (L) 12.0 - 16.0 g/dL    Hematocrit 31.5 (L) 37.0 - 47.0 %    .0 (H) 81.4 - 97.8 fL    MCH 32.7 27.0 - 33.0 pg     MCHC 32.7 32.2 - 35.5 g/dL    RDW 49.4 35.9 - 50.0 fL    Platelet Count 264 164 - 446 K/uL    MPV 9.3 9.0 - 12.9 fL    Neutrophils-Polys 42.80 (L) 44.00 - 72.00 %    Lymphocytes 37.50 22.00 - 41.00 %    Monocytes 15.80 (H) 0.00 - 13.40 %    Eosinophils 2.40 0.00 - 6.90 %    Basophils 0.60 0.00 - 1.80 %    Immature Granulocytes 0.90 0.00 - 0.90 %    Nucleated RBC 0.00 0.00 - 0.20 /100 WBC    Neutrophils (Absolute) 1.44 (L) 1.82 - 7.42 K/uL    Lymphs (Absolute) 1.26 1.00 - 4.80 K/uL    Monos (Absolute) 0.53 0.00 - 0.85 K/uL    Eos (Absolute) 0.08 0.00 - 0.51 K/uL    Baso (Absolute) 0.02 0.00 - 0.12 K/uL    Immature Granulocytes (abs) 0.03 0.00 - 0.11 K/uL    NRBC (Absolute) 0.00 K/uL   Comp Metabolic Panel (CMP)    Collection Time: 03/19/25  5:34 AM   Result Value Ref Range    Sodium 131 (L) 135 - 145 mmol/L    Potassium 4.7 3.6 - 5.5 mmol/L    Chloride 95 (L) 96 - 112 mmol/L    Co2 28 20 - 33 mmol/L    Anion Gap 8.0 7.0 - 16.0    Glucose 89 65 - 99 mg/dL    Bun 13 8 - 22 mg/dL    Creatinine 0.47 (L) 0.50 - 1.40 mg/dL    Calcium 9.0 8.5 - 10.5 mg/dL    Correct Calcium 9.6 8.5 - 10.5 mg/dL    AST(SGOT) 25 12 - 45 U/L    ALT(SGPT) 12 2 - 50 U/L    Alkaline Phosphatase 80 30 - 99 U/L    Total Bilirubin 0.4 0.1 - 1.5 mg/dL    Albumin 3.3 3.2 - 4.9 g/dL    Total Protein 6.2 6.0 - 8.2 g/dL    Globulin 2.9 1.9 - 3.5 g/dL    A-G Ratio 1.1 g/dL   Magnesium    Collection Time: 03/19/25  5:34 AM   Result Value Ref Range    Magnesium 1.9 1.5 - 2.5 mg/dL   Phosphorus    Collection Time: 03/19/25  5:34 AM   Result Value Ref Range    Phosphorus 3.6 2.5 - 4.5 mg/dL   TSH with Reflex to FT4    Collection Time: 03/19/25  5:34 AM   Result Value Ref Range    TSH 1.680 0.380 - 5.330 uIU/mL   Vitamin D, 25-hydroxy (blood)    Collection Time: 03/19/25  5:34 AM   Result Value Ref Range    25-Hydroxy   Vitamin D 25 56 30 - 100 ng/mL   ESTIMATED GFR    Collection Time: 03/19/25  5:34 AM   Result Value Ref Range    GFR (CKD-EPI) 93 >60  mL/min/1.73 m 2       Medications:  Scheduled Medications   Medication Dose Frequency    Pharmacy Consult Request  1 Each PHARMACY TO DOSE    senna-docusate  2 Tablet Q EVENING    [START ON 3/24/2025] alendronate  70 mg Q7 DAYS    fish oil  1,000 mg DAILY    flecainide  25 mg BID    levothyroxine  50 mcg AM ES    lidocaine  1 Patch Q24HR    magnesium chloride  64 mg DAILY WITH LUNCH    vitamin D3  1,000 Units DAILY    omeprazole  20 mg DAILY    enoxaparin (LOVENOX) injection  40 mg DAILY AT 1800     PRN medications: lidocaine, hydrALAZINE, senna-docusate **AND** polyethylene glycol/lytes, lactulose, docusate sodium, bisacodyl EC, magnesium hydroxide, sodium phosphate, carboxymethylcellulose, benzocaine-menthol, mag hydrox-al hydrox-simeth, ondansetron **OR** ondansetron, traZODone, sodium chloride, [] acetaminophen **FOLLOWED BY** acetaminophen, gabapentin, oxyCODONE immediate-release **OR** oxyCODONE immediate-release    Diet:  Current Diet Order   Procedures    Diet Order Diet: Regular (total free water restriction 500mL, total fluids 1500mL per day.); Fluid modifications: (optional): 1500 ml Fluid Restriction       Medical Decision Making and Plan:  Imapacted L Femoral Neck Fracture s/p Hemiarthroplasty 3/14/25 Dr. Cota   Left superior and inferior pubic ramus fracture non-op  PT and OT for mobility and ADLs. Per guidelines, 15 hours per week between PT, OT and/or SLP.  Follow-up Ortho  WBAT LLE     Pain - Tylenol, oxycodone. 3/19 Schedule Gabapentin, schedule oxy in AM, add robaxin PRN.      ABLA - 3/19 10.3 stable     Leukopenia - 3/19 3.4 monitor      Arrhythmia - Continue Flecainide. Follows with Dr. Vail     Peripheral Neuropathy - Continue Gabapentin     Hyponatremia - Continue fluid restriction, improving. 3/19 131 improving. Liberalize fluids.      GERD - Continue PPI     Hypothyroidism - Continue Synthroid     Hypertension - Lisinopril HELD due to hyperkalemia. 3/19 BP elevated required PRN  hydralazine. Restart Lisinopril 10mg daily.      Hyperkalemia - 3/19 WNL 4.7. Restarting Lisinopril     Hypomagnesemia - Continue supplement. 3/19 WNL     Vitamin D Deficiency - Supplement      Bowel - Patient on Senna-docusate for constipation prophylaxis.      Bladder - TV/PVR/BS PRN        Upcoming Labs/imaging: 3/21     DVT PROPHYLAXIS: Start Lovenox 40mg SQ nightly on admission to ARU, held at main due to slight drop in Hgb, however repeat improved in 10's. No active bleeding, incision looks good. 3/19 Hgb stable.      HOSPITALIST FOLLOWING: No     CODE STATUS: DNAR/DNI     DISPO: Home      GUS: TBD     MEDS SENT TO: TBD     DISCHARGE SPECIALIST FOLLOW UP:   Ortho     Patient to scheduled follow up with their PCP within 2 weeks from discharge from the [facility].       ____________________________________    Dr. Jacqui Alatorre DO, MS  [facility] [place] - Physical Medicine & Rehabilitation   ____________________________________

## 2025-03-20 ENCOUNTER — PATIENT MESSAGE (OUTPATIENT)
Dept: HEALTH INFORMATION MANAGEMENT | Facility: OTHER | Age: 85
End: 2025-03-20

## 2025-03-20 ENCOUNTER — APPOINTMENT (OUTPATIENT)
Dept: OCCUPATIONAL THERAPY | Facility: REHABILITATION | Age: 85
DRG: 560 | End: 2025-03-20
Attending: PHYSICAL MEDICINE & REHABILITATION
Payer: MEDICARE

## 2025-03-20 ENCOUNTER — APPOINTMENT (OUTPATIENT)
Dept: PHYSICAL THERAPY | Facility: REHABILITATION | Age: 85
DRG: 560 | End: 2025-03-20
Attending: PHYSICAL MEDICINE & REHABILITATION
Payer: MEDICARE

## 2025-03-20 PROCEDURE — 99232 SBSQ HOSP IP/OBS MODERATE 35: CPT | Performed by: PHYSICAL MEDICINE & REHABILITATION

## 2025-03-20 PROCEDURE — 97535 SELF CARE MNGMENT TRAINING: CPT

## 2025-03-20 PROCEDURE — A9270 NON-COVERED ITEM OR SERVICE: HCPCS | Performed by: PHYSICAL MEDICINE & REHABILITATION

## 2025-03-20 PROCEDURE — 97530 THERAPEUTIC ACTIVITIES: CPT | Mod: CO

## 2025-03-20 PROCEDURE — 770010 HCHG ROOM/CARE - REHAB SEMI PRIVAT*

## 2025-03-20 PROCEDURE — 700101 HCHG RX REV CODE 250: Performed by: PHYSICAL MEDICINE & REHABILITATION

## 2025-03-20 PROCEDURE — 700102 HCHG RX REV CODE 250 W/ 637 OVERRIDE(OP): Performed by: PHYSICAL MEDICINE & REHABILITATION

## 2025-03-20 PROCEDURE — 700111 HCHG RX REV CODE 636 W/ 250 OVERRIDE (IP): Mod: JZ | Performed by: PHYSICAL MEDICINE & REHABILITATION

## 2025-03-20 PROCEDURE — 97150 GROUP THERAPEUTIC PROCEDURES: CPT

## 2025-03-20 PROCEDURE — 97110 THERAPEUTIC EXERCISES: CPT

## 2025-03-20 RX ORDER — LISINOPRIL 5 MG/1
5 TABLET ORAL
Status: DISCONTINUED | OUTPATIENT
Start: 2025-03-21 | End: 2025-03-26

## 2025-03-20 RX ORDER — TRAZODONE HYDROCHLORIDE 50 MG/1
25 TABLET ORAL
Status: DISCONTINUED | OUTPATIENT
Start: 2025-03-20 | End: 2025-03-28 | Stop reason: HOSPADM

## 2025-03-20 RX ADMIN — LEVOTHYROXINE SODIUM 50 MCG: 0.05 TABLET ORAL at 05:01

## 2025-03-20 RX ADMIN — FLECAINIDE ACETATE 25 MG: 50 TABLET ORAL at 20:38

## 2025-03-20 RX ADMIN — GABAPENTIN 100 MG: 100 CAPSULE ORAL at 14:25

## 2025-03-20 RX ADMIN — OXYCODONE 5 MG: 5 TABLET ORAL at 05:01

## 2025-03-20 RX ADMIN — OXYCODONE HYDROCHLORIDE 10 MG: 10 TABLET ORAL at 20:37

## 2025-03-20 RX ADMIN — LISINOPRIL 10 MG: 5 TABLET ORAL at 05:01

## 2025-03-20 RX ADMIN — SENNOSIDES AND DOCUSATE SODIUM 2 TABLET: 50; 8.6 TABLET ORAL at 20:37

## 2025-03-20 RX ADMIN — TRAZODONE HYDROCHLORIDE 25 MG: 50 TABLET ORAL at 20:38

## 2025-03-20 RX ADMIN — OXYCODONE 5 MG: 5 TABLET ORAL at 10:10

## 2025-03-20 RX ADMIN — ENOXAPARIN SODIUM 40 MG: 100 INJECTION SUBCUTANEOUS at 18:06

## 2025-03-20 RX ADMIN — GABAPENTIN 100 MG: 100 CAPSULE ORAL at 08:29

## 2025-03-20 RX ADMIN — OMEPRAZOLE 20 MG: 20 CAPSULE, DELAYED RELEASE ORAL at 08:29

## 2025-03-20 RX ADMIN — GABAPENTIN 200 MG: 100 CAPSULE ORAL at 20:36

## 2025-03-20 RX ADMIN — FLECAINIDE ACETATE 25 MG: 50 TABLET ORAL at 08:29

## 2025-03-20 RX ADMIN — LIDOCAINE 1 PATCH: 4 PATCH TOPICAL at 08:29

## 2025-03-20 RX ADMIN — MAGNESIUM 64 MG (MAGNESIUM CHLORIDE) TABLET,DELAYED RELEASE 64 MG: at 20:36

## 2025-03-20 RX ADMIN — Medication 1000 UNITS: at 08:29

## 2025-03-20 ASSESSMENT — PAIN DESCRIPTION - PAIN TYPE
TYPE: ACUTE PAIN
TYPE: ACUTE PAIN;CHRONIC PAIN
TYPE: ACUTE PAIN;CHRONIC PAIN;SURGICAL PAIN

## 2025-03-20 NOTE — THERAPY
Occupational Therapy  Daily Treatment     Patient Name:  Alyssa Juan  Age:  84 y.o., Sex:  female  Medical Record #:  [id number]  Today's Date:  3/20/2025    Precautions  Precautions: Fall Risk  Fall Risk: History of falls  Cognition/Communication: Hard of hearing, Hearing Aids  Orthopedic: Posterior Hip Precautions  Weight Bearing: WBAT LLE    Subjective: Pt pleasant and motivated to participate in OT     Objective:    03/20/25 1301   OT Charge Group   Charges Yes   OT Self Care / ADL (Units) 2   OT Therapeutic Exercise (Units) 2   OT Total Time Spent   OT Individual Total Time Spent (Mins) 60   Precautions   Precautions Fall Risk   Fall Risk History of falls   Cognition/Communication Hard of hearing;Hearing Aids   Orthopedic Posterior Hip Precautions   Weight Bearing WBAT LLE   Vitals   O2 Delivery Device None - Room Air   Pain 0 - 10 Group   Location Leg   Location Orientation Right   Bladder   Urine Color Yellow   Number of Times Voided 1   Bladder Device Bathroom   Cognition    Level of Consciousness Alert   ABS (Agitated Behavior Scale)   Agitated Behavior Scale Performed No   Oral Hygiene   Level of Assist Modified Independent   Patient Position Seated   Additional Description Extra time needed   Grooming   Level of Assist Modified Independent   Patient Position Seated   Additional Description Hand hygiene   Putting On/Taking Off Footwear   Level of Assist Set Up   Patient Position Seated   Footwear Type Socks   Adaptive Equipment Sock Aid   Toilet Transfer   Level of Assist Contact Guard Assist   Transfer Type Stand Step Transfer   Adaptive Equipment Grab bars   Assistive Device Wheelchair   Toileting Hygiene   Level of Assist Contact Guard Assist   Additional Description Assist for standing balance;Grab bars;Cueing needed;Extra time needed   Roll Left and Right   Level of Assist Stand By Assist   Additional Description Use of bed rail;Extra time needed;Head of bed elevated   Lying to Sitting on Side  of Bed   Level of Assist Stand By Assist   Additional Description Head of bed elevated;Use of bed rail;Cueing needed;Extra time needed   Sit to Stand   Level of Assist Contact Guard Assist   Assistive Devices No AD   Additional Description Extra time needed   Chair/Bed-to-Chair Transfer   Level of Assist Contact Guard Assist   Transfer Type Stand Step Transfer   Assistive Devices Wheelchair   Additional Description Extra time needed   Strengths & Barriers   Strengths Able to follow instructions;Effective communication skills;Good carryover of learning;Good insight into deficits/needs;Making steady progress towards goals;Manages pain appropriately;Motivated for self care and independence;Pleasant and cooperative;Supportive family;Willingly participates in therapeutic activities   Barriers Fatigue;Generalized weakness;Impaired balance;Pain;Limited mobility   Interdisciplinary Plan of Care Collaboration   Patient Position at End of Therapy Seated;Chair Alarm On;Self Releasing Lap Belt Applied;Call Light within Reach;Tray Table within Reach;Phone within Reach       Therapeutic Exercise  Purpose: to improve strength, to improve ROM/flexibility, and to improve functional endurance  Interventions:   River Med Cycle: Gear Level 4 and Time 15 minutes  Nustep: Resistance Level 4 and Time 10 minutes    Assessment:    Pt seen for tx, addressing toileting, grooming, strengthening, and functional transfers. Pt tolerated activity well. Pt progressing towards goals. Continue OT POC.    Strengths: Able to follow instructions, Effective communication skills, Good carryover of learning, Good insight into deficits/needs, Making steady progress towards goals, Manages pain appropriately, Motivated for self care and independence, Pleasant and cooperative, Supportive family, Willingly participates in therapeutic activities    Barriers: Fatigue, Generalized weakness, Impaired balance, Pain, Limited mobility    Plan:    ADL with AE   IADL     related mobility and transfers     strength/endurance building standing tolerance    balance activity    DME  OT DME Recommendations  Additional Equipment (NOT TYPICALLY COVERED BY INSURANCE): Other (see comments), Hip kit (Pt believes she still has hip kit from previous admission, unsure and will have friend check for her)    Passport items to be completed:  Perform bathroom transfers, complete dressing, complete feeding, get ready for the day, prepare a simple meal, participate in household tasks, adapt home for safety needs, demonstrate home exercise program, complete caregiver training     Occupational Therapy Goals (Active)       Problem: Bathing       Dates: Start:  03/19/25         Goal: STG-Within one week, patient will bathe with CGA.       Dates: Start:  03/19/25               Problem: Dressing       Dates: Start:  03/19/25         Goal: STG-Within one week, patient will dress LB with CGA and AE PRN.        Dates: Start:  03/19/25               Problem: Functional Transfers       Dates: Start:  03/19/25         Goal: STG-Within one week, patient will transfer to step in shower to simulate home environment with CGA.        Dates: Start:  03/19/25               Problem: Grooming       Dates: Start:  03/19/25         Goal: STG-Within one week, patient will complete grooming in standing with LRAD with CGA.       Dates: Start:  03/19/25               Problem: OT Long Term Goals       Dates: Start:  03/19/25         Goal: LTG-By discharge, patient will complete basic self care tasks with supervision.        Dates: Start:  03/19/25            Goal: LTG-By discharge, patient will perform bathroom transfers with supervision.        Dates: Start:  03/19/25               Problem: Toileting       Dates: Start:  03/19/25         Goal: STG-Within one week, patient will complete toileting tasks with CGA.        Dates: Start:  03/19/25

## 2025-03-20 NOTE — CARE PLAN
"  Problem: Pain - Standard  Goal: Alleviation of pain or a reduction in pain to the patient’s comfort goal  Outcome: Progressing   Patient is able to rate pain on a scale of 1-10.       Problem: Fall Risk - Rehab  Goal: Patient will remain free from falls  Outcome: Progressing   Blanka Pendleton Fall risk Assessment Score: 18    High fall risk Interventions   - Bed and strip alarm   - Yellow sign by the door   - Yellow wrist band \"Fall risk\"  - Room near to the nurse station  - Do not leave patient unattended in the bathroom  - Fall risk education provided             " 5 (moderate pain)

## 2025-03-20 NOTE — THERAPY
Physical Therapy   Daily Treatment     Patient Name:  Alyssa Juan  Age:  84 y.o., Sex:  female  Medical Record #:  [id number]  Today's Date: 3/20/2025     Precautions  Precautions: Fall Risk  Fall Risk: History of falls  Cognition/Communication: Hard of hearing, Hearing Aids  Orthopedic: Posterior Hip Precautions  Weight Bearing: WBAT LLE    Subjective: Pt was seated in w/c upon arrival and agreeable to treatment.      Objective:    03/20/25 1401   PT Charge Group   PT Group Therapy Group Activities   PT Total Time Spent   PT Group Total Time Spent (Mins) 60   Precautions   Fall Risk History of falls   Cognition/Communication Hard of hearing;Hearing Aids   Orthopedic Posterior Hip Precautions   Weight Bearing WBAT LLE   Interdisciplinary Plan of Care Collaboration   Patient Position at End of Therapy Seated;Call Light within Reach;Tray Table within Reach;Phone within Reach         Therapeutic Exercise  Purpose: to improve strength, to improve ROM/flexibility, and to improve functional endurance  Interventions:   Lower body exercises:   Seated program -   Ankle pumps, 3 sets of 10 and Bilateral  Hip abduction, 3 sets of 10, Bilateral, and Medium Resistance Theraband  Hip adduction, 3 sets of 10 and Bilateral  Long arc quad, 3 sets of 10, Bilateral, and Weight 2.5# LLE  Marching, 3 sets of 10, 2.5# LLE  Hamstring curl, 3 sets of 10 and Bilateral    Group Therapy  Purpose: to increase active participation through peer pressure, to enhance motivation, to validate increased independence with skills, to increase patient interaction during physical recovery, to facilitate goal discussion, and to reinforce strengthening and movement through group format demonstration  Interventions:   LE exercise group    Assessment: Pt with good participation in group activities this session with minimal pain limitation.  Pt is motivated to progress.     Strengths: Able to follow instructions, Alert and oriented, Independent prior  level of function, Manages pain appropriately, Motivated for self care and independence, Pleasant and cooperative, Supportive family, Willingly participates in therapeutic activities  Barriers: Generalized weakness, Impaired activity tolerance, Decreased endurance, Fatigue, Impaired balance, Limited mobility    Plan:   Bed mobility, transfer training, LE strengthening, AMB with FWW    DME      Passport items to be completed:  Get in/out of bed safely, in/out of a vehicle, safely use mobility device, walk or wheel around home/community, navigate up and down stairs, show how to get up/down from the ground, ensure home is accessible, demonstrate HEP, complete caregiver training    Physical Therapy Problems (Active)       Problem: Balance       Dates: Start:  03/19/25         Goal: STG-Within one week, patient will maintain dynamic standing x 5 minutes for ADLs with LRAD and supervised       Dates: Start:  03/19/25               Problem: Mobility       Dates: Start:  03/19/25         Goal: STG-Within one week, patient will ambulate household distance x 150 ft with LRAD, pain <3/10, supervised       Dates: Start:  03/19/25               Problem: Mobility Transfers       Dates: Start:  03/19/25         Goal: STG-Within one week, patient will perform bed mobility with supervision and LRAD       Dates: Start:  03/19/25            Goal: STG-Within one week, patient will transfer bed to chair with LRAD, SBA, adhering to spinal precautions       Dates: Start:  03/19/25               Problem: PT-Long Term Goals       Dates: Start:  03/19/25         Goal: LTG-By discharge, patient will tolerate standing x 10 minutes with LRAD, pain <2/10, Rick       Dates: Start:  03/19/25            Goal: LTG-By discharge, patient will ambulate x 300 ft with LRAD, Rick       Dates: Start:  03/19/25            Goal: LTG-By discharge, patient will transfer one surface to another Rick with LRAD, adhering to spinal precautions       Dates: Start:   03/19/25            Goal: LTG-By discharge, patient will perform home exercise program Rick with handouts       Dates: Start:  03/19/25            Goal: LTG-By discharge, patient will ambulate up/down 4-6 stairs with supervision, bilateral railings       Dates: Start:  03/19/25            Goal: LTG-By discharge, patient will complete TUG <60 seconds with LRAD       Dates: Start:  03/19/25

## 2025-03-20 NOTE — THERAPY
"Occupational Therapy  Daily Treatment     Patient Name:  Alyssa Juan  Age:  84 y.o., Sex:  female  Medical Record #:  [id number]  Today's Date:  3/20/2025    Precautions  Precautions: (P) Fall Risk  Fall Risk: (P) History of falls  Cognition/Communication: (P) Hard of hearing, Hearing Aids  Orthopedic: (P) Posterior Hip Precautions  Weight Bearing: (P) WBAT LLE    Subjective:  I didn't feel very good earlier but  I am a little better now that I went to the bathroom .\"     Objective:    03/20/25 0933   OT Charge Group   OT Self Care / ADL (Units) 2   OT Therapy Activity (Units) 1   OT Therapeutic Exercise (Units) 1   OT Total Time Spent   OT Individual Total Time Spent (Mins) 60   Precautions   Precautions Fall Risk   Fall Risk History of falls   Cognition/Communication Hard of hearing;Hearing Aids   Orthopedic Posterior Hip Precautions   Weight Bearing WBAT LLE   Putting On/Taking Off Footwear   Level of Assist Set Up;Supervised   Patient Position Seated  (edge of bed)   Footwear Type Treaded Socks   Adaptive Equipment Sock Aid   Lying to Sitting on Side of Bed   Level of Assist Contact Guard Assist   Additional Description Extra time needed   Sit to Stand   Level of Assist Contact Guard Assist   Assistive Devices FWW   Additional Description Extra time needed   Chair/Bed-to-Chair Transfer   Level of Assist Contact Guard Assist   Transfer Type Stand Step Transfer   Assistive Devices FWW   Additional Description Extra time needed   Interdisciplinary Plan of Care Collaboration   Patient Position at End of Therapy Seated;Chair Alarm On;Call Light within Reach;Tray Table within Reach;Phone within Reach     Oral care setup seated at sink   Toilet transfer     CGA  using grab bar    Toileting      Steady assist with  clothing mgmt       FWW  x 65 feet  with CGA      UE ther ex    10 reps x 2   1lb weight    Right/left: biceps curls  supination/ pronation   internal /external rotation     front arm raise.     " Bilateral   chest press shoulder press and  arm circles        Tx focus on improving overall strength/endurance   and ADL independence with   AE     Assessment:    Good carry over use of AE for LB  dressing  ( familiar from opposite hip replacement 2 years ago)        Demonstrates  imrpoving functional endurance   Strengths: Able to follow instructions, Effective communication skills, Good carryover of learning, Good insight into deficits/needs, Making steady progress towards goals, Manages pain appropriately, Motivated for self care and independence, Pleasant and cooperative, Supportive family, Willingly participates in therapeutic activities  Barriers: Fatigue, Generalized weakness, Impaired balance, Pain, Limited mobility    Plan:      ADL with AE   IADL  related mobility and transfers   strength/endurance building standing  tolerance  and balance activity         DME  OT DME Recommendations  Additional Equipment (NOT TYPICALLY COVERED BY INSURANCE): Other (see comments), Hip kit (Pt believes she still has hip kit from previous admission, unsure and will have friend check for her)        Occupational Therapy Goals (Active)       Problem: Bathing       Dates: Start:  03/19/25         Goal: STG-Within one week, patient will bathe with CGA.       Dates: Start:  03/19/25               Problem: Dressing       Dates: Start:  03/19/25         Goal: STG-Within one week, patient will dress LB with CGA and AE PRN.        Dates: Start:  03/19/25               Problem: Functional Transfers       Dates: Start:  03/19/25         Goal: STG-Within one week, patient will transfer to step in shower to simulate home environment with CGA.        Dates: Start:  03/19/25               Problem: Grooming       Dates: Start:  03/19/25         Goal: STG-Within one week, patient will complete grooming in standing with LRAD with CGA.       Dates: Start:  03/19/25               Problem: OT Long Term Goals       Dates: Start:  03/19/25          Goal: LTG-By discharge, patient will complete basic self care tasks with supervision.        Dates: Start:  03/19/25            Goal: LTG-By discharge, patient will perform bathroom transfers with supervision.        Dates: Start:  03/19/25               Problem: Toileting       Dates: Start:  03/19/25         Goal: STG-Within one week, patient will complete toileting tasks with CGA.        Dates: Start:  03/19/25

## 2025-03-20 NOTE — PROGRESS NOTES
NURSING DAILY NOTE    Name: Alyssa Juan   Date of Admission: 3/18/2025   Admitting Diagnosis: Closed left hip fracture, initial encounter ([facility])  Attending Physician: JAQUAN CLARK D.O.  Allergies: Sulfa drugs and Codeine    Safety  Patient Assist  Mod assist.  Patient Precautions  Precautions: Fall Risk  Fall Risk: History of falls  Cognition/Communication: Hearing Aids  Orthopedic: Posterior Hip Precautions  Weight Bearing: WBAT LLE  Bed Transfer Status  Minimal Assist  Toilet Transfer Status   Minimal Assist  Assistive Devices  Rails, Wheelchair  Oxygen  Room air w/o2 available  Diet/Therapeutic Dining  Current Diet Order   Procedures    Diet Order Diet: Regular (total free water restriction 500mL, total fluids 1500mL per day.); Fluid modifications: (optional): 1800 ml Fluid Restriction     Pill Administration  whole  Agitated Behavioral Scale     ABS Level of Severity       Fall Risk  Has the patient had a fall this admission?      Blanka Pendleton Fall Risk Scoring  18, HIGH RISK  Fall Risk Safety Measures  bed alarm and chair alarm    Vitals  Temperature: 36.6 °C (97.8 °F)  Temp src: Oral  Pulse: 69  Respiration: 18  Blood Pressure : (!) 155/75  Blood Pressure MAP (Calculated): 102 MM HG  BP Location: Right, Upper Arm  Patient BP Position: Warner's Position     Oxygen  Pulse Oximetry: 94 %  O2 (LPM): 1  O2 Delivery Device: Room air w/o2 available    Bowel and Bladder  Last Bowel Movement  03/19/25  Stool Type  Type 6: Fluffy pieces with ragged edges, a mushy stool  Bowel Device  Bathroom, Diaper  Continent  Bladder: Did not void   Bowel: No movement  Bladder Function  Urine Void (mL):  (Moderate)  Number of Times Voided: 1  Urine Color: Yellow  Genitourinary Assessment   Bladder Assessment (WDL):  Within Defined Limits  Urine Color: Yellow  $ Bladder Scan Results (mL): 49    Skin  Ovidio Score   19  Sensory Interventions   Bed Types: Standard/Trauma Mattress with Overlay  Skin Preventative Measures:  Waffle Overlay  Moisture Interventions         Pain  Pain Rating Scale  6 - Hard to ignore, avoid usual activities  Pain Location  Generalized, Hip  Pain Location Orientation  Left  Pain Interventions   Medication (see MAR)    ADLs    Bathing   Shower, Staff  Linen Change      Personal Hygiene  Change Monica Pads, Moist Monica Wipes, Perineal Care  Chlorhexidine Bath      Oral Care     Teeth/Dentures     Shave     Nutrition Percentage Eaten  *  * Meal *  *, Lunch, Between % Consumed  Environmental Precautions     Patient Turns/Positioning  Patient turns self independently side to side without assistance, to offload sacral area  Patient Turns Assistance/Tolerance     Bed Positions     Head of Bed Elevated         Psychosocial/Neurologic Assessment  Psychosocial Assessment  Psychosocial (WDL):  Within Defined Limits  Neurologic Assessment  Neuro (WDL): Within Defined Limits  Level of Consciousness: Alert  Orientation Level: Oriented X4  EENT (WDL):  WDL Except    Cardio/Pulmonary Assessment  Edema      Respiratory Breath Sounds     Cardiac Assessment   Cardiac (WDL):  WDL Except

## 2025-03-20 NOTE — PROGRESS NOTES
..NURSING DAILY NOTE    Name: Alyssa Juan   Date of Admission: 3/18/2025   Admitting Diagnosis: Closed left hip fracture, initial encounter ([facility])  Attending Physician: JAQUAN CLARK D.O.  Allergies: Sulfa drugs and Codeine    Safety  Patient Assist  Mod assist.  Patient Precautions  Precautions: Fall Risk  Fall Risk: History of falls  Cognition/Communication: Hearing Aids  Orthopedic: Posterior Hip Precautions  Weight Bearing: WBAT LLE  Bed Transfer Status  Minimal Assist  Toilet Transfer Status   Minimal Assist  Assistive Devices  Rails, Wheelchair  Oxygen  None - Room Air  Diet/Therapeutic Dining  Current Diet Order   Procedures    Diet Order Diet: Regular (total free water restriction 500mL, total fluids 1500mL per day.); Fluid modifications: (optional): 1800 ml Fluid Restriction     Pill Administration  whole    Fall Risk    Blanka Pendleton Fall Risk Scoring  18, HIGH RISK  Fall Risk Safety Measures  bed alarm, chair alarm, and low vision/hearing    Vitals  Temperature: 36.6 °C (97.8 °F)  Temp src: Temporal  Pulse: 63  Respiration: (!) 8  Blood Pressure : (!) 153/79  Blood Pressure MAP (Calculated): 104 MM HG  BP Location: Right, Upper Arm  Patient BP Position: Warner's Position     Oxygen  Pulse Oximetry: 99 %  O2 (LPM): 1  O2 Delivery Device: None - Room Air    Bowel and Bladder  Last Bowel Movement  03/19/25  Stool Type  Type 6: Fluffy pieces with ragged edges, a mushy stool  Bowel Device  Bathroom, Diaper  Bladder Function  Urine Void (mL):  (Moderate)  Number of Times Voided: 1  Urine Color: Yellow  Genitourinary Assessment   Bladder Assessment (WDL):  Within Defined Limits  Urine Color: Yellow  $ Bladder Scan Results (mL): 49    Skin  Ovidio Score   19  Sensory Interventions   Bed Types: Standard/Trauma Mattress with Overlay  Skin Preventative Measures: Waffle Overlay, Pillows in Use for Support / Positioning  Moisture Interventions         Pain  Pain Rating Scale  5 - Interrupts some  activities  Pain Location  Back  Pain Location Orientation  Mid, Lower  Pain Interventions   Medication (see MAR)    ADLs    Bathing   Shower, Staff  Personal Hygiene  Change Monica Pads  Oral Care  Brushed Teeth  Nutrition Percentage Eaten  *  * Meal *  *, Dinner, *  * Amount Consumed *  *, Between % Consumed  Environmental Precautions     Patient Turns/Positioning  Patient turns self independently side to side without assistance, to offload sacral area    Psychosocial/Neurologic Assessment  Psychosocial Assessment  Psychosocial (WDL):  Within Defined Limits  Neurologic Assessment  Neuro (WDL): Within Defined Limits  Level of Consciousness: Alert  Orientation Level: Oriented X4  EENT (WDL):  WDL Except    Cardio/Pulmonary Assessment  Respiratory Breath Sounds     Cardiac Assessment   Cardiac (WDL):  WDL Except (HTN)

## 2025-03-20 NOTE — PROGRESS NOTES
"  Physical Medicine & Rehabilitation Progress Note    Encounter Date: 3/20/2025    Chief Complaint: Hip Fracture    Interval Events (Subjective):  VS: BP fluctuant 150's then 100's   Last BM: 3/20  Bladder: Voiding low PVRs  Schedule Meds Not Given: None  PRN Meds Taken: None    Patient seen and examined in her room. Didn't feel well today. BP was a lot lower. At home only takes 5mg lisinopril and BP was low. Felt fine this AM was after breakfast and therapy. No fevers or chills. Does feel depressed about function, would like to address sleep and depression.      ROS: 14 point ROS negative unless otherwise specified in the HPI    Objective:  VITAL SIGNS: /53   Pulse 66   Temp 36.5 °C (97.7 °F) (Oral)   Resp 17   Ht 1.549 m (5' 1\")   Wt 48.5 kg (106 lb 14.8 oz)   SpO2 90%   BMI 20.20 kg/m²     GEN: No apparent distress  HEENT: Head normocephalic, atraumatic.  Sclera nonicteric bilaterally, no ocular discharge appreciated bilaterally.  CV: Extremities warm and well-perfused, no peripheral edema appreciated bilaterally.  PULMONARY: Breathing nonlabored on room air, no respiratory accessory muscle use.  Not requiring supplemental oxygen.  SKIN: No appreciable skin breakdown on exposed areas of skin.  PSYCH: Mood and affect within normal limits.  NEURO: Awake alert.  Conversational.  Logical thought content.      Laboratory Values:  Recent Results (from the past 72 hours)   CBC WITHOUT DIFFERENTIAL    Collection Time: 03/18/25  3:58 AM   Result Value Ref Range    WBC 5.8 4.8 - 10.8 K/uL    RBC 2.99 (L) 4.20 - 5.40 M/uL    Hemoglobin 10.1 (L) 12.0 - 16.0 g/dL    Hematocrit 30.1 (L) 37.0 - 47.0 %    .7 (H) 81.4 - 97.8 fL    MCH 33.8 (H) 27.0 - 33.0 pg    MCHC 33.6 32.2 - 35.5 g/dL    RDW 50.2 (H) 35.9 - 50.0 fL    Platelet Count 223 164 - 446 K/uL    MPV 9.0 9.0 - 12.9 fL   CBC with Differential    Collection Time: 03/19/25  5:34 AM   Result Value Ref Range    WBC 3.4 (L) 4.8 - 10.8 K/uL    RBC 3.15 (L) " 4.20 - 5.40 M/uL    Hemoglobin 10.3 (L) 12.0 - 16.0 g/dL    Hematocrit 31.5 (L) 37.0 - 47.0 %    .0 (H) 81.4 - 97.8 fL    MCH 32.7 27.0 - 33.0 pg    MCHC 32.7 32.2 - 35.5 g/dL    RDW 49.4 35.9 - 50.0 fL    Platelet Count 264 164 - 446 K/uL    MPV 9.3 9.0 - 12.9 fL    Neutrophils-Polys 42.80 (L) 44.00 - 72.00 %    Lymphocytes 37.50 22.00 - 41.00 %    Monocytes 15.80 (H) 0.00 - 13.40 %    Eosinophils 2.40 0.00 - 6.90 %    Basophils 0.60 0.00 - 1.80 %    Immature Granulocytes 0.90 0.00 - 0.90 %    Nucleated RBC 0.00 0.00 - 0.20 /100 WBC    Neutrophils (Absolute) 1.44 (L) 1.82 - 7.42 K/uL    Lymphs (Absolute) 1.26 1.00 - 4.80 K/uL    Monos (Absolute) 0.53 0.00 - 0.85 K/uL    Eos (Absolute) 0.08 0.00 - 0.51 K/uL    Baso (Absolute) 0.02 0.00 - 0.12 K/uL    Immature Granulocytes (abs) 0.03 0.00 - 0.11 K/uL    NRBC (Absolute) 0.00 K/uL   Comp Metabolic Panel (CMP)    Collection Time: 03/19/25  5:34 AM   Result Value Ref Range    Sodium 131 (L) 135 - 145 mmol/L    Potassium 4.7 3.6 - 5.5 mmol/L    Chloride 95 (L) 96 - 112 mmol/L    Co2 28 20 - 33 mmol/L    Anion Gap 8.0 7.0 - 16.0    Glucose 89 65 - 99 mg/dL    Bun 13 8 - 22 mg/dL    Creatinine 0.47 (L) 0.50 - 1.40 mg/dL    Calcium 9.0 8.5 - 10.5 mg/dL    Correct Calcium 9.6 8.5 - 10.5 mg/dL    AST(SGOT) 25 12 - 45 U/L    ALT(SGPT) 12 2 - 50 U/L    Alkaline Phosphatase 80 30 - 99 U/L    Total Bilirubin 0.4 0.1 - 1.5 mg/dL    Albumin 3.3 3.2 - 4.9 g/dL    Total Protein 6.2 6.0 - 8.2 g/dL    Globulin 2.9 1.9 - 3.5 g/dL    A-G Ratio 1.1 g/dL   Magnesium    Collection Time: 03/19/25  5:34 AM   Result Value Ref Range    Magnesium 1.9 1.5 - 2.5 mg/dL   Phosphorus    Collection Time: 03/19/25  5:34 AM   Result Value Ref Range    Phosphorus 3.6 2.5 - 4.5 mg/dL   TSH with Reflex to FT4    Collection Time: 03/19/25  5:34 AM   Result Value Ref Range    TSH 1.680 0.380 - 5.330 uIU/mL   Vitamin D, 25-hydroxy (blood)    Collection Time: 03/19/25  5:34 AM   Result Value Ref Range     25-Hydroxy   Vitamin D 25 56 30 - 100 ng/mL   ESTIMATED GFR    Collection Time: 25  5:34 AM   Result Value Ref Range    GFR (CKD-EPI) 93 >60 mL/min/1.73 m 2       Medications:  Scheduled Medications   Medication Dose Frequency    [START ON 3/21/2025] lisinopril  5 mg Q DAY    traZODone  25 mg QHS    magnesium chloride  64 mg QHS    gabapentin  100 mg BID    gabapentin  200 mg Q EVENING    oxyCODONE immediate-release  5 mg BID    Pharmacy Consult Request  1 Each PHARMACY TO DOSE    senna-docusate  2 Tablet Q EVENING    [START ON 3/24/2025] alendronate  70 mg Q7 DAYS    flecainide  25 mg BID    levothyroxine  50 mcg AM ES    lidocaine  1 Patch Q24HR    vitamin D3  1,000 Units DAILY    omeprazole  20 mg DAILY    enoxaparin (LOVENOX) injection  40 mg DAILY AT 1800     PRN medications: methocarbamol, lidocaine, hydrALAZINE, senna-docusate **AND** polyethylene glycol/lytes, lactulose, docusate sodium, bisacodyl EC, magnesium hydroxide, sodium phosphate, carboxymethylcellulose, benzocaine-menthol, mag hydrox-al hydrox-simeth, ondansetron **OR** ondansetron, sodium chloride, [] acetaminophen **FOLLOWED BY** acetaminophen, oxyCODONE immediate-release **OR** oxyCODONE immediate-release    Diet:  Current Diet Order   Procedures    Diet Order Diet: Regular (total free water restriction 500mL, total fluids 1500mL per day.); Fluid modifications: (optional): 1800 ml Fluid Restriction       Medical Decision Making and Plan:  Imapacted L Femoral Neck Fracture s/p Hemiarthroplasty 3/14/25 Dr. Cota   Left superior and inferior pubic ramus fracture non-op  PT and OT for mobility and ADLs. Per guidelines, 15 hours per week between PT, OT and/or SLP.  Follow-up Ortho  WBAT LLE     Pain - Tylenol, oxycodone. 3/19 Schedule Gabapentin, schedule oxy in AM, add robaxin PRN.      ABLA - 3/19 10.3 stable     Leukopenia - 3/19 3.4 monitor      Arrhythmia - Continue Flecainide. Follows with Dr. Vail     Peripheral Neuropathy -  Continue Gabapentin     Hyponatremia - Continue fluid restriction, improving. 3/19 131 improving. Liberalize fluids.      GERD - Continue PPI     Hypothyroidism - Continue Synthroid     Hypertension - Lisinopril 20mg HELD due to hyperkalemia. 3/19 BP elevated required PRN hydralazine. Restart Lisinopril 10mg daily. 3/20 Patient with lower BP today, only takes 5mg at home. Reduce.      Hyperkalemia - 3/19 WNL 4.7. Restarting Lisinopril     Hypomagnesemia - Continue supplement. 3/19 WNL     Vitamin D Deficiency - Supplement      Bowel - Patient on Senna-docusate for constipation prophylaxis.      Bladder - TV/PVR/BS PRN    Sleep/Depression - Schedule Trazodone. Pt had friend recommend Elavil, d/w her large side effect profile. Will consider if fails trazodone.         Upcoming Labs/imaging: 3/21     DVT PROPHYLAXIS: Start Lovenox 40mg SQ nightly on admission to ARU, held at main due to slight drop in Hgb, however repeat improved in 10's. No active bleeding, incision looks good. 3/19 Hgb stable.      HOSPITALIST FOLLOWING: No     CODE STATUS: DNAR/DNI     DISPO: Home      GUS: TBD     MEDS SENT TO: TBD     DISCHARGE SPECIALIST FOLLOW UP:   Ortho     Patient to scheduled follow up with their PCP within 2 weeks from discharge from the [facility].       ____________________________________    Dr. Jacqui Alatorre DO, MS  [facility] - Physical Medicine & Rehabilitation   ____________________________________

## 2025-03-21 ENCOUNTER — APPOINTMENT (OUTPATIENT)
Dept: PHYSICAL THERAPY | Facility: REHABILITATION | Age: 85
DRG: 560 | End: 2025-03-21
Attending: PHYSICAL MEDICINE & REHABILITATION
Payer: MEDICARE

## 2025-03-21 ENCOUNTER — APPOINTMENT (OUTPATIENT)
Dept: OCCUPATIONAL THERAPY | Facility: REHABILITATION | Age: 85
DRG: 560 | End: 2025-03-21
Attending: PHYSICAL MEDICINE & REHABILITATION
Payer: MEDICARE

## 2025-03-21 LAB
ANION GAP SERPL CALC-SCNC: 10 MMOL/L (ref 7–16)
BUN SERPL-MCNC: 18 MG/DL (ref 8–22)
CALCIUM SERPL-MCNC: 9.1 MG/DL (ref 8.5–10.5)
CHLORIDE SERPL-SCNC: 95 MMOL/L (ref 96–112)
CO2 SERPL-SCNC: 27 MMOL/L (ref 20–33)
CREAT SERPL-MCNC: 0.54 MG/DL (ref 0.5–1.4)
ERYTHROCYTE [DISTWIDTH] IN BLOOD BY AUTOMATED COUNT: 49.7 FL (ref 35.9–50)
GFR SERPLBLD CREATININE-BSD FMLA CKD-EPI: 90 ML/MIN/1.73 M 2
GLUCOSE SERPL-MCNC: 77 MG/DL (ref 65–99)
HCT VFR BLD AUTO: 29 % (ref 37–47)
HGB BLD-MCNC: 9.8 G/DL (ref 12–16)
MCH RBC QN AUTO: 33.7 PG (ref 27–33)
MCHC RBC AUTO-ENTMCNC: 33.8 G/DL (ref 32.2–35.5)
MCV RBC AUTO: 99.7 FL (ref 81.4–97.8)
PLATELET # BLD AUTO: 295 K/UL (ref 164–446)
PMV BLD AUTO: 9.2 FL (ref 9–12.9)
POTASSIUM SERPL-SCNC: 4 MMOL/L (ref 3.6–5.5)
RBC # BLD AUTO: 2.91 M/UL (ref 4.2–5.4)
SODIUM SERPL-SCNC: 132 MMOL/L (ref 135–145)
WBC # BLD AUTO: 4.9 K/UL (ref 4.8–10.8)

## 2025-03-21 PROCEDURE — 700101 HCHG RX REV CODE 250: Performed by: PHYSICAL MEDICINE & REHABILITATION

## 2025-03-21 PROCEDURE — 770010 HCHG ROOM/CARE - REHAB SEMI PRIVAT*

## 2025-03-21 PROCEDURE — 99232 SBSQ HOSP IP/OBS MODERATE 35: CPT | Performed by: PHYSICAL MEDICINE & REHABILITATION

## 2025-03-21 PROCEDURE — 97530 THERAPEUTIC ACTIVITIES: CPT

## 2025-03-21 PROCEDURE — 97535 SELF CARE MNGMENT TRAINING: CPT

## 2025-03-21 PROCEDURE — 80048 BASIC METABOLIC PNL TOTAL CA: CPT

## 2025-03-21 PROCEDURE — 97140 MANUAL THERAPY 1/> REGIONS: CPT

## 2025-03-21 PROCEDURE — 97116 GAIT TRAINING THERAPY: CPT

## 2025-03-21 PROCEDURE — 97110 THERAPEUTIC EXERCISES: CPT

## 2025-03-21 PROCEDURE — 700111 HCHG RX REV CODE 636 W/ 250 OVERRIDE (IP): Mod: JZ | Performed by: PHYSICAL MEDICINE & REHABILITATION

## 2025-03-21 PROCEDURE — 700102 HCHG RX REV CODE 250 W/ 637 OVERRIDE(OP): Performed by: PHYSICAL MEDICINE & REHABILITATION

## 2025-03-21 PROCEDURE — 85027 COMPLETE CBC AUTOMATED: CPT

## 2025-03-21 PROCEDURE — A9270 NON-COVERED ITEM OR SERVICE: HCPCS | Performed by: PHYSICAL MEDICINE & REHABILITATION

## 2025-03-21 PROCEDURE — 36415 COLL VENOUS BLD VENIPUNCTURE: CPT

## 2025-03-21 RX ADMIN — GABAPENTIN 100 MG: 100 CAPSULE ORAL at 15:04

## 2025-03-21 RX ADMIN — Medication 1000 UNITS: at 08:42

## 2025-03-21 RX ADMIN — LEVOTHYROXINE SODIUM 50 MCG: 0.05 TABLET ORAL at 05:33

## 2025-03-21 RX ADMIN — OXYCODONE 5 MG: 5 TABLET ORAL at 09:57

## 2025-03-21 RX ADMIN — SENNOSIDES AND DOCUSATE SODIUM 2 TABLET: 50; 8.6 TABLET ORAL at 20:48

## 2025-03-21 RX ADMIN — MAGNESIUM 64 MG (MAGNESIUM CHLORIDE) TABLET,DELAYED RELEASE 64 MG: at 20:47

## 2025-03-21 RX ADMIN — LIDOCAINE 1 PATCH: 4 PATCH TOPICAL at 08:43

## 2025-03-21 RX ADMIN — TRAZODONE HYDROCHLORIDE 25 MG: 50 TABLET ORAL at 20:48

## 2025-03-21 RX ADMIN — CARBOXYMETHYLCELLULOSE SODIUM 1 DROP: 5 SOLUTION/ DROPS OPHTHALMIC at 20:57

## 2025-03-21 RX ADMIN — POLYETHYLENE GLYCOL 3350 1 PACKET: 17 POWDER, FOR SOLUTION ORAL at 08:48

## 2025-03-21 RX ADMIN — HYDRALAZINE HYDROCHLORIDE 25 MG: 25 TABLET ORAL at 04:40

## 2025-03-21 RX ADMIN — OMEPRAZOLE 20 MG: 20 CAPSULE, DELAYED RELEASE ORAL at 08:42

## 2025-03-21 RX ADMIN — GABAPENTIN 100 MG: 100 CAPSULE ORAL at 08:43

## 2025-03-21 RX ADMIN — LISINOPRIL 5 MG: 5 TABLET ORAL at 05:32

## 2025-03-21 RX ADMIN — GABAPENTIN 200 MG: 100 CAPSULE ORAL at 20:47

## 2025-03-21 RX ADMIN — FLECAINIDE ACETATE 25 MG: 50 TABLET ORAL at 08:43

## 2025-03-21 RX ADMIN — FLECAINIDE ACETATE 25 MG: 50 TABLET ORAL at 20:49

## 2025-03-21 RX ADMIN — OXYCODONE 5 MG: 5 TABLET ORAL at 05:32

## 2025-03-21 RX ADMIN — ENOXAPARIN SODIUM 40 MG: 100 INJECTION SUBCUTANEOUS at 18:42

## 2025-03-21 ASSESSMENT — PAIN DESCRIPTION - PAIN TYPE
TYPE: ACUTE PAIN
TYPE: CHRONIC PAIN
TYPE: ACUTE PAIN
TYPE: ACUTE PAIN;CHRONIC PAIN

## 2025-03-21 NOTE — THERAPY
Physical Therapy   Daily Treatment     Patient Name:  Alyssa Juan  Age:  84 y.o., Sex:  female  Medical Record #:  [id number]  Today's Date: 3/21/2025     Precautions  Precautions: Fall Risk  Fall Risk: History of falls  Cognition/Communication: Hard of hearing, Hearing Aids  Orthopedic: Posterior Hip Precautions  Weight Bearing: WBAT LLE    Subjective: Patient is looking forward to PT and will work through pain to get better     Objective:    03/21/25 1431   PT Charge Group   PT Gait Training (Units) 1   PT Therapeutic Exercise (Units) 1   PT Total Time Spent   PT Individual Total Time Spent (Mins) 30   Sit to Lying   Level of Assist Minimal Assist   Additional Description Use of bed rail  (assist to left leg)   Sit to Stand   Level of Assist Stand By Assist   Assistive Devices FWW   Additional Description Extra time needed   Chair/Bed-to-Chair Transfer   Level of Assist Stand By Assist   Transfer Type Stand Step Transfer   Assistive Devices FWW   Additional Description Extra time needed   Ambulation   Level of Assist Contact Guard Assist   Assistive Device FWW   Additional Description Extra time needed   Distance 2 x 100 feet  (very slow/short step length)   Interdisciplinary Plan of Care Collaboration   Patient Position at End of Therapy In Bed;Call Light within Reach;Other (Comments)  (RN in room)         Therapeutic Exercise  Purpose: to improve strength, to improve ROM/flexibility, and to improve functional endurance  Interventions:   Lower body exercises:   Standing program -   Hip flexion, 2 sets of 10 and Bilateral  Heel Rise, 2 sets of 10 and Bilateral    Assessment: Very slow gait speed and decreased clearance of foot with each step, 1 small/self corrected LOB with turning while walking, needs better standing balance to decrease risk of repeated fall    Strengths: Able to follow instructions, Alert and oriented, Independent prior level of function, Manages pain appropriately, Motivated for self care  and independence, Pleasant and cooperative, Supportive family, Willingly participates in therapeutic activities  Barriers: Generalized weakness, Impaired activity tolerance, Decreased endurance, Fatigue, Impaired balance, Limited mobility    Plan:   Bed mobility, transfer training, LE strengthening, AMB with FWW     DME           Passport items to be completed:  Get in/out of bed safely, in/out of a vehicle, safely use mobility device, walk or wheel around home/community, navigate up and down stairs, show how to get up/down from the ground, ensure home is accessible, demonstrate HEP, complete caregiver training    Physical Therapy Problems (Active)       Problem: Balance       Dates: Start:  03/19/25         Goal: STG-Within one week, patient will maintain dynamic standing x 5 minutes for ADLs with LRAD and supervised       Dates: Start:  03/19/25               Problem: Mobility       Dates: Start:  03/19/25         Goal: STG-Within one week, patient will ambulate household distance x 150 ft with LRAD, pain <3/10, supervised       Dates: Start:  03/19/25               Problem: Mobility Transfers       Dates: Start:  03/19/25         Goal: STG-Within one week, patient will perform bed mobility with supervision and LRAD       Dates: Start:  03/19/25            Goal: STG-Within one week, patient will transfer bed to chair with LRAD, SBA, adhering to spinal precautions       Dates: Start:  03/19/25               Problem: PT-Long Term Goals       Dates: Start:  03/19/25         Goal: LTG-By discharge, patient will tolerate standing x 10 minutes with LRAD, pain <2/10, Rick       Dates: Start:  03/19/25            Goal: LTG-By discharge, patient will ambulate x 300 ft with LRAD, Rick       Dates: Start:  03/19/25            Goal: LTG-By discharge, patient will transfer one surface to another Rick with LRAD, adhering to spinal precautions       Dates: Start:  03/19/25            Goal: LTG-By discharge, patient will perform  home exercise program Rick with handouts       Dates: Start:  03/19/25            Goal: LTG-By discharge, patient will ambulate up/down 4-6 stairs with supervision, bilateral railings       Dates: Start:  03/19/25            Goal: LTG-By discharge, patient will complete TUG <60 seconds with LRAD       Dates: Start:  03/19/25

## 2025-03-21 NOTE — THERAPY
"Occupational Therapy  Daily Treatment     Patient Name:  Alyssa Juan  Age:  84 y.o., Sex:  female  Medical Record #:  [id number]  Today's Date:  3/21/2025    Precautions  Precautions: Fall Risk  Fall Risk: History of falls  Cognition/Communication: Hard of hearing, Hearing Aids  Orthopedic: Posterior Hip Precautions  Weight Bearing: NWB LLE    Subjective: \" All my PT was in a group for an hour yesterday.  I didn't like  that.\"     Objective:    03/21/25 0701   OT Charge Group   OT Self Care / ADL (Units) 3   OT Therapeutic Exercise (Units) 1   OT Total Time Spent   OT Individual Total Time Spent (Mins) 60   Precautions   Precautions Fall Risk   Fall Risk History of falls   Cognition/Communication Hard of hearing;Hearing Aids   Orthopedic Posterior Hip Precautions   Weight Bearing NWB LLE   Bladder   Urine Color Yellow   Number of Times Voided 1   Bladder Device Bathroom   Oral Hygiene   Level of Assist Stand By Assist   Patient Position Standing   Grooming   Level of Assist Stand By Assist   Patient Position Standing   Upper Body Dressing   Level of Assist Set Up   Patient Position Seated   Clothing Item(s) Pullover shirt  (and unbutonned sweater)   Lower Body Dressing   Level of Assist Minimal Assist   Patient Position Seated;Standing   Clothing Item(s) Pants   Adaptive Equipment Reacher   Additional Description   (FWW)   Putting On/Taking Off Footwear   Level of Assist Set Up   Patient Position Seated   Footwear Type Treaded Socks   Adaptive Equipment Sock Aid   Toilet Transfer   Level of Assist Stand By Assist   Transfer Type Stand Step Transfer   Adaptive Equipment Grab bars   Assistive Device FWW   Toileting Hygiene   Level of Assist Stand By Assist   Lying to Sitting on Side of Bed   Level of Assist Stand By Assist   Sit to Stand   Level of Assist Stand By Assist   Assistive Devices FWW  (1 cue for hand placement)   Chair/Bed-to-Chair Transfer   Level of Assist Contact Guard Assist   Transfer Type Stand " Step Transfer   Assistive Devices FWW  (to wheelchair)   Patient Scheduling Information   Other Scheduling Comments does not like  a 1 hour group    agreeable to a 30 minutes   PT group for LE ther ex     Educated regarding purpose  and benefit of group activity.  She feels an hour is a little long and did not like not having   any individual  PT  tx  yesterday.           Therapeutic Exercise  Purpose: to improve functional endurance  Interventions:   River Med Cycle: Gear Level 3    and Time 5 minutes  x 2        FWW bed to bathroom with close  SBA    Assessment:    Continues to make functional gains.    Required some cues for using the reacher to don pants this AM    primarily due to it being initial activity of the day.   Strengths: Able to follow instructions, Effective communication skills, Good carryover of learning, Good insight into deficits/needs, Making steady progress towards goals, Manages pain appropriately, Motivated for self care and independence, Pleasant and cooperative, Supportive family, Willingly participates in therapeutic activities  Barriers: Fatigue, Generalized weakness, Impaired balance, Pain, Limited mobility    Plan:   ADL with AE   IADL  related mobility and transfers   strength/endurance building standing  tolerance  and balance activity       DME  OT DME Recommendations  Additional Equipment (NOT TYPICALLY COVERED BY INSURANCE): Other (see comments), Hip kit (Pt believes she still has hip kit from previous admission, unsure and will have friend check for her)        Occupational Therapy Goals (Active)       Problem: Bathing       Dates: Start:  03/19/25         Goal: STG-Within one week, patient will bathe with CGA.       Dates: Start:  03/19/25               Problem: Dressing       Dates: Start:  03/19/25         Goal: STG-Within one week, patient will dress LB with CGA and AE PRN.        Dates: Start:  03/19/25               Problem: Functional Transfers       Dates: Start:  03/19/25          Goal: STG-Within one week, patient will transfer to step in shower to simulate home environment with CGA.        Dates: Start:  03/19/25               Problem: Grooming       Dates: Start:  03/19/25         Goal: STG-Within one week, patient will complete grooming in standing with LRAD with CGA.       Dates: Start:  03/19/25               Problem: OT Long Term Goals       Dates: Start:  03/19/25         Goal: LTG-By discharge, patient will complete basic self care tasks with supervision.        Dates: Start:  03/19/25            Goal: LTG-By discharge, patient will perform bathroom transfers with supervision.        Dates: Start:  03/19/25               Problem: Toileting       Dates: Start:  03/19/25         Goal: STG-Within one week, patient will complete toileting tasks with CGA.        Dates: Start:  03/19/25

## 2025-03-21 NOTE — PROGRESS NOTES
..NURSING DAILY NOTE    Name: Alyssa Juan   Date of Admission: 3/18/2025   Admitting Diagnosis: Closed left hip fracture, initial encounter ([facility])  Attending Physician: JAQUAN CLARK D.O.  Allergies: Sulfa drugs and Codeine    Safety  Patient Assist  Mod assist  Patient Precautions  Precautions: Fall Risk  Fall Risk: History of falls  Cognition/Communication: Hard of hearing, Hearing Aids  Orthopedic: Posterior Hip Precautions  Weight Bearing: WBAT LLE  Bed Transfer Status  Contact Guard Assist  Toilet Transfer Status   Contact Guard Assist  Assistive Devices  Rails, Wheelchair  Oxygen  None - Room Air  Diet/Therapeutic Dining  Current Diet Order   Procedures    Diet Order Diet: Regular (total free water restriction 500mL, total fluids 1500mL per day.); Fluid modifications: (optional): 1800 ml Fluid Restriction     Pill Administration  whole    Fall Risk  Blanka Pendleton Fall Risk Scoring  18, HIGH RISK  Fall Risk Safety Measures  bed alarm, chair alarm, and low vision/hearing    Vitals  Temperature: 37.3 °C (99.2 °F)  Temp src: Oral  Pulse: 74  Respiration: 17  Blood Pressure : (!) 163/73  Blood Pressure MAP (Calculated): 103 MM HG  BP Location: Right, Upper Arm  Patient BP Position: Warner's Position     Oxygen  Pulse Oximetry: 95 %  O2 (LPM): 1  O2 Delivery Device: None - Room Air    Bowel and Bladder  Last Bowel Movement  03/20/25  Stool Type  Type 5: Soft blob with clear cut edges (passed easily)  Bowel Device  Bathroom  Continent  Bladder: Did not void   Bowel: No movement  Bladder Function  Urine Void (mL):  (Moderate)  Number of Times Voided: 1  Urine Color: Yellow  Genitourinary Assessment   Bladder Assessment (WDL):  Within Defined Limits  Urine Color: Yellow  Bladder Device: Bathroom  $ Bladder Scan Results (mL): 49    Skin  Ovidio Score   19  Sensory Interventions   Bed Types: Standard/Trauma Mattress with Overlay  Skin Preventative Measures: Waffle Overlay  Moisture Interventions          Pain  Pain Rating Scale  7 - Focus of attention, prevents doing daily activities  Pain Location  Leg, Back  Pain Location Orientation  Left  Pain Interventions   Medication (see MAR)    ADLs    Bathing   Shower, Staff  Linen Change   Partial  Personal Hygiene  Change Monica Pads  Oral Care  Brushed Teeth  ge Eaten  *  * Meal *  *, Breakfast, *  * Amount Consumed *  *, Between % Consumed  Patient Turns/Positioning  Patient turns self independently side to side without assistance, to offload sacral area      Psychosocial/Neurologic Assessment  Psychosocial Assessment  Psychosocial (WDL):  Within Defined Limits  Neurologic Assessment  Neuro (WDL): Within Defined Limits  Level of Consciousness: Alert  Orientation Level: Oriented X4  EENT (WDL):  WDL Except    Cardio/Pulmonary Assessment  Cardiac Assessment   Cardiac (WDL):  WDL Except (HTN)

## 2025-03-21 NOTE — PROGRESS NOTES
"  Physical Medicine & Rehabilitation Progress Note    Encounter Date: 3/21/2025    Chief Complaint: Hip Fracture    Interval Events (Subjective):  VS: -150 other VSS  Last BM: 3/21  Bladder: Voiding low PVRs  Schedule Meds Not Given: None  PRN Meds Taken: Hydralazine, Miralax    Patient seen and examined in her room. She is feeling well. Still had some lower BP, but not symptomatic, no nausea like yesterday. Interested in seeing neuropsychology. Was able to sleep from 9:30-3 AM but then had some pain.       ROS: 14 point ROS negative unless otherwise specified in the HPI    Objective:  VITAL SIGNS: /63   Pulse 76   Temp 36.6 °C (97.8 °F) (Oral)   Resp 18   Ht 1.549 m (5' 1\")   Wt 48.5 kg (106 lb 14.8 oz)   SpO2 98%   BMI 20.20 kg/m²     GEN: No apparent distress  HEENT: Head normocephalic, atraumatic.  Sclera nonicteric bilaterally, no ocular discharge appreciated bilaterally.  CV: Extremities warm and well-perfused, no peripheral edema appreciated bilaterally.  PULMONARY: Breathing nonlabored on room air, no respiratory accessory muscle use.  Not requiring supplemental oxygen.  SKIN: No appreciable skin breakdown on exposed areas of skin.  PSYCH: Mood and affect within normal limits.  NEURO: Awake alert.  Conversational.  Logical thought content.      Laboratory Values:  Recent Results (from the past 72 hours)   CBC with Differential    Collection Time: 03/19/25  5:34 AM   Result Value Ref Range    WBC 3.4 (L) 4.8 - 10.8 K/uL    RBC 3.15 (L) 4.20 - 5.40 M/uL    Hemoglobin 10.3 (L) 12.0 - 16.0 g/dL    Hematocrit 31.5 (L) 37.0 - 47.0 %    .0 (H) 81.4 - 97.8 fL    MCH 32.7 27.0 - 33.0 pg    MCHC 32.7 32.2 - 35.5 g/dL    RDW 49.4 35.9 - 50.0 fL    Platelet Count 264 164 - 446 K/uL    MPV 9.3 9.0 - 12.9 fL    Neutrophils-Polys 42.80 (L) 44.00 - 72.00 %    Lymphocytes 37.50 22.00 - 41.00 %    Monocytes 15.80 (H) 0.00 - 13.40 %    Eosinophils 2.40 0.00 - 6.90 %    Basophils 0.60 0.00 - 1.80 %    " Immature Granulocytes 0.90 0.00 - 0.90 %    Nucleated RBC 0.00 0.00 - 0.20 /100 WBC    Neutrophils (Absolute) 1.44 (L) 1.82 - 7.42 K/uL    Lymphs (Absolute) 1.26 1.00 - 4.80 K/uL    Monos (Absolute) 0.53 0.00 - 0.85 K/uL    Eos (Absolute) 0.08 0.00 - 0.51 K/uL    Baso (Absolute) 0.02 0.00 - 0.12 K/uL    Immature Granulocytes (abs) 0.03 0.00 - 0.11 K/uL    NRBC (Absolute) 0.00 K/uL   Comp Metabolic Panel (CMP)    Collection Time: 03/19/25  5:34 AM   Result Value Ref Range    Sodium 131 (L) 135 - 145 mmol/L    Potassium 4.7 3.6 - 5.5 mmol/L    Chloride 95 (L) 96 - 112 mmol/L    Co2 28 20 - 33 mmol/L    Anion Gap 8.0 7.0 - 16.0    Glucose 89 65 - 99 mg/dL    Bun 13 8 - 22 mg/dL    Creatinine 0.47 (L) 0.50 - 1.40 mg/dL    Calcium 9.0 8.5 - 10.5 mg/dL    Correct Calcium 9.6 8.5 - 10.5 mg/dL    AST(SGOT) 25 12 - 45 U/L    ALT(SGPT) 12 2 - 50 U/L    Alkaline Phosphatase 80 30 - 99 U/L    Total Bilirubin 0.4 0.1 - 1.5 mg/dL    Albumin 3.3 3.2 - 4.9 g/dL    Total Protein 6.2 6.0 - 8.2 g/dL    Globulin 2.9 1.9 - 3.5 g/dL    A-G Ratio 1.1 g/dL   Magnesium    Collection Time: 03/19/25  5:34 AM   Result Value Ref Range    Magnesium 1.9 1.5 - 2.5 mg/dL   Phosphorus    Collection Time: 03/19/25  5:34 AM   Result Value Ref Range    Phosphorus 3.6 2.5 - 4.5 mg/dL   TSH with Reflex to FT4    Collection Time: 03/19/25  5:34 AM   Result Value Ref Range    TSH 1.680 0.380 - 5.330 uIU/mL   Vitamin D, 25-hydroxy (blood)    Collection Time: 03/19/25  5:34 AM   Result Value Ref Range    25-Hydroxy   Vitamin D 25 56 30 - 100 ng/mL   ESTIMATED GFR    Collection Time: 03/19/25  5:34 AM   Result Value Ref Range    GFR (CKD-EPI) 93 >60 mL/min/1.73 m 2   CBC WITHOUT DIFFERENTIAL    Collection Time: 03/21/25  5:43 AM   Result Value Ref Range    WBC 4.9 4.8 - 10.8 K/uL    RBC 2.91 (L) 4.20 - 5.40 M/uL    Hemoglobin 9.8 (L) 12.0 - 16.0 g/dL    Hematocrit 29.0 (L) 37.0 - 47.0 %    MCV 99.7 (H) 81.4 - 97.8 fL    MCH 33.7 (H) 27.0 - 33.0 pg    MCHC  33.8 32.2 - 35.5 g/dL    RDW 49.7 35.9 - 50.0 fL    Platelet Count 295 164 - 446 K/uL    MPV 9.2 9.0 - 12.9 fL   Basic Metabolic Panel    Collection Time: 25  5:43 AM   Result Value Ref Range    Sodium 132 (L) 135 - 145 mmol/L    Potassium 4.0 3.6 - 5.5 mmol/L    Chloride 95 (L) 96 - 112 mmol/L    Co2 27 20 - 33 mmol/L    Glucose 77 65 - 99 mg/dL    Bun 18 8 - 22 mg/dL    Creatinine 0.54 0.50 - 1.40 mg/dL    Calcium 9.1 8.5 - 10.5 mg/dL    Anion Gap 10.0 7.0 - 16.0   ESTIMATED GFR    Collection Time: 25  5:43 AM   Result Value Ref Range    GFR (CKD-EPI) 90 >60 mL/min/1.73 m 2       Medications:  Scheduled Medications   Medication Dose Frequency    lisinopril  5 mg Q DAY    traZODone  25 mg QHS    magnesium chloride  64 mg QHS    gabapentin  100 mg BID    gabapentin  200 mg Q EVENING    oxyCODONE immediate-release  5 mg BID    Pharmacy Consult Request  1 Each PHARMACY TO DOSE    senna-docusate  2 Tablet Q EVENING    [START ON 3/24/2025] alendronate  70 mg Q7 DAYS    flecainide  25 mg BID    levothyroxine  50 mcg AM ES    lidocaine  1 Patch Q24HR    vitamin D3  1,000 Units DAILY    omeprazole  20 mg DAILY    enoxaparin (LOVENOX) injection  40 mg DAILY AT 1800     PRN medications: methocarbamol, lidocaine, hydrALAZINE, senna-docusate **AND** polyethylene glycol/lytes, lactulose, docusate sodium, bisacodyl EC, magnesium hydroxide, sodium phosphate, carboxymethylcellulose, benzocaine-menthol, mag hydrox-al hydrox-simeth, ondansetron **OR** ondansetron, sodium chloride, [] acetaminophen **FOLLOWED BY** acetaminophen, oxyCODONE immediate-release **OR** oxyCODONE immediate-release    Diet:  Current Diet Order   Procedures    Diet Order Diet: Regular (total free water restriction 500mL, total fluids 1500mL per day.); Fluid modifications: (optional): 1800 ml Fluid Restriction       Medical Decision Making and Plan:  Imapacted L Femoral Neck Fracture s/p Hemiarthroplasty 3/14/25 Dr. Cota   Left superior and  inferior pubic ramus fracture non-op  PT and OT for mobility and ADLs. Per guidelines, 15 hours per week between PT, OT and/or SLP.  Follow-up Ortho  WBAT LLE     Pain - Tylenol, oxycodone. 3/19 Schedule Gabapentin, schedule oxy in AM, add robaxin PRN.      ABLA - 3/19 10.3 stable. 3/21 9.8 Stable    Leukopenia - 3/19 3.4 monitor. 3/21 WNL 4.9     Arrhythmia - Continue Flecainide. Follows with Dr. Vail     Peripheral Neuropathy - Continue Gabapentin     Hyponatremia - Continue fluid restriction, improving. 3/19 131 improving. Liberalize fluids. 3/21 Na better 132. Continue fluid restriction for now.      GERD - Continue PPI     Hypothyroidism - Continue Synthroid     Hypertension - Lisinopril 20mg HELD due to hyperkalemia. 3/19 BP elevated required PRN hydralazine. Restart Lisinopril 10mg daily. 3/20 Patient with lower BP today, only takes 5mg at home. Reduce. 3/21 BP stable. Received Hydralazine but it was just before getting scheduled Lisinopril.      Hyperkalemia - 3/19 WNL 4.7. Restarting Lisinopril. 3/21 WNL 4     Hypomagnesemia - Continue supplement. 3/19 WNL     Vitamin D Deficiency - Supplement      Bowel - Patient on Senna-docusate for constipation prophylaxis.      Bladder - TV/PVR/BS PRN    Sleep/Depression - Schedule Trazodone. Pt had friend recommend Elavil, d/w her large side effect profile. Will consider if fails trazodone.         Upcoming Labs/imaging: 3/25     DVT PROPHYLAXIS: Start Lovenox 40mg SQ nightly on admission to ARU, held at main due to slight drop in Hgb, however repeat improved in 10's. No active bleeding, incision looks good. 3/19 Hgb stable.      HOSPITALIST FOLLOWING: No     CODE STATUS: DNAR/DNI     DISPO: Home      GUS: TBD     MEDS SENT TO: TBD     DISCHARGE SPECIALIST FOLLOW UP:   Ortho     Patient to scheduled follow up with their PCP within 2 weeks from discharge from the [facility].       ____________________________________    Dr. Jacqui Alatorre DO,  MS  [facility] - Physical Medicine & Rehabilitation   ____________________________________

## 2025-03-21 NOTE — THERAPY
"Occupational Therapy  Daily Treatment     Patient Name:  Alyssa Juan  Age:  84 y.o., Sex:  female  Medical Record #:  [id number]  Today's Date:  3/21/2025    Precautions  Precautions: (P) Fall Risk  Fall Risk: (P) History of falls  Cognition/Communication: (P) Hard of hearing, Hearing Aids  Orthopedic: (P) Posterior Hip Precautions  Weight Bearing: (P) WBAT LLE    Subjective: Pt encountered for OT sitting in WC with CNA having just completed toileting. Pleasant and agreeable to participate. C/o of LLE pain/stiffness that impacts mobility. \"I feel like it just needs to get worked out.\"       Objective:    03/21/25 0931   OT Charge Group   Charges Yes   OT Self Care / ADL (Units) 1   OT Manual Therapy Technique (Units) 1   OT Therapeutic Exercise (Units) 2   OT Total Time Spent   OT Individual Total Time Spent (Mins) 60   Precautions   Precautions Fall Risk   Fall Risk History of falls   Cognition/Communication Hard of hearing;Hearing Aids   Orthopedic Posterior Hip Precautions   Weight Bearing WBAT LLE   Chair/Bed-to-Chair Transfer   Level of Assist Contact Guard Assist   Transfer Type Stand Step Transfer   Assistive Devices FWW   Additional Description Extra time needed   Interdisciplinary Plan of Care Collaboration   IDT Collaboration with  Nursing;Certified O.T. Assistant  (KNIGHT)   Patient Position at End of Therapy In Bed;Bed Alarm On;Call Light within Reach;Tray Table within Reach;Phone within Reach   Collaboration Comments RN for med pass; KNIGHT RE POC       Therapeutic Exercise  Purpose: to improve strength, to improve ROM/flexibility, and to improve functional endurance  Interventions:   Nustep: Resistance Level 6 and Time 10:06; 443 steps    Manual Therapy  Purpose: to improve pain  Interventions: Supine on the therapy mat with wedge placed under legs to relieve LBP  Soft tissue mobilization  TrP release along the anterior/ medial, and lateral aspects of the L upper leg    Pain Management  Purpose: to " "improve perceived pain levels and to increase tolerance and participation for therapy  Interventions:  Ice Pack (ice cube packs applied to pts L groin region and L medial thigh)  Soft Tissue Mobilization/Massage (see \"Olvin Therapy\" above)    Assessment:    Pt overall at CGA emerging towards SBA for functional mobility at the FWW level, primarily limited by main and stiffness in the L upper leg/thigh. Responded well to manual tx/TrP release and use of ice for pain management, w/ mod to minimal c/o of pain by the end of session.       Strengths: Able to follow instructions, Effective communication skills, Good carryover of learning, Good insight into deficits/needs, Making steady progress towards goals, Manages pain appropriately, Motivated for self care and independence, Pleasant and cooperative, Supportive family, Willingly participates in therapeutic activities  Barriers: Fatigue, Generalized weakness, Impaired balance, Pain, Limited mobility    Plan:   ADL with AE   IADL  related mobility and transfers   strength/endurance building standing  tolerance  and balance activity       DME  OT DME Recommendations  Additional Equipment (NOT TYPICALLY COVERED BY INSURANCE): Other (see comments), Hip kit (Pt believes she still has hip kit from previous admission, unsure and will have friend check for her)    Passport items to be completed:  Perform bathroom transfers, complete dressing, complete feeding, get ready for the day, prepare a simple meal, participate in household tasks, adapt home for safety needs, demonstrate home exercise program, complete caregiver training     Occupational Therapy Goals (Active)       Problem: Bathing       Dates: Start:  03/19/25         Goal: STG-Within one week, patient will bathe with CGA.       Dates: Start:  03/19/25               Problem: Dressing       Dates: Start:  03/19/25         Goal: STG-Within one week, patient will dress LB with CGA and AE PRN.        Dates: Start:  03/19/25 "               Problem: Functional Transfers       Dates: Start:  03/19/25         Goal: STG-Within one week, patient will transfer to step in shower to simulate home environment with CGA.        Dates: Start:  03/19/25               Problem: Grooming       Dates: Start:  03/19/25         Goal: STG-Within one week, patient will complete grooming in standing with LRAD with CGA.       Dates: Start:  03/19/25               Problem: OT Long Term Goals       Dates: Start:  03/19/25         Goal: LTG-By discharge, patient will complete basic self care tasks with supervision.        Dates: Start:  03/19/25            Goal: LTG-By discharge, patient will perform bathroom transfers with supervision.        Dates: Start:  03/19/25               Problem: Toileting       Dates: Start:  03/19/25         Goal: STG-Within one week, patient will complete toileting tasks with CGA.        Dates: Start:  03/19/25

## 2025-03-21 NOTE — PROGRESS NOTES
NURSING DAILY NOTE    Name: Alyssa Juan   Date of Admission: 3/18/2025   Admitting Diagnosis: Closed left hip fracture, initial encounter ([facility])  Attending Physician: JAQUAN CLARK D.O.  Allergies: Sulfa drugs and Codeine    Safety  Patient Assist  Mod assist  Patient Precautions  Precautions: Fall Risk  Fall Risk: History of falls  Cognition/Communication: Hard of hearing, Hearing Aids  Orthopedic: Posterior Hip Precautions  Weight Bearing: WBAT LLE  Bed Transfer Status  Contact Guard Assist  Toilet Transfer Status   Contact Guard Assist  Assistive Devices  Rails, Wheelchair  Oxygen  None - Room Air  Diet/Therapeutic Dining  Current Diet Order   Procedures    Diet Order Diet: Regular (total free water restriction 500mL, total fluids 1500mL per day.); Fluid modifications: (optional): 1800 ml Fluid Restriction     Pill Administration  whole  Agitated Behavioral Scale     ABS Level of Severity       Fall Risk  Has the patient had a fall this admission?      Blanka Pendleton Fall Risk Scoring  18, HIGH RISK  Fall Risk Safety Measures  bed alarm and chair alarm    Vitals  Temperature: 37.3 °C (99.2 °F)  Temp src: Oral  Pulse: 70  Respiration: 17  Blood Pressure : (!) 142/70  Blood Pressure MAP (Calculated): 94 MM HG  BP Location: Right, Upper Arm  Patient BP Position: Warner's Position     Oxygen  Pulse Oximetry: 95 %  O2 (LPM): 1  O2 Delivery Device: None - Room Air    Bowel and Bladder  Last Bowel Movement  03/20/25  Stool Type  Type 5: Soft blob with clear cut edges (passed easily)  Bowel Device  Bathroom  Continent  Bladder: Did not void   Bowel: No movement  Bladder Function  Urine Void (mL):  (Moderate)  Number of Times Voided: 1  Urine Color: Yellow  Genitourinary Assessment   Bladder Assessment (WDL):  Within Defined Limits  Urine Color: Yellow  Bladder Device: Bathroom  $ Bladder Scan Results (mL): 49    Skin  Ovidio Score   19  Sensory Interventions   Bed Types: Standard/Trauma Mattress with  Overlay  Skin Preventative Measures: Waffle Overlay  Moisture Interventions         Pain  Pain Rating Scale  5 - Interrupts some activities  Pain Location  Leg  Pain Location Orientation  Right  Pain Interventions   Medication (see MAR)    ADLs    Bathing   Shower, Staff  Linen Change   Partial  Personal Hygiene  Change Monica Pads  Chlorhexidine Bath      Oral Care  Brushed Teeth  Teeth/Dentures     Shave     Nutrition Percentage Eaten  *  * Meal *  *, Breakfast, *  * Amount Consumed *  *, Between % Consumed  Environmental Precautions     Patient Turns/Positioning  Sitting up in wheelchair  Patient Turns Assistance/Tolerance     Bed Positions     Head of Bed Elevated         Psychosocial/Neurologic Assessment  Psychosocial Assessment  Psychosocial (WDL):  Within Defined Limits  Neurologic Assessment  Neuro (WDL): Within Defined Limits  Level of Consciousness: Alert  Orientation Level: Oriented X4  EENT (WDL):  WDL Except    Cardio/Pulmonary Assessment  Edema      Respiratory Breath Sounds     Cardiac Assessment   Cardiac (WDL):  WDL Except (HTN)

## 2025-03-22 ENCOUNTER — APPOINTMENT (OUTPATIENT)
Dept: PHYSICAL THERAPY | Facility: REHABILITATION | Age: 85
DRG: 560 | End: 2025-03-22
Attending: PHYSICAL MEDICINE & REHABILITATION
Payer: MEDICARE

## 2025-03-22 PROCEDURE — 770010 HCHG ROOM/CARE - REHAB SEMI PRIVAT*

## 2025-03-22 PROCEDURE — 97116 GAIT TRAINING THERAPY: CPT

## 2025-03-22 PROCEDURE — 700111 HCHG RX REV CODE 636 W/ 250 OVERRIDE (IP): Mod: JZ | Performed by: PHYSICAL MEDICINE & REHABILITATION

## 2025-03-22 PROCEDURE — 700102 HCHG RX REV CODE 250 W/ 637 OVERRIDE(OP): Performed by: PHYSICAL MEDICINE & REHABILITATION

## 2025-03-22 PROCEDURE — 97110 THERAPEUTIC EXERCISES: CPT

## 2025-03-22 PROCEDURE — 97530 THERAPEUTIC ACTIVITIES: CPT

## 2025-03-22 PROCEDURE — 700101 HCHG RX REV CODE 250: Performed by: PHYSICAL MEDICINE & REHABILITATION

## 2025-03-22 PROCEDURE — A9270 NON-COVERED ITEM OR SERVICE: HCPCS | Performed by: PHYSICAL MEDICINE & REHABILITATION

## 2025-03-22 RX ADMIN — Medication 1000 UNITS: at 08:56

## 2025-03-22 RX ADMIN — LIDOCAINE 1 PATCH: 4 PATCH TOPICAL at 08:57

## 2025-03-22 RX ADMIN — FLECAINIDE ACETATE 25 MG: 50 TABLET ORAL at 08:59

## 2025-03-22 RX ADMIN — ENOXAPARIN SODIUM 40 MG: 100 INJECTION SUBCUTANEOUS at 18:41

## 2025-03-22 RX ADMIN — GABAPENTIN 200 MG: 100 CAPSULE ORAL at 20:11

## 2025-03-22 RX ADMIN — POLYETHYLENE GLYCOL 3350 1 PACKET: 17 POWDER, FOR SOLUTION ORAL at 13:45

## 2025-03-22 RX ADMIN — GABAPENTIN 100 MG: 100 CAPSULE ORAL at 08:56

## 2025-03-22 RX ADMIN — FLECAINIDE ACETATE 25 MG: 50 TABLET ORAL at 20:16

## 2025-03-22 RX ADMIN — TRAZODONE HYDROCHLORIDE 25 MG: 50 TABLET ORAL at 20:11

## 2025-03-22 RX ADMIN — OXYCODONE 5 MG: 5 TABLET ORAL at 05:20

## 2025-03-22 RX ADMIN — OMEPRAZOLE 20 MG: 20 CAPSULE, DELAYED RELEASE ORAL at 08:56

## 2025-03-22 RX ADMIN — GABAPENTIN 100 MG: 100 CAPSULE ORAL at 16:07

## 2025-03-22 RX ADMIN — LEVOTHYROXINE SODIUM 50 MCG: 0.05 TABLET ORAL at 05:19

## 2025-03-22 RX ADMIN — OXYCODONE HYDROCHLORIDE 10 MG: 10 TABLET ORAL at 20:23

## 2025-03-22 RX ADMIN — SENNOSIDES AND DOCUSATE SODIUM 2 TABLET: 50; 8.6 TABLET ORAL at 20:11

## 2025-03-22 RX ADMIN — OXYCODONE 5 MG: 5 TABLET ORAL at 09:40

## 2025-03-22 RX ADMIN — LISINOPRIL 5 MG: 5 TABLET ORAL at 05:19

## 2025-03-22 RX ADMIN — MAGNESIUM 64 MG (MAGNESIUM CHLORIDE) TABLET,DELAYED RELEASE 64 MG: at 20:11

## 2025-03-22 RX ADMIN — OXYCODONE HYDROCHLORIDE 10 MG: 10 TABLET ORAL at 00:49

## 2025-03-22 ASSESSMENT — PAIN DESCRIPTION - PAIN TYPE
TYPE: ACUTE PAIN

## 2025-03-22 NOTE — PROGRESS NOTES
NURSING DAILY NOTE    Name: Alyssa Juan   Date of Admission: 3/18/2025   Admitting Diagnosis: Closed left hip fracture, initial encounter ([facility])  Attending Physician: AJQUAN CLARK D.O.  Allergies: Sulfa drugs and Codeine    Safety  Patient Assist  Mod assist  Patient Precautions  Precautions: Fall Risk  Fall Risk: History of falls  Cognition/Communication: Hard of hearing, Hearing Aids  Orthopedic: Posterior Hip Precautions  Weight Bearing: WBAT LLE  Bed Transfer Status  Stand By Assist  Toilet Transfer Status   Stand By Assist  Assistive Devices  Gait Belt, Rails, Wheelchair push  Oxygen  None - Room Air  Diet/Therapeutic Dining  Current Diet Order   Procedures    Diet Order Diet: Regular (total free water restriction 500mL, total fluids 1500mL per day.); Fluid modifications: (optional): 1800 ml Fluid Restriction     Pill Administration  whole and floated  Agitated Behavioral Scale     ABS Level of Severity       Fall Risk  Has the patient had a fall this admission?      Blanka Pendleton Fall Risk Scoring  19, HIGH RISK  Fall Risk Safety Measures  bed alarm, chair alarm, poor balance, and low vision/hearing    Vitals  Temperature: 36.6 °C (97.8 °F)  Temp src: Oral  Pulse: 76  Respiration: (!) 22  Blood Pressure : 133/63  Blood Pressure MAP (Calculated): 86 MM HG  BP Location: Right, Upper Arm  Patient BP Position: Warner's Position     Oxygen  Pulse Oximetry: 98 %  O2 (LPM): 0  O2 Delivery Device: None - Room Air    Bowel and Bladder  Last Bowel Movement  03/21/25  Stool Type  Type 5: Soft blob with clear cut edges (passed easily)  Bowel Device  Bathroom  Continent  Bladder: Did not void   Bowel: No movement  Bladder Function  Urine Void (mL):  (Moderate)  Number of Times Voided: 1  Urine Color: Yellow  Genitourinary Assessment   Bladder Assessment (WDL):  Within Defined Limits  Petersen Catheter: Not Applicable  Urine Color: Yellow  Bladder Device: Bathroom  $ Bladder Scan Results (mL): 12    Skin  Ovidio  Score   19  Sensory Interventions   Bed Types: Standard/Trauma Mattress with Overlay  Skin Preventative Measures: Pillows in Use for Support / Positioning, Waffle Overlay  Moisture Interventions         Pain  Pain Rating Scale  9 - Can't bear the pain, unable to do anything  Pain Location  Groin  Pain Location Orientation   (Generalized)  Pain Interventions   Medication (see MAR), Cold Pack    ADLs    Bathing   Patient Refused Bathing  Linen Change   Partial  Personal Hygiene  Perineal Care, Change Monica Pads  Chlorhexidine Bath      Oral Care  Brushed Teeth  Teeth/Dentures     Shave     Nutrition Percentage Eaten  *  * Meal *  *, Breakfast, *  * Amount Consumed *  *, Between % Consumed  Environmental Precautions  Treaded Slipper Socks on Patient, Personal Belongings, Wastebasket, Call Bell etc. in Easy Reach, Bed in Low Position, Communication Sign for Patients & Families  Patient Turns/Positioning  Patient turns self independently side to side without assistance, to offload sacral area  Patient Turns Assistance/Tolerance     Bed Positions  Bed Controls On, Bed Locked  Head of Bed Elevated  Self regulated      Psychosocial/Neurologic Assessment  Psychosocial Assessment  Psychosocial (WDL):  Within Defined Limits  Neurologic Assessment  Neuro (WDL): Within Defined Limits  Level of Consciousness: Alert  Orientation Level: Oriented X4  EENT (WDL):  WDL Except    Cardio/Pulmonary Assessment  Edema   LLE Edema: 1+  Respiratory Breath Sounds     Cardiac Assessment   Cardiac (WDL):  WDL Except (Hx of HTN)

## 2025-03-22 NOTE — PROGRESS NOTES
NURSING DAILY NOTE    Name: Alyssa Juan   Date of Admission: 3/18/2025   Admitting Diagnosis: Closed left hip fracture, initial encounter ([facility])  Attending Physician: JAQUAN CLARK D.O.  Allergies: Sulfa drugs and Codeine    Safety  Patient Assist  Mod assist  Patient Precautions  Precautions: Fall Risk  Fall Risk: History of falls  Cognition/Communication: Hard of hearing, Hearing Aids  Orthopedic: Posterior Hip Precautions  Weight Bearing: WBAT LLE  Bed Transfer Status  Stand By Assist  Toilet Transfer Status   Stand By Assist  Assistive Devices  Gait Belt, Hand held assist, Rails, Wheelchair  Oxygen  None - Room Air  Diet/Therapeutic Dining  Current Diet Order   Procedures    Diet Order Diet: Regular (total free water restriction 500mL, total fluids 1500mL per day.); Fluid modifications: (optional): 1800 ml Fluid Restriction     Pill Administration  whole  Agitated Behavioral Scale     ABS Level of Severity       Fall Risk  Has the patient had a fall this admission?      Blanka Pendleton Fall Risk Scoring  18, HIGH RISK  Fall Risk Safety Measures  bed alarm, chair alarm, and poor balance    Vitals  Temperature: 36.6 °C (97.8 °F)  Temp src: Oral  Pulse: 76  Respiration: 18  Blood Pressure : 133/63  Blood Pressure MAP (Calculated): 86 MM HG  BP Location: Right, Upper Arm  Patient BP Position: Warner's Position     Oxygen  Pulse Oximetry: 98 %  O2 (LPM): 0  O2 Delivery Device: None - Room Air    Bowel and Bladder  Last Bowel Movement  03/21/25  Stool Type  Type 5: Soft blob with clear cut edges (passed easily)  Bowel Device  Bathroom  Continent  Bladder: Did not void   Bowel: No movement  Bladder Function  Urine Void (mL):  (Moderate)  Number of Times Voided: 1  Urine Color: Yellow  Genitourinary Assessment   Bladder Assessment (WDL):  Within Defined Limits  Urine Color: Yellow  Bladder Device: Bathroom  $ Bladder Scan Results (mL): 49    Skin  Ovidio Score   19  Sensory Interventions   Bed Types:  Standard/Trauma Mattress with Overlay  Skin Preventative Measures: Waffle Overlay  Moisture Interventions         Pain  Pain Rating Scale  8 - Awful, hard to do anything (during ambulation, 5/10 in sitting)  Pain Location  Leg, Hip  Pain Location Orientation  Left  Pain Interventions   Medication (see MAR)    ADLs    Bathing   Shower, Staff  Linen Change   Partial  Personal Hygiene  Change Monica Pads, Moist Monica Wipes, Perineal Care  Chlorhexidine Bath      Oral Care  Brushed Teeth  Teeth/Dentures     Shave     Nutrition Percentage Eaten  *  * Meal *  *, Breakfast, *  * Amount Consumed *  *, Between % Consumed  Environmental Precautions     Patient Turns/Positioning  Patient turns self independently side to side without assistance, to offload sacral area  Patient Turns Assistance/Tolerance     Bed Positions     Head of Bed Elevated         Psychosocial/Neurologic Assessment  Psychosocial Assessment  Psychosocial (WDL):  Within Defined Limits  Neurologic Assessment  Neuro (WDL): Within Defined Limits  Level of Consciousness: Alert  Orientation Level: Oriented X4  EENT (WDL):  WDL Except    Cardio/Pulmonary Assessment  Edema      Respiratory Breath Sounds     Cardiac Assessment   Cardiac (WDL):  WDL Except (HTN)

## 2025-03-22 NOTE — THERAPY
Physical Therapy   Daily Treatment     Patient Name:  Alyssa Juan  Age:  84 y.o., Sex:  female  Medical Record #:  [id number]  Today's Date: 3/22/2025     Precautions  Precautions: Fall Risk  Fall Risk: Poor balance, History of falls  Cognition/Communication: Hard of hearing, Hearing Aids  Orthopedic: Posterior Hip Precautions  Weight Bearing: WBAT LLE    Subjective: Pt in room supine in bed with ice pack on groin and stated ''My groin hurts still but I can try to participate''. Pt agreeable to therapy session.    Objective:    03/22/25 1101   PT Charge Group   PT Gait Training (Units) 1   PT Therapeutic Activities (Units) 1   PT Total Time Spent   PT Individual Total Time Spent (Mins) 30   Precautions   Precautions Fall Risk   Fall Risk Poor balance;History of falls   Cognition/Communication Hard of hearing;Hearing Aids   Orthopedic Posterior Hip Precautions   Weight Bearing WBAT LLE   Pain 0 - 10 Group   Location Groin   Location Orientation Left   Pain Rating Scale (NPRS) 6   Description Aching   Sit to Lying   Level of Assist Minimal Assist   Additional Description Extra time needed  (BLE management)   Lying to Sitting on Side of Bed   Level of Assist Stand By Assist   Additional Description Head of bed elevated;Use of bed rail;Cueing needed;Extra time needed   Sit to Stand   Level of Assist Stand By Assist   Assistive Devices FWW   Additional Description Cueing needed;Extra time needed   Chair/Bed-to-Chair Transfer   Level of Assist Stand By Assist   Transfer Type Stand Step Transfer   Assistive Devices FWW   Additional Description Extra time needed   Ambulation   Level of Assist Contact Guard Assist   Assistive Device FWW   Additional Description Extra time needed   Distance 100ft x 3  (slow, short step length, narrow angela)   Interdisciplinary Plan of Care Collaboration   IDT Collaboration with  Physician   Patient Position at End of Therapy Call Light within Reach;Tray Table within Reach;Phone within  Reach;Seated;Chair Alarm On;Self Releasing Lap Belt Applied   Collaboration Comments Physician present at beginning of session.         Therapeutic Activities  Purpose: to improve performance and function of daily activities and to increase safety with activities of daily life and mobility related to activities of daily life  Interventions:   Bed/chair transfer training  Bed mobility training (scooting, supine to sit, sit to supine, rolling)  Sit to stand training    Gait Training  Purpose: to improve functional ambulation and to improve walking endurance  Interventions:   Safe use of device  Walking endurance  Normalization of gait   Deviations (laterality in comments):  Bradykinetic  Decreased Chanel    Assessment: Pt tolerated session fairly, ambulated with very slow steady gait with longer rest breaks in between gait. Pt demonstrated heavy leaning of UE on RW to advance RLE due to increase pain with wb on LLE.     Strengths: Able to follow instructions, Alert and oriented, Independent prior level of function, Manages pain appropriately, Motivated for self care and independence, Pleasant and cooperative, Supportive family, Willingly participates in therapeutic activities  Barriers: Generalized weakness, Impaired activity tolerance, Decreased endurance, Fatigue, Impaired balance, Limited mobility       Plan:   Bed mobility, transfer training, LE strengthening, AMB with FWW         DME  Passport items to be completed:  Get in/out of bed safely, in/out of a vehicle, safely use mobility device, walk or wheel around home/community, navigate up and down stairs, show how to get up/down from the ground, ensure home is accessible, demonstrate HEP, complete caregiver training    Physical Therapy Problems (Active)       Problem: Balance       Dates: Start:  03/19/25         Goal: STG-Within one week, patient will maintain dynamic standing x 5 minutes for ADLs with LRAD and supervised       Dates: Start:  03/19/25                Problem: Mobility       Dates: Start:  03/19/25         Goal: STG-Within one week, patient will ambulate household distance x 150 ft with LRAD, pain <3/10, supervised       Dates: Start:  03/19/25               Problem: Mobility Transfers       Dates: Start:  03/19/25         Goal: STG-Within one week, patient will perform bed mobility with supervision and LRAD       Dates: Start:  03/19/25            Goal: STG-Within one week, patient will transfer bed to chair with LRAD, SBA, adhering to spinal precautions       Dates: Start:  03/19/25               Problem: PT-Long Term Goals       Dates: Start:  03/19/25         Goal: LTG-By discharge, patient will tolerate standing x 10 minutes with LRAD, pain <2/10, Rick       Dates: Start:  03/19/25            Goal: LTG-By discharge, patient will ambulate x 300 ft with LRAD, Rick       Dates: Start:  03/19/25            Goal: LTG-By discharge, patient will transfer one surface to another Rick with LRAD, adhering to spinal precautions       Dates: Start:  03/19/25            Goal: LTG-By discharge, patient will perform home exercise program Rick with handouts       Dates: Start:  03/19/25            Goal: LTG-By discharge, patient will ambulate up/down 4-6 stairs with supervision, bilateral railings       Dates: Start:  03/19/25            Goal: LTG-By discharge, patient will complete TUG <60 seconds with LRAD       Dates: Start:  03/19/25

## 2025-03-22 NOTE — THERAPY
Physical Therapy   Daily Treatment     Patient Name:  Alyssa Juan  Age:  84 y.o., Sex:  female  Medical Record #:  [id number]  Today's Date: 3/22/2025     Precautions  Precautions: (P) Fall Risk  Fall Risk: (P) History of falls  Cognition/Communication: (P) Hard of hearing, Hearing Aids  Orthopedic: (P) Posterior Hip Precautions  Weight Bearing: (P) WBAT LLE    Subjective: Pt was seated in w/c upon arrival. Pt reported pain in her crotch that started last night. Pt agreed to go on the Nustep.     Objective:    03/22/25 0931   PT Charge Group   PT Therapeutic Exercise (Units) 2   PT Total Time Spent   PT Individual Total Time Spent (Mins) 30   Precautions   Precautions Fall Risk   Fall Risk History of falls   Cognition/Communication Hard of hearing;Hearing Aids   Orthopedic Posterior Hip Precautions   Weight Bearing WBAT LLE   Pain 0 - 10 Group   Location Groin   Location Orientation Left   Pain Rating Scale (NPRS) 6   Description Aching;Dull   Cognition    Level of Consciousness Alert   Sit to Lying   Level of Assist Minimal Assist   Additional Description Extra time needed  (BLE management)   Sit to Stand   Level of Assist Stand By Assist   Assistive Devices FWW   Additional Description Cueing needed;Extra time needed   Chair/Bed-to-Chair Transfer   Level of Assist Stand By Assist   Transfer Type Stand Step Transfer   Assistive Devices FWW   Additional Description Extra time needed   Patient Scheduling Information   Primary or Coverage PT [facility]   Interdisciplinary Plan of Care Collaboration   IDT Collaboration with  Nursing   Patient Position at End of Therapy In Bed;Call Light within Reach;Tray Table within Reach;Phone within Reach   Collaboration Comments RN gave pain medication during session         Therapeutic Exercise  Purpose: to improve strength, to improve ROM/flexibility, and to improve functional endurance  Interventions:   Nustep: Resistance Level 4 and Time 10 minutes. 540 steps    Assessment:  Alyssa was is high pain upon arrival and was limited with more activities during session. RN gave pain medication. She was able to tolerate NuStep exercise this session with no report of increase in pain. Ice pack helps alleviate her pain.     Strengths: Able to follow instructions, Alert and oriented, Independent prior level of function, Manages pain appropriately, Motivated for self care and independence, Pleasant and cooperative, Supportive family, Willingly participates in therapeutic activities  Barriers: Generalized weakness, Impaired activity tolerance, Decreased endurance, Fatigue, Impaired balance, Limited mobility    Plan:   Bed mobility, transfer training, LE strengthening, AMB with FWW     DME           Passport items to be completed:  Get in/out of bed safely, in/out of a vehicle, safely use mobility device, walk or wheel around home/community, navigate up and down stairs, show how to get up/down from the ground, ensure home is accessible, demonstrate HEP, complete caregiver training    Physical Therapy Problems (Active)       Problem: Balance       Dates: Start:  03/19/25         Goal: STG-Within one week, patient will maintain dynamic standing x 5 minutes for ADLs with LRAD and supervised       Dates: Start:  03/19/25               Problem: Mobility       Dates: Start:  03/19/25         Goal: STG-Within one week, patient will ambulate household distance x 150 ft with LRAD, pain <3/10, supervised       Dates: Start:  03/19/25               Problem: Mobility Transfers       Dates: Start:  03/19/25         Goal: STG-Within one week, patient will perform bed mobility with supervision and LRAD       Dates: Start:  03/19/25            Goal: STG-Within one week, patient will transfer bed to chair with LRAD, SBA, adhering to spinal precautions       Dates: Start:  03/19/25               Problem: PT-Long Term Goals       Dates: Start:  03/19/25         Goal: LTG-By discharge, patient will tolerate standing x  10 minutes with LRAD, pain <2/10, Rick       Dates: Start:  03/19/25            Goal: LTG-By discharge, patient will ambulate x 300 ft with LRAD, Rick       Dates: Start:  03/19/25            Goal: LTG-By discharge, patient will transfer one surface to another Rick with LRAD, adhering to spinal precautions       Dates: Start:  03/19/25            Goal: LTG-By discharge, patient will perform home exercise program Rick with handouts       Dates: Start:  03/19/25            Goal: LTG-By discharge, patient will ambulate up/down 4-6 stairs with supervision, bilateral railings       Dates: Start:  03/19/25            Goal: LTG-By discharge, patient will complete TUG <60 seconds with LRAD       Dates: Start:  03/19/25

## 2025-03-22 NOTE — CARE PLAN
The patient is Stable - Low risk of patient condition declining or worsening    Shift Goals  Clinical Goals: Safety, Wound care, Pain control  Patient Goals: Rest  Family Goals: JOVANI    Progress made toward(s) clinical / shift goals:      Problem: Knowledge Deficit - Standard  Goal: Patient and family/care givers will demonstrate understanding of plan of care, disease process/condition, diagnostic tests and medications  Outcome: Progressing     Problem: Fall Risk - Rehab  Goal: Patient will remain free from falls  Outcome: Progressing  Moscoso Helder Score: 19  Interventions:  -Bed/Strip alarm in place  -Chair strip alarm in place  -Treaded socks on pt  -Fall risk band and communication signs in place  -Bed rails in place  -Call light and belongings within reach     Problem: Pain - Standard  Goal: Alleviation of pain or a reduction in pain to the patient’s comfort goal  Outcome: Progressing  Pt tolerates PRN pain medication well.

## 2025-03-23 ENCOUNTER — APPOINTMENT (OUTPATIENT)
Dept: OCCUPATIONAL THERAPY | Facility: REHABILITATION | Age: 85
DRG: 560 | End: 2025-03-23
Attending: PHYSICAL MEDICINE & REHABILITATION
Payer: MEDICARE

## 2025-03-23 ENCOUNTER — APPOINTMENT (OUTPATIENT)
Dept: PHYSICAL THERAPY | Facility: REHABILITATION | Age: 85
DRG: 560 | End: 2025-03-23
Attending: PHYSICAL MEDICINE & REHABILITATION
Payer: MEDICARE

## 2025-03-23 PROCEDURE — 97530 THERAPEUTIC ACTIVITIES: CPT | Mod: CO

## 2025-03-23 PROCEDURE — 99232 SBSQ HOSP IP/OBS MODERATE 35: CPT | Performed by: PHYSICAL MEDICINE & REHABILITATION

## 2025-03-23 PROCEDURE — 700111 HCHG RX REV CODE 636 W/ 250 OVERRIDE (IP): Performed by: PHYSICAL MEDICINE & REHABILITATION

## 2025-03-23 PROCEDURE — 97110 THERAPEUTIC EXERCISES: CPT | Mod: CO

## 2025-03-23 PROCEDURE — A9270 NON-COVERED ITEM OR SERVICE: HCPCS | Performed by: PHYSICAL MEDICINE & REHABILITATION

## 2025-03-23 PROCEDURE — 770010 HCHG ROOM/CARE - REHAB SEMI PRIVAT*

## 2025-03-23 PROCEDURE — 700102 HCHG RX REV CODE 250 W/ 637 OVERRIDE(OP): Performed by: PHYSICAL MEDICINE & REHABILITATION

## 2025-03-23 PROCEDURE — 97530 THERAPEUTIC ACTIVITIES: CPT

## 2025-03-23 PROCEDURE — 700101 HCHG RX REV CODE 250: Performed by: PHYSICAL MEDICINE & REHABILITATION

## 2025-03-23 PROCEDURE — 97535 SELF CARE MNGMENT TRAINING: CPT | Mod: CO

## 2025-03-23 PROCEDURE — 97116 GAIT TRAINING THERAPY: CPT

## 2025-03-23 PROCEDURE — 700112 HCHG RX REV CODE 229: Mod: JZ | Performed by: PHYSICAL MEDICINE & REHABILITATION

## 2025-03-23 PROCEDURE — A9270 NON-COVERED ITEM OR SERVICE: HCPCS | Mod: JZ | Performed by: PHYSICAL MEDICINE & REHABILITATION

## 2025-03-23 PROCEDURE — 97110 THERAPEUTIC EXERCISES: CPT

## 2025-03-23 RX ORDER — AMOXICILLIN 250 MG
2 CAPSULE ORAL EVERY EVENING
Status: DISCONTINUED | OUTPATIENT
Start: 2025-03-23 | End: 2025-03-26

## 2025-03-23 RX ORDER — POLYETHYLENE GLYCOL 3350 17 G/17G
1 POWDER, FOR SOLUTION ORAL DAILY
Status: DISCONTINUED | OUTPATIENT
Start: 2025-03-23 | End: 2025-03-26

## 2025-03-23 RX ADMIN — LISINOPRIL 5 MG: 5 TABLET ORAL at 05:26

## 2025-03-23 RX ADMIN — OMEPRAZOLE 20 MG: 20 CAPSULE, DELAYED RELEASE ORAL at 08:47

## 2025-03-23 RX ADMIN — ENOXAPARIN SODIUM 40 MG: 100 INJECTION SUBCUTANEOUS at 18:19

## 2025-03-23 RX ADMIN — OXYCODONE 5 MG: 5 TABLET ORAL at 05:24

## 2025-03-23 RX ADMIN — METHOCARBAMOL 250 MG: 500 TABLET ORAL at 11:59

## 2025-03-23 RX ADMIN — OXYCODONE 5 MG: 5 TABLET ORAL at 14:14

## 2025-03-23 RX ADMIN — TRAZODONE HYDROCHLORIDE 25 MG: 50 TABLET ORAL at 20:25

## 2025-03-23 RX ADMIN — LIDOCAINE 1 PATCH: 4 PATCH TOPICAL at 08:46

## 2025-03-23 RX ADMIN — MAGNESIUM HYDROXIDE 30 ML: 1200 LIQUID ORAL at 14:16

## 2025-03-23 RX ADMIN — OXYCODONE 5 MG: 5 TABLET ORAL at 01:05

## 2025-03-23 RX ADMIN — Medication 1000 UNITS: at 08:47

## 2025-03-23 RX ADMIN — ONDANSETRON 4 MG: 4 TABLET, ORALLY DISINTEGRATING ORAL at 21:49

## 2025-03-23 RX ADMIN — MAGNESIUM 64 MG (MAGNESIUM CHLORIDE) TABLET,DELAYED RELEASE 64 MG: at 20:25

## 2025-03-23 RX ADMIN — ACETAMINOPHEN 650 MG: 325 TABLET ORAL at 23:59

## 2025-03-23 RX ADMIN — FLECAINIDE ACETATE 25 MG: 50 TABLET ORAL at 20:25

## 2025-03-23 RX ADMIN — GABAPENTIN 200 MG: 100 CAPSULE ORAL at 20:25

## 2025-03-23 RX ADMIN — POLYETHYLENE GLYCOL 3350 1 PACKET: 17 POWDER, FOR SOLUTION ORAL at 11:59

## 2025-03-23 RX ADMIN — GABAPENTIN 100 MG: 100 CAPSULE ORAL at 08:47

## 2025-03-23 RX ADMIN — FLECAINIDE ACETATE 25 MG: 50 TABLET ORAL at 09:42

## 2025-03-23 RX ADMIN — DOCUSATE SODIUM 283 MG: 283 LIQUID RECTAL at 14:16

## 2025-03-23 RX ADMIN — OXYCODONE HYDROCHLORIDE 10 MG: 10 TABLET ORAL at 20:24

## 2025-03-23 RX ADMIN — GABAPENTIN 100 MG: 100 CAPSULE ORAL at 14:16

## 2025-03-23 RX ADMIN — LEVOTHYROXINE SODIUM 50 MCG: 0.05 TABLET ORAL at 05:24

## 2025-03-23 RX ADMIN — OXYCODONE 5 MG: 5 TABLET ORAL at 09:44

## 2025-03-23 RX ADMIN — SENNOSIDES AND DOCUSATE SODIUM 2 TABLET: 50; 8.6 TABLET ORAL at 20:25

## 2025-03-23 ASSESSMENT — PAIN DESCRIPTION - PAIN TYPE
TYPE: ACUTE PAIN

## 2025-03-23 NOTE — PROGRESS NOTES
NURSING DAILY NOTE    Name: Alyssa Juan   Date of Admission: 3/18/2025   Admitting Diagnosis: Closed left hip fracture, initial encounter ([facility])  Attending Physician: JAQUAN CLARK D.O.  Allergies: Sulfa drugs and Codeine    Safety  Patient Assist  Mod assist  Patient Precautions  Precautions: Fall Risk  Fall Risk: Poor balance, History of falls  Cognition/Communication: Hard of hearing, Hearing Aids  Orthopedic: Posterior Hip Precautions  Weight Bearing: WBAT LLE  Bed Transfer Status  Stand By Assist  Toilet Transfer Status   Stand By Assist  Assistive Devices  Gait Belt, Rails, Wheelchair push  Oxygen  None - Room Air  Diet/Therapeutic Dining  Current Diet Order   Procedures    Diet Order Diet: Regular (total free water restriction 500mL, total fluids 1500mL per day.); Fluid modifications: (optional): 1800 ml Fluid Restriction     Pill Administration  whole  Agitated Behavioral Scale     ABS Level of Severity       Fall Risk  Has the patient had a fall this admission?      Blanka Pendleton Fall Risk Scoring  19, HIGH RISK  Fall Risk Safety Measures  bed alarm, chair alarm, and poor balance    Vitals  Temperature: 37.1 °C (98.7 °F)  Temp src: Temporal  Pulse: 71  Respiration: 18  Blood Pressure : 130/68  Blood Pressure MAP (Calculated): 89 MM HG  BP Location: Right, Upper Arm  Patient BP Position: Sitting     Oxygen  Pulse Oximetry: 94 %  O2 (LPM): 0  O2 Delivery Device: None - Room Air    Bowel and Bladder  Last Bowel Movement  03/21/25  Stool Type  Type 5: Soft blob with clear cut edges (passed easily)  Bowel Device  Bathroom  Continent  Bladder: Did not void   Bowel: No movement  Bladder Function  Urine Void (mL):  (Moderate)  Number of Times Voided: 1  Urine Color: Yellow  Genitourinary Assessment   Bladder Assessment (WDL):  Within Defined Limits  Petersen Catheter: Not Applicable  Urine Color: Yellow  Bladder Device: Bathroom  $ Bladder Scan Results (mL): 12    Skin  Ovidio Score   19  Sensory  Interventions   Bed Types: Standard/Trauma Mattress with Overlay  Skin Preventative Measures: Pillows in Use for Support / Positioning, Waffle Overlay  Moisture Interventions         Pain  Pain Rating Scale  6 - Hard to ignore, avoid usual activities  Pain Location  Groin  Pain Location Orientation  Left  Pain Interventions   Medication (see MAR)    ADLs    Bathing   Patient Refused Bathing  Linen Change   Partial  Personal Hygiene  Perineal Care, Change Monica Pads  Chlorhexidine Bath      Oral Care  Brushed Teeth  Teeth/Dentures     Shave     Nutrition Percentage Eaten  *  * Meal *  *, Breakfast, *  * Amount Consumed *  *, Between % Consumed  Environmental Precautions  Treaded Slipper Socks on Patient, Personal Belongings, Wastebasket, Call Bell etc. in Easy Reach, Bed in Low Position, Communication Sign for Patients & Families  Patient Turns/Positioning  Patient turns self independently side to side without assistance, to offload sacral area  Patient Turns Assistance/Tolerance     Bed Positions  Bed Controls On, Bed Locked  Head of Bed Elevated  Self regulated      Psychosocial/Neurologic Assessment  Psychosocial Assessment  Psychosocial (WDL):  Within Defined Limits  Neurologic Assessment  Neuro (WDL): Within Defined Limits  Level of Consciousness: Alert  Orientation Level: Oriented X4  EENT (WDL):  WDL Except    Cardio/Pulmonary Assessment  Edema   LLE Edema: 1+  Respiratory Breath Sounds     Cardiac Assessment   Cardiac (WDL):  WDL Except (Hx of HTN)

## 2025-03-23 NOTE — PROGRESS NOTES
Physical Medicine & Rehabilitation Progress Note  Encounter Date: 3/23/2025    Chief Complaint:   Hip fracture    Interval Events (Subjective):  Hip pain overnight.  Pain improved with ice as well as with pain medication.    Objective:  VITAL SIGNS: VITAL SIGNS:   Vitals:    03/23/25 0105 03/23/25 0437 03/23/25 0524 03/23/25 0526   BP:  (!) 150/73  (!) 165/85   Pulse:  (!) 58     Resp: 18 18 16    Temp:  36.1 °C (97 °F)     TempSrc:  Temporal     SpO2:  100%     Weight:       Height:             Gen: NAD  Psych: Mood and affect appropriate  CV: RRR,  no edema  Resp: CTAB, no upper airway sounds  Abd: NTND  Neuro: Alert and oriented x 3    Laboratory Values:  Recent Results (from the past 72 hours)   CBC WITHOUT DIFFERENTIAL    Collection Time: 03/21/25  5:43 AM   Result Value Ref Range    WBC 4.9 4.8 - 10.8 K/uL    RBC 2.91 (L) 4.20 - 5.40 M/uL    Hemoglobin 9.8 (L) 12.0 - 16.0 g/dL    Hematocrit 29.0 (L) 37.0 - 47.0 %    MCV 99.7 (H) 81.4 - 97.8 fL    MCH 33.7 (H) 27.0 - 33.0 pg    MCHC 33.8 32.2 - 35.5 g/dL    RDW 49.7 35.9 - 50.0 fL    Platelet Count 295 164 - 446 K/uL    MPV 9.2 9.0 - 12.9 fL   Basic Metabolic Panel    Collection Time: 03/21/25  5:43 AM   Result Value Ref Range    Sodium 132 (L) 135 - 145 mmol/L    Potassium 4.0 3.6 - 5.5 mmol/L    Chloride 95 (L) 96 - 112 mmol/L    Co2 27 20 - 33 mmol/L    Glucose 77 65 - 99 mg/dL    Bun 18 8 - 22 mg/dL    Creatinine 0.54 0.50 - 1.40 mg/dL    Calcium 9.1 8.5 - 10.5 mg/dL    Anion Gap 10.0 7.0 - 16.0   ESTIMATED GFR    Collection Time: 03/21/25  5:43 AM   Result Value Ref Range    GFR (CKD-EPI) 90 >60 mL/min/1.73 m 2       Medications:  Scheduled Medications   Medication Dose Frequency    lisinopril  5 mg Q DAY    traZODone  25 mg QHS    magnesium chloride  64 mg QHS    gabapentin  100 mg BID    gabapentin  200 mg Q EVENING    oxyCODONE immediate-release  5 mg BID    Pharmacy Consult Request  1 Each PHARMACY TO DOSE    senna-docusate  2 Tablet Q EVENING     [START ON 3/24/2025] alendronate  70 mg Q7 DAYS    flecainide  25 mg BID    levothyroxine  50 mcg AM ES    lidocaine  1 Patch Q24HR    vitamin D3  1,000 Units DAILY    omeprazole  20 mg DAILY    enoxaparin (LOVENOX) injection  40 mg DAILY AT 1800     PRN medications: methocarbamol, lidocaine, hydrALAZINE, senna-docusate **AND** polyethylene glycol/lytes, lactulose, docusate sodium, bisacodyl EC, magnesium hydroxide, sodium phosphate, carboxymethylcellulose, benzocaine-menthol, mag hydrox-al hydrox-simeth, ondansetron **OR** ondansetron, sodium chloride, [] acetaminophen **FOLLOWED BY** acetaminophen, oxyCODONE immediate-release **OR** oxyCODONE immediate-release    Bowel:  Last Documented BM Date:   Last Documented BM Description:      Bladder:  Last Documented Urine Void (mL):  (large)  Last Documented Bladder Scan: Post Void  Last Documented Bladder Scan Results (mL): 6    Physical Therapy:  Bed Mobility    Transfers        Mobility     Stairs    Barriers to Discharge Home:      Occupational Therapy:  Grooming     Bathing     UB Dressing     LB Dressing     Toileting     Shower & Transfer     Barriers to Discharge Home:    Diet:  Current Diet Order   Procedures    Diet Order Diet: Regular (total free water restriction 500mL, total fluids 1500mL per day.); Fluid modifications: (optional): 1800 ml Fluid Restriction     Medical Decision Making and Plan:  Femoral neck fracture status post hemiarthroplasty by Dr. Cota  Pelvic fractures  Continue comprehensive inpatient rehabilitation with physical therapy and Occupational Therapy    Pain  Overall stable with Tylenol, oxycodone, gabapentin.  Pain improves with ice.  Labs reviewed.  Vitals reviewed.  Case discussed with patient's RN.    Peripheral neuropathy continue gabapentin    DVT prophylaxis continue Lovenox    I, Warren Holloway M.D., personally performed a complete drug regimen review and no potential clinically significant medication issues were  identified.  _____________________________________    Warren Amie, MD  [facility] - Physical Medicine & Rehabilitation     _____________________________________

## 2025-03-23 NOTE — THERAPY
Physical Therapy   Daily Treatment     Patient Name:  Alyssa Juan  Age:  84 y.o., Sex:  female  Medical Record #:  [id number]  Today's Date: 3/23/2025     Precautions  Precautions: Fall Risk  Fall Risk: Poor balance, History of falls  Cognition/Communication: Hard of hearing, Hearing Aids  Orthopedic: Posterior Hip Precautions  Weight Bearing: WBAT LLE    Subjective: Pt agreeable to both sessions detailed below.    Objective:    03/23/25 0701   PT Charge Group   PT Gait Training (Units) 1   PT Therapeutic Exercise (Units) 2   PT Therapeutic Activities (Units) 1   PT Total Time Spent   PT Individual Total Time Spent (Mins) 60   Pain 0 - 10 Group   Location Leg;Groin   Location Orientation Left   Description Aching   Lying to Sitting on Side of Bed   Level of Assist Moderate Assist   Additional Description Head of bed elevated   Sit to Stand   Level of Assist Supervised   Assistive Devices FWW   Chair/Bed-to-Chair Transfer   Level of Assist Supervised   Transfer Type Stand Step Transfer   Assistive Devices FWW   Ambulation   Level of Assist Stand By Assist   Assistive Device FWW   Additional Description Extra time needed   Distance 65ft x2   Interdisciplinary Plan of Care Collaboration   Patient Position at End of Therapy Seated;Chair Alarm On;Call Light within Reach;Tray Table within Reach;Phone within Reach       Therapeutic Activities  Purpose: to improve performance and function of daily activities and to increase safety with activities of daily life and mobility related to activities of daily life  Interventions:   Self-care  ModA for LB dressing, SPV during UB dressing    Therapeutic Exercise  Purpose: to improve strength and to improve functional endurance  Interventions:   Lower body exercises:   Seated program -   Ankle pumps, 2 sets of 10 and Bilateral  Hip abduction, 2 sets of 10, Bilateral, and Light Resistance Theraband  Hip adduction, 2 sets of 10, Bilateral, and Other Resistance 1-3 second  isometric hold  Long arc quad, 2 sets of 10, Right, and Left  Hamstring curl, 2 sets of 10, Right, Left, and Light Resistance Theraband    Gait Training  Purpose: to improve functional ambulation and to improve walking endurance  Interventions:   Walking endurance  Deviations (laterality in comments):  Bradykinetic  Step-To Pattern       03/23/25 0930   PT Charge Group   PT Therapeutic Exercise (Units) 1   PT Therapeutic Activities (Units) 1   PT Total Time Spent   PT Individual Total Time Spent (Mins) 30   Pain 0 - 10 Group   Location Leg;Groin   Location Orientation Left   Pain Rating Scale (NPRS) 7   Description Dull  (RN delivered pain meds)   Sit to Lying   Level of Assist Minimal Assist   Additional Description Extra time needed  (BLE management)   Sit to Stand   Level of Assist Supervised   Assistive Devices FWW   Chair/Bed-to-Chair Transfer   Level of Assist Stand By Assist   Transfer Type Stand Step Transfer   Assistive Devices FWW   Additional Description Extra time needed   Interdisciplinary Plan of Care Collaboration   IDT Collaboration with  Nursing;Family / Caregiver   Patient Position at End of Therapy In Bed;Bed Alarm On;Call Light within Reach;Tray Table within Reach;Phone within Reach;Family / Friend in Room   Collaboration Comments RN provided pain meds; son and friend present post-session     Therapeutic Activities  Purpose: to improve performance and function of daily activities and to increase safety with activities of daily life and mobility related to activities of daily life  Interventions:   Bed mobility training (scooting, supine to sit, sit to supine, rolling)  ModA for LB dressing    Therapeutic Exercise  Purpose: to improve strength and to improve functional endurance  Interventions:   Nustep: Resistance Level 4 and Time 8:14 minutes      Assessment: Pt continues to be limited by L groin pain, but has good insight into CLOF and limitations. Pt requires more physical assistance during  bed mobility tasks when pain is exacerbated.    Strengths: Able to follow instructions, Alert and oriented, Independent prior level of function, Manages pain appropriately, Motivated for self care and independence, Pleasant and cooperative, Supportive family, Willingly participates in therapeutic activities  Barriers: Generalized weakness, Impaired activity tolerance, Decreased endurance, Fatigue, Impaired balance, Limited mobility    Plan:   Bed mobility, transfer training, LE strengthening, AMB with FWW     DME           Passport items to be completed:  Get in/out of bed safely, in/out of a vehicle, safely use mobility device, walk or wheel around home/community, navigate up and down stairs, show how to get up/down from the ground, ensure home is accessible, demonstrate HEP, complete caregiver training    Physical Therapy Problems (Active)       Problem: Balance       Dates: Start:  03/19/25         Goal: STG-Within one week, patient will maintain dynamic standing x 5 minutes for ADLs with LRAD and supervised       Dates: Start:  03/19/25               Problem: Mobility       Dates: Start:  03/19/25         Goal: STG-Within one week, patient will ambulate household distance x 150 ft with LRAD, pain <3/10, supervised       Dates: Start:  03/19/25               Problem: Mobility Transfers       Dates: Start:  03/19/25         Goal: STG-Within one week, patient will perform bed mobility with supervision and LRAD       Dates: Start:  03/19/25            Goal: STG-Within one week, patient will transfer bed to chair with LRAD, SBA, adhering to spinal precautions       Dates: Start:  03/19/25               Problem: PT-Long Term Goals       Dates: Start:  03/19/25         Goal: LTG-By discharge, patient will tolerate standing x 10 minutes with LRAD, pain <2/10, Rick       Dates: Start:  03/19/25            Goal: LTG-By discharge, patient will ambulate x 300 ft with LRAD, Rick       Dates: Start:  03/19/25            Goal:  LTG-By discharge, patient will transfer one surface to another Rick with LRAD, adhering to spinal precautions       Dates: Start:  03/19/25            Goal: LTG-By discharge, patient will perform home exercise program Rick with handouts       Dates: Start:  03/19/25            Goal: LTG-By discharge, patient will ambulate up/down 4-6 stairs with supervision, bilateral railings       Dates: Start:  03/19/25            Goal: LTG-By discharge, patient will complete TUG <60 seconds with LRAD       Dates: Start:  03/19/25

## 2025-03-23 NOTE — PROGRESS NOTES
NURSING DAILY NOTE    Name: Alyssa Juan   Date of Admission: 3/18/2025   Admitting Diagnosis: Closed left hip fracture, initial encounter ([facility])  Attending Physician: JAQUAN CLARK D.O.  Allergies: Sulfa drugs and Codeine    Safety  Patient Assist  Mod assist  Patient Precautions  Precautions: Fall Risk  Fall Risk: Poor balance, History of falls  Cognition/Communication: Hard of hearing, Hearing Aids  Orthopedic: Posterior Hip Precautions  Weight Bearing: WBAT LLE  Bed Transfer Status  Stand By Assist  Toilet Transfer Status   Stand By Assist  Assistive Devices  Gait Belt, Rails, Wheelchair push  Oxygen  Nasal Cannula  Diet/Therapeutic Dining  Current Diet Order   Procedures    Diet Order Diet: Regular (total free water restriction 500mL, total fluids 1500mL per day.); Fluid modifications: (optional): 1800 ml Fluid Restriction     Pill Administration  whole and floated  Agitated Behavioral Scale     ABS Level of Severity       Fall Risk  Has the patient had a fall this admission?      Blanka Pendleton Fall Risk Scoring  19, HIGH RISK  Fall Risk Safety Measures  bed alarm, chair alarm, poor balance, and low vision/hearing    Vitals  Temperature: 36.1 °C (97 °F)  Temp src: Temporal  Pulse: (!) 58  Respiration: 18  Blood Pressure : (!) 150/73  Blood Pressure MAP (Calculated): 99 MM HG  BP Location: Right, Upper Arm  Patient BP Position: Supine     Oxygen  Pulse Oximetry: 100 %  O2 (LPM): 2  O2 Delivery Device: Nasal Cannula    Bowel and Bladder  Last Bowel Movement  03/21/25  Stool Type  Type 5: Soft blob with clear cut edges (passed easily)  Bowel Device  Bathroom  Continent  Bladder: Did not void   Bowel: No movement  Bladder Function  Urine Void (mL):  (Moderate)  Number of Times Voided: 1  Urine Color: Yellow  Genitourinary Assessment   Bladder Assessment (WDL):  Within Defined Limits  Petersen Catheter: Not Applicable  Urine Color: Yellow  Bladder Device: Bathroom  $ Bladder Scan Results (mL):  12    Skin  Ovidio Score   19  Sensory Interventions   Bed Types: Standard/Trauma Mattress with Overlay  Skin Preventative Measures: Pillows in Use for Support / Positioning, Waffle Overlay  Moisture Interventions         Pain  Pain Rating Scale  6 - Hard to ignore, avoid usual activities  Pain Location  Leg, Groin  Pain Location Orientation  Left  Pain Interventions   Medication (see MAR)    ADLs    Bathing   Patient Refused Bathing  Linen Change   Partial  Personal Hygiene  Perineal Care, Change Monica Pads  Chlorhexidine Bath      Oral Care  Brushed Teeth  Teeth/Dentures     Shave     Nutrition Percentage Eaten  *  * Meal *  *, Breakfast, *  * Amount Consumed *  *, Between % Consumed  Environmental Precautions  Treaded Slipper Socks on Patient, Personal Belongings, Wastebasket, Call Bell etc. in Easy Reach, Bed in Low Position, Communication Sign for Patients & Families  Patient Turns/Positioning  Patient turns self independently side to side without assistance, to offload sacral area  Patient Turns Assistance/Tolerance     Bed Positions  Bed Controls On, Bed Locked  Head of Bed Elevated  Self regulated      Psychosocial/Neurologic Assessment  Psychosocial Assessment  Psychosocial (WDL):  Within Defined Limits  Neurologic Assessment  Neuro (WDL): Within Defined Limits  Level of Consciousness: Alert  Orientation Level: Oriented X4  EENT (WDL):  WDL Except    Cardio/Pulmonary Assessment  Edema   LLE Edema: 1+  Respiratory Breath Sounds     Cardiac Assessment   Cardiac (WDL):  WDL Except (Hx of HTN)

## 2025-03-23 NOTE — CARE PLAN
The patient is Stable - Low risk of patient condition declining or worsening    Shift Goals  Clinical Goals: Safety, Wound care, Pain control  Patient Goals: Pain control, Rest  Family Goals: JOVANI    Progress made toward(s) clinical / shift goals:      Problem: Knowledge Deficit - Standard  Goal: Patient and family/care givers will demonstrate understanding of plan of care, disease process/condition, diagnostic tests and medications  Outcome: Progressing     Problem: Fall Risk - Rehab  Goal: Patient will remain free from falls  Outcome: Progressing  Blanka Pendleton Score: 19  Interventions:  -Bed/Strip alarm in place  -Chair strip alarm in place  -Treaded socks on pt  -Fall risk band and communication signs in place  -Bed rails in place  -Call light and belongings within reach     Problem: Pain - Standard  Goal: Alleviation of pain or a reduction in pain to the patient’s comfort goal  Outcome: Progressing  Pt tolerates PRN pain medication well.

## 2025-03-23 NOTE — PROGRESS NOTES
06/01/2023 - Spoke to the patient and informed him that Dr. Alana Garcia would like to see him in the office to discuss surgery. I did apologize to the patient about the confusion at his discharge and assured him that we have taken measures to make sure this doesn't happen in the future. Patient is scheduled on June 7, 2023. With patients permission I also called and spoke to his daughter Laurie Limon) and informed her of the above. Both patient and his daughter was appreciative of the call.     ----- Message from REY Quiles NP sent at 5/31/2023  3:08 PM EDT -----  Regarding: needs consult with [facility]  Please place on consult list with [facility] for Wednesday sometime.    Please call and let patient know NURSING DAILY NOTE    Name: Alyssa Juan   Date of Admission: 3/18/2025   Admitting Diagnosis: Closed left hip fracture, initial encounter ([facility])  Attending Physician: JAQUAN CLARK D.O.  Allergies: Sulfa drugs and Codeine    Safety  Patient Assist  Mod assist  Patient Precautions  Precautions: Fall Risk  Fall Risk: Poor balance, History of falls  Cognition/Communication: Hard of hearing  Orthopedic: Posterior Hip Precautions  Weight Bearing: WBAT LLE  Bed Transfer Status  Stand By Assist  Toilet Transfer Status   Stand By Assist  Assistive Devices  Gait Belt, Rails, Wheelchair push  Oxygen  Nasal Cannula  Diet/Therapeutic Dining  Current Diet Order   Procedures    Diet Order Diet: Regular (total free water restriction 500mL, total fluids 1500mL per day.); Fluid modifications: (optional): 1800 ml Fluid Restriction     Pill Administration  whole  Agitated Behavioral Scale     ABS Level of Severity       Fall Risk  Has the patient had a fall this admission?      Blanka Pendleton Fall Risk Scoring  19, HIGH RISK  Fall Risk Safety Measures  bed alarm, chair alarm, and poor balance    Vitals  Temperature: 36.4 °C (97.6 °F)  Temp src: Temporal  Pulse: 72  Respiration: 18  Blood Pressure : 123/53  Blood Pressure MAP (Calculated): 76 MM HG  BP Location: Right, Upper Arm  Patient BP Position: Supine     Oxygen  Pulse Oximetry: 95 %  O2 (LPM): 2  O2 Delivery Device: Nasal Cannula    Bowel and Bladder  Last Bowel Movement  03/21/25  Stool Type  Type 5: Soft blob with clear cut edges (passed easily)  Bowel Device  Bathroom  Continent  Bladder: Did not void   Bowel: No movement  Bladder Function  Urine Void (mL):  (large)  Number of Times Voided: 1  Urine Color: Yellow  Genitourinary Assessment   Bladder Assessment (WDL):  Within Defined Limits  Petersen Catheter: Not Applicable  Urine Color: Yellow  Bladder Device: Bathroom  Time Void: Yes  Bladder Scan: Post Void  $ Bladder Scan Results (mL): 6    Skin  Ovidio Score    19  Sensory Interventions   Bed Types: Standard/Trauma Mattress with Overlay  Skin Preventative Measures: Pillows in Use for Support / Positioning, Waffle Overlay  Moisture Interventions         Pain  Pain Rating Scale  8 - Awful, hard to do anything  Pain Location  Groin  Pain Location Orientation  Left  Pain Interventions   Medication (see MAR), Cold Pack    ADLs    Bathing   Patient Refused Bathing  Linen Change   Partial  Personal Hygiene  Perineal Care, Change Monica Pads  Chlorhexidine Bath      Oral Care  Brushed Teeth  Teeth/Dentures     Shave     Nutrition Percentage Eaten  *  * Meal *  *, Breakfast, *  * Amount Consumed *  *, Between % Consumed  Environmental Precautions  Treaded Slipper Socks on Patient, Personal Belongings, Wastebasket, Call Bell etc. in Easy Reach, Bed in Low Position, Communication Sign for Patients & Families  Patient Turns/Positioning  Patient turns self independently side to side without assistance, to offload sacral area  Patient Turns Assistance/Tolerance     Bed Positions  Bed Controls On, Bed Locked  Head of Bed Elevated  Self regulated      Psychosocial/Neurologic Assessment  Psychosocial Assessment  Psychosocial (WDL):  Within Defined Limits  Neurologic Assessment  Neuro (WDL): Within Defined Limits  Level of Consciousness: Alert  Orientation Level: Oriented X4  EENT (WDL):  WDL Except    Cardio/Pulmonary Assessment  Edema   LLE Edema: 1+  Respiratory Breath Sounds     Cardiac Assessment   Cardiac (WDL):  WDL Except (HTN)

## 2025-03-23 NOTE — THERAPY
"Occupational Therapy  Daily Treatment     Patient Name:  Alyssa Juan  Age:  84 y.o., Sex:  female  Medical Record #:  [id number]  Today's Date:  3/23/2025    Precautions  Precautions: (P) Fall Risk  Fall Risk: (P) Poor balance, History of falls  Cognition/Communication: (P) Hard of hearing  Orthopedic: (P) Posterior Hip Precautions  Weight Bearing: (P) WBAT LLE    Subjective:  \"My neck hurts \"     Objective:    03/23/25 1231   OT Charge Group   OT Self Care / ADL (Units) 1   OT Therapy Activity (Units) 2   OT Therapeutic Exercise (Units) 1   OT Total Time Spent   OT Individual Total Time Spent (Mins) 60   Precautions   Precautions Fall Risk   Fall Risk Poor balance;History of falls   Cognition/Communication Hard of hearing   Orthopedic Posterior Hip Precautions   Weight Bearing WBAT LLE   Tub/Shower Transfer   Level of Assist Contact Guard Assist  (step in shower transfer)   Transfer Type Stand step transfer   Adaptive Equipment Grab bars;Use of showerchair   Assistive Device FWW   Toilet Transfer   Level of Assist Stand By Assist   Transfer Type Stand Step Transfer   Adaptive Equipment Grab bars   Assistive Device FWW   Toileting Hygiene   Level of Assist Stand By Assist   Sit to Lying   Level of Assist Stand By Assist   Chair/Bed-to-Chair Transfer   Level of Assist Stand By Assist   Transfer Type Stand Step Transfer   Assistive Devices FWW   Interdisciplinary Plan of Care Collaboration   IDT Collaboration with  Family / Caregiver   Patient Position at End of Therapy In Bed;Call Light within Reach;Tray Table within Reach;Phone within Reach   Collaboration Comments Son present voicing concern about  returning to home at d/c  . family is trying to urge Alyssa to transition to  [facility] here in  [place]  or possibly  to one of 2 locations where the children live.  reports   his brothers mother marsha  who is also close in age to Alyssa is  her planned  caregiver assist upond d/c   will endorse  at IDT  on Monday . " "3-24-25         Therapeutic Exercise  Purpose: to improve functional endurance  Interventions:   River Med Cycle: Gear Level 3 and Time 5 minutes  x 2     MHP to posterior neck  while performing motomed   with UE  also  small ice pack to left groin.     Reported improved relief with heat.     Required initial cues for technique with  step in shower transfer.     Reports she had 2 gals that would come in and cook for her.    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    Educated son  that if Alyssa  relies on paid assist for  tasks  [place]  would be a  fair alternative.  His concern is that she reports \" I just want to get back to normal and then make a decision .\"        Strengths: Able to follow instructions, Effective communication skills, Good carryover of learning, Good insight into deficits/needs, Making steady progress towards goals, Manages pain appropriately, Motivated for self care and independence, Pleasant and cooperative, Supportive family, Willingly participates in therapeutic activities  Barriers: Fatigue, Generalized weakness, Impaired balance, Pain, Limited mobility    Plan:   ADL routine in AM   ADL with AE   IADL  related mobility and transfers   strength/endurance building standing  tolerance  and balance activity       DME  OT DME Recommendations  Additional Equipment (NOT TYPICALLY COVERED BY INSURANCE): Other (see comments), Hip kit (Pt believes she still has hip kit from previous admission, unsure and will have friend check for her)        Occupational Therapy Goals (Active)       Problem: Bathing       Dates: Start:  03/19/25         Goal: STG-Within one week, patient will bathe with CGA.       Dates: Start:  03/19/25               Problem: Dressing       Dates: Start:  03/19/25         Goal: STG-Within one week, patient will dress LB with CGA and AE PRN.        Dates: Start:  03/19/25               Problem: Functional Transfers       Dates: Start:  " 03/19/25         Goal: STG-Within one week, patient will transfer to step in shower to simulate home environment with CGA.        Dates: Start:  03/19/25               Problem: Grooming       Dates: Start:  03/19/25         Goal: STG-Within one week, patient will complete grooming in standing with LRAD with CGA.       Dates: Start:  03/19/25               Problem: OT Long Term Goals       Dates: Start:  03/19/25         Goal: LTG-By discharge, patient will complete basic self care tasks with supervision.        Dates: Start:  03/19/25            Goal: LTG-By discharge, patient will perform bathroom transfers with supervision.        Dates: Start:  03/19/25               Problem: Toileting       Dates: Start:  03/19/25         Goal: STG-Within one week, patient will complete toileting tasks with CGA.        Dates: Start:  03/19/25

## 2025-03-24 ENCOUNTER — APPOINTMENT (OUTPATIENT)
Dept: PHYSICAL MEDICINE AND REHAB | Facility: REHABILITATION | Age: 85
DRG: 560 | End: 2025-03-24
Attending: PHYSICAL MEDICINE & REHABILITATION
Payer: MEDICARE

## 2025-03-24 ENCOUNTER — APPOINTMENT (OUTPATIENT)
Dept: OCCUPATIONAL THERAPY | Facility: REHABILITATION | Age: 85
DRG: 560 | End: 2025-03-24
Attending: PHYSICAL MEDICINE & REHABILITATION
Payer: MEDICARE

## 2025-03-24 ENCOUNTER — APPOINTMENT (OUTPATIENT)
Dept: PHYSICAL THERAPY | Facility: REHABILITATION | Age: 85
DRG: 560 | End: 2025-03-24
Attending: PHYSICAL MEDICINE & REHABILITATION
Payer: MEDICARE

## 2025-03-24 PROCEDURE — 700111 HCHG RX REV CODE 636 W/ 250 OVERRIDE (IP): Mod: JZ | Performed by: PHYSICAL MEDICINE & REHABILITATION

## 2025-03-24 PROCEDURE — A9270 NON-COVERED ITEM OR SERVICE: HCPCS | Performed by: PHYSICAL MEDICINE & REHABILITATION

## 2025-03-24 PROCEDURE — 97110 THERAPEUTIC EXERCISES: CPT

## 2025-03-24 PROCEDURE — 90791 PSYCH DIAGNOSTIC EVALUATION: CPT | Performed by: STUDENT IN AN ORGANIZED HEALTH CARE EDUCATION/TRAINING PROGRAM

## 2025-03-24 PROCEDURE — 97535 SELF CARE MNGMENT TRAINING: CPT | Mod: CO

## 2025-03-24 PROCEDURE — 700101 HCHG RX REV CODE 250: Performed by: PHYSICAL MEDICINE & REHABILITATION

## 2025-03-24 PROCEDURE — 97116 GAIT TRAINING THERAPY: CPT

## 2025-03-24 PROCEDURE — 97530 THERAPEUTIC ACTIVITIES: CPT | Mod: CO

## 2025-03-24 PROCEDURE — 700101 HCHG RX REV CODE 250: Performed by: STUDENT IN AN ORGANIZED HEALTH CARE EDUCATION/TRAINING PROGRAM

## 2025-03-24 PROCEDURE — 97110 THERAPEUTIC EXERCISES: CPT | Mod: CO

## 2025-03-24 PROCEDURE — 700102 HCHG RX REV CODE 250 W/ 637 OVERRIDE(OP): Performed by: PHYSICAL MEDICINE & REHABILITATION

## 2025-03-24 PROCEDURE — 770010 HCHG ROOM/CARE - REHAB SEMI PRIVAT*

## 2025-03-24 PROCEDURE — 99232 SBSQ HOSP IP/OBS MODERATE 35: CPT | Performed by: STUDENT IN AN ORGANIZED HEALTH CARE EDUCATION/TRAINING PROGRAM

## 2025-03-24 PROCEDURE — 97530 THERAPEUTIC ACTIVITIES: CPT

## 2025-03-24 RX ADMIN — FLECAINIDE ACETATE 25 MG: 50 TABLET ORAL at 21:04

## 2025-03-24 RX ADMIN — LIDOCAINE 1 PATCH: 4 PATCH TOPICAL at 08:31

## 2025-03-24 RX ADMIN — ALENDRONATE SODIUM 70 MG: 70 TABLET ORAL at 06:19

## 2025-03-24 RX ADMIN — MAGNESIUM HYDROXIDE 30 ML: 1200 LIQUID ORAL at 06:25

## 2025-03-24 RX ADMIN — MENTHOL 4 G: 10 GEL TOPICAL at 21:04

## 2025-03-24 RX ADMIN — OXYCODONE 5 MG: 5 TABLET ORAL at 16:14

## 2025-03-24 RX ADMIN — OXYCODONE HYDROCHLORIDE 10 MG: 10 TABLET ORAL at 21:13

## 2025-03-24 RX ADMIN — MENTHOL 4 G: 10 GEL TOPICAL at 14:30

## 2025-03-24 RX ADMIN — ENOXAPARIN SODIUM 40 MG: 100 INJECTION SUBCUTANEOUS at 17:33

## 2025-03-24 RX ADMIN — LEVOTHYROXINE SODIUM 50 MCG: 0.05 TABLET ORAL at 06:18

## 2025-03-24 RX ADMIN — GABAPENTIN 200 MG: 100 CAPSULE ORAL at 21:04

## 2025-03-24 RX ADMIN — MAGNESIUM 64 MG (MAGNESIUM CHLORIDE) TABLET,DELAYED RELEASE 64 MG: at 21:04

## 2025-03-24 RX ADMIN — TRAZODONE HYDROCHLORIDE 25 MG: 50 TABLET ORAL at 21:04

## 2025-03-24 RX ADMIN — POLYETHYLENE GLYCOL 3350 1 PACKET: 17 POWDER, FOR SOLUTION ORAL at 08:31

## 2025-03-24 RX ADMIN — LACTULOSE 30 ML: 10 SOLUTION ORAL at 14:30

## 2025-03-24 RX ADMIN — OMEPRAZOLE 20 MG: 20 CAPSULE, DELAYED RELEASE ORAL at 08:31

## 2025-03-24 RX ADMIN — GABAPENTIN 100 MG: 100 CAPSULE ORAL at 14:30

## 2025-03-24 RX ADMIN — OXYCODONE 5 MG: 5 TABLET ORAL at 06:18

## 2025-03-24 RX ADMIN — OXYCODONE 5 MG: 5 TABLET ORAL at 09:07

## 2025-03-24 RX ADMIN — FLECAINIDE ACETATE 25 MG: 50 TABLET ORAL at 08:31

## 2025-03-24 RX ADMIN — Medication 1000 UNITS: at 08:31

## 2025-03-24 RX ADMIN — OXYCODONE HYDROCHLORIDE 10 MG: 10 TABLET ORAL at 00:36

## 2025-03-24 RX ADMIN — GABAPENTIN 100 MG: 100 CAPSULE ORAL at 08:30

## 2025-03-24 ASSESSMENT — PAIN DESCRIPTION - PAIN TYPE
TYPE: ACUTE PAIN

## 2025-03-24 NOTE — THERAPY
"Occupational Therapy  Daily Treatment     Patient Name:  Alyssa Juan  Age:  84 y.o., Sex:  female  Medical Record #:  [id number]  Today's Date:  3/24/2025    Precautions  Precautions: (P) Fall Risk  Fall Risk: (P) Poor balance, History of falls  Cognition/Communication: Hard of hearing  Orthopedic: (P) Posterior Hip Precautions  Weight Bearing: (P) WBAT LLE    Subjective: \"I think I would like some ice packs when we are done.\"     Objective: /Assessment:   03/24/25 1231   OT Charge Group   OT Self Care / ADL (Units) 2   OT Therapy Activity (Units) 1   OT Therapeutic Exercise (Units) 1   OT Total Time Spent   OT Individual Total Time Spent (Mins) 60   Precautions   Precautions Fall Risk   Fall Risk Poor balance;History of falls   Orthopedic Posterior Hip Precautions   Weight Bearing WBAT LLE   Pain 0 - 10 Group   Location Back;Groin   Location Orientation Left   Pain Rating Scale (NPRS) 5   Description   (Ice pack to the area for comfort   ( per her request) at end of session)   Bowel   Bowel Device Bathroom   Last BM 03/24/25   Number of Times Stooled 1   Stool Description Small;Brown   Oral Hygiene   Level of Assist Supervised   Patient Position Standing   Grooming   Level of Assist Supervised   Patient Position Standing   Additional Description Hand hygiene;Face Wash   Toilet Transfer   Level of Assist Stand By Assist   Transfer Type Stand Step Transfer   Adaptive Equipment Grab bars   Assistive Device FWW   Toileting Hygiene   Level of Assist Minimal Assist  (asssist with wiping  post BM)   Additional Description Assist for hygiene   Chair/Bed-to-Chair Transfer   Level of Assist Stand By Assist   Transfer Type Stand Step Transfer   Assistive Devices FWW     FWW  bed <-> bathroom   SBA     Sit to supine supervision     UE there ex for strength/endurance buildig    Motomed  x 5 minutes level 3      UE there ex  seated   all exercises  2 sets of 10  2lb weight  right / left   :  biceps curls  supination / " pronation   knee to shoulder cross overs   bilateral  holding 2lb weight with both hands    chest press shoulder press  arm circles forward and back    Stood at sink x 5 minutes for grooming tasks         Strengths: Able to follow instructions, Effective communication skills, Good carryover of learning, Good insight into deficits/needs, Making steady progress towards goals, Manages pain appropriately, Motivated for self care and independence, Pleasant and cooperative, Supportive family, Willingly participates in therapeutic activities  Barriers: Fatigue, Generalized weakness, Impaired balance, Pain, Limited mobility    Plan:  ADL with AE   IADL  related mobility and transfers   strength/endurance building standing  tolerance  and balance activity      DME  OT DME Recommendations  Additional Equipment (NOT TYPICALLY COVERED BY INSURANCE): Other (see comments), Hip kit (Pt believes she still has hip kit from previous admission, unsure and will have friend check for her)        Occupational Therapy Goals (Active)       Problem: OT Long Term Goals       Dates: Start:  03/19/25         Goal: LTG-By discharge, patient will complete basic self care tasks with supervision.        Dates: Start:  03/19/25            Goal: LTG-By discharge, patient will perform bathroom transfers with supervision.        Dates: Start:  03/19/25
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
## 2025-03-24 NOTE — THERAPY
"Occupational Therapy  Daily Treatment     Patient Name:  Alyssa Juan  Age:  84 y.o., Sex:  female  Medical Record #:  [id number]  Today's Date:  3/24/2025    Precautions  Precautions: (P) Fall Risk  Fall Risk: (P) Poor balance, History of falls  Cognition/Communication: Hard of hearing  Orthopedic: (P) Posterior Hip Precautions (left)  Weight Bearing: (P) WBAT LLE    Subjective: \" I am ready to do it .\" Referring to AM routine      Objective:    03/24/25 0701   OT Charge Group   OT Self Care / ADL (Units) 4   OT Total Time Spent   OT Individual Total Time Spent (Mins) 60   Precautions   Precautions Fall Risk   Fall Risk Poor balance;History of falls   Orthopedic Posterior Hip Precautions  (left)   Weight Bearing WBAT LLE   Eating   Level of Assist Independent   Oral Hygiene   Level of Assist Supervised   Patient Position Standing   Grooming   Level of Assist Supervised   Patient Position Standing   Upper Body Dressing   Level of Assist Stand by Assist   Patient Position Seated   Clothing Item(s) Pullover shirt   Lower Body Dressing   Level of Assist Stand by Assist   Patient Position Seated;Standing   Clothing Item(s) Pants;Disposable Brief   Adaptive Equipment Reacher   Putting On/Taking Off Footwear   Level of Assist Stand by Assist;Supervised   Patient Position Seated   Footwear Type Treaded Socks   Adaptive Equipment Sock Aid   Shower/Bathe Self   Level of Assist Stand By Assist   Patient Position Seated   Adaptive Equipment Fold Down Shower Bench;Grab Bars;Long Handle Sponge;Handheld Shower Head   Tub/Shower Transfer   Level of Assist Stand by Assist   Transfer Type Stand step transfer  (no lip to step over)   Adaptive Equipment Grab bars   Assistive Device FWW   Toilet Transfer   Level of Assist Stand By Assist   Transfer Type Stand Step Transfer   Adaptive Equipment Grab bars   Assistive Device FWW   Toileting Hygiene   Level of Assist Stand By Assist   Chair/Bed-to-Chair Transfer   Level of Assist " Stand By Assist   Transfer Type Stand Step Transfer   Assistive Devices [facility]   Interdisciplinary Plan of Care Collaboration   Patient Position at End of Therapy Seated;Call Light within Reach;Tray Table within Reach;Phone within Reach     [facility]  bedside <-> bathroom   with close SBA    clothing retrieval from closet at [facility] level  with SBA     Activities of Daily Living (ADL's)  Purpose: to improve function, safety, and independence with activities of daily living  Interventions:   Eating  Oral Hygiene  Grooming  Shower/Bathing  Upper Body Dressing  Lower Body Dressing  Toilet Transfers  Toilet Hygiene  Shower Transfers: Wet  Adaptive Equipment Training    Assessment:     Requires occasional cues for safety / technique  with mobility  and  ADL tasks   Demonstrates improving  independence   Strengths: Able to follow instructions, Effective communication skills, Good carryover of learning, Good insight into deficits/needs, Making steady progress towards goals, Manages pain appropriately, Motivated for self care and independence, Pleasant and cooperative, Supportive family, Willingly participates in therapeutic activities  Barriers: Fatigue, Generalized weakness, Impaired balance, Pain, Limited mobility    Plan:  ADL with AE   IADL  related mobility and transfers   strength/endurance building standing  tolerance  and balance activity      DME  OT DME Recommendations  Additional Equipment (NOT TYPICALLY COVERED BY INSURANCE): Other (see comments), Hip kit (Pt believes she still has hip kit from previous admission, unsure and will have friend check for her)        Occupational Therapy Goals (Active)       Problem: Bathing       Dates: Start:  03/19/25         Goal: STG-Within one week, patient will bathe with CGA.       Dates: Start:  03/19/25               Problem: Dressing       Dates: Start:  03/19/25         Goal: STG-Within one week, patient will dress LB with CGA and AE PRN.        Dates: Start:  03/19/25                Problem: Functional Transfers       Dates: Start:  03/19/25         Goal: STG-Within one week, patient will transfer to step in shower to simulate home environment with CGA.        Dates: Start:  03/19/25               Problem: Grooming       Dates: Start:  03/19/25         Goal: STG-Within one week, patient will complete grooming in standing with LRAD with CGA.       Dates: Start:  03/19/25               Problem: OT Long Term Goals       Dates: Start:  03/19/25         Goal: LTG-By discharge, patient will complete basic self care tasks with supervision.        Dates: Start:  03/19/25            Goal: LTG-By discharge, patient will perform bathroom transfers with supervision.        Dates: Start:  03/19/25               Problem: Toileting       Dates: Start:  03/19/25         Goal: STG-Within one week, patient will complete toileting tasks with CGA.        Dates: Start:  03/19/25

## 2025-03-24 NOTE — CARE PLAN
Problem: Grooming  Goal: STG-Within one week, patient will complete grooming in standing with LRAD with CGA.  Outcome: Met     Problem: Bathing  Goal: STG-Within one week, patient will bathe with CGA.  Outcome: Met     Problem: Dressing  Goal: STG-Within one week, patient will dress LB with CGA and AE PRN.   Outcome: Met     Problem: Toileting  Goal: STG-Within one week, patient will complete toileting tasks with CGA.   Outcome: Met     Problem: Functional Transfers  Goal: STG-Within one week, patient will transfer to step in shower to simulate home environment with CGA.   Outcome: Met

## 2025-03-24 NOTE — PROGRESS NOTES
Physical Medicine & Rehabilitation Progress Note    Encounter Date: 3/24/2025    Chief Complaint: Hip Fracture    _____________________________________  Interdisciplinary Team Conference   Most recent IDT on 3/24/2025    Earl SUNG D.O./Ph.D., was present and led the interdisciplinary team conference on 3/24/2025.  I led the IDT conference and agree with the IDT conference documentation and plan of care as noted below.     Nursing:  Diet Current Diet Order   Procedures    Diet Order Diet: Regular (total free water restriction 500mL, total fluids 1500mL per day.); Fluid modifications: (optional): 1800 ml Fluid Restriction       Eating ADL Independent     % of Last Meal  Oral Nutrition: Between 50-75% Consumed   Sleep    Bowel Last BM: 03/21/25   Bladder Continent   Barriers to Discharge Home:   none      Physical Therapy:  Bed Mobility Roll Left and Right: Stand By Assist  Sit to Lying: Stand By Assist  Lying to Sitting: Moderate Assist   Transfers Sit to Stand: Supervised  Chair/Bed to Chair: Stand By Assist  Toilet: Stand By Assist  Car: Contact Guard Assist   Mobility Ambulation: Stand By Assist  Device: FWW  Ambulation Distance: 220ft  Wheelchair:    Type:    Wheelchair Distance:     Stairs/Curbs Stairs: Contact Guard Assist  Stairs Amount: 6  Curbs: Contact Guard Assist   Barriers to Discharge Home:   Pain, getting out of bed      Occupational Therapy:  Oral Hygiene Supervised   Grooming Supervised   Shower/Bathing Stand By Assist   UB Dressing Stand by Assist   LB Dressing Stand by Assist  Stand by Assist, Supervised   Toileting Transfer: Stand By Assist  Hygiene: Stand By Assist   Shower & Transfer Stand by Assist     Barriers to Discharge Home:   pain    Respiratory Therapy:  O2 (LPM): 0  O2 Delivery Device: None - Room Air    Case Management:  Continues to work on disposition and DME needs.        Discharge Date/Disposition:  3/28/2025  _____________________________________     Interval Events  "(Subjective):  VS: labile BP, 150/76 yesterday, 90/50 most recently.   Last BM: 3/24  Bladder: Voiding low PVRs  Schedule Meds Not Given: refuses evening lidocaine patch.   PRN Meds Taken: Tylenol, Enemeez, milk of mag, methocarbamol, Zofran, oxycodone    Patient seen at bedside today.  Feeling well overall, and has been making good improvements.  Was able to have a bowel movement this afternoon.      ROS: 14 point ROS negative unless otherwise specified in the HPI    Objective:  VITAL SIGNS: BP 90/50   Pulse 66   Temp 36.5 °C (97.7 °F) (Oral)   Resp 20   Ht 1.549 m (5' 1\")   Wt 48.5 kg (106 lb 14.8 oz)   SpO2 96%   BMI 20.20 kg/m²     GEN: No apparent distress  HEENT: Head normocephalic, atraumatic.  Sclera nonicteric bilaterally, no ocular discharge appreciated bilaterally.  CV: Extremities warm and well-perfused, no peripheral edema appreciated bilaterally.  PULMONARY: Breathing nonlabored on room air, no respiratory accessory muscle use.  Not requiring supplemental oxygen.  SKIN: No appreciable skin breakdown on exposed areas of skin.  PSYCH: Mood and affect within normal limits.  NEURO: Awake alert.  Conversational.  Logical thought content.      Laboratory Values:  No results found for this or any previous visit (from the past 72 hours).      Medications:  Scheduled Medications   Medication Dose Frequency    senna-docusate  2 Tablet Q EVENING    And    polyethylene glycol/lytes  1 Packet DAILY    lisinopril  5 mg Q DAY    traZODone  25 mg QHS    magnesium chloride  64 mg QHS    gabapentin  100 mg BID    gabapentin  200 mg Q EVENING    oxyCODONE immediate-release  5 mg BID    Pharmacy Consult Request  1 Each PHARMACY TO DOSE    alendronate  70 mg Q7 DAYS    flecainide  25 mg BID    levothyroxine  50 mcg AM ES    lidocaine  1 Patch Q24HR    vitamin D3  1,000 Units DAILY    omeprazole  20 mg DAILY    enoxaparin (LOVENOX) injection  40 mg DAILY AT 1800     PRN medications: methocarbamol, lidocaine, " hydrALAZINE, lactulose, docusate sodium, bisacodyl EC, magnesium hydroxide, sodium phosphate, carboxymethylcellulose, benzocaine-menthol, mag hydrox-al hydrox-simeth, ondansetron **OR** ondansetron, sodium chloride, [] acetaminophen **FOLLOWED BY** acetaminophen, oxyCODONE immediate-release **OR** oxyCODONE immediate-release    Diet:  Current Diet Order   Procedures    Diet Order Diet: Regular (total free water restriction 500mL, total fluids 1500mL per day.); Fluid modifications: (optional): 1800 ml Fluid Restriction       Medical Decision Making and Plan:  Imapacted L Femoral Neck Fracture s/p Hemiarthroplasty 3/14/25 Dr. Cota   Left superior and inferior pubic ramus fracture non-op  PT and OT for mobility and ADLs. Per guidelines, 15 hours per week between PT, OT and/or SLP.  Follow-up Ortho  WBAT LLE     Pain - Tylenol, oxycodone. 3/19 Schedule Gabapentin, schedule oxy in AM, add robaxin PRN.      ABLA - 3/19 10.3 stable. 3/21 9.8 Stable    Leukopenia - 3/19 3.4 monitor. 3/21 WNL 4.9     Arrhythmia - Continue Flecainide. Follows with Dr. Vail     Peripheral Neuropathy - Continue Gabapentin     Hyponatremia - Continue fluid restriction, improving. 3/19 131 improving. Liberalize fluids. 3/21 Na better 132. Continue fluid restriction for now.      GERD - Continue PPI     Hypothyroidism - Continue Synthroid     Hypertension - Lisinopril 20mg HELD due to hyperkalemia. 3/19 BP elevated required PRN hydralazine. Restart Lisinopril 10mg daily. 3/20 Patient with lower BP today, only takes 5mg at home. Reduce. 3/21 BP stable. Received Hydralazine but it was just before getting scheduled Lisinopril.      Hyperkalemia - 3/19 WNL 4.7. Restarting Lisinopril. 3/21 WNL 4.      Hypomagnesemia - Continue supplement. 3/19 WNL     Vitamin D Deficiency - Supplement      Bowel - Patient on Senna-docusate for constipation prophylaxis.  3/23 add MiraLAX daily.  3/24: Had a BM today.     Bladder - TV/PVR/BS  PRN    Sleep/Depression - Schedule Trazodone. Pt had friend recommend Elavil, d/w her large side effect profile. Will consider if fails trazodone.         Upcoming Labs/imaging: 3/25     DVT PROPHYLAXIS: Start Lovenox 40mg SQ nightly on admission to ARU, held at main due to slight drop in Hgb, however repeat improved in 10's. No active bleeding, incision looks good. 3/19 Hgb stable.      HOSPITALIST FOLLOWING: No     CODE STATUS: DNAR/DNI     DISPO: Home      GUS: 3/28/2025     MEDS SENT TO: TBD     DISCHARGE SPECIALIST FOLLOW UP:   Ortho     Patient to scheduled follow up with their PCP within 2 weeks from discharge from the [facility].     ____________________________________    Earl Ramos D.O.  Physical Medicine & Rehabilitation   ____________________________________    Total time:  60 minutes.

## 2025-03-24 NOTE — THERAPY
"Occupational Therapy  Daily Treatment     Patient Name:  Alyssa Juan  Age:  84 y.o., Sex:  female  Medical Record #:  [id number]  Today's Date:  3/24/2025    Precautions  Precautions: (P) Fall Risk  Fall Risk: (P) Poor balance, History of falls  Cognition/Communication: Hard of hearing  Orthopedic: (P) Posterior Hip Precautions  Weight Bearing: (P) WBAT LLE    Subjective: \"Can we do some of this later? I would like to lay down.\"     Objective:   Assessment:   03/24/25 0931   OT Charge Group   OT Self Care / ADL (Units) 1   OT Total Time Spent   OT Individual Total Time Spent (Mins) 15   Precautions   Precautions Fall Risk   Fall Risk Poor balance;History of falls   Orthopedic Posterior Hip Precautions   Weight Bearing WBAT LLE   Sit to Lying   Level of Assist Stand By Assist   Sit to Stand   Level of Assist Supervised   Assistive Devices FWW   Chair/Bed-to-Chair Transfer   Level of Assist Stand By Assist   Transfer Type Stand Step Transfer   Assistive Devices FWW   Interdisciplinary Plan of Care Collaboration   Patient Position at End of Therapy In Bed;Tray Table within Reach;Phone within Reach;Family / Friend in Room      FWW  x 10 feet w/c to bed with SBA      Fatigued   would like to rest in bed prior to PT      Time added to PM session to compensate for missed time this session         Strengths: Able to follow instructions, Effective communication skills, Good carryover of learning, Good insight into deficits/needs, Making steady progress towards goals, Manages pain appropriately, Motivated for self care and independence, Pleasant and cooperative, Supportive family, Willingly participates in therapeutic activities  Barriers: Fatigue, Generalized weakness, Impaired balance, Pain, Limited mobility    Plan:    ADL with AE   IADL  related mobility and transfers   strength/endurance building standing  tolerance  and balance activity    DME  OT DME Recommendations  Additional Equipment (NOT TYPICALLY COVERED BY " INSURANCE): Other (see comments), Hip kit (Pt believes she still has hip kit from previous admission, unsure and will have friend check for her)      Occupational Therapy Goals (Active)       Problem: OT Long Term Goals       Dates: Start:  03/19/25         Goal: LTG-By discharge, patient will complete basic self care tasks with supervision.        Dates: Start:  03/19/25            Goal: LTG-By discharge, patient will perform bathroom transfers with supervision.        Dates: Start:  03/19/25

## 2025-03-24 NOTE — THERAPY
"Physical Therapy   Daily Treatment     Patient Name:  Alyssa Juan  Age:  84 y.o., Sex:  female  Medical Record #:  [id number]  Today's Date: 3/24/2025     Precautions  Precautions: Fall Risk  Fall Risk: Poor balance, History of falls  Cognition/Communication: Hard of hearing  Orthopedic: Posterior Hip Precautions  Weight Bearing: WBAT LLE    Subjective: Pt reports reduced pain compared to yesterday.    Objective:    03/24/25 1030   PT Charge Group   PT Gait Training (Units) 1   PT Therapeutic Exercise (Units) 2   PT Therapeutic Activities (Units) 1   PT Total Time Spent   PT Individual Total Time Spent (Mins) 60   Vitals   Patient BP Position Sitting   Blood Pressure  104/49   O2 Delivery Device None - Room Air   Vitals Comments standing BP: 101/45   Pain 0 - 10 Group   Location Groin   Location Orientation Left   Sit to Stand   Level of Assist Supervised   Assistive Devices FWW   Additional Description Extra time needed   Chair/Bed-to-Chair Transfer   Level of Assist Stand By Assist   Transfer Type Stand Step Transfer   Assistive Devices FWW   Additional Description Extra time needed   Car Transfer   Level of Assist Contact Guard Assist   Transfer Type Stand Step Transfer   Assistive Device FWW   Additional Description Cueing needed;Extra time needed   Ambulation   Level of Assist Stand By Assist   Assistive Device FWW   Additional Description Extra time needed   Distance 220ft   Curbs   Level of Assist Contact Guard Assist   Assistive Device FWW   Curb Height 4\" step;6\" step   Additional Description Cueing needed;Extra time needed   Interdisciplinary Plan of Care Collaboration   Patient Position at End of Therapy Seated;Chair Alarm On;Call Light within Reach;Phone within Reach;Tray Table within Reach       Therapeutic Activities  Purpose: to improve performance and function of daily activities and to increase safety with activities of daily life and mobility related to activities of daily " "life  Interventions:   Car transfer x1, CGA  Discussed posterior hip precautions    Therapeutic Exercise  Purpose: to improve strength and to improve functional endurance  Interventions:   Lower body exercises:   Seated program -   Ankle pumps, 1 set of 10 and Bilateral  Hip abduction, 1 set of 10, Bilateral, and Medium Resistance Theraband  Hip adduction, 1 set of 10, Bilateral, and Other Resistance 1-3 second isometric hold  Long arc quad, 1 set of 10, Right, and Left  Marching, Reciprocal and 1 set of 10; LLE limited to 90 degrees  Hamstring curl, 1 set of 10, Right, Left, and Medium Resistance Theraband    Gait Training  Purpose: to improve functional ambulation and to improve walking endurance  Interventions:   Walking endurance, 220ft x1  Deviations (laterality in comments):  Bradykinetic    Stair/Curb Training  Purpose: to improve functional use of curbs and improve use of AD on curbs  Interventions:   Curb training  4\" curb x1 c/ FWW  V/c for sequencing  6\" curb x2, c/ FWW  V/c for sequencing  Safe use of device    Assessment: Safe mechanics demonstrated during curb training and car transfer. Pt unable to initially recall posterior hip precautions, but provided 3/3 at end of session.    Strengths: Able to follow instructions, Alert and oriented, Independent prior level of function, Manages pain appropriately, Motivated for self care and independence, Pleasant and cooperative, Supportive family, Willingly participates in therapeutic activities  Barriers: Generalized weakness, Impaired activity tolerance, Decreased endurance, Fatigue, Impaired balance, Limited mobility    Plan:   Bed mobility, transfer training, LE strengthening, AMB with FWW     DME           Passport items to be completed:  Get in/out of bed safely, in/out of a vehicle, safely use mobility device, walk or wheel around home/community, navigate up and down stairs, show how to get up/down from the ground, ensure home is accessible, demonstrate " HEP, complete caregiver training    Physical Therapy Problems (Active)       Problem: Balance       Dates: Start:  03/19/25         Goal: STG-Within one week, patient will maintain dynamic standing x 5 minutes for ADLs with LRAD and supervised       Dates: Start:  03/19/25               Problem: Mobility       Dates: Start:  03/19/25         Goal: STG-Within one week, patient will ambulate household distance x 150 ft with LRAD, pain <3/10, supervised       Dates: Start:  03/19/25               Problem: Mobility Transfers       Dates: Start:  03/19/25         Goal: STG-Within one week, patient will perform bed mobility with supervision and LRAD       Dates: Start:  03/19/25            Goal: STG-Within one week, patient will transfer bed to chair with LRAD, SBA, adhering to spinal precautions       Dates: Start:  03/19/25               Problem: PT-Long Term Goals       Dates: Start:  03/19/25         Goal: LTG-By discharge, patient will tolerate standing x 10 minutes with LRAD, pain <2/10, Rick       Dates: Start:  03/19/25            Goal: LTG-By discharge, patient will ambulate x 300 ft with LRAD, Rick       Dates: Start:  03/19/25            Goal: LTG-By discharge, patient will transfer one surface to another Rick with LRAD, adhering to spinal precautions       Dates: Start:  03/19/25            Goal: LTG-By discharge, patient will perform home exercise program Rick with handouts       Dates: Start:  03/19/25            Goal: LTG-By discharge, patient will ambulate up/down 4-6 stairs with supervision, bilateral railings       Dates: Start:  03/19/25            Goal: LTG-By discharge, patient will complete TUG <60 seconds with LRAD       Dates: Start:  03/19/25

## 2025-03-24 NOTE — CARE PLAN
"The patient is Watcher - Medium risk of patient condition declining or worsening    Shift Goals  Clinical Goals: safety, wound care, pain control  Problem: Fall Risk - Rehab  Goal: Patient will remain free from falls  Note: Blanka Pendleton Fall risk Assessment Score: 19    High fall risk Interventions   - Bed and strip alarm   - Yellow sign by the door   - Yellow wrist band \"Fall risk\"  - Call light in reach  - Do not leave patient unattended in the bathroom  - Fall risk education provided       Problem: Pain - Standard  Goal: Alleviation of pain or a reduction in pain to the patient’s comfort goal  Note: Oxycodone administered as ordered for pain, cold packs also provided.     "

## 2025-03-24 NOTE — PROGRESS NOTES
NURSING DAILY NOTE    Name: Alyssa Juan   Date of Admission: 3/18/2025   Admitting Diagnosis: Closed left hip fracture, initial encounter (formerly [facility])  Attending Physician: JAQUAN CLARK D.O.  Allergies: Sulfa drugs and Codeine    Safety  Patient Assist  Mod assist  Patient Precautions  Precautions: Fall Risk  Fall Risk: Poor balance, History of falls  Cognition/Communication: Hard of hearing  Orthopedic: Posterior Hip Precautions  Weight Bearing: WBAT LLE  Bed Transfer Status  Stand By Assist  Toilet Transfer Status   Stand By Assist  Assistive Devices  Gait Belt, Rails, Wheelchair push  Oxygen  Nasal Cannula  Diet/Therapeutic Dining  Current Diet Order   Procedures    Diet Order Diet: Regular (total free water restriction 500mL, total fluids 1500mL per day.); Fluid modifications: (optional): 1800 ml Fluid Restriction     Pill Administration  whole and one at a time  Agitated Behavioral Scale     ABS Level of Severity       Fall Risk  Has the patient had a fall this admission?      Blanka Pendleton Fall Risk Scoring  19, HIGH RISK  Fall Risk Safety Measures  bed alarm, chair alarm, poor balance, and low vision/hearing    Vitals  Temperature: 36.6 °C (97.9 °F)  Temp src: Temporal  Pulse: 60  Respiration: 20  Blood Pressure : 109/59  Blood Pressure MAP (Calculated): 76 MM HG  BP Location: Right, Upper Arm  Patient BP Position: Supine     Oxygen  Pulse Oximetry: 100 %  O2 (LPM): 2  O2 Delivery Device: Nasal Cannula    Bowel and Bladder  Last Bowel Movement  03/21/25  Stool Type  Type 5: Soft blob with clear cut edges (passed easily)  Bowel Device  Bathroom  Continent  Bladder: Did not void   Bowel: No movement  Bladder Function  Urine Void (mL):  (large)  Number of Times Voided: 1  Urine Color: Yellow  Genitourinary Assessment   Bladder Assessment (WDL):  Within Defined Limits  Petersen Catheter: Not Applicable  Urine Color: Yellow  Bladder Device: Bathroom  Time Void: Yes  Bladder Scan: Post Void  $ Bladder Scan  Results (mL): 7    Skin  Ovidio Score   18  Sensory Interventions   Bed Types: Standard/Trauma Mattress with Overlay  Skin Preventative Measures: Pillows in Use for Support / Positioning, Waffle Overlay  Moisture Interventions         Pain  Pain Rating Scale  8 - Awful, hard to do anything  Pain Location  Leg  Pain Location Orientation  Right, Left  Pain Interventions   Medication (see MAR)    ADLs    Bathing   Patient Refused Bathing  Linen Change   Partial  Personal Hygiene  Perineal Care, Moist Monica Wipes  Chlorhexidine Bath      Oral Care  Brushed Teeth  Teeth/Dentures     Shave     Nutrition Percentage Eaten  *  * Meal *  *, Breakfast, *  * Amount Consumed *  *, Between % Consumed  Environmental Precautions  Treaded Slipper Socks on Patient, Personal Belongings, Wastebasket, Call Bell etc. in Easy Reach, Bed in Low Position  Patient Turns/Positioning  Patient turns self independently side to side without assistance, to offload sacral area  Patient Turns Assistance/Tolerance     Bed Positions  Bed Controls On, Bed Locked  Head of Bed Elevated  Self regulated      Psychosocial/Neurologic Assessment  Psychosocial Assessment  Psychosocial (WDL):  Within Defined Limits  Neurologic Assessment  Neuro (WDL): Within Defined Limits  Level of Consciousness: Alert  Orientation Level: Oriented X4  Cognition: Follows commands, Appropriate attention/concentration  Speech: Clear  EENT (WDL):  WDL Except    Cardio/Pulmonary Assessment  Edema   LLE Edema: 1+  Respiratory Breath Sounds     Cardiac Assessment   Cardiac (WDL):  WDL Except (HTN)

## 2025-03-24 NOTE — CARE PLAN
Problem: Balance  Goal: STG-Within one week, patient will maintain dynamic standing x 5 minutes for ADLs with LRAD and supervised  Outcome: Met     Problem: Mobility  Goal: STG-Within one week, patient will ambulate household distance x 150 ft with LRAD, pain <3/10, supervised  Outcome: Progressing     Problem: Mobility Transfers  Goal: STG-Within one week, patient will perform bed mobility with supervision and LRAD  Outcome: Progressing  Goal: STG-Within one week, patient will transfer bed to chair with LRAD, SBA, adhering to spinal precautions  Outcome: Met

## 2025-03-25 ENCOUNTER — APPOINTMENT (OUTPATIENT)
Dept: PHYSICAL THERAPY | Facility: REHABILITATION | Age: 85
DRG: 560 | End: 2025-03-25
Attending: PHYSICAL MEDICINE & REHABILITATION
Payer: MEDICARE

## 2025-03-25 ENCOUNTER — APPOINTMENT (OUTPATIENT)
Dept: OCCUPATIONAL THERAPY | Facility: REHABILITATION | Age: 85
DRG: 560 | End: 2025-03-25
Attending: PHYSICAL MEDICINE & REHABILITATION
Payer: MEDICARE

## 2025-03-25 LAB
ANION GAP SERPL CALC-SCNC: 10 MMOL/L (ref 7–16)
BUN SERPL-MCNC: 14 MG/DL (ref 8–22)
CALCIUM SERPL-MCNC: 8.8 MG/DL (ref 8.5–10.5)
CHLORIDE SERPL-SCNC: 94 MMOL/L (ref 96–112)
CO2 SERPL-SCNC: 27 MMOL/L (ref 20–33)
CREAT SERPL-MCNC: 0.55 MG/DL (ref 0.5–1.4)
ERYTHROCYTE [DISTWIDTH] IN BLOOD BY AUTOMATED COUNT: 48.9 FL (ref 35.9–50)
GFR SERPLBLD CREATININE-BSD FMLA CKD-EPI: 89 ML/MIN/1.73 M 2
GLUCOSE SERPL-MCNC: 84 MG/DL (ref 65–99)
HCT VFR BLD AUTO: 28.4 % (ref 37–47)
HGB BLD-MCNC: 9.5 G/DL (ref 12–16)
MAGNESIUM SERPL-MCNC: 2.5 MG/DL (ref 1.5–2.5)
MCH RBC QN AUTO: 33.2 PG (ref 27–33)
MCHC RBC AUTO-ENTMCNC: 33.5 G/DL (ref 32.2–35.5)
MCV RBC AUTO: 99.3 FL (ref 81.4–97.8)
PLATELET # BLD AUTO: 390 K/UL (ref 164–446)
PMV BLD AUTO: 8.7 FL (ref 9–12.9)
POTASSIUM SERPL-SCNC: 4.4 MMOL/L (ref 3.6–5.5)
RBC # BLD AUTO: 2.86 M/UL (ref 4.2–5.4)
SODIUM SERPL-SCNC: 131 MMOL/L (ref 135–145)
WBC # BLD AUTO: 4.3 K/UL (ref 4.8–10.8)

## 2025-03-25 PROCEDURE — 700101 HCHG RX REV CODE 250: Performed by: PHYSICAL MEDICINE & REHABILITATION

## 2025-03-25 PROCEDURE — 97530 THERAPEUTIC ACTIVITIES: CPT

## 2025-03-25 PROCEDURE — 770010 HCHG ROOM/CARE - REHAB SEMI PRIVAT*

## 2025-03-25 PROCEDURE — 97530 THERAPEUTIC ACTIVITIES: CPT | Mod: CO

## 2025-03-25 PROCEDURE — 85027 COMPLETE CBC AUTOMATED: CPT

## 2025-03-25 PROCEDURE — 36415 COLL VENOUS BLD VENIPUNCTURE: CPT

## 2025-03-25 PROCEDURE — 80048 BASIC METABOLIC PNL TOTAL CA: CPT

## 2025-03-25 PROCEDURE — 700111 HCHG RX REV CODE 636 W/ 250 OVERRIDE (IP): Mod: JZ | Performed by: PHYSICAL MEDICINE & REHABILITATION

## 2025-03-25 PROCEDURE — 700102 HCHG RX REV CODE 250 W/ 637 OVERRIDE(OP): Performed by: PHYSICAL MEDICINE & REHABILITATION

## 2025-03-25 PROCEDURE — 97116 GAIT TRAINING THERAPY: CPT

## 2025-03-25 PROCEDURE — A9270 NON-COVERED ITEM OR SERVICE: HCPCS | Performed by: PHYSICAL MEDICINE & REHABILITATION

## 2025-03-25 PROCEDURE — 97535 SELF CARE MNGMENT TRAINING: CPT | Mod: CO

## 2025-03-25 PROCEDURE — 83735 ASSAY OF MAGNESIUM: CPT

## 2025-03-25 PROCEDURE — 99232 SBSQ HOSP IP/OBS MODERATE 35: CPT | Performed by: STUDENT IN AN ORGANIZED HEALTH CARE EDUCATION/TRAINING PROGRAM

## 2025-03-25 RX ADMIN — OXYCODONE 5 MG: 5 TABLET ORAL at 08:38

## 2025-03-25 RX ADMIN — TRAZODONE HYDROCHLORIDE 25 MG: 50 TABLET ORAL at 20:43

## 2025-03-25 RX ADMIN — ENOXAPARIN SODIUM 40 MG: 100 INJECTION SUBCUTANEOUS at 17:34

## 2025-03-25 RX ADMIN — LEVOTHYROXINE SODIUM 50 MCG: 0.05 TABLET ORAL at 05:36

## 2025-03-25 RX ADMIN — OXYCODONE HYDROCHLORIDE 10 MG: 10 TABLET ORAL at 20:44

## 2025-03-25 RX ADMIN — LIDOCAINE 1 PATCH: 4 PATCH TOPICAL at 08:40

## 2025-03-25 RX ADMIN — POLYETHYLENE GLYCOL 3350 1 PACKET: 17 POWDER, FOR SOLUTION ORAL at 08:39

## 2025-03-25 RX ADMIN — Medication 1000 UNITS: at 08:37

## 2025-03-25 RX ADMIN — MAGNESIUM 64 MG (MAGNESIUM CHLORIDE) TABLET,DELAYED RELEASE 64 MG: at 20:44

## 2025-03-25 RX ADMIN — LISINOPRIL 5 MG: 5 TABLET ORAL at 05:35

## 2025-03-25 RX ADMIN — GABAPENTIN 100 MG: 100 CAPSULE ORAL at 08:38

## 2025-03-25 RX ADMIN — GABAPENTIN 100 MG: 100 CAPSULE ORAL at 14:20

## 2025-03-25 RX ADMIN — MENTHOL 4 G: 10 GEL TOPICAL at 20:50

## 2025-03-25 RX ADMIN — OMEPRAZOLE 20 MG: 20 CAPSULE, DELAYED RELEASE ORAL at 08:38

## 2025-03-25 RX ADMIN — OXYCODONE HYDROCHLORIDE 10 MG: 10 TABLET ORAL at 14:26

## 2025-03-25 RX ADMIN — MENTHOL 4 G: 10 GEL TOPICAL at 09:00

## 2025-03-25 RX ADMIN — FLECAINIDE ACETATE 25 MG: 50 TABLET ORAL at 20:50

## 2025-03-25 RX ADMIN — GABAPENTIN 200 MG: 100 CAPSULE ORAL at 20:43

## 2025-03-25 RX ADMIN — FLECAINIDE ACETATE 25 MG: 50 TABLET ORAL at 08:45

## 2025-03-25 RX ADMIN — OXYCODONE 5 MG: 5 TABLET ORAL at 05:38

## 2025-03-25 RX ADMIN — MENTHOL 4 G: 10 GEL TOPICAL at 14:21

## 2025-03-25 ASSESSMENT — PAIN DESCRIPTION - PAIN TYPE
TYPE: ACUTE PAIN
TYPE: ACUTE PAIN
TYPE: ACUTE PAIN;SURGICAL PAIN
TYPE: ACUTE PAIN

## 2025-03-25 NOTE — PROGRESS NOTES
NURSING DAILY NOTE    Name: Alyssa Juan   Date of Admission: 3/18/2025   Admitting Diagnosis: Closed left hip fracture, initial encounter ([facility])  Attending Physician: Jacqui Alatorre D.o.  Allergies: Sulfa drugs and Codeine    Safety  Patient Assist  Mod assist  Patient Precautions     Precaution Comments     Bed Transfer Status     Toilet Transfer Status      Assistive Devices  Gait Belt, Rails, Wheelchair push  Oxygen  None - Room Air  Diet/Therapeutic Dining  Current Diet Order   Procedures    Diet Order Diet: Regular (total free water restriction 500mL, total fluids 1500mL per day.); Fluid modifications: (optional): 1800 ml Fluid Restriction     Pill Administration  whole  Agitated Behavioral Scale     ABS Level of Severity       Fall Risk  Has the patient had a fall this admission?   No  Blanka Pendleton Fall Risk Scoring  19, HIGH RISK  Fall Risk Safety Measures  bed alarm, chair alarm, poor balance, and low vision/ hearing    Vitals  Temperature: 36.6 °C (97.8 °F)  Temp src: Oral  Pulse: 68  Respiration: 20  Blood Pressure : 128/58  Blood Pressure MAP (Calculated): 81 MM HG  BP Location: Right, Upper Arm  Patient BP Position: Sitting     Oxygen  Pulse Oximetry: 95 %  O2 (LPM): 0  O2 Delivery Device: None - Room Air    Bowel and Bladder  Last Bowel Movement  03/24/25  Stool Type  Type 5: Soft blob with clear cut edges (passed easily)  Bowel Device  Bathroom  Continent  Bladder: Did not void   Bowel: No movement  Bladder Function  Urine Void (mL):  (large)  Number of Times Voided: 1  Urine Color: Unable To Evaluate  Genitourinary Assessment   Bladder Assessment (WDL):  Within Defined Limits  Petersen Catheter: Not Applicable  Urine Color: Unable To Evaluate  Bladder Device: Bathroom  Time Void: Yes  Bladder Scan: Post Void  $ Bladder Scan Results (mL): 7    Skin  Ovidio Score   19  Sensory Interventions   Bed Types: Standard/Trauma Mattress  Skin  Preventative Measures: Pillows in Use for Support / Positioning  Moisture Interventions         Pain  Pain Rating Scale  6 - Hard to ignore, avoid usual activities  Pain Location  Back  Pain Location Orientation  Mid  Pain Interventions   Medication (see MAR)    ADLs    Bathing   Patient Refused Bathing  Linen Change   Partial  Personal Hygiene  Perineal Care, Moist Monica Wipes  Chlorhexidine Bath      Oral Care  Brushed Teeth  Teeth/Dentures     Shave     Nutrition Percentage Eaten  Between 50-75% Consumed  Environmental Precautions  Treaded Slipper Socks on Patient, Transferred to Stronger Side, Personal Belongings, Wastebasket, Call Bell etc. in Easy Reach, Bed in Low Position, Report Given to Other Health Care Providers Regarding Fall Risk, Communication Sign for Patients & Families, Mobility Assessed & Appropriate Sign Placed  Patient Turns/Positioning  Patient turns self independently side to side without assistance, to offload sacral area  Patient Turns Assistance/Tolerance     Bed Positions  Bed Locked, Bed Controls On  Head of Bed Elevated  Self regulated      Psychosocial/Neurologic Assessment  Psychosocial Assessment  Psychosocial (WDL):  Within Defined Limits  Neurologic Assessment  Neuro (WDL): Within Defined Limits  Level of Consciousness: Alert  Orientation Level: Oriented X4  Cognition: Follows commands, Appropriate attention/concentration  Speech: Clear  EENT (WDL):  WDL Except    Cardio/Pulmonary Assessment  Edema   LLE Edema: 1+  Respiratory Breath Sounds     Cardiac Assessment   Cardiac (WDL):  WDL Except (HTN)

## 2025-03-25 NOTE — THERAPY
"Occupational Therapy  Daily Treatment     Patient Name:  Alyssa Juan  Age:  84 y.o., Sex:  female  Medical Record #:  [id number]  Today's Date:  3/25/2025    Precautions  Precautions: (P) Fall Risk  Fall Risk: (P) Poor balance, History of falls  Cognition/Communication: (P) Hard of hearing  Orthopedic: (P) Posterior Hip Precautions  Weight Bearing: (P) WBAT LLE    Subjective: \" Can we just go walk outside today?\"     Objective:    03/25/25 1302   OT Charge Group   OT Self Care / ADL (Units) 2   OT Therapy Activity (Units) 2   OT Total Time Spent   OT Individual Total Time Spent (Mins) 60   Precautions   Precautions Fall Risk   Fall Risk Poor balance;History of falls   Cognition/Communication Hard of hearing   Orthopedic Posterior Hip Precautions   Weight Bearing WBAT LLE   Bowel   Bowel Device Bathroom   Last BM 03/25/25   Number of Times Stooled 1   Stool Description Small   Type of Stool (Consistency)   (small serparte sausage like formed pieces)   Oral Hygiene   Level of Assist Independent   Patient Position Seated  (due to fatigue   performed at end of session)   Putting On/Taking Off Footwear   Level of Assist Maximal Assist   Patient Position Seated   Footwear Type Laced Shoes;Treaded Socks;Socks   Adaptive Equipment Sock Aid   Additional Description   (doff / don  tread and regular socks with reacher and sock aid  supervision   total j carlos to don and doff lace up shoes  ( note using \" loaner\" shoes  so she  could walk outside.)   Toilet Transfer   Level of Assist Stand By Assist   Toileting Hygiene   Level of Assist Minimal Assist   Additional Description Assist for hygiene  (post BM)   Sit to Lying   Level of Assist Stand By Assist   Lying to Sitting on Side of Bed   Level of Assist Stand By Assist   Chair/Bed-to-Chair Transfer   Level of Assist Supervised   Transfer Type Stand Step Transfer   Assistive Devices FWW      FWW outside  in rehab court yard  x  50 and 75 feet with SBA   over sidewalk and " large cobble stone style surface  transfer to and from  park style bench with SBA       UE bike  x 5 minutes  level 3.       Treatment focus on ADL safety  AE use  functional mobility safety  functional strength/endurance building         Assessment:    Difficulty with shoes due to fit does not have shoes here  continues to function at supervision / SBA      Strengths: Able to follow instructions, Effective communication skills, Good carryover of learning, Good insight into deficits/needs, Making steady progress towards goals, Manages pain appropriately, Motivated for self care and independence, Pleasant and cooperative, Supportive family, Willingly participates in therapeutic activities  Barriers: Fatigue, Generalized weakness, Impaired balance, Pain, Limited mobility    Plan:    ADL with AE   IADL  related mobility and transfers   strength/endurance building standing  tolerance  and balance activity    DME  OT DME Recommendations  Additional Equipment (NOT TYPICALLY COVERED BY INSURANCE): Other (see comments), Hip kit (Pt believes she still has hip kit from previous admission, unsure and will have friend check for her)      Occupational Therapy Goals (Active)       Problem: OT Long Term Goals       Dates: Start:  03/19/25         Goal: LTG-By discharge, patient will complete basic self care tasks with supervision.        Dates: Start:  03/19/25            Goal: LTG-By discharge, patient will perform bathroom transfers with supervision.        Dates: Start:  03/19/25

## 2025-03-25 NOTE — CARE PLAN
The patient is Stable - Low risk of patient condition declining or worsening    Shift Goals  Clinical Goals: safety, wound care, pain control  Patient Goals: pain control, rest  Family Goals: JOVANI    Progress made toward(s) clinical / shift goals:    Problem: Knowledge Deficit - Standard  Goal: Patient and family/care givers will demonstrate understanding of plan of care, disease process/condition, diagnostic tests and medications  Outcome: Progressing   Patient educated on the POC and medications administered. All questions and concerns addressed at this time  Problem: Fall Risk - Rehab  Goal: Patient will remain free from falls  Outcome: Progressing   Bed is locked and in low position. Call light and belongings within reach   Problem: Pain - Standard  Goal: Alleviation of pain or a reduction in pain to the patient’s comfort goal  Outcome: Progressing   Use ;of 0-10 pain scale. Patient is able to verbalize pain and discomfort. PRN and scheduled pain medications administered.

## 2025-03-25 NOTE — PROGRESS NOTES
"  Physical Medicine & Rehabilitation Progress Note    Encounter Date: 3/25/2025    Chief Complaint: Hip Fracture    _____________________________________  Interdisciplinary Team Conference   Most recent IDT on 3/24/2025    Discharge Date/Disposition:  3/28/2025  _____________________________________     Interval Events (Subjective):  VS: intermittent hypertension 149/75  Last BM: 3/24  Bladder: Voiding low PVRs  Schedule Meds Not Given: none  PRN Meds Taken: milk of mag, lactulose, oxycodone    Patient seen at bedside today.  Continues to participate with therapies well.  Was able to have a BM recently, although loose.  Patient agrees to stay on stool softeners and MiraLAX, as she has chronic constipation even at baseline.  No other new complaints today.    ROS: 14 point ROS negative unless otherwise specified in the HPI    Objective:  VITAL SIGNS: BP (!) 149/75   Pulse 69   Temp 36.6 °C (97.8 °F) (Temporal)   Resp 18   Ht 1.549 m (5' 1\")   Wt 48.5 kg (106 lb 14.8 oz)   SpO2 95%   BMI 20.20 kg/m²     GEN: No apparent distress  HEENT: Head normocephalic, atraumatic.  Sclera nonicteric bilaterally, no ocular discharge appreciated bilaterally.  CV: Extremities warm and well-perfused, no peripheral edema appreciated bilaterally.  PULMONARY: Breathing nonlabored on room air, no respiratory accessory muscle use.  Not requiring supplemental oxygen.  SKIN:   3/24: L hip      PSYCH: Mood and affect within normal limits.  NEURO: Awake alert.  Conversational.  Logical thought content.      Laboratory Values:  Recent Results (from the past 72 hours)   Basic Metabolic Panel    Collection Time: 03/25/25  5:59 AM   Result Value Ref Range    Sodium 131 (L) 135 - 145 mmol/L    Potassium 4.4 3.6 - 5.5 mmol/L    Chloride 94 (L) 96 - 112 mmol/L    Co2 27 20 - 33 mmol/L    Glucose 84 65 - 99 mg/dL    Bun 14 8 - 22 mg/dL    Creatinine 0.55 0.50 - 1.40 mg/dL    Calcium 8.8 8.5 - 10.5 mg/dL    Anion Gap 10.0 7.0 - 16.0   CBC WITHOUT " DIFFERENTIAL    Collection Time: 25  5:59 AM   Result Value Ref Range    WBC 4.3 (L) 4.8 - 10.8 K/uL    RBC 2.86 (L) 4.20 - 5.40 M/uL    Hemoglobin 9.5 (L) 12.0 - 16.0 g/dL    Hematocrit 28.4 (L) 37.0 - 47.0 %    MCV 99.3 (H) 81.4 - 97.8 fL    MCH 33.2 (H) 27.0 - 33.0 pg    MCHC 33.5 32.2 - 35.5 g/dL    RDW 48.9 35.9 - 50.0 fL    Platelet Count 390 164 - 446 K/uL    MPV 8.7 (L) 9.0 - 12.9 fL   MAGNESIUM    Collection Time: 25  5:59 AM   Result Value Ref Range    Magnesium 2.5 1.5 - 2.5 mg/dL   ESTIMATED GFR    Collection Time: 25  5:59 AM   Result Value Ref Range    GFR (CKD-EPI) 89 >60 mL/min/1.73 m 2         Medications:  Scheduled Medications   Medication Dose Frequency    diclofenac sodium  4 g TID    senna-docusate  2 Tablet Q EVENING    And    polyethylene glycol/lytes  1 Packet DAILY    lisinopril  5 mg Q DAY    traZODone  25 mg QHS    magnesium chloride  64 mg QHS    gabapentin  100 mg BID    gabapentin  200 mg Q EVENING    oxyCODONE immediate-release  5 mg BID    Pharmacy Consult Request  1 Each PHARMACY TO DOSE    alendronate  70 mg Q7 DAYS    flecainide  25 mg BID    levothyroxine  50 mcg AM ES    lidocaine  1 Patch Q24HR    vitamin D3  1,000 Units DAILY    omeprazole  20 mg DAILY    enoxaparin (LOVENOX) injection  40 mg DAILY AT 1800     PRN medications: methocarbamol, lidocaine, hydrALAZINE, lactulose, docusate sodium, bisacodyl EC, magnesium hydroxide, sodium phosphate, carboxymethylcellulose, benzocaine-menthol, mag hydrox-al hydrox-simeth, ondansetron **OR** ondansetron, sodium chloride, [] acetaminophen **FOLLOWED BY** acetaminophen, oxyCODONE immediate-release **OR** oxyCODONE immediate-release    Diet:  Current Diet Order   Procedures    Diet Order Diet: Regular (total free water restriction 500mL, total fluids 1500mL per day.); Fluid modifications: (optional): 1800 ml Fluid Restriction       Medical Decision Making and Plan:  Imapacted L Femoral Neck Fracture s/p  Hemiarthroplasty 3/14/25 Dr. Cota   Left superior and inferior pubic ramus fracture non-op  PT and OT for mobility and ADLs. Per guidelines, 15 hours per week between PT, OT and/or SLP.  Follow-up Ortho  WBAT LLE     Pain - Tylenol, oxycodone. 3/19 Schedule Gabapentin, schedule oxy in AM, add robaxin PRN. 3/24: add voltaren for chronic R shoulder pain.      ABLA - 3/19 10.3 stable. 3/21 9.8 Stable. 3/25 hgb 9.5, recent iron panel shows low iron, consider supplementation, although watch for constipation.     Leukopenia - 3/19 3.4 monitor. 3/21 WNL 4.9     Arrhythmia - Continue Flecainide. Follows with Dr. Vail     Peripheral Neuropathy - Continue Gabapentin     Hyponatremia - Continue fluid restriction, improving. 3/19 131 improving. Liberalize fluids. 3/21 Na better 132. Continue fluid restriction for now. 3/25 Na 131.      GERD - Continue PPI     Hypothyroidism - Continue Synthroid     Hypertension - Lisinopril 20mg HELD due to hyperkalemia. 3/19 BP elevated required PRN hydralazine. Restart Lisinopril 10mg daily. 3/20 Patient with lower BP today, only takes 5mg at home. Reduce. 3/21 BP stable. Received Hydralazine but it was just before getting scheduled Lisinopril.      Hyperkalemia - 3/19 WNL 4.7. Restarting Lisinopril. 3/21 WNL 4. 3/25 K 4.4.      Hypomagnesemia - Continue supplement. 3/19 WNL 3/25 WNL.      Vitamin D Deficiency - Supplement      Bowel - Patient on Senna-docusate for constipation prophylaxis.  3/23 add MiraLAX daily.  3/24: Had a BM today.     Bladder - TV/PVR/BS PRN    Sleep/Depression - Schedule Trazodone. Pt had friend recommend Elavil, d/w her large side effect profile. Will consider if fails trazodone.         Upcoming Labs/imaging: 3/25     DVT PROPHYLAXIS: Start Lovenox 40mg SQ nightly on admission to ARU, held at main due to slight drop in Hgb, however repeat improved in 10's. No active bleeding, incision looks good. 3/19 Hgb stable.      HOSPITALIST FOLLOWING: No     CODE STATUS:  DNAR/DNI     DISPO: Home      GUS: 3/28/2025     MEDS SENT TO: [facility]     DISCHARGE SPECIALIST FOLLOW UP:   Ortho     Patient to scheduled follow up with their PCP within 2 weeks from discharge from the [facility].     ____________________________________    Earl Ramos D.O.  Physical Medicine & Rehabilitation   ____________________________________    Total time:  40 minutes.

## 2025-03-25 NOTE — DISCHARGE PLANNING
Case Management/IDT follow up.   Projected dc date: 3/28/25  IDT continues to recommend IRF level of care as patient continue to make progress with all therapies.     DC needs  Home health:  PT/OT/RN/CNA  DME: VERONA  Family training:    Follow up:   PCP:  Other:     I met with patient and family providing update from IDT and discussed plan of care.  They are in agreement w/ plan.     Plan:  Continue to follow

## 2025-03-25 NOTE — PROGRESS NOTES
NURSING DAILY NOTE    Name: Alyssa Juan   Date of Admission: 3/18/2025   Admitting Diagnosis: Closed left hip fracture, initial encounter ([facility])  Attending Physician: JAQUAN CLARK D.O.  Allergies: Sulfa drugs and Codeine    Safety  Patient Assist  Mod assist  Patient Precautions  Precautions: Fall Risk  Fall Risk: Poor balance, History of falls  Cognition/Communication: Hard of hearing  Orthopedic: Posterior Hip Precautions  Weight Bearing: WBAT LLE  Bed Transfer Status  Stand By Assist  Toilet Transfer Status   Stand By Assist  Assistive Devices  Rails, Wheelchair  Oxygen  None - Room Air  Diet/Therapeutic Dining  Current Diet Order   Procedures    Diet Order Diet: Regular (total free water restriction 500mL, total fluids 1500mL per day.); Fluid modifications: (optional): 1800 ml Fluid Restriction     Pill Administration  whole and one at a time  Agitated Behavioral Scale     ABS Level of Severity       Fall Risk  Has the patient had a fall this admission?      Blanka Pendleton Fall Risk Scoring  19, HIGH RISK  Fall Risk Safety Measures  bed alarm, chair alarm, poor balance, and low vision/hearing    Vitals  Temperature: 36.6 °C (97.8 °F)  Temp src: Temporal  Pulse: 69  Respiration: 18  Blood Pressure : (!) 149/75  Blood Pressure MAP (Calculated): 100 MM HG  BP Location: Left, Upper Arm  Patient BP Position: Sitting     Oxygen  Pulse Oximetry: 95 %  O2 (LPM): 0  O2 Delivery Device: None - Room Air    Bowel and Bladder  Last Bowel Movement  03/24/25  Stool Type  Type 6: Fluffy pieces with ragged edges, a mushy stool, Type 7: Watery, no solid pieces-entirely liquid  Bowel Device  Bathroom  Continent  Bladder: Did not void   Bowel: No movement  Bladder Function  Urine Void (mL):  (Moderate)  Number of Times Voided: 1  Urine Color: Yellow  Genitourinary Assessment   Bladder Assessment (WDL):  Within Defined Limits  Petersen Catheter: Not Applicable  Urine Color: Yellow  Bladder Device: Bathroom  Time Void:  Yes  Bladder Scan: Post Void  $ Bladder Scan Results (mL): 7    Skin  Ovidio Score   19  Sensory Interventions   Bed Types: Standard/Trauma Mattress  Skin Preventative Measures: Pillows in Use for Support / Positioning  Moisture Interventions         Pain  Pain Rating Scale  5 - Interrupts some activities  Pain Location  Groin, Back  Pain Location Orientation  Posterior  Pain Interventions   Medication (see MAR)    ADLs    Bathing   Patient Refused Bathing  Linen Change   Partial  Personal Hygiene  Moist Monica Wipes  Chlorhexidine Bath      Oral Care  Brushed Teeth  Teeth/Dentures     Shave     Nutrition Percentage Eaten  Between 50-75% Consumed  Environmental Precautions  Treaded Slipper Socks on Patient, Bed in Low Position  Patient Turns/Positioning  Patient turns self independently side to side without assistance, to offload sacral area  Patient Turns Assistance/Tolerance     Bed Positions  Bed Controls On, Bed Locked  Head of Bed Elevated  Less than 30 degrees      Psychosocial/Neurologic Assessment  Psychosocial Assessment  Psychosocial (WDL):  Within Defined Limits  Neurologic Assessment  Neuro (WDL): Within Defined Limits  Level of Consciousness: Alert  Orientation Level: Oriented X4  Cognition: Follows commands, Appropriate attention/concentration  Speech: Clear  EENT (WDL):  WDL Except    Cardio/Pulmonary Assessment  Edema   LLE Edema: 1+  Respiratory Breath Sounds     Cardiac Assessment   Cardiac (WDL):  WDL Except (HTN)

## 2025-03-25 NOTE — THERAPY
Physical Therapy   Daily Treatment     Patient Name:  Alyssa Juan  Age:  84 y.o., Sex:  female  Medical Record #:  [id number]  Today's Date: 3/25/2025     Precautions  Precautions: Fall Risk  Fall Risk: Poor balance, History of falls  Cognition/Communication: Hard of hearing  Orthopedic: Posterior Hip Precautions  Weight Bearing: WBAT LLE    Subjective: Pt agreeable to sessions detailed below.    Objective:    03/25/25 0701   PT Charge Group   PT Gait Training (Units) 1   PT Therapeutic Activities (Units) 3   PT Total Time Spent   PT Individual Total Time Spent (Mins) 60   Pain 0 - 10 Group   Location Groin;Leg   Location Orientation Left   Roll Left and Right   Level of Assist Supervised   Additional Description   (complete on mat table)   Sit to Lying   Level of Assist Supervised   Additional Description   (complete on mat table)   Lying to Sitting on Side of Bed   Level of Assist Supervised   Additional Description   (complete on mat table)   Sit to Stand   Level of Assist Supervised   Assistive Devices FWW   Additional Description Extra time needed   Chair/Bed-to-Chair Transfer   Level of Assist Supervised   Transfer Type Stand Step Transfer   Assistive Devices FWW   Additional Description Extra time needed   Ambulation   Level of Assist Supervised   Assistive Device FWW   Additional Description Extra time needed   Distance 220ft   Interdisciplinary Plan of Care Collaboration   Patient Position at End of Therapy Seated;Chair Alarm On  (in dining room c/ staff for breakfast)         Therapeutic Activities  Purpose: to improve performance and function of daily activities and to increase safety with activities of daily life and mobility related to activities of daily life  Interventions:   Bed mobility training (scooting, supine to sit, sit to supine, rolling)  Patient or family education  Reviewed fall packet and discussed fall prevention, home modifications, and floor recovery    Gait Training  Purpose: to  improve functional ambulation and to improve walking endurance  Interventions:   Walking endurance, 1 set x220ft  Deviations (laterality in comments):  Bradykinetic         03/25/25 1001   PT Charge Group   PT Gait Training (Units) 1   PT Therapeutic Activities (Units) 1   PT Total Time Spent   PT Individual Total Time Spent (Mins) 30   Pain 0 - 10 Group   Location Groin   Location Orientation Left   Sit to Lying   Level of Assist Stand By Assist   Additional Description Completed on regular bed   Lying to Sitting on Side of Bed   Level of Assist Stand By Assist   Additional Description Completed on regular bed   Sit to Stand   Level of Assist Supervised   Assistive Devices FWW   Additional Description Extra time needed   Chair/Bed-to-Chair Transfer   Level of Assist Supervised   Transfer Type Stand Step Transfer   Assistive Devices FWW   Additional Description Extra time needed   Ambulation   Level of Assist Supervised   Assistive Device FWW   Additional Description Extra time needed   Distance 220ft   Interdisciplinary Plan of Care Collaboration   IDT Collaboration with  Family / Caregiver;Physician   Patient Position at End of Therapy In Bed;Bed Alarm On;Call Light within Reach;Tray Table within Reach;Phone within Reach   Collaboration Comments friend present at beginning of session; physician performing rounds     Therapeutic Activities  Purpose: to improve performance and function of daily activities and to increase safety with activities of daily life and mobility related to activities of daily life  Interventions:   Bed mobility training (scooting, supine to sit, sit to supine, rolling)  Reinforce supine<>sit training from morning session    Gait Training  Purpose: to improve functional ambulation and to improve walking endurance  Interventions:   Walking endurance, 1 set x220ft  Deviations (laterality in comments):  Bradykinetic    Assessment: Pt demonstrated improved supine<>sit mechanics following blocked  practice and good carry over to afternoon session. Pt continues to progress standing and ambulation tolerance.    Strengths: Able to follow instructions, Alert and oriented, Independent prior level of function, Manages pain appropriately, Motivated for self care and independence, Pleasant and cooperative, Supportive family, Willingly participates in therapeutic activities  Barriers: Generalized weakness, Impaired activity tolerance, Decreased endurance, Fatigue, Impaired balance, Limited mobility    Plan:   Bed mobility, transfer training, LE strengthening, AMB with FWW     DME           Passport items to be completed:  Get in/out of bed safely, in/out of a vehicle, safely use mobility device, walk or wheel around home/community, navigate up and down stairs, show how to get up/down from the ground, ensure home is accessible, demonstrate HEP, complete caregiver training    Physical Therapy Problems (Active)       Problem: Mobility       Dates: Start:  03/19/25         Goal: STG-Within one week, patient will ambulate household distance x 150 ft with LRAD, pain <3/10, supervised       Dates: Start:  03/19/25               Problem: Mobility Transfers       Dates: Start:  03/19/25         Goal: STG-Within one week, patient will perform bed mobility with supervision and LRAD       Dates: Start:  03/19/25               Problem: PT-Long Term Goals       Dates: Start:  03/19/25         Goal: LTG-By discharge, patient will tolerate standing x 10 minutes with LRAD, pain <2/10, Rick       Dates: Start:  03/19/25            Goal: LTG-By discharge, patient will ambulate x 300 ft with LRAD, Rick       Dates: Start:  03/19/25            Goal: LTG-By discharge, patient will transfer one surface to another Rick with LRAD, adhering to spinal precautions       Dates: Start:  03/19/25            Goal: LTG-By discharge, patient will perform home exercise program Rikc with handouts       Dates: Start:  03/19/25            Goal: LTG-By  discharge, patient will ambulate up/down 4-6 stairs with supervision, bilateral railings       Dates: Start:  03/19/25            Goal: LTG-By discharge, patient will complete TUG <60 seconds with LRAD       Dates: Start:  03/19/25

## 2025-03-25 NOTE — CARE PLAN
Problem: Fall Risk - Rehab  Goal: Patient will remain free from falls  Outcome: Progressing     Problem: Pain - Standard  Goal: Alleviation of pain or a reduction in pain to the patient’s comfort goal  Outcome: Progressing     The patient is Stable - Low risk of patient condition declining or worsening    Shift Goals  Clinical Goals: safety, pain control  Patient Goals: pain control, rest  Family Goals: JOVANI

## 2025-03-25 NOTE — THERAPY
Missed Therapy    Patient Name: Alyssa Juan  Age:  84 y.o., Sex:  female  Medical Record #: [id number]  Today's Date: 3/25/2025    Discussed missed therapy with therapy scheduling, physician, and PT.       03/25/25 1031   Therapy Missed   Missed Therapy (Minutes) 60   Reason For Missed Therapy Non-Medical - Patient Refused  (pt refused OT group therapy at this time d/t increased pain and fatigue. will reattempt as schedule allows.)   Interdisciplinary Plan of Care Collaboration   IDT Collaboration with  Physical Therapist;Therapy Tech   Patient Position at End of Therapy In Bed;Call Light within Reach;Tray Table within Reach;Phone within Reach

## 2025-03-26 ENCOUNTER — APPOINTMENT (OUTPATIENT)
Dept: OCCUPATIONAL THERAPY | Facility: REHABILITATION | Age: 85
DRG: 560 | End: 2025-03-26
Attending: PHYSICAL MEDICINE & REHABILITATION
Payer: MEDICARE

## 2025-03-26 ENCOUNTER — APPOINTMENT (OUTPATIENT)
Dept: RADIOLOGY | Facility: MEDICAL CENTER | Age: 85
End: 2025-03-26
Attending: STUDENT IN AN ORGANIZED HEALTH CARE EDUCATION/TRAINING PROGRAM
Payer: MEDICARE

## 2025-03-26 ENCOUNTER — APPOINTMENT (OUTPATIENT)
Dept: PHYSICAL THERAPY | Facility: REHABILITATION | Age: 85
DRG: 560 | End: 2025-03-26
Attending: PHYSICAL MEDICINE & REHABILITATION
Payer: MEDICARE

## 2025-03-26 PROCEDURE — 97530 THERAPEUTIC ACTIVITIES: CPT | Mod: CO

## 2025-03-26 PROCEDURE — 97110 THERAPEUTIC EXERCISES: CPT | Mod: CO

## 2025-03-26 PROCEDURE — A9270 NON-COVERED ITEM OR SERVICE: HCPCS | Performed by: PHYSICAL MEDICINE & REHABILITATION

## 2025-03-26 PROCEDURE — 97535 SELF CARE MNGMENT TRAINING: CPT | Mod: CO

## 2025-03-26 PROCEDURE — 97116 GAIT TRAINING THERAPY: CPT

## 2025-03-26 PROCEDURE — 99232 SBSQ HOSP IP/OBS MODERATE 35: CPT | Performed by: PHYSICAL MEDICINE & REHABILITATION

## 2025-03-26 PROCEDURE — 700102 HCHG RX REV CODE 250 W/ 637 OVERRIDE(OP): Performed by: PHYSICAL MEDICINE & REHABILITATION

## 2025-03-26 PROCEDURE — 700101 HCHG RX REV CODE 250: Performed by: PHYSICAL MEDICINE & REHABILITATION

## 2025-03-26 PROCEDURE — 770010 HCHG ROOM/CARE - REHAB SEMI PRIVAT*

## 2025-03-26 PROCEDURE — 97110 THERAPEUTIC EXERCISES: CPT

## 2025-03-26 PROCEDURE — 700111 HCHG RX REV CODE 636 W/ 250 OVERRIDE (IP): Mod: JZ | Performed by: PHYSICAL MEDICINE & REHABILITATION

## 2025-03-26 PROCEDURE — 97530 THERAPEUTIC ACTIVITIES: CPT

## 2025-03-26 RX ORDER — LISINOPRIL 5 MG/1
5 TABLET ORAL
Status: DISCONTINUED | OUTPATIENT
Start: 2025-03-27 | End: 2025-03-27

## 2025-03-26 RX ORDER — POLYETHYLENE GLYCOL 3350 17 G/17G
1 POWDER, FOR SOLUTION ORAL DAILY
Status: DISCONTINUED | OUTPATIENT
Start: 2025-03-27 | End: 2025-03-28 | Stop reason: HOSPADM

## 2025-03-26 RX ORDER — OXYCODONE HYDROCHLORIDE 5 MG/1
5 TABLET ORAL DAILY
Refills: 0 | Status: DISCONTINUED | OUTPATIENT
Start: 2025-03-27 | End: 2025-03-27

## 2025-03-26 RX ORDER — AMOXICILLIN 250 MG
2 CAPSULE ORAL
Status: DISCONTINUED | OUTPATIENT
Start: 2025-03-27 | End: 2025-03-28 | Stop reason: HOSPADM

## 2025-03-26 RX ADMIN — OMEPRAZOLE 20 MG: 20 CAPSULE, DELAYED RELEASE ORAL at 08:25

## 2025-03-26 RX ADMIN — FLECAINIDE ACETATE 25 MG: 50 TABLET ORAL at 20:31

## 2025-03-26 RX ADMIN — FLECAINIDE ACETATE 25 MG: 50 TABLET ORAL at 08:25

## 2025-03-26 RX ADMIN — MENTHOL 4 G: 10 GEL TOPICAL at 08:26

## 2025-03-26 RX ADMIN — LEVOTHYROXINE SODIUM 50 MCG: 0.05 TABLET ORAL at 05:20

## 2025-03-26 RX ADMIN — GABAPENTIN 100 MG: 100 CAPSULE ORAL at 08:25

## 2025-03-26 RX ADMIN — OXYCODONE 5 MG: 5 TABLET ORAL at 09:35

## 2025-03-26 RX ADMIN — MAGNESIUM 64 MG (MAGNESIUM CHLORIDE) TABLET,DELAYED RELEASE 64 MG: at 20:28

## 2025-03-26 RX ADMIN — GABAPENTIN 200 MG: 100 CAPSULE ORAL at 20:28

## 2025-03-26 RX ADMIN — GABAPENTIN 100 MG: 100 CAPSULE ORAL at 14:30

## 2025-03-26 RX ADMIN — LIDOCAINE 1 PATCH: 4 PATCH TOPICAL at 08:25

## 2025-03-26 RX ADMIN — TRAZODONE HYDROCHLORIDE 25 MG: 50 TABLET ORAL at 20:31

## 2025-03-26 RX ADMIN — Medication 1000 UNITS: at 08:24

## 2025-03-26 RX ADMIN — ENOXAPARIN SODIUM 40 MG: 100 INJECTION SUBCUTANEOUS at 17:34

## 2025-03-26 RX ADMIN — OXYCODONE 5 MG: 5 TABLET ORAL at 05:20

## 2025-03-26 RX ADMIN — OXYCODONE HYDROCHLORIDE 10 MG: 10 TABLET ORAL at 20:28

## 2025-03-26 RX ADMIN — MENTHOL 4 G: 10 GEL TOPICAL at 14:31

## 2025-03-26 RX ADMIN — MAGNESIUM HYDROXIDE 30 ML: 1200 LIQUID ORAL at 16:17

## 2025-03-26 RX ADMIN — LISINOPRIL 5 MG: 5 TABLET ORAL at 05:19

## 2025-03-26 RX ADMIN — POLYETHYLENE GLYCOL 3350 1 PACKET: 17 POWDER, FOR SOLUTION ORAL at 08:25

## 2025-03-26 ASSESSMENT — PAIN DESCRIPTION - PAIN TYPE
TYPE: ACUTE PAIN

## 2025-03-26 NOTE — PROGRESS NOTES
NURSING DAILY NOTE    Name: Alyssa Juan   Date of Admission: 3/18/2025   Admitting Diagnosis: Closed left hip fracture, initial encounter ([facility])  Attending Physician: JAQUAN CLARK D.O.  Allergies: Sulfa drugs and Codeine    Safety  Patient Assist  Mod assist  Patient Precautions  Precautions: Fall Risk  Fall Risk: Poor balance, History of falls  Cognition/Communication: Hard of hearing  Orthopedic: Posterior Hip Precautions  Weight Bearing: WBAT LLE  Bed Transfer Status  Supervised  Toilet Transfer Status   Stand By Assist  Assistive Devices  Rails, Wheelchair  Oxygen  Nasal Cannula  Diet/Therapeutic Dining  Current Diet Order   Procedures    Diet Order Diet: Regular (total free water restriction 500mL, total fluids 1500mL per day.); Fluid modifications: (optional): 1800 ml Fluid Restriction     Pill Administration  whole and one at a time  Agitated Behavioral Scale     ABS Level of Severity       Fall Risk  Has the patient had a fall this admission?      Blanka Pendleton Fall Risk Scoring  19, HIGH RISK  Fall Risk Safety Measures  bed alarm, chair alarm, poor balance, and low vision/hearing    Vitals  Temperature: 36.6 °C (97.9 °F)  Temp src: Temporal  Pulse: 66  Respiration: 17  Blood Pressure : 117/82  Blood Pressure MAP (Calculated): 94 MM HG  BP Location: Left, Upper Arm  Patient BP Position: Supine     Oxygen  Pulse Oximetry: 94 %  O2 (LPM): 2  O2 Delivery Device: Nasal Cannula    Bowel and Bladder  Last Bowel Movement  03/25/25  Stool Type  Type 4: Like a sausage or snake, smooth and soft  Bowel Device  Bathroom  Continent  Bladder: Did not void   Bowel: No movement  Bladder Function  Urine Void (mL):  (Moderate)  Number of Times Voided: 1  Urine Color: Unable To Evaluate  Genitourinary Assessment   Bladder Assessment (WDL):  Within Defined Limits  Petersen Catheter: Not Applicable  Urine Color: Unable To Evaluate  Bladder Device: Bathroom  Time Void: Yes  Bladder Scan: Post Void  $ Bladder Scan  Results (mL): 7    Skin  Ovidio Score   19  Sensory Interventions   Bed Types: Standard/Trauma Mattress  Skin Preventative Measures: Pillows in Use for Support / Positioning  Moisture Interventions         Pain  Pain Rating Scale  6 - Hard to ignore, avoid usual activities  Pain Location  Groin, Hip  Pain Location Orientation  Left  Pain Interventions   Medication (see MAR)    ADLs    Bathing   Patient Refused Bathing  Linen Change   Partial  Personal Hygiene  Moist Monica Wipes  Chlorhexidine Bath      Oral Care  Brushed Teeth  Teeth/Dentures     Shave     Nutrition Percentage Eaten  Breakfast, Between 25-50% Consumed  Environmental Precautions  Treaded Slipper Socks on Patient, Personal Belongings, Wastebasket, Call Bell etc. in Easy Reach, Bed in Low Position  Patient Turns/Positioning  Patient turns self independently side to side without assistance, to offload sacral area  Patient Turns Assistance/Tolerance     Bed Positions  Bed Controls On, Bed Locked  Head of Bed Elevated  Self regulated      Psychosocial/Neurologic Assessment  Psychosocial Assessment  Psychosocial (WDL):  Within Defined Limits  Neurologic Assessment  Neuro (WDL): Within Defined Limits  Level of Consciousness: Alert  Orientation Level: Oriented X4  Cognition: Follows commands, Appropriate attention/concentration  Speech: Clear  EENT (WDL):  WDL Except    Cardio/Pulmonary Assessment  Edema   LLE Edema: 1+  Respiratory Breath Sounds     Cardiac Assessment   Cardiac (WDL):  WDL Except (HTN)

## 2025-03-26 NOTE — THERAPY
"Occupational Therapy  Daily Treatment     Patient Name:  Alyssa Juan  Age:  84 y.o., Sex:  female  Medical Record #:  [id number]  Today's Date:  3/26/2025    Precautions  Precautions: Fall Risk  Fall Risk: Poor balance, History of falls  Cognition/Communication: Hard of hearing  Orthopedic: Posterior Hip Precautions  Weight Bearing: WBAT LLE  Documentation reflects  2 tx sessions    Subjective:   \" Someone will be doing the cooking for awhile.\" AM session   \" Can we still go outside?\"   PM session      Objective:    03/26/25 0845 03/26/25 1240 03/26/25 1250   OT Charge Group   OT Self Care / ADL (Units) 1 1  --    OT Therapy Activity (Units) 2  --   --    OT Therapeutic Exercise (Units)  --  2  --    OT Total Time Spent   OT Individual Total Time Spent (Mins) 45 45  --    Precautions   Precautions  --  Fall Risk  --    Fall Risk  --  Poor balance;History of falls  --    Orthopedic  --  Posterior Hip Precautions  --    Weight Bearing  --  WBAT LLE  --    Vitals   Patient BP Position  --  Sitting Sitting   Blood Pressure   --  (!) 89/50 107/53   Oral Hygiene   Level of Assist  --  Independent  --    Patient Position  --  Seated  (due to low BP  earlier in session)  --    Grooming   Level of Assist  --  Independent  --    Patient Position  --  Seated  (due to low BP earlier in session)  --    Tub/Shower Transfer   Level of Assist Supervised  --   --    Transfer Type Stand step transfer  --   --    Adaptive Equipment Grab bars;Use of tub transfer bench  --   --    Assistive Device 4WW  --   --    Additional Description   (cues for technique  Dry  step in shower transfer)  --   --    IADL Assessments   Kitchen Mobility Supervised  (cues for  counter top sliding for item transport   fridge  to microwave.)  --   --    Meal Preparation Supervised  (heating up an  item in the microwave)  --   --    Interdisciplinary Plan of Care Collaboration   Patient Position at End of Therapy Seated;Call Light within Reach;Tray " Table within Reach;Phone within Reach;Chair Alarm On Seated;Call Light within Reach;Tray Table within Reach;Phone within Reach;Chair Alarm On  --          Therapeutic Activities  Purpose: to improve performance and function of daily activities and to increase safety with activities of daily life and mobility related to activities of daily life  Interventions:  Worked on static standing  endurance   stood  x 7 minutes for leisure interest activity ( playing Xapo )   remainder of game in sitting due to  back pain  with prolonged standing ( this is not new to the patient)            Therapeutic Exercise  Purpose: to improve strength and to improve functional endurance  Interventions:   Upper body exercises:   Seated program -   Chest press, 2 sets of 10, Right, Left, and Weight 2lbs   Front arm raise, 2 sets of 10, Right, and Left  Shoulder press, 2 sets of 10, Right, and Left  Internal shoulder rotation, 2 sets of 10, Right, and Left  External shoulder rotation, 2 sets of 10, Right, and Left  Bicep curls, 2 sets of 10, Right, and Left  Pronation/Supination, 2 sets of 10, Right, and Left  Other, 2lb weight  all exercises   River Med Cycle: Gear Level 3 and Time 5 minutes     Activities of Daily Living (ADL's)  Purpose: to improve function, safety, and independence with activities of daily living  Interventions:   Oral Hygiene  Grooming    Assessment:     Pm  limited by low BP    Reported some light headedness with transfer  with nursing    Functioning at a supervision / SBA  level     Strengths: Able to follow instructions, Effective communication skills, Good carryover of learning, Good insight into deficits/needs, Making steady progress towards goals, Manages pain appropriately, Motivated for self care and independence, Pleasant and cooperative, Supportive family, Willingly participates in therapeutic activities  Barriers: Fatigue, Generalized weakness, Impaired balance, Pain, Limited mobility    Plan:   ADL  routine in AM    perform at FWW level    last shower and dressing  sat on toilet  for  dressing tasks   ( towel placed over seat and on floor)      Has loaner shoes but only for walking outside  Score footwear   using  Tread socks only     IADL related mobility and transfers strength/endurance building standing tolerance and balance activity   DME  OT DME Recommendations  Additional Equipment (NOT TYPICALLY COVERED BY INSURANCE): Other (see comments), Hip kit (Pt believes she still has hip kit from previous admission, unsure and will have friend check for her)        Occupational Therapy Goals (Active)       Problem: OT Long Term Goals       Dates: Start:  03/19/25         Goal: LTG-By discharge, patient will complete basic self care tasks with supervision.        Dates: Start:  03/19/25            Goal: LTG-By discharge, patient will perform bathroom transfers with supervision.        Dates: Start:  03/19/25

## 2025-03-26 NOTE — PROGRESS NOTES
"  Physical Medicine & Rehabilitation Progress Note    Encounter Date: 3/26/2025    Chief Complaint: Hip Fracture    Interval Events (Subjective):  VS: SBP fluctuant, lower in AM after getting AM meds   Last BM: 3/25  Bladder: Voiding low PVRs  Schedule Meds Not Given: None  PRN Meds Taken: Oxycodone last night    Patient seen and examined in her room. D/w her fluid balance, sodium, BP in detail. She was taking salt tabs once a week prior to admission. She also had seen Nephro about her sodium, they also told her drink less free water. Her BP does go up and down and she normally doesn't take her BP med so early. She takes only morning meds and staggers them. Will change dose timing. She has OP follow up with  PCP. Will see her Cardiologist for annual follow up as well. D/w her repeating labs for stability tomorrow.       ROS: 14 point ROS negative unless otherwise specified in the HPI    Objective:  VITAL SIGNS: BP (!) 89/50   Pulse 78   Temp 37.3 °C (99.1 °F) (Temporal)   Resp 12   Ht 1.549 m (5' 1\")   Wt 48.5 kg (106 lb 14.8 oz)   SpO2 96%   BMI 20.20 kg/m²     GEN: No apparent distress  HEENT: Head normocephalic, atraumatic.  Sclera nonicteric bilaterally, no ocular discharge appreciated bilaterally.  CV: Extremities warm and well-perfused, no peripheral edema appreciated bilaterally.  PULMONARY: Breathing nonlabored on room air, no respiratory accessory muscle use.  Not requiring supplemental oxygen.  SKIN:     PSYCH: Mood and affect within normal limits.  NEURO: Awake alert.  Conversational.  Logical thought content.      Laboratory Values:  Recent Results (from the past 72 hours)   Basic Metabolic Panel    Collection Time: 03/25/25  5:59 AM   Result Value Ref Range    Sodium 131 (L) 135 - 145 mmol/L    Potassium 4.4 3.6 - 5.5 mmol/L    Chloride 94 (L) 96 - 112 mmol/L    Co2 27 20 - 33 mmol/L    Glucose 84 65 - 99 mg/dL    Bun 14 8 - 22 mg/dL    Creatinine 0.55 0.50 - 1.40 mg/dL    Calcium 8.8 " 8.5 - 10.5 mg/dL    Anion Gap 10.0 7.0 - 16.0   CBC WITHOUT DIFFERENTIAL    Collection Time: 25  5:59 AM   Result Value Ref Range    WBC 4.3 (L) 4.8 - 10.8 K/uL    RBC 2.86 (L) 4.20 - 5.40 M/uL    Hemoglobin 9.5 (L) 12.0 - 16.0 g/dL    Hematocrit 28.4 (L) 37.0 - 47.0 %    MCV 99.3 (H) 81.4 - 97.8 fL    MCH 33.2 (H) 27.0 - 33.0 pg    MCHC 33.5 32.2 - 35.5 g/dL    RDW 48.9 35.9 - 50.0 fL    Platelet Count 390 164 - 446 K/uL    MPV 8.7 (L) 9.0 - 12.9 fL   MAGNESIUM    Collection Time: 25  5:59 AM   Result Value Ref Range    Magnesium 2.5 1.5 - 2.5 mg/dL   ESTIMATED GFR    Collection Time: 25  5:59 AM   Result Value Ref Range    GFR (CKD-EPI) 89 >60 mL/min/1.73 m 2       Medications:  Scheduled Medications   Medication Dose Frequency    diclofenac sodium  4 g TID    senna-docusate  2 Tablet Q EVENING    And    polyethylene glycol/lytes  1 Packet DAILY    lisinopril  5 mg Q DAY    traZODone  25 mg QHS    magnesium chloride  64 mg QHS    gabapentin  100 mg BID    gabapentin  200 mg Q EVENING    oxyCODONE immediate-release  5 mg BID    Pharmacy Consult Request  1 Each PHARMACY TO DOSE    alendronate  70 mg Q7 DAYS    flecainide  25 mg BID    levothyroxine  50 mcg AM ES    lidocaine  1 Patch Q24HR    vitamin D3  1,000 Units DAILY    omeprazole  20 mg DAILY    enoxaparin (LOVENOX) injection  40 mg DAILY AT 1800     PRN medications: methocarbamol, lidocaine, hydrALAZINE, lactulose, docusate sodium, bisacodyl EC, magnesium hydroxide, sodium phosphate, carboxymethylcellulose, benzocaine-menthol, mag hydrox-al hydrox-simeth, ondansetron **OR** ondansetron, sodium chloride, [] acetaminophen **FOLLOWED BY** acetaminophen, oxyCODONE immediate-release **OR** oxyCODONE immediate-release    Diet:  Current Diet Order   Procedures    Diet Order Diet: Regular (total free water restriction 500mL, total fluids 1500mL per day.); Fluid modifications: (optional): 1800 ml Fluid Restriction       Medical Decision Making  and Plan:  Imapacted L Femoral Neck Fracture s/p Hemiarthroplasty 3/14/25 Dr. Cota   Left superior and inferior pubic ramus fracture non-op  PT and OT for mobility and ADLs. Per guidelines, 15 hours per week between PT, OT and/or SLP.  Follow-up Ortho  WBAT LLE     Pain - Tylenol, oxycodone. 3/19 Schedule Gabapentin, schedule oxy in AM, add robaxin PRN.     3/26 Make scheduled AM oxycodone at 0600. Reduction from BID.      ABLA - 3/19 10.3 stable. 3/21 9.8 Stable. 3/26 9.5    Leukopenia - 3/19 3.4 monitor. 3/21 WNL 4.9. 3/26 4.3     Arrhythmia - Continue Flecainide. Follows with Dr. Vail     Peripheral Neuropathy - Continue Gabapentin     Hyponatremia - Continue fluid restriction, improving. 3/19 131 improving. Liberalize fluids. 3/21 Na better 132. Continue fluid restriction for now. 3/25 Stable 131. Patient has seen Nephro in past and told to limit fluids, takes salt tab every Monday, does not know dose, has not been doing this while hospitalized.      GERD - Continue PPI     Hypothyroidism - Continue Synthroid     Hypertension - Lisinopril 20mg HELD due to hyperkalemia. 3/19 BP elevated required PRN hydralazine. Restart Lisinopril 10mg daily. 3/20 Patient with lower BP today, only takes 5mg at home. Reduce. 3/21 BP stable. Received Hydralazine but it was just before getting scheduled Lisinopril.     3/26 Change timing of Lisinopril. Suspect low's in AM due to timing early AM and initiation therapy. Patient mildly symptomatic. Could be related to pain meds as well.      Hyperkalemia - 3/19 WNL 4.7. Restarting Lisinopril. 3/21 WNL 4. 3/25 4.4 WNL     Hypomagnesemia - Continue supplement. 3/19 WNL     Vitamin D Deficiency - Supplement      Bowel - Patient on Senna-docusate for constipation prophylaxis.      Bladder - TV/PVR/BS PRN    Sleep/Depression - Schedule Trazodone. Pt had friend recommend Elavil, d/w her large side effect profile. Will consider if fails trazodone.         Upcoming Labs/imaging: 3/25     DVT  PROPHYLAXIS: Start Lovenox 40mg SQ nightly on admission to ARU, held at main due to slight drop in Hgb, however repeat improved in 10's. No active bleeding, incision looks good. 3/19 Hgb stable.      HOSPITALIST FOLLOWING: No     CODE STATUS: DNAR/DNI     DISPO: Home      GUS: 3/28     MEDS SENT TO: [facility]     DISCHARGE SPECIALIST FOLLOW UP:   Ortho     Patient to scheduled follow up with their PCP within 2 weeks from discharge from the [facility].       ____________________________________    Dr. Jacqui Alatorre DO, MS  [facility] - Physical Medicine & Rehabilitation   ____________________________________

## 2025-03-26 NOTE — PROGRESS NOTES
NURSING DAILY NOTE    Name: Alyssa Juan   Date of Admission: 3/18/2025   Admitting Diagnosis: Closed left hip fracture, initial encounter ([facility])  Attending Physician: JAQUAN CLARK D.O.  Allergies: Sulfa drugs and Codeine    Safety  Patient Assist  Mod assist  Patient Precautions  Precautions: Fall Risk  Fall Risk: Poor balance, History of falls  Cognition/Communication: Hard of hearing  Orthopedic: Posterior Hip Precautions  Weight Bearing: WBAT LLE  Bed Transfer Status  Supervised  Toilet Transfer Status   Stand By Assist  Assistive Devices  Rails, Wheelchair  Oxygen  None - Room Air  Diet/Therapeutic Dining  Current Diet Order   Procedures    Diet Order Diet: Regular (total free water restriction 500mL, total fluids 1500mL per day.); Fluid modifications: (optional): 1800 ml Fluid Restriction     Pill Administration  whole  Agitated Behavioral Scale     ABS Level of Severity       Fall Risk  Has the patient had a fall this admission?      Blanka Pendleton Fall Risk Scoring  19, HIGH RISK  Fall Risk Safety Measures  bed alarm and chair alarm    Vitals  Temperature: 36.6 °C (97.8 °F)  Temp src: Oral  Pulse: 75  Respiration: 18  Blood Pressure : (!) 146/72  Blood Pressure MAP (Calculated): 97 MM HG  BP Location: Left, Upper Arm  Patient BP Position: Supine     Oxygen  Pulse Oximetry: 92 %  O2 (LPM): 0  O2 Delivery Device: None - Room Air    Bowel and Bladder  Last Bowel Movement  03/25/25  Stool Type   (small serparte sausage like formed pieces)  Bowel Device  Bathroom  Continent  Bladder: Did not void   Bowel: No movement  Bladder Function  Urine Void (mL):  (Moderate)  Number of Times Voided: 1  Urine Color: Unable To Evaluate  Genitourinary Assessment   Bladder Assessment (WDL):  Within Defined Limits  Petersen Catheter: Not Applicable  Urine Color: Unable To Evaluate  Bladder Device: Bathroom  Time Void: Yes  Bladder Scan: Post Void  $ Bladder Scan Results (mL): 7    Skin  Ovidio Score   19  Sensory  Interventions   Bed Types: Standard/Trauma Mattress  Skin Preventative Measures: Pillows in Use for Support / Positioning  Moisture Interventions         Pain  Pain Rating Scale  7 - Focus of attention, prevents doing daily activities  Pain Location  Groin, Leg  Pain Location Orientation  Left  Pain Interventions   Medication (see MAR)    ADLs    Bathing   Patient Refused Bathing  Linen Change   Partial  Personal Hygiene  Moist Monica Wipes  Chlorhexidine Bath      Oral Care  Brushed Teeth  Teeth/Dentures     Shave     Nutrition Percentage Eaten  Breakfast, Between 25-50% Consumed  Environmental Precautions  Treaded Slipper Socks on Patient, Bed in Low Position  Patient Turns/Positioning  Patient turns self independently side to side without assistance, to offload sacral area  Patient Turns Assistance/Tolerance     Bed Positions  Bed Controls On, Bed Locked  Head of Bed Elevated  Less than 30 degrees      Psychosocial/Neurologic Assessment  Psychosocial Assessment  Psychosocial (WDL):  Within Defined Limits  Neurologic Assessment  Neuro (WDL): Within Defined Limits  Level of Consciousness: Alert  Orientation Level: Oriented X4  Cognition: Follows commands, Appropriate attention/concentration  Speech: Clear  EENT (WDL):  WDL Except    Cardio/Pulmonary Assessment  Edema   LLE Edema: 1+  Respiratory Breath Sounds     Cardiac Assessment   Cardiac (WDL):  WDL Except

## 2025-03-26 NOTE — PROGRESS NOTES
Patient's BP was 84/52 with morning vitals. BP was rechecked and was 85/49. Patient is not symptomatic at this time. Denies HA, dizziness, lightheadedness, SOB, or chest pain. Dr Alatorre notified via [employer] at 1025.

## 2025-03-26 NOTE — THERAPY
Physical Therapy   Daily Treatment     Patient Name:  Alyssa Juan  Age:  84 y.o., Sex:  female  Medical Record #:  [id number]  Today's Date: 3/26/2025     Precautions  Precautions: Fall Risk  Fall Risk: Poor balance, History of falls  Cognition/Communication: Hard of hearing  Orthopedic: Posterior Hip Precautions  Weight Bearing: WBAT LLE    Subjective: Pt agreeable to sessions detailed below.    Objective:    03/26/25 0701   PT Charge Group   PT Gait Training (Units) 2   PT Therapeutic Exercise (Units) 1   PT Therapeutic Activities (Units) 1   PT Total Time Spent   PT Individual Total Time Spent (Mins) 60   Pain 0 - 10 Group   Location Groin;Hip   Location Orientation Left   Sit to Stand   Level of Assist Modified Independent   Assistive Devices FWW   Additional Description Extra time needed   Chair/Bed-to-Chair Transfer   Level of Assist Supervised   Transfer Type Stand Step Transfer   Assistive Devices FWW   Additional Description Extra time needed   Toilet Transfer   Level of Assist Supervised   Transfer Type Stand Step Transfer   Adaptive Equipment Grab bars   Assistive Device FWW   Additional Description Extra time needed   Toileting Hygiene   Level of Assist Stand By Assist   Additional Description Grab bars;Extra time needed   Ambulation   Level of Assist Supervised   Assistive Device FWW   Additional Description Extra time needed   Distance 440ft x1, 75ft x1   Interdisciplinary Plan of Care Collaboration   Patient Position at End of Therapy Seated;Chair Alarm On;Call Light within Reach;Tray Table within Reach;Phone within Reach       Therapeutic Exercise  Purpose: to improve strength and to improve functional endurance  Interventions:   Lower body exercises:   Standing program -   Marching, Reciprocal and 1 set of 10  Nustep: Resistance Level 4 and Time 8 minutes    Gait Training  Purpose: to improve functional ambulation and to improve walking endurance  Interventions:   Walking endurance, 440ft x1,  75ft x1  Deviations (laterality in comments):  Bradykinetic       03/26/25 1430   PT Charge Group   PT Gait Training (Units) 1   PT Therapeutic Activities (Units) 1   PT Total Time Spent   PT Individual Total Time Spent (Mins) 30   Sit to Lying   Level of Assist Contact Guard Assist   Additional Description Use of bed rail;Extra time needed   Lying to Sitting on Side of Bed   Level of Assist Stand By Assist   Additional Description Use of bed rail;Extra time needed   Sit to Stand   Level of Assist Modified Independent   Assistive Devices FWW   Additional Description Extra time needed   Chair/Bed-to-Chair Transfer   Level of Assist Modified Independent   Transfer Type Stand Step Transfer   Assistive Devices FWW   Additional Description Extra time needed   Ambulation   Level of Assist Supervised   Assistive Device FWW   Additional Description Extra time needed   Distance 300ft  (outdoor ambulation)   Family Training   Scheduling Completed   Date 03/27/25   Time 1400   Family Member(s) Scheduled Son   Minutes 60 minutes  (30 PT and 30 OT)   Items to Train Indoor ambulation;Car transfers;Bed/Chair Transfers;Stairs;Bed mobility;Medical precautions   Interdisciplinary Plan of Care Collaboration   Patient Position at End of Therapy In Bed;Bed Alarm On;Call Light within Reach;Tray Table within Reach;Phone within Reach     Gait Training  Purpose: to improve functional ambulation and to improve walking endurance  Interventions:   Walking endurance  Outdoor ambulation  Deviations (laterality in comments):  Bradykinetic    Discharge Interventions  Purpose: to review final discharge recommendations and education  Interventions:   reviewed relevant DME and adaptive equipment recommendations  discussed home safety  discussed floor recovery/fall prevention      Assessment: Pt significantly improved ambulation tolerance and endurance during today's morning session. Pt demonstrated good environmental awareness while ambulating  outside. Pt receptive to education provided concerning fall safety and floor recovery.     Strengths: Able to follow instructions, Alert and oriented, Independent prior level of function, Manages pain appropriately, Motivated for self care and independence, Pleasant and cooperative, Supportive family, Willingly participates in therapeutic activities  Barriers: Generalized weakness, Impaired activity tolerance, Decreased endurance, Fatigue, Impaired balance, Limited mobility    Plan:   D/c IRF-[facility]  Demonstrate floor recovery    DME           Passport items to be completed:  Get in/out of bed safely, in/out of a vehicle, safely use mobility device, walk or wheel around home/community, navigate up and down stairs, show how to get up/down from the ground, ensure home is accessible, demonstrate HEP, complete caregiver training    Physical Therapy Problems (Active)       Problem: Mobility       Dates: Start:  03/19/25         Goal: STG-Within one week, patient will ambulate household distance x 150 ft with LRAD, pain <3/10, supervised       Dates: Start:  03/19/25               Problem: Mobility Transfers       Dates: Start:  03/19/25         Goal: STG-Within one week, patient will perform bed mobility with supervision and LRAD       Dates: Start:  03/19/25               Problem: PT-Long Term Goals       Dates: Start:  03/19/25         Goal: LTG-By discharge, patient will tolerate standing x 10 minutes with LRAD, pain <2/10, Rick       Dates: Start:  03/19/25            Goal: LTG-By discharge, patient will ambulate x 300 ft with LRAD, Rick       Dates: Start:  03/19/25            Goal: LTG-By discharge, patient will transfer one surface to another Rick with LRAD, adhering to spinal precautions       Dates: Start:  03/19/25            Goal: LTG-By discharge, patient will perform home exercise program Rick with handouts       Dates: Start:  03/19/25            Goal: LTG-By discharge, patient will ambulate up/down 4-6  stairs with supervision, bilateral railings       Dates: Start:  03/19/25            Goal: LTG-By discharge, patient will complete TUG <60 seconds with LRAD       Dates: Start:  03/19/25

## 2025-03-26 NOTE — CARE PLAN
"The patient is Watcher - Medium risk of patient condition declining or worsening    Shift Goals  Clinical Goals: safety  Problem: Fall Risk - Rehab  Goal: Patient will remain free from falls  Note: Blanka Pendleton Fall risk Assessment Score: 19    High fall risk Interventions   - Bed and strip alarm   - Yellow sign by the door   - Yellow wrist band \"Fall risk\"  - call light in reach  - Do not leave patient unattended in the bathroom  - Fall risk education provided     Problem: Pain - Standard  Goal: Alleviation of pain or a reduction in pain to the patient’s comfort goal  Note: Oxycodone administered for pain as ordered.     "

## 2025-03-27 ENCOUNTER — PHARMACY VISIT (OUTPATIENT)
Dept: PHARMACY | Facility: MEDICAL CENTER | Age: 85
End: 2025-03-27
Payer: COMMERCIAL

## 2025-03-27 ENCOUNTER — APPOINTMENT (OUTPATIENT)
Dept: OCCUPATIONAL THERAPY | Facility: REHABILITATION | Age: 85
DRG: 560 | End: 2025-03-27
Attending: PHYSICAL MEDICINE & REHABILITATION
Payer: MEDICARE

## 2025-03-27 ENCOUNTER — APPOINTMENT (OUTPATIENT)
Dept: PHYSICAL THERAPY | Facility: REHABILITATION | Age: 85
DRG: 560 | End: 2025-03-27
Attending: PHYSICAL MEDICINE & REHABILITATION
Payer: MEDICARE

## 2025-03-27 LAB
ANION GAP SERPL CALC-SCNC: 9 MMOL/L (ref 7–16)
BUN SERPL-MCNC: 17 MG/DL (ref 8–22)
CALCIUM SERPL-MCNC: 8.8 MG/DL (ref 8.5–10.5)
CHLORIDE SERPL-SCNC: 95 MMOL/L (ref 96–112)
CO2 SERPL-SCNC: 28 MMOL/L (ref 20–33)
CREAT SERPL-MCNC: 0.55 MG/DL (ref 0.5–1.4)
ERYTHROCYTE [DISTWIDTH] IN BLOOD BY AUTOMATED COUNT: 48.2 FL (ref 35.9–50)
GFR SERPLBLD CREATININE-BSD FMLA CKD-EPI: 89 ML/MIN/1.73 M 2
GLUCOSE SERPL-MCNC: 80 MG/DL (ref 65–99)
HCT VFR BLD AUTO: 28.7 % (ref 37–47)
HGB BLD-MCNC: 9.5 G/DL (ref 12–16)
MCH RBC QN AUTO: 33 PG (ref 27–33)
MCHC RBC AUTO-ENTMCNC: 33.1 G/DL (ref 32.2–35.5)
MCV RBC AUTO: 99.7 FL (ref 81.4–97.8)
PLATELET # BLD AUTO: 431 K/UL (ref 164–446)
PMV BLD AUTO: 8.7 FL (ref 9–12.9)
POTASSIUM SERPL-SCNC: 4.6 MMOL/L (ref 3.6–5.5)
RBC # BLD AUTO: 2.88 M/UL (ref 4.2–5.4)
SODIUM SERPL-SCNC: 132 MMOL/L (ref 135–145)
WBC # BLD AUTO: 3.6 K/UL (ref 4.8–10.8)

## 2025-03-27 PROCEDURE — 97530 THERAPEUTIC ACTIVITIES: CPT

## 2025-03-27 PROCEDURE — 85027 COMPLETE CBC AUTOMATED: CPT

## 2025-03-27 PROCEDURE — A9270 NON-COVERED ITEM OR SERVICE: HCPCS | Performed by: PHYSICAL MEDICINE & REHABILITATION

## 2025-03-27 PROCEDURE — 99232 SBSQ HOSP IP/OBS MODERATE 35: CPT | Performed by: PHYSICAL MEDICINE & REHABILITATION

## 2025-03-27 PROCEDURE — 97116 GAIT TRAINING THERAPY: CPT

## 2025-03-27 PROCEDURE — 700101 HCHG RX REV CODE 250: Performed by: PHYSICAL MEDICINE & REHABILITATION

## 2025-03-27 PROCEDURE — 700102 HCHG RX REV CODE 250 W/ 637 OVERRIDE(OP): Performed by: PHYSICAL MEDICINE & REHABILITATION

## 2025-03-27 PROCEDURE — RXMED WILLOW AMBULATORY MEDICATION CHARGE: Performed by: PHYSICAL MEDICINE & REHABILITATION

## 2025-03-27 PROCEDURE — 36415 COLL VENOUS BLD VENIPUNCTURE: CPT

## 2025-03-27 PROCEDURE — 80048 BASIC METABOLIC PNL TOTAL CA: CPT

## 2025-03-27 PROCEDURE — 97535 SELF CARE MNGMENT TRAINING: CPT

## 2025-03-27 PROCEDURE — 700111 HCHG RX REV CODE 636 W/ 250 OVERRIDE (IP): Mod: JZ | Performed by: PHYSICAL MEDICINE & REHABILITATION

## 2025-03-27 PROCEDURE — 770010 HCHG ROOM/CARE - REHAB SEMI PRIVAT*

## 2025-03-27 RX ORDER — ALENDRONATE SODIUM 70 MG/1
70 TABLET ORAL
Qty: 4 TABLET | Refills: 0 | Status: SHIPPED | OUTPATIENT
Start: 2025-03-27

## 2025-03-27 RX ORDER — TRAZODONE HYDROCHLORIDE 50 MG/1
25 TABLET ORAL
Qty: 30 TABLET | Refills: 2 | Status: SHIPPED | OUTPATIENT
Start: 2025-03-27

## 2025-03-27 RX ORDER — GABAPENTIN 100 MG/1
100 CAPSULE ORAL 3 TIMES DAILY PRN
Qty: 120 CAPSULE | Refills: 2 | Status: SHIPPED | OUTPATIENT
Start: 2025-03-27

## 2025-03-27 RX ORDER — LEVOTHYROXINE SODIUM 50 UG/1
50 TABLET ORAL
Qty: 30 TABLET | Refills: 2 | Status: SHIPPED | OUTPATIENT
Start: 2025-03-27

## 2025-03-27 RX ORDER — FLECAINIDE ACETATE 50 MG/1
25 TABLET ORAL 2 TIMES DAILY
Qty: 30 TABLET | Refills: 2 | Status: SHIPPED | OUTPATIENT
Start: 2025-03-27

## 2025-03-27 RX ORDER — LISINOPRIL 5 MG/1
2.5 TABLET ORAL
Status: DISCONTINUED | OUTPATIENT
Start: 2025-03-27 | End: 2025-03-28 | Stop reason: HOSPADM

## 2025-03-27 RX ORDER — OXYCODONE HYDROCHLORIDE 5 MG/1
5 TABLET ORAL EVERY 6 HOURS PRN
Qty: 28 TABLET | Refills: 0 | Status: SHIPPED | OUTPATIENT
Start: 2025-03-27 | End: 2025-04-03

## 2025-03-27 RX ORDER — ASPIRIN 81 MG/1
81 TABLET ORAL 2 TIMES DAILY
Qty: 60 TABLET | Refills: 0 | Status: SHIPPED | OUTPATIENT
Start: 2025-03-27 | End: 2025-04-26

## 2025-03-27 RX ORDER — OMEPRAZOLE 20 MG/1
20 TABLET, DELAYED RELEASE ORAL DAILY
Qty: 30 TABLET | Refills: 2 | Status: SHIPPED | OUTPATIENT
Start: 2025-03-27

## 2025-03-27 RX ORDER — LISINOPRIL 2.5 MG/1
2.5 TABLET ORAL DAILY
Qty: 30 TABLET | Refills: 2 | Status: SHIPPED | OUTPATIENT
Start: 2025-03-27

## 2025-03-27 RX ADMIN — OXYCODONE 5 MG: 5 TABLET ORAL at 05:25

## 2025-03-27 RX ADMIN — LIDOCAINE 1 PATCH: 4 PATCH TOPICAL at 08:48

## 2025-03-27 RX ADMIN — FLECAINIDE ACETATE 25 MG: 50 TABLET ORAL at 20:13

## 2025-03-27 RX ADMIN — MENTHOL 4 G: 10 GEL TOPICAL at 20:06

## 2025-03-27 RX ADMIN — ENOXAPARIN SODIUM 40 MG: 100 INJECTION SUBCUTANEOUS at 18:18

## 2025-03-27 RX ADMIN — GABAPENTIN 200 MG: 100 CAPSULE ORAL at 20:02

## 2025-03-27 RX ADMIN — LISINOPRIL 5 MG: 5 TABLET ORAL at 08:48

## 2025-03-27 RX ADMIN — MENTHOL 4 G: 10 GEL TOPICAL at 08:56

## 2025-03-27 RX ADMIN — GABAPENTIN 100 MG: 100 CAPSULE ORAL at 08:48

## 2025-03-27 RX ADMIN — GABAPENTIN 100 MG: 100 CAPSULE ORAL at 15:09

## 2025-03-27 RX ADMIN — LEVOTHYROXINE SODIUM 50 MCG: 0.05 TABLET ORAL at 05:23

## 2025-03-27 RX ADMIN — SENNOSIDES AND DOCUSATE SODIUM 2 TABLET: 50; 8.6 TABLET ORAL at 08:48

## 2025-03-27 RX ADMIN — OXYCODONE 5 MG: 5 TABLET ORAL at 13:29

## 2025-03-27 RX ADMIN — MENTHOL 4 G: 10 GEL TOPICAL at 15:11

## 2025-03-27 RX ADMIN — MAGNESIUM 64 MG (MAGNESIUM CHLORIDE) TABLET,DELAYED RELEASE 64 MG: at 20:02

## 2025-03-27 RX ADMIN — OXYCODONE 5 MG: 5 TABLET ORAL at 20:04

## 2025-03-27 RX ADMIN — OMEPRAZOLE 20 MG: 20 CAPSULE, DELAYED RELEASE ORAL at 08:48

## 2025-03-27 RX ADMIN — FLECAINIDE ACETATE 25 MG: 50 TABLET ORAL at 08:48

## 2025-03-27 RX ADMIN — Medication 1000 UNITS: at 08:48

## 2025-03-27 RX ADMIN — POLYETHYLENE GLYCOL 3350 1 PACKET: 17 POWDER, FOR SOLUTION ORAL at 08:48

## 2025-03-27 RX ADMIN — TRAZODONE HYDROCHLORIDE 25 MG: 50 TABLET ORAL at 20:02

## 2025-03-27 ASSESSMENT — BRIEF INTERVIEW FOR MENTAL STATUS (BIMS)
BIMS SUMMARY SCORE: 15
WHAT YEAR IS IT: CORRECT
WHAT MONTH IS IT: ACCURATE WITHIN 5 DAYS
INITIAL REPETITION OF BED BLUE SOCK - FIRST ATTEMPT: 3
ASKED TO RECALL BED: YES, NO CUE REQUIRED
ASKED TO RECALL BLUE: YES, NO CUE REQUIRED
WHAT DAY OF THE WEEK IS IT: CORRECT
ASKED TO RECALL SOCK: YES, NO CUE REQUIRED

## 2025-03-27 ASSESSMENT — PAIN DESCRIPTION - PAIN TYPE
TYPE: ACUTE PAIN

## 2025-03-27 NOTE — DISCHARGE SUMMARY
Physical Medicine & Rehabilitation Discharge Summary    Admission Date: 3/18/2025    Discharge Date: 3/28/2025    Attending Provider: Dr. Sesar Vergara DO MS for Dr. Jacqui Alatorre DO, MS    Admission Diagnosis:   Active Hospital Problems    Diagnosis     *Closed left hip fracture, initial encounter ([facility])        Discharge Diagnosis:  Active Hospital Problems    Diagnosis     *Closed left hip fracture, initial encounter ([facility])        HPI per Admission History & Physical:  85 yo female with PMH significant for hyponatremia presented to [facility] 3/14/25 after GLF. Has L hip and pubic symphysis fractures. Underwent hemiarthroplasty 3/14 Dr. Cota and is WBAT. Course c/b hypertension and hyponatremia. Is on fluid restrictions. Admitted to ARU 3/18/2025. States pain has been controlled. Eye is bothering her seems to have a little abrasion lower inside lid. Uses Refresh drops at home. Otherwise is knowledgeable of Rehab process. Had been here a couple of years ago.     Patient was admitted to [facility] on 3/18/2025.     Hospital Course by Problem List:  Imapacted L Femoral Neck Fracture s/p Hemiarthroplasty 3/14/25 Dr. Cota   Left superior and inferior pubic ramus fracture non-op  PT and OT for mobility and ADLs. Per guidelines, 15 hours per week between PT, OT and/or SLP.  Follow-up Ortho  WBAT LLE     Pain - Tylenol, oxycodone. 3/19 Schedule Gabapentin, schedule oxy in AM, add robaxin PRN.      3/26 Make scheduled AM oxycodone at 0600. Reduction from BID.     I discussed the risks and benefits of using opiate medications for pain control.  I discussed the risk of addiction, potential for overdose, and respiratory depression (and the potential need for opiate antagonist therapy if this occurs).  I encouraged the patient to take this medication sparingly with the expressed goal of weaning off the medication as soon as is clinically appropriate.  I informed the patient that we are only able to  provide a 7 day supply of these medications at discharge and that they will be responsible for requesting any refills needed from their primary care provider or their surgeon.  We discussed the need to safely secure these medications to prevent theft, inadvertent ingestion, or misuse.  Any unused medication should be immediately disposed of through a sanctioned medication disposal program.  We discussed adjunctive pain medications and conservative therapies at length.      I answered the patient's questions regarding this treatment, and the patient indicated understanding and willingness to proceed.       ABLA - 3/19 10.3 stable. 3/21 9.8 Stable. 3/27 9.5     Leukopenia - 3/27 Stable up and down throughout hospitalization. Follow up PCP.      Arrhythmia - Continue Flecainide. Follows with Dr. Vail     Peripheral Neuropathy - Continue Gabapentin     Hyponatremia - Continue fluid restriction, improving. 3/19 131 improving. Liberalize fluids. 3/21 Na better 132. Continue fluid restriction for now. 3/25 Stable 131. Patient has seen Nephro in past and told to limit fluids, takes salt tab every Monday, does not know dose, has not been doing this while hospitalized.      3/27 Stable 132     GERD - Continue PPI     Hypothyroidism - Continue Synthroid     Hypertension - Lisinopril 20mg HELD due to hyperkalemia. 3/19 BP elevated required PRN hydralazine. Restart Lisinopril 10mg daily. 3/20 Patient with lower BP today, only takes 5mg at home. Reduce. 3/21 BP stable. Received Hydralazine but it was just before getting scheduled Lisinopril.      3/26 Change timing of Lisinopril. Suspect low's in AM due to timing early AM and initiation therapy. Patient mildly symptomatic. Could be related to pain meds as well.   3/27 BP still low this AM. Stop scheduled oxycodone and reduce to 2.5mg. Will have PCP follow up.     Hyperkalemia - 3/19 WNL 4.7. Restarting Lisinopril. 3/21 WNL 4. 3/25 4.4 WNL. 3/27 WNL 4.6     Hypomagnesemia -  Continue supplement. 3/19 WNL     Vitamin D Deficiency - Supplement      Bowel - Patient on Senna-docusate for constipation prophylaxis.      Bladder - TV/PVR/BS PRN     Sleep/Depression - Schedule Trazodone. Pt had friend recommend Elavil, d/w her large side effect profile. Will consider if fails trazodone.            DVT PROPHYLAXIS: Start Lovenox 40mg SQ nightly on admission to ARU, held at main due to slight drop in Hgb, however repeat improved in 10's. No active bleeding, incision looks good. 3/19 Hgb stable. D/c Lovenox on discharge.    Switch to ASA 81mg BID on discharge per Ortho     HOSPITALIST FOLLOWING: No     CODE STATUS: DNAR/DNI     DISPO: Home      GUS: 3/28     MEDS SENT TO: [facility]     DISCHARGE SPECIALIST FOLLOW UP:   Ortho     Patient to scheduled follow up with their PCP within 2 weeks from discharge from the [facility].         Functional Status at Discharge    Bed Mobility Roll Left and Right: Independent  Sit to Lying: Stand By Assist  Lying to Sitting: Supervised   Transfers Sit to Stand: Modified Independent  Chair/Bed to Chair: Modified Independent  Toilet: Supervised  Car: Stand By Assist   Mobility Ambulation: Modified Independent  Device: FWW  Ambulation Distance: 200ft  Wheelchair: N/A  Type:    Wheelchair Distance:     Stairs/Curbs Stairs: Stand By Assist  Stairs Amount: 4  Curbs: Stand By Assist     Oral Hygiene Independent   Grooming Independent   Shower/Bathing Stand By Assist   UB Dressing Stand by Assist   LB Dressing Stand by Assist  Maximal Assist   Toileting Transfer: Supervised  Hygiene: Stand By Assist   Shower & Transfer Supervised         Jacqui SUNG D.O., personally performed a complete drug regimen review and no potential clinically significant medication issues were identified.       Discharge Medication:     Medication List        START taking these medications        Instructions   oxyCODONE immediate-release 5 MG Tabs  Commonly known as:  Roxicodone   Take 1 Tablet by mouth every 6 hours as needed for Severe Pain for up to 7 days. Indications: Acute Pain  Dose: 5 mg     traZODone 50 MG Tabs  Commonly known as: Desyrel   Take 0.5 Tablets by mouth at bedtime.  Dose: 25 mg            CHANGE how you take these medications        Instructions   gabapentin 100 MG Caps  What changed: additional instructions  Commonly known as: Neurontin   Take 1 Capsule by mouth 3 times a day as needed (pain). Take 1 capsule morning and noon and 2 capsules at night  Indications: Neuropathic Pain  Dose: 100 mg     lisinopril 2.5 MG Tabs  What changed:   medication strength  how much to take  Commonly known as: Prinivil   Take 1 Tablet by mouth every day. Indications: High Blood Pressure  Dose: 2.5 mg            CONTINUE taking these medications        Instructions   alendronate 70 MG Tabs  Commonly known as: Fosamax   Take 1 Tablet by mouth every 7 days. Indications: Osteopenia  Dose: 70 mg     aspirin 81 MG EC tablet   Take 1 Tablet by mouth 2 times a day for 30 days. Indications: DVT ppx  Dose: 81 mg     D3 PO   Take 1 Capsule by mouth every day. Indications: suppliment  Dose: 1 Capsule     flecainide 50 MG tablet  Commonly known as: Tambocor   Take 0.5 Tablets by mouth 2 times a day. Indications: Arrythmia  Dose: 25 mg     levothyroxine 50 MCG Tabs  Commonly known as: Synthroid   Take 1 Tablet by mouth every morning on an empty stomach. Indications: Underactive Thyroid  Dose: 50 mcg     magnesium chloride 64 MG Tbec  Commonly known as: Mag-64   Take 1 Tablet by mouth every day with lunch.  Dose: 64 mg     omeprazole 20 MG tablet  Commonly known as: PriLOSEC OTC   Take 1 Tablet by mouth every day. Take before breakfast  Indications: Heartburn  Dose: 20 mg            STOP taking these medications      ALIGN PREBIOTIC-PROBIOTIC PO     Bismuth Subsalicylate 525 MG/15ML Susp     docusate sodium 100 MG Caps  Commonly known as: Colace     FIBER PO     FISH OIL PO     Home Care  Oxygen     Iberogast Caps     lidocaine 4 % Ptch  Commonly known as: Asperflex     Lubiprostone 8 MCG Caps     MULTIVITAMIN PO     polyethylene glycol/lytes Pack  Commonly known as: Miralax     TURMERIC PO     VITAMIN B COMPLEX PO              Discharge Diet:  Current Diet Order   Procedures    Diet Order Diet: Regular (total free water restriction 500mL, total fluids 1500mL per day.); Fluid modifications: (optional): 1800 ml Fluid Restriction       Discharge Activity:  Per PT/OT    Disposition:  Patient to discharge home with family support and community resources.    Equipment:  Per PT/OT    Follow-up & Discharge Instructions:  Follow up with your primary care provider (PCP) within 7-10 days of discharge to review your medications and take over your care.     If you develop chest pain, fever, chills, change in neurologic function (weakness, sensation changes, vision changes), or other concerning sxs, seek immediate medical attention or call 911.      Future Appointments   Date Time Provider Department Center   3/27/2025  2:30 PM London Cartwright, PT [facility] None   4/11/2025  1:00 PM [address]       Condition on Discharge:  Good    More than 35 minutes was spent on discharging this patient, including face-to-face time, prescription management, and the dictation of this note.    Dr. Sesar Vergara DO, MS  [facility] - Physical Medicine & Rehabilitation       Date of Service: 3/28/2025

## 2025-03-27 NOTE — CARE PLAN
Problem: Mobility  Goal: STG-Within one week, patient will ambulate household distance x 150 ft with LRAD, pain <3/10, supervised  Outcome: Met     Problem: Mobility Transfers  Goal: STG-Within one week, patient will perform bed mobility with supervision and LRAD  Outcome: Met     Problem: PT-Long Term Goals  Goal: LTG-By discharge, patient will tolerate standing x 10 minutes with LRAD, pain <2/10, Rick  Outcome: Met  Goal: LTG-By discharge, patient will ambulate x 300 ft with LRAD, Rick  Outcome: Met  Goal: LTG-By discharge, patient will transfer one surface to another Rick with LRAD, adhering to spinal precautions  Outcome: Met  Goal: LTG-By discharge, patient will perform home exercise program Rick with handouts  Outcome: Met  Goal: LTG-By discharge, patient will ambulate up/down 4-6 stairs with supervision, bilateral railings  Outcome: Discharged - Not Met  Goal: LTG-By discharge, patient will complete TUG <60 seconds with LRAD  Outcome: Discharged - Not Met

## 2025-03-27 NOTE — THERAPY
Occupational Therapy   Discharge Summary     Patient Name:  Alyssa Juan  Age:  84 y.o., Sex:  female  Medical Record #:  [id number]  Today's Date:  3/27/2025     Precautions  Precautions: (P) Fall Risk  Fall Risk: (P) Poor balance, History of falls  Cognition/Communication: (P) Hard of hearing  Orthopedic: (P) Posterior Hip Precautions  Weight Bearing: (P) WBAT LLE    Subjective: Pt pleasant and agreeable to OT session.      Objective:    03/27/25 0701   OT Charge Group   OT Self Care / ADL (Units) 4   OT Total Time Spent   OT Individual Total Time Spent (Mins) 60   Precautions   Precautions Fall Risk   Fall Risk Poor balance;History of falls   Cognition/Communication Hard of hearing   Orthopedic Posterior Hip Precautions   Weight Bearing WBAT LLE   Vitals   O2 Delivery Device None - Room Air   Eating   Assistance Needed Independent   CARE Score - Eating 6   Oral Hygiene   Assistance Needed Independent;Adaptive equipment   CARE Score - Oral Hygiene 6   Upper Body Dressing   Assistance Needed Independent   CARE Score - Upper Body Dressing 6   Lower Body Dressing   Assistance Needed Independent;Adaptive equipment   CARE Score - Lower Body Dressing 6   Putting On/Taking Off Footwear   Assistance Needed Independent;Adaptive equipment   CARE Score - Putting On/Taking Off Footwear 6   Shower/Bathe Self   Assistance Needed Independent;Adaptive equipment   CARE Score - Shower/Bathe Self 6   Toilet Transfer   Assistance Needed Independent;Adaptive equipment   CARE Score - Toilet Transfer 6   Toileting Hygiene   Assistance Needed Independent;Adaptive equipment   CARE Score - Toileting Hygiene 6   Chair/Bed-to-Chair Transfer   Assistance Needed Independent;Adaptive equipment   CARE Score - Chair/Bed-to-Chair Transfer 6   Interdisciplinary Plan of Care Collaboration   Patient Position at End of Therapy Seated;Self Releasing Lap Belt Applied;Call Light within Reach;Tray Table within Reach;Phone within Reach          Discharge Interventions  Purpose: to complete discharge assessments in preparation for upcoming discharge from facility  Interventions:   completed discharge IRF-CROW items  reviewed relevant precautions to maximize safety during home and community mobility, ADLs, and IADLs    Assessment: Pt has made good progress toward her OT goals during this hospitalization. Benefits from set-up assist for ADL support with AE/AD PRN. She continues to present with pain, but is able to tolerate ADLs. Describes good social support and environmental set-up to support safety at d/c.     Strengths: Able to follow instructions, Effective communication skills, Good carryover of learning, Good insight into deficits/needs, Making steady progress towards goals, Manages pain appropriately, Motivated for self care and independence, Pleasant and cooperative, Supportive family, Willingly participates in therapeutic activities  Barriers: Fatigue, Generalized weakness, Impaired balance, Pain, Limited mobility    Plan:   FT later this afternoon, d/c tomorrow    DME  OT DME Recommendations  Additional Equipment (NOT TYPICALLY COVERED BY INSURANCE): Other (see comments), Hip kit (Pt believes she still has hip kit from previous admission, unsure and will have friend check for her)        OT Care Plan (Active)       Problem: OT Long Term Goals       Goal: LTG-By discharge, patient will complete basic self care tasks with supervision.           Goal: LTG-By discharge, patient will perform bathroom transfers with supervision.

## 2025-03-27 NOTE — CARE PLAN
The patient is Stable - Low risk of patient condition declining or worsening    Shift Goals  Clinical Goals: safety  Patient Goals: pain control, rest  Family Goals: JOVANI    Progress made toward(s) clinical / shift goals:    Problem: Knowledge Deficit - Standard  Goal: Patient and family/care givers will demonstrate understanding of plan of care, disease process/condition, diagnostic tests and medications  Outcome: Progressing   Patient educated on the POC and medications administered. All questions and concerns addressed at this time   Problem: Fall Risk - Rehab  Goal: Patient will remain free from falls  Outcome: Progressing   Bed is locked and in low position. Call light and belongings within reach  Problem: Pain - Standard  Goal: Alleviation of pain or a reduction in pain to the patient’s comfort goal  Outcome: Progressing  Use of 0-10 pain scale. Patient is able to verbalize pain and discomfort at this time. Scheduled pain medications administered

## 2025-03-27 NOTE — Clinical Note
REFERRAL APPROVAL NOTICE         Sent on March 27, 2025                   Alyssa Juan  [address] [place] [address]                   Dear Ms. Juan,    After a careful review of the medical information and benefit coverage, [facility] has processed your referral. See below for additional details.    If applicable, you must be actively enrolled with your insurance for coverage of the authorized service. If you have any questions regarding your coverage, please contact your insurance directly.    REFERRAL INFORMATION   Referral #:  [id number]  Referred-To Provider    Referred-By Provider:  Orthopedic Surgery    FERMIN Noel JESSELL M      [address]  [address]  [place] [address]  [phone] [address]  [address]  [place] [id number] [phone]    Referral Start Date:  03/27/2025  Referral End Date:   03/27/2026             SCHEDULING  If you do not already have an appointment, please call [phone] to make an appointment.     MORE INFORMATION  If you do not already have a [employer] account, sign up at: [email or web address]  You can access your medical information, make appointments, see lab results, billing information, and more.  If you have questions regarding this referral, please contact  the [facility] Referrals department at:             [phone]. Monday - Friday 8:00AM - 5:00PM.     Sincerely,    [facility]

## 2025-03-27 NOTE — CARE PLAN
The patient is Stable - Low risk of patient condition declining or worsening    Shift Goals  Clinical Goals: safety  Patient Goals: pain control, rest  Family Goals: JOVANI    Progress made toward(s) clinical / shift goals:    Problem: Knowledge Deficit - Standard  Goal: Patient and family/care givers will demonstrate understanding of plan of care, disease process/condition, diagnostic tests and medications  Outcome: Progressing   Patient educated on the POC and medications administered. All questions and concerns addressed at this time   Notified Dr Alatorre of low SBP during morning vitals     Problem: Fall Risk - Rehab  Goal: Patient will remain free from falls  Outcome: Progressing   Bed is locked and in low position. Call light and belongings within reach  Problem: Pain - Standard  Goal: Alleviation of pain or a reduction in pain to the patient’s comfort goal  Outcome: Progressing   Use of 0-10 pain scale. Patient is able to verbalize pain and discomfort appropriately. PRN pain medications administered.

## 2025-03-27 NOTE — PROGRESS NOTES
NURSING DAILY NOTE    Name: Alyssa Juan   Date of Admission: 3/18/2025   Admitting Diagnosis: Closed left hip fracture, initial encounter ([facility])  Attending Physician: Jacqui Alatorre D.o.  Allergies: Sulfa drugs and Codeine    Safety  Patient Assist  Mod assist  Patient Precautions     Precaution Comments     Bed Transfer Status     Toilet Transfer Status      Assistive Devices  Rails, Wheelchair  Oxygen  None - Room Air  Diet/Therapeutic Dining  Current Diet Order   Procedures    Diet Order Diet: Regular (total free water restriction 500mL, total fluids 1500mL per day.); Fluid modifications: (optional): 1800 ml Fluid Restriction     Pill Administration  whole  Agitated Behavioral Scale     ABS Level of Severity       Fall Risk  Has the patient had a fall this admission?   No  Blanka Pendleton Fall Risk Scoring  19, HIGH RISK  Fall Risk Safety Measures  bed alarm, chair alarm, poor balance, and low vision/ hearing    Vitals  Temperature: 37.3 °C (99.1 °F)  Temp src: Temporal  Pulse: 74  Respiration: 14  Blood Pressure : 132/71  Blood Pressure MAP (Calculated): 91 MM HG  BP Location: Right, Upper Arm  Patient BP Position: Warner's Position     Oxygen  Pulse Oximetry: 93 %  O2 (LPM): 2  O2 Delivery Device: None - Room Air    Bowel and Bladder  Last Bowel Movement  03/25/25  Stool Type  Type 4: Like a sausage or snake, smooth and soft  Bowel Device  Bathroom  Continent  Bladder: Did not void   Bowel: No movement  Bladder Function  Urine Void (mL):  (MOderate)  Number of Times Voided: 1  Urine Color: Yellow  Genitourinary Assessment   Bladder Assessment (WDL):  Within Defined Limits  Petersen Catheter: Not Applicable  Urine Color: Yellow  Bladder Device: Bathroom  Time Void: Yes  Bladder Scan: Post Void  $ Bladder Scan Results (mL): 7    Skin  Ovidio Score   19  Sensory Interventions   Bed Types: Standard/Trauma Mattress  Skin Preventative Measures: Pillows in  Use for Support / Positioning  Moisture Interventions         Pain  Pain Rating Scale  5 - Interrupts some activities  Pain Location  Hip  Pain Location Orientation  Left  Pain Interventions   Declines    ADLs    Bathing   Patient Refused Bathing  Linen Change   Partial  Personal Hygiene  Change Monica Pads, Moist Monica Wipes, Perineal Care  Chlorhexidine Bath      Oral Care  Brushed Teeth  Teeth/Dentures     Shave     Nutrition Percentage Eaten  Lunch, Between % Consumed  Environmental Precautions  Treaded Slipper Socks on Patient, Transferred to Stronger Side, Personal Belongings, Wastebasket, Call Bell etc. in Easy Reach, Report Given to Other Health Care Providers Regarding Fall Risk, Bed in Low Position, Communication Sign for Patients & Families, Mobility Assessed & Appropriate Sign Placed  Patient Turns/Positioning  Patient turns self independently side to side without assistance, to offload sacral area  Patient Turns Assistance/Tolerance     Bed Positions  Bed Locked, Bed Controls On  Head of Bed Elevated  Self regulated      Psychosocial/Neurologic Assessment  Psychosocial Assessment  Psychosocial (WDL):  Within Defined Limits  Neurologic Assessment  Neuro (WDL): Within Defined Limits  Level of Consciousness: Alert  Orientation Level: Oriented X4  Cognition: Follows commands, Appropriate attention/concentration  Speech: Clear  EENT (WDL):  WDL Except    Cardio/Pulmonary Assessment  Edema   LLE Edema: 1+  Respiratory Breath Sounds     Cardiac Assessment   Cardiac (WDL):  WDL Except

## 2025-03-27 NOTE — PROGRESS NOTES
NURSING DAILY NOTE    Name: Alyssa Juan   Date of Admission: 3/18/2025   Admitting Diagnosis: Closed left hip fracture, initial encounter ([facility])  Attending Physician: JAQUAN CLARK D.O.  Allergies: Sulfa drugs and Codeine    Safety  Patient Assist  Mod assist  Patient Precautions  Precautions: Fall Risk  Fall Risk: Poor balance, History of falls  Cognition/Communication: Hard of hearing  Orthopedic: Posterior Hip Precautions  Weight Bearing: WBAT LLE  Bed Transfer Status  Modified Independent  Toilet Transfer Status   Supervised  Assistive Devices  Rails, Hand held assist, Wheelchair  Oxygen  Nasal Cannula  Diet/Therapeutic Dining  Current Diet Order   Procedures    Diet Order Diet: Regular (total free water restriction 500mL, total fluids 1500mL per day.); Fluid modifications: (optional): 1800 ml Fluid Restriction     Pill Administration  whole and one at a time  Agitated Behavioral Scale     ABS Level of Severity       Fall Risk  Has the patient had a fall this admission?      Blanka Pendleton Fall Risk Scoring  19, HIGH RISK  Fall Risk Safety Measures  bed alarm, chair alarm, and low vision/hearing    Vitals  Temperature: 36.4 °C (97.6 °F)  Temp src: Temporal  Pulse: 63  Respiration: 18  Blood Pressure : 135/66  Blood Pressure MAP (Calculated): 89 MM HG  BP Location: Left, Upper Arm  Patient BP Position: Supine     Oxygen  Pulse Oximetry: 98 %  O2 (LPM): 2  O2 Delivery Device: Nasal Cannula    Bowel and Bladder  Last Bowel Movement  03/26/25  Stool Type  Type 6: Fluffy pieces with ragged edges, a mushy stool, Type 7: Watery, no solid pieces-entirely liquid  Bowel Device  Bathroom  Continent  Bladder: Did not void   Bowel: No movement  Bladder Function  Urine Void (mL):  (large)  Number of Times Voided: 1  Urine Color: Yellow  Genitourinary Assessment   Bladder Assessment (WDL):  Within Defined Limits  Petersen Catheter: Not Applicable  Urine Color: Yellow  Bladder Device: Bathroom  Time Void: Yes  Bladder  Scan: Post Void  $ Bladder Scan Results (mL): 7    Skin  Ovidio Score   19  Sensory Interventions   Bed Types: Standard/Trauma Mattress  Skin Preventative Measures: Pillows in Use for Support / Positioning, Gray Foam for Oxygen Tubing (Pad ear protector), Remove Glasses While Patient is Sleeping  Moisture Interventions         Pain  Pain Rating Scale  6 - Hard to ignore, avoid usual activities  Pain Location  Hip, Neck  Pain Location Orientation  Left  Pain Interventions   Medication (see MAR)    ADLs    Bathing   Patient Refused Bathing  Linen Change   Partial  Personal Hygiene  Change Monica Pads, Moist Monica Wipes, Perineal Care  Chlorhexidine Bath      Oral Care  Brushed Teeth  Teeth/Dentures     Shave     Nutrition Percentage Eaten  Lunch, Between % Consumed  Environmental Precautions  Treaded Slipper Socks on Patient, Bed in Low Position  Patient Turns/Positioning  Patient turns self independently side to side without assistance, to offload sacral area  Patient Turns Assistance/Tolerance     Bed Positions  Bed Controls On, Bed Locked  Head of Bed Elevated  Less than 30 degrees      Psychosocial/Neurologic Assessment  Psychosocial Assessment  Psychosocial (WDL):  Within Defined Limits  Neurologic Assessment  Neuro (WDL): Within Defined Limits  Level of Consciousness: Alert  Orientation Level: Oriented X4  Cognition: Follows commands, Appropriate attention/concentration  Speech: Clear  EENT (WDL):  WDL Except    Cardio/Pulmonary Assessment  Edema   LLE Edema: 1+  Respiratory Breath Sounds     Cardiac Assessment   Cardiac (WDL):  WDL Except (HTN)

## 2025-03-27 NOTE — DISCHARGE INSTRUCTIONS
Physical Therapy Discharge Instructions for Alyssa Juan    3/27/2025    Weight Bearing Status - Patient Should: Bear Weight as Tolerated Left Leg  Level of Assist Required for Ambulation: No Assist on Flat Surfaces, Supervision on Curbs, Supervision on Stairs  Distance Patient May Ambulate: 200ft  Device Recommended for Ambulation: Front-Wheeled Walker  Level of Assist Required for Transfers: Requires No Assist  Device Recommended for Transfers: Front-Wheeled Walker  Home Exercise Program: Refer to Home Exercise Program Handout for Details    We really enjoyed having you in rehab with us, Alyssa!! We wish you nothing but the best! -Owen PT        Suicidal Feelings: How to Help Yourself    Suicide is when you end your own life. Suicidal ideation includes expressing thoughts about, or a preoccupation with, ending your own life. There are many things you can do to help yourself feel better when struggling with these feelings. Many services and people are available to support you and others who struggle with similar feelings.  If you ever feel like you may hurt yourself or others, or have thoughts about taking your own life, get help right away. To get help:  Go to your nearest emergency department.  Call your local emergency services (911 in the [place]  Call the [facility] and human services helpline (211 in the [place]  Call or text a suicide hotline to speak with a trained counselor. The following suicide hotlines are available in the [place]:  5-354-021-TALK (1-508.326.4216 or 765 in the [place]  7-289-OLCTLLH (1-225.269.6649).  Text 694664. This is the Crisis Text Line in the [place]  1-187.552.6628. This is a hotline for Setswana speakers.  1-951.960.7331. This is a hotline for TTY users.  1-358-3-U-NETTIE (1-750.843.4980). This is a hotline for lesbian, pollock, bisexual, transgender, or questioning youth.  For a list of hotlines in [place], visit  suicide.org/hotlines/international/mkeqga-tayqoyi-pefmwgtb.html  Contact a crisis center or a local suicide prevention center. To find a crisis center or suicide prevention center:  Call your local hospital, clinic, community service organization, mental health center, social service provider, or health department. Ask for help with connecting to a crisis center.  For a list of crisis centers in the [place], visit: suicidepreventionlifeline.org  For a list of crisis centers in [place], visit: suicideprevention.ca  How to help yourself feel better    Promise yourself that you will not do anything bad or extreme when you have suicidal feelings. Remember the times you have felt hopeful.  Many people have gotten through suicidal thoughts and feelings, and you can too.  If you have had these feelings before, remind yourself that you can get through them again.  Let family, friends, teachers, or counselors know how you are feeling. Do not separate yourself from those who care about you and want to help you.  Talk with someone every day, even if you do not feel like talking to anyone or being with other people.  Face-to-face conversation is best to help them understand your feelings.  Contact a mental health care provider and work with this person regularly.  Make a safety plan that you can follow during a crisis.  Include phone numbers of suicide prevention hotlines, mental health professionals, and trusted friends and family members you can call during an emergency.  Save these numbers on your phone.  If you are thinking of taking a lot of medicine, give your medicine to someone who can give it to you as prescribed.  If you are on antidepressants and are concerned you will overdose, tell your health care provider so that he or she can give you safer medicines.  Try to stick to your routines and follow a schedule every day. Make self-care a priority.  Make a list of realistic goals, and cross them off when you  achieve them. Accomplishments can give you a sense of worth.  Wait until you are feeling better before doing things that you find difficult or unpleasant.  Do things that you have always enjoyed to take your mind off your feelings.  Try reading a book, or listening to or playing music.  Spending time outside, in nature, may help you feel better.  Follow these instructions at home:    Visit your primary health care provider every year for a physical and a mental health checkup.  Take over-the-counter and prescription medicines only as told by your health care provider.  Ask your health care provider about the possible side effects of any medicines you are taking.  Ask your health care provider about whether suicidal ideation is a possible side effect of any of your medicines.  Learn about suicidal ideation and what increases the risk for the development of suicidal thoughts.  Eat a well-balanced diet, and eat regular meals.  Get plenty of rest.  Exercise if you are able. Just 30 minutes of exercise each day can help you feel better.  Keep your living space well lit.  Do not use alcohol or drugs. Remove these substances from your home.  General recommendations  Remove weapons, poisons, knives, and other deadly items from your home.  Work with a mental health care provider as needed.  When you are feeling well, write yourself a letter with tips and support that you can read when you are not feeling well.  Remember that life's difficulties can be sorted out with help. Conditions can be treated, and you can learn behaviors and ways of thinking that will help you.  Work with your health care provider or counselor to learn ways of coping with your thoughts and feelings.  Where to find more information  National Suicide Prevention Lifeline: www.suicidepreventionlifeline.org  Hopeline: www.hopeline.com  American Foundation for Suicide Prevention: www.afsp.org  The [employer] Project (for lesbian, pollock, bisexual, transgender, or  questioning youth): www.thetrevorproject.org  National [place] of Mental Health: www.nimh.nih.gov/health/topics/suicide-prevention  Suicide Prevention Resources: afsp.org/suicide-prevention-resources  Contact a health care provider if:  You feel as though you are a burden to others.  You feel agitated, angry, vengeful, or have extreme mood swings.  You have withdrawn from family and friends.  You are frequently using drugs or alcohol.  Get help right away if:  You are talking about suicide or wishing to die.  You start making plans for how to commit suicide.  You feel that you have no reason to live.  You start making plans for putting your affairs in order, saying goodbye, or giving your possessions away.  You feel guilt, shame, or unbearable pain, and it seems like there is no way out.  You are engaging in risky behaviors that could lead to death.  If you have any of these thoughts or symptoms, get help right away:  Go to your nearest emergency department or crisis center.  Call emergency services (911 in the [place]  Call or text a suicide crisis helpline.  Summary  Suicide is when you take your own life. Suicidal feelings are thoughts about ending your own life.  Promise yourself that you will not do anything bad or extreme when you have suicidal feelings.  Let family, friends, teachers, or counselors know how you are feeling.  Get help right away if you start making plans for how to commit suicide.  This information is not intended to replace advice given to you by your health care provider. Make sure you discuss any questions you have with your health care provider.  Document Revised: 07/14/2022 Document Reviewed: 04/28/2022  [employer] Patient Education © 2023 [employer]      Understanding Your Risk for Falls    Each year, millions of people have serious injuries from falls. It is important to understand your risk for falling. Talk with your health care provider about your risk and what you can do to lower  it. There are actions you can take at home to lower your risk and prevent falls.  If you do have a serious fall, make sure to tell your health care provider. Falling once raises your risk of falling again.  How can falls affect me?  Serious injuries from falls are common. These include:  Broken bones, such as hip fractures.  Head injuries, such as traumatic brain injuries (TBI) or concussion.  A fear of falling can cause you to avoid activities and stay at home. This can make your muscles weaker and actually raise your risk for a fall.  What can increase my risk?  There are a number of risk factors that increase your risk for falling. The more risk factors you have, the higher your risk of falling. Serious injuries from a fall happen most often to people older than age 65. Children and young adults ages 15-29 are also at higher risk.  Common risk factors include:  Weakness in the lower body.  Lack (deficiency) of vitamin D.  Being generally weak or confused due to long-term (chronic) illness.  Dizziness or balance problems.  Poor vision.  Medicines that cause dizziness or drowsiness. These can include medicines for your blood pressure, heart, anxiety, insomnia, or edema, as well as pain medicines and muscle relaxants.  Other risk factors include:  Drinking alcohol.  Having had a fall in the past.  Having depression.  Having foot pain or wearing improper footwear.  Working at a dangerous job.  Having any of the following in your home:  Tripping hazards, such as floor clutter or loose rugs.  Poor lighting.  Pets.  Having dementia or memory loss.  What actions can I take to lower my risk of falling?         Physical activity  Maintain physical fitness. Do strength and balance exercises. Consider taking a regular class to build strength and balance. Yoga and danielle chi are good options.  Vision  Have your eyes checked every year and your vision prescription updated as needed.  Walking aids and footwear  Wear nonskid shoes.  Do not wear high heels.  Do not walk around the house in socks or slippers.  Use a cane or walker as told by your health care provider.  Home safety  Attach secure railings on both sides of your stairs.  Install grab bars for your tub, shower, and toilet. Use a bath mat in your tub or shower.  Use good lighting in all rooms. Keep a flashlight near your bed.  Make sure there is a clear path from your bed to the bathroom. Use night-lights.  Do not use throw rugs. Make sure all carpeting is taped or tacked down securely.  Remove all clutter from walkways and stairways, including extension cords.  Repair uneven or broken steps.  Avoid walking on icy or slippery surfaces. Walk on the grass instead of on icy or slick sidewalks. Use ice melt to get rid of ice on walkways.  Use a cordless phone.  Questions to ask your health care provider  Can you help me check my risk for a fall?  Do any of my medicines make me more likely to fall?  Should I take a vitamin D supplement?  What exercises can I do to improve my strength and balance?  Should I make an appointment to have my vision checked?  Do I need a bone density test to check for weak bones or osteoporosis?  Would it help to use a cane or a walker?  Where to find more information  Centers for Disease Control and PreventionDARIN: www.cdc.gov  Community-Based Fall Prevention Programs: www.cdc.gov  National [place] on Aging: www.lanre.nih.gov  Contact a health care provider if:  You fall at home.  You are afraid of falling at home.  You feel weak, drowsy, or dizzy.  Summary  Serious injuries from a fall happen most often to people older than age 65. Children and young adults ages 15-29 are also at higher risk.  Talk with your health care provider about your risks for falling and how to lower those risks.  Taking certain precautions at home can lower your risk for falling.  If you fall, always tell your health care provider.  This information is not intended to replace  advice given to you by your health care provider. Make sure you discuss any questions you have with your health care provider.  Document Revised: 07/21/2021 Document Reviewed: 07/21/2021  [employer] Patient Education © 2023 [employer].        [facility] NURSING DISCHARGE INSTRUCTIONS    Blood Pressure : 138/66  Weight: 48.5 kg (106 lb 14.8 oz)  Nursing recommendations for Alyssa Juan at time of discharge are as follows:  Client and Family Member verbalized understanding of all discharge instructions and prescriptions.     Review all your home medications and newly ordered medications with your doctor and/or pharmacist. Follow medication instructions as directed by your doctor and/or pharmacist.    Pain Management:   Discharge Pain Medication Instructions:  Comfort Goal: Comfort with Movement, Sleep Comfortably, Perform Activity  Notify your primary care provider if pain is unrelieved with these measures, if the pain is new, or increased in intensity.    Discharge Skin Characteristics: Warm, Dry  Discharge Skin Exam: Other (Comments) (Left hip incision)  Wound 03/14/25 Incision Hip Left PRINEO DERMABOND, STERI STRIPS, 4X4, ABD, TEGADERM (Active)   Wound Image   03/18/25 1400   Site Assessment JOVANI 03/26/25 2028   Periwound Assessment Clean;Intact;Dry 03/26/25 1600   Margins Attached edges 03/26/25 1600   Closure Dermabond 03/26/25 1600   Drainage Amount None 03/26/25 1600   Treatments Cleansed;Site care 03/26/25 1600   Dressing Status Clean;Dry;Intact 03/27/25 0848   Dressing Changed Observed 03/27/25 0848   Dressing Options [place] Dressing 03/27/25 0848   Dressing Change/Treatment Frequency Every 72 hrs, and As Needed 03/25/25 0900   NEXT Dressing Change/Treatment Date 03/27/25 03/25/25 0900     Skin / Wound Care Instructions: Please contact your primary care physician for any change in skin integrity.       If You Have Surgical Incisions / Wounds:  Monitor surgical site(s) for signs of  increased swelling, redness or symptoms of drainage from the site or fever as this could indicate signs and symptoms of infection. If these symptoms are noted, notifiy your primary care provider.      Discharge Safety Instructions: Should Not Be Left Alone In The House     Discharge Safety Concerns: History Of Falls, Weakness, Unsteady Gait  The interdisciplinary team has made recommendation that you should not be left alone  in the house due to history of falls, weakness, and unsteady gait  Anti-embolic stockings are not required to increase circulation to the lower extremities.    Discharge Diet: Regular Diet     Discharge Liquids: Thin Liquids  Discharge Bowel Function: Incontinent  Please contact your primary care physician for any changes in bowel habits.  Discharge Bowel Program:    Discharge Bladder Function: Incontinent  Discharge Urinary Devices: Brief      Nursing Discharge Plan:   Influenza Vaccine Indication: Not indicated: Previously immunized this influenza season and > 8 years of age    Case Management Discharge Instructions:   Discharge Location:    Agency Name/Address/Phone:    Home Health:    Outpatient Services:    DME Provider/Phone:    Medical Equipment Ordered:    Prescription Faxed to:        Discharge Medication Instructions:  Below are the medications your physician expects you to take upon discharge:      Occupational Therapy Discharge Instructions for Alyssa Juan    3/28/2025    Level of Assist Required for Eating: Able to Complete Eating without Assist  Level of Assist Required for Grooming: Able to Complete Grooming without Assist  Level of Assist Required for Dressing: Able to Complete Dressing without Assist  Equipment for Dressing: Sock Aid, Reacher  Level of Assist Required for Toileting: Able to Complete Toileting without Assist  Level of Assist Required for Toilet Transfer: Requires Supervision with Toilet Transfer  Level of Assist Required for Bathing: Able to Complete  Bathing without Assist  Equipment for Bathing: Shower Chair, Grab Bars in Tub / Shower, Hand Held Shower Head  Level of Assist Required for Shower Transfer: Requires Supervision with Shower Transfer  Level of Assist Required for Home Mgmt: Requires Supervision with Home Management  Level of Assist Required for Meal Prep: Requires Supervision with Meal Preparation   Supervision with mobility using the walker    risk of fall is high  due to your history of falls   Best of atilio Shah  I enjoyed working with you!  Keaton COTTON

## 2025-03-27 NOTE — THERAPY
Occupational Therapy  Daily Treatment     Patient Name:  Alyssa Juan  Age:  84 y.o., Sex:  female  Medical Record #:  [id number]  Today's Date:  3/27/2025    Precautions  Precautions: Fall Risk  Fall Risk: Poor balance, History of falls  Cognition/Communication: Hard of hearing  Orthopedic: Posterior Hip Precautions  Weight Bearing: WBAT LLE    Subjective: ***     Objective:    03/27/25 1331   OT Charge Group   OT Self Care / ADL (Units) 4   OT Total Time Spent   OT Individual Total Time Spent (Mins) 60   Interdisciplinary Plan of Care Collaboration   IDT Collaboration with  Family / Caregiver   Patient Position at End of Therapy Seated;Family / Friend in Room   Collaboration Comments FT         Family training completed with Son who was on time and present in person for all training. Reviewed and educated re: CLOF with ADL's, DME recommendations, AE recommendations , hip precautions, and level of supervision. OTR answered all questions. All family members reported that they feel comfortable and confident that they will be able to provide pt with this level of assist.          Assessment:    ***  Strengths: Able to follow instructions, Effective communication skills, Good carryover of learning, Good insight into deficits/needs, Making steady progress towards goals, Manages pain appropriately, Motivated for self care and independence, Pleasant and cooperative, Supportive family, Willingly participates in therapeutic activities  Barriers: Fatigue, Generalized weakness, Impaired balance, Pain, Limited mobility    Plan:  ***    DME  OT DME Recommendations  Additional Equipment (NOT TYPICALLY COVERED BY INSURANCE): Other (see comments), Hip kit (Pt believes she still has hip kit from previous admission, unsure and will have friend check for her)    Passport items to be completed:  {REHAB PASSPORT:29765}    Occupational Therapy Goals (Active)       There are no active problems.

## 2025-03-27 NOTE — CARE PLAN
"The patient is Watcher - Medium risk of patient condition declining or worsening    Shift Goals  Clinical Goals: safety  Problem: Fall Risk - Rehab  Goal: Patient will remain free from falls  Note: Blanka Pendleton Fall risk Assessment Score: 19    High fall risk Interventions   - Bed and strip alarm   - Yellow sign by the door   - Yellow wrist band \"Fall risk\"  - call light in reach  - Do not leave patient unattended in the bathroom  - Fall risk education provided       Problem: Pain - Standard  Goal: Alleviation of pain or a reduction in pain to the patient’s comfort goal  Note: Oxycodone administered for pain control as ordered. Patient repositioned and ice packs provided for comfort.     "

## 2025-03-27 NOTE — THERAPY
Physical Therapy   Daily Treatment     Patient Name:  Alyssa Juan  Age:  84 y.o., Sex:  female  Medical Record #:  [id number]  Today's Date: 3/27/2025     Precautions  Precautions: Fall Risk  Fall Risk: Poor balance, History of falls  Cognition/Communication: Hard of hearing  Orthopedic: Posterior Hip Precautions  Weight Bearing: WBAT LLE    Subjective: Pt confident c/ d/c home tomorrow.    Objective:    03/27/25 0930   PT Charge Group   PT Gait Training (Units) 1   PT Therapeutic Activities (Units) 1   PT Total Time Spent   PT Individual Total Time Spent (Mins) 30   Roll Left and Right   Assistance Needed Independent   Physical Assistance Level No physical assistance   CARE Score - Roll Left and Right 6   Roll Left and Right   Level of Assist Independent   Additional Description Completed on regular bed   Sit to Lying   Assistance Needed Independent   Physical Assistance Level No physical assistance   CARE Score - Sit to Lying 6   Sit to Lying   Level of Assist Stand By Assist   Additional Description Completed on regular bed   Lying to Sitting on Side of Bed   Assistance Needed Independent   Physical Assistance Level No physical assistance   CARE Score - Lying to Sitting on Side of Bed 6   Lying to Sitting on Side of Bed   Level of Assist Supervised   Additional Description Completed on regular bed   Sit to Stand   Assistance Needed Adaptive equipment;Independent   Physical Assistance Level No physical assistance   CARE Score - Sit to Stand 6   Sit to Stand   Level of Assist Modified Independent   Assistive Devices FWW   Additional Description Extra time needed   Chair/Bed-to-Chair Transfer   Assistance Needed Independent;Adaptive equipment   Physical Assistance Level No physical assistance   CARE Score - Chair/Bed-to-Chair Transfer 6   Chair/Bed-to-Chair Transfer   Level of Assist Modified Independent   Transfer Type Stand Step Transfer   Assistive Devices FWW   Additional Description Extra time needed  "  Car Transfer   Assistance Needed Independent;Adaptive equipment   Physical Assistance Level No physical assistance   CARE Score - Car Transfer 6   Car Transfer   Level of Assist Stand By Assist   Transfer Type Stand Step Transfer   Assistive Device FWW   Additional Description Extra time needed   Walk 10 Feet   Assistance Needed Adaptive equipment;Independent   Physical Assistance Level No physical assistance   CARE Score - Walk 10 Feet 6   Walk 50 Feet with Two Turns   Assistance Needed Adaptive equipment;Independent   Physical Assistance Level No physical assistance   CARE Score - Walk 50 Feet with Two Turns 6   Walk 150 Feet   Assistance Needed Adaptive equipment;Independent   Physical Assistance Level No physical assistance   CARE Score - Walk 150 Feet 6   Walking 10 Feet on Uneven Surfaces   Assistance Needed Adaptive equipment;Independent   Physical Assistance Level No physical assistance   CARE Score - Walking 10 Feet on Uneven Surfaces 6   Ambulation   Level of Assist Modified Independent   Assistive Device FWW   Additional Description Extra time needed   Distance 200ft   1 Step (Curb)   Assistance Needed Verbal cues   Physical Assistance Level No physical assistance   CARE Score - 1 Step (Curb) 4   Curbs   Level of Assist Stand By Assist   Assistive Device FWW   Curb Height 8\" step   Additional Description Cueing needed   4 Steps   Assistance Needed Verbal cues   Physical Assistance Level No physical assistance   CARE Score - 4 Steps 4   12 Steps   Reason if not Attempted Medical concerns   CARE Score - 12 Steps 88   Stairs   Level of Assist Stand By Assist   Stair Height 6\" step   Additional Description Both handrails   Stairs Amount 4   Picking Up Object   Assistance Needed Adaptive equipment;Independent   Physical Assistance Level No physical assistance   CARE Score - Picking Up Object 6   Wheel 50 Feet with Two Turns   Reason if not Attempted Activity not applicable   CARE Score - Wheel 50 Feet with " Two Turns 9   Wheel 150 Feet   Reason if not Attempted Activity not applicable   CARE Score - Wheel 150 Feet 9   Wheelchair   Level of Assist N/A   Interdisciplinary Plan of Care Collaboration   IDT Collaboration with  Physical Therapist   Patient Position at End of Therapy Seated;Chair Alarm On;Call Light within Reach;Tray Table within Reach;Phone within Reach   Collaboration Comments POC, CLOF   PT Discharge Summary   Discharge Location Home   Patient Discharging with Assist of Family;Friend   Level of Supervision Required Upon Discharge No Supervision   Recommended Equipment for Discharge Front-Wheeled Walker   Recommeded Services Upon Discharge Home Health Physical Therapy   Long Term Goals Met 4   Long Term Goals Not Met 2   Reason(s) for Goals Not Met Pt requires SBA during stairs for safety and continues to ambulate at a slow speed that increases her risk for falling   Criteria for Termination of Services Maximum Function Achieved for Inpatient Rehabilitation   Discharge Instructions to Patient   Weight Bearing Status - Patient Should Bear Weight as Tolerated Left Leg   Level of Assist Required for Ambulation No Assist on Flat Surfaces;Supervision on Curbs;Supervision on Stairs   Distance Patient May Ambulate 200ft   Device Recommended for Ambulation Front-Wheeled Walker   Level of Assist Required for Transfers Requires No Assist   Device Recommended for Transfers Front-Wheeled Walker   Home Exercise Program Refer to Home Exercise Program Handout for Details       Discharge Interventions  Purpose: to complete discharge assessments in preparation for upcoming discharge from facility and to review final discharge recommendations and education  Interventions:   completed discharge IRF-CROW items  completed patient passport  reviewed relevant DME and adaptive equipment recommendations  discussed home safety  discussed floor recovery/fall prevention  reviewed HEP with handout provided  reviewed relevant precautions  to maximize safety during home and community mobility, ADLs, and IADLs    Assessment: Pt has made notable improvements during time in rehab and is now at a SBA to IND level c/ all mobility tasks. Pt has consistently demonstrated good insight during this POC and will have good support at home. Pt is expected to continue progressing c/ HHPT.    Strengths: Able to follow instructions, Alert and oriented, Independent prior level of function, Manages pain appropriately, Motivated for self care and independence, Pleasant and cooperative, Supportive family, Willingly participates in therapeutic activities  Barriers: Generalized weakness, Impaired activity tolerance, Decreased endurance, Fatigue, Impaired balance, Limited mobility    Plan:   D/C home c/ support from son and friend; HHPT    DME           Passport items to be completed:  complete caregiver training (to be completed this afternoon)    Physical Therapy Problems (Active)       There are no active problems.

## 2025-03-27 NOTE — PROGRESS NOTES
"  Physical Medicine & Rehabilitation Progress Note    Encounter Date: 3/27/2025    Chief Complaint: Hip Fracture    Interval Events (Subjective):  VS: SBP low even after changing dose to later  Last BM: 3/26  Bladder: Voiding low PVRs  Schedule Meds Not Given: None  PRN Meds Taken: Oxycodone last night    Patient seen and examined in her room. Son present. Went over all medications for discharge. Doing well. Feels ready to go home.       ROS: 14 point ROS negative unless otherwise specified in the HPI    Objective:  VITAL SIGNS: /57   Pulse 66   Temp 36.8 °C (98.3 °F) (Temporal)   Resp 16   Ht 1.549 m (5' 1\")   Wt 48.5 kg (106 lb 14.8 oz)   SpO2 98%   BMI 20.20 kg/m²     GEN: No apparent distress  HEENT: Head normocephalic, atraumatic.  Sclera nonicteric bilaterally, no ocular discharge appreciated bilaterally.  CV: Extremities warm and well-perfused, no peripheral edema appreciated bilaterally.  PULMONARY: Breathing nonlabored on room air, no respiratory accessory muscle use.  Not requiring supplemental oxygen.  SKIN:     PSYCH: Mood and affect within normal limits.  NEURO: Awake alert.  Conversational.  Logical thought content.      Laboratory Values:  Recent Results (from the past 72 hours)   Basic Metabolic Panel    Collection Time: 03/25/25  5:59 AM   Result Value Ref Range    Sodium 131 (L) 135 - 145 mmol/L    Potassium 4.4 3.6 - 5.5 mmol/L    Chloride 94 (L) 96 - 112 mmol/L    Co2 27 20 - 33 mmol/L    Glucose 84 65 - 99 mg/dL    Bun 14 8 - 22 mg/dL    Creatinine 0.55 0.50 - 1.40 mg/dL    Calcium 8.8 8.5 - 10.5 mg/dL    Anion Gap 10.0 7.0 - 16.0   CBC WITHOUT DIFFERENTIAL    Collection Time: 03/25/25  5:59 AM   Result Value Ref Range    WBC 4.3 (L) 4.8 - 10.8 K/uL    RBC 2.86 (L) 4.20 - 5.40 M/uL    Hemoglobin 9.5 (L) 12.0 - 16.0 g/dL    Hematocrit 28.4 (L) 37.0 - 47.0 %    MCV 99.3 (H) 81.4 - 97.8 fL    MCH 33.2 (H) 27.0 - 33.0 pg    MCHC 33.5 32.2 - 35.5 g/dL    RDW 48.9 35.9 - 50.0 fL    " Platelet Count 390 164 - 446 K/uL    MPV 8.7 (L) 9.0 - 12.9 fL   MAGNESIUM    Collection Time: 03/25/25  5:59 AM   Result Value Ref Range    Magnesium 2.5 1.5 - 2.5 mg/dL   ESTIMATED GFR    Collection Time: 03/25/25  5:59 AM   Result Value Ref Range    GFR (CKD-EPI) 89 >60 mL/min/1.73 m 2   Basic Metabolic Panel    Collection Time: 03/27/25  5:32 AM   Result Value Ref Range    Sodium 132 (L) 135 - 145 mmol/L    Potassium 4.6 3.6 - 5.5 mmol/L    Chloride 95 (L) 96 - 112 mmol/L    Co2 28 20 - 33 mmol/L    Glucose 80 65 - 99 mg/dL    Bun 17 8 - 22 mg/dL    Creatinine 0.55 0.50 - 1.40 mg/dL    Calcium 8.8 8.5 - 10.5 mg/dL    Anion Gap 9.0 7.0 - 16.0   CBC WITHOUT DIFFERENTIAL    Collection Time: 03/27/25  5:32 AM   Result Value Ref Range    WBC 3.6 (L) 4.8 - 10.8 K/uL    RBC 2.88 (L) 4.20 - 5.40 M/uL    Hemoglobin 9.5 (L) 12.0 - 16.0 g/dL    Hematocrit 28.7 (L) 37.0 - 47.0 %    MCV 99.7 (H) 81.4 - 97.8 fL    MCH 33.0 27.0 - 33.0 pg    MCHC 33.1 32.2 - 35.5 g/dL    RDW 48.2 35.9 - 50.0 fL    Platelet Count 431 164 - 446 K/uL    MPV 8.7 (L) 9.0 - 12.9 fL   ESTIMATED GFR    Collection Time: 03/27/25  5:32 AM   Result Value Ref Range    GFR (CKD-EPI) 89 >60 mL/min/1.73 m 2       Medications:  Scheduled Medications   Medication Dose Frequency    oxyCODONE immediate-release  5 mg DAILY    senna-docusate  2 Tablet Daily-0800    And    polyethylene glycol/lytes  1 Packet DAILY    diclofenac sodium  4 g TID    traZODone  25 mg QHS    magnesium chloride  64 mg QHS    gabapentin  100 mg BID    gabapentin  200 mg Q EVENING    Pharmacy Consult Request  1 Each PHARMACY TO DOSE    alendronate  70 mg Q7 DAYS    flecainide  25 mg BID    levothyroxine  50 mcg AM ES    lidocaine  1 Patch Q24HR    vitamin D3  1,000 Units DAILY    omeprazole  20 mg DAILY    enoxaparin (LOVENOX) injection  40 mg DAILY AT 1800     PRN medications: methocarbamol, lidocaine, hydrALAZINE, lactulose, docusate sodium, bisacodyl EC, magnesium hydroxide, sodium  phosphate, carboxymethylcellulose, benzocaine-menthol, mag hydrox-al hydrox-simeth, ondansetron **OR** ondansetron, sodium chloride, [] acetaminophen **FOLLOWED BY** acetaminophen, oxyCODONE immediate-release **OR** oxyCODONE immediate-release    Diet:  Current Diet Order   Procedures    Diet Order Diet: Regular (total free water restriction 500mL, total fluids 1500mL per day.); Fluid modifications: (optional): 1800 ml Fluid Restriction       Medical Decision Making and Plan:  Imapacted L Femoral Neck Fracture s/p Hemiarthroplasty 3/14/25 Dr. Cota   Left superior and inferior pubic ramus fracture non-op  PT and OT for mobility and ADLs. Per guidelines, 15 hours per week between PT, OT and/or SLP.  Follow-up Ortho  WBAT LLE     Pain - Tylenol, oxycodone. 3/19 Schedule Gabapentin, schedule oxy in AM, add robaxin PRN.     3/26 Make scheduled AM oxycodone at 0600. Reduction from BID.      ABLA - 3/19 10.3 stable. 3/21 9.8 Stable. 3/27 9.5    Leukopenia - 3/27 Stable up and down throughout hospitalization. Follow up PCP.      Arrhythmia - Continue Flecainide. Follows with Dr. Vail     Peripheral Neuropathy - Continue Gabapentin     Hyponatremia - Continue fluid restriction, improving. 3/19 131 improving. Liberalize fluids. 3/21 Na better 132. Continue fluid restriction for now. 3/25 Stable 131. Patient has seen Nephro in past and told to limit fluids, takes salt tab every Monday, does not know dose, has not been doing this while hospitalized.     3/27 Stable 132     GERD - Continue PPI     Hypothyroidism - Continue Synthroid     Hypertension - Lisinopril 20mg HELD due to hyperkalemia. 3/19 BP elevated required PRN hydralazine. Restart Lisinopril 10mg daily. 3/20 Patient with lower BP today, only takes 5mg at home. Reduce. 3/21 BP stable. Received Hydralazine but it was just before getting scheduled Lisinopril.     3/26 Change timing of Lisinopril. Suspect low's in AM due to timing early AM and initiation  therapy. Patient mildly symptomatic. Could be related to pain meds as well.   3/27 BP still low this AM. Stop scheduled oxycodone and reduce to 2.5mg. Will have PCP follow up.     Hyperkalemia - 3/19 WNL 4.7. Restarting Lisinopril. 3/21 WNL 4. 3/25 4.4 WNL. 3/27 WNL 4.6     Hypomagnesemia - Continue supplement. 3/19 WNL     Vitamin D Deficiency - Supplement      Bowel - Patient on Senna-docusate for constipation prophylaxis.      Bladder - TV/PVR/BS PRN    Sleep/Depression - Schedule Trazodone. Pt had friend recommend Elavil, d/w her large side effect profile. Will consider if fails trazodone.            DVT PROPHYLAXIS: Start Lovenox 40mg SQ nightly on admission to ARU, held at main due to slight drop in Hgb, however repeat improved in 10's. No active bleeding, incision looks good. 3/19 Hgb stable. D/c Lovenox on discharge.     HOSPITALIST FOLLOWING: No     CODE STATUS: DNAR/DNI     DISPO: Home      GUS: 3/28     MEDS SENT TO: [facility]     DISCHARGE SPECIALIST FOLLOW UP:   Ortho     Patient to scheduled follow up with their PCP within 2 weeks from discharge from the [facility].       ____________________________________    Dr. Jacqui Alatorre DO, MS  [facility] - Physical Medicine & Rehabilitation   ____________________________________

## 2025-03-27 NOTE — CARE PLAN
Problem: OT Long Term Goals  Goal: LTG-By discharge, patient will complete basic self care tasks with supervision.   Outcome: Met  Goal: LTG-By discharge, patient will perform bathroom transfers with supervision.   Outcome: Met

## 2025-03-28 ENCOUNTER — APPOINTMENT (OUTPATIENT)
Dept: PHYSICAL MEDICINE AND REHAB | Facility: REHABILITATION | Age: 85
End: 2025-03-28
Attending: PHYSICAL MEDICINE & REHABILITATION
Payer: MEDICARE

## 2025-03-28 VITALS
DIASTOLIC BLOOD PRESSURE: 52 MMHG | TEMPERATURE: 98.3 F | OXYGEN SATURATION: 94 % | HEIGHT: 61 IN | HEART RATE: 69 BPM | BODY MASS INDEX: 20.19 KG/M2 | SYSTOLIC BLOOD PRESSURE: 107 MMHG | WEIGHT: 106.92 LBS | RESPIRATION RATE: 18 BRPM

## 2025-03-28 PROCEDURE — 700101 HCHG RX REV CODE 250: Performed by: PHYSICAL MEDICINE & REHABILITATION

## 2025-03-28 PROCEDURE — 99239 HOSP IP/OBS DSCHRG MGMT >30: CPT | Performed by: PHYSICAL MEDICINE & REHABILITATION

## 2025-03-28 PROCEDURE — 90832 PSYTX W PT 30 MINUTES: CPT | Performed by: STUDENT IN AN ORGANIZED HEALTH CARE EDUCATION/TRAINING PROGRAM

## 2025-03-28 PROCEDURE — A9270 NON-COVERED ITEM OR SERVICE: HCPCS | Performed by: PHYSICAL MEDICINE & REHABILITATION

## 2025-03-28 PROCEDURE — 700102 HCHG RX REV CODE 250 W/ 637 OVERRIDE(OP): Performed by: PHYSICAL MEDICINE & REHABILITATION

## 2025-03-28 RX ADMIN — LIDOCAINE 1 PATCH: 4 PATCH TOPICAL at 11:29

## 2025-03-28 RX ADMIN — MENTHOL 4 G: 10 GEL TOPICAL at 09:00

## 2025-03-28 RX ADMIN — OXYCODONE 5 MG: 5 TABLET ORAL at 07:36

## 2025-03-28 RX ADMIN — Medication 1000 UNITS: at 09:07

## 2025-03-28 RX ADMIN — OMEPRAZOLE 20 MG: 20 CAPSULE, DELAYED RELEASE ORAL at 09:08

## 2025-03-28 RX ADMIN — LISINOPRIL 2.5 MG: 5 TABLET ORAL at 09:08

## 2025-03-28 RX ADMIN — LIDOCAINE 1 PATCH: 4 PATCH TOPICAL at 09:17

## 2025-03-28 RX ADMIN — LEVOTHYROXINE SODIUM 50 MCG: 0.05 TABLET ORAL at 05:28

## 2025-03-28 RX ADMIN — POLYETHYLENE GLYCOL 3350 1 PACKET: 17 POWDER, FOR SOLUTION ORAL at 09:07

## 2025-03-28 RX ADMIN — SENNOSIDES AND DOCUSATE SODIUM 2 TABLET: 50; 8.6 TABLET ORAL at 09:16

## 2025-03-28 RX ADMIN — FLECAINIDE ACETATE 25 MG: 50 TABLET ORAL at 09:07

## 2025-03-28 RX ADMIN — GABAPENTIN 100 MG: 100 CAPSULE ORAL at 09:08

## 2025-03-28 RX ADMIN — OXYCODONE HYDROCHLORIDE 10 MG: 10 TABLET ORAL at 12:08

## 2025-03-28 ASSESSMENT — PATIENT HEALTH QUESTIONNAIRE - PHQ9
SUM OF ALL RESPONSES TO PHQ9 QUESTIONS 1 AND 2: 0
SUM OF ALL RESPONSES TO PHQ9 QUESTIONS 1 AND 2: 0
2. FEELING DOWN, DEPRESSED, IRRITABLE, OR HOPELESS: NOT AT ALL
1. LITTLE INTEREST OR PLEASURE IN DOING THINGS: NOT AT ALL
1. LITTLE INTEREST OR PLEASURE IN DOING THINGS: NOT AT ALL
2. FEELING DOWN, DEPRESSED, IRRITABLE, OR HOPELESS: NOT AT ALL

## 2025-03-28 ASSESSMENT — PAIN DESCRIPTION - PAIN TYPE
TYPE: ACUTE PAIN
TYPE: SURGICAL PAIN

## 2025-03-28 ASSESSMENT — ACTIVITIES OF DAILY LIVING (ADL)
TOILET_TRANSFER_LEVEL_OF_ASSIST: REQUIRES SUPERVISION WITH TOILET TRANSFER
TOILETING_LEVEL_OF_ASSIST: ABLE TO COMPLETE TOILETING WITHOUT ASSIST
SHOWER_TRANSFER_LEVEL_OF_ASSIST: REQUIRES SUPERVISION WITH SHOWER TRANSFER

## 2025-03-28 NOTE — PROGRESS NOTES
NURSING DAILY NOTE    Name: Alyssa Juan   Date of Admission: 3/18/2025   Admitting Diagnosis: Closed left hip fracture, initial encounter ([facility])  Attending Physician: JAQUAN CLARK D.O.  Allergies: Sulfa drugs and Codeine    Safety  Patient Assist  mod x 1  Patient Precautions  Precautions: Fall Risk  Fall Risk: Poor balance, History of falls  Cognition/Communication: Hard of hearing  Orthopedic: Posterior Hip Precautions  Weight Bearing: WBAT LLE  Bed Transfer Status  Modified Independent  Toilet Transfer Status   Supervised  Assistive Devices  Rails, Wheelchair push  Oxygen  None - Room Air  Diet/Therapeutic Dining  Current Diet Order   Procedures    Diet Order Diet: Regular (total free water restriction 500mL, total fluids 1500mL per day.); Fluid modifications: (optional): 1800 ml Fluid Restriction     Pill Administration  whole and one at a time  Agitated Behavioral Scale     ABS Level of Severity       Fall Risk  Has the patient had a fall this admission?      Blanka Pendleton Fall Risk Scoring  19, HIGH RISK  Fall Risk Safety Measures  bed alarm, chair alarm, poor balance, and low vision/hearing      Vitals  Temperature: 36.4 °C (97.6 °F)  Temp src: Temporal  Pulse: 61  Respiration: 16  Blood Pressure : 138/66  Blood Pressure MAP (Calculated): 90 MM HG  BP Location: Right, Upper Arm  Patient BP Position: Supine     Oxygen  Pulse Oximetry: 99 %  O2 (LPM): 2  O2 Delivery Device: None - Room Air    Bowel and Bladder  Last Bowel Movement  03/27/25  Stool Type  Type 3: Like a sausage, but with cracks on its surface  Bowel Device  Bathroom  Continent  Bladder: Non-stress incontinence   Bowel: Incontinent movement  Bladder Function  Urine Void (mL):  (large)  Number of Times Voided: 1  Urine Color: Yellow  Genitourinary Assessment   Bladder Assessment (WDL):  Within Defined Limits  Petersen Catheter: Not Applicable  Urine Color: Yellow  Bladder Device: Bathroom  Time Void: Yes  Bladder Scan: Post Void  $  Bladder Scan Results (mL): 7    Skin  Ovidio Score   19  Sensory Interventions   Bed Types: Standard/Trauma Mattress  Skin Preventative Measures: Pillows in Use for Support / Positioning  Moisture Interventions         Pain  Pain Rating Scale  6 - Hard to ignore, avoid usual activities  Pain Location  Hip, Groin, Neck  Pain Location Orientation  Left  Pain Interventions   Medication (see MAR)    ADLs    Bathing   Shower, * * With Assistance from, Staff  Linen Change   Partial  Personal Hygiene  Change Monica Pads, Moist Monica Wipes, Perineal Care  Chlorhexidine Bath      Oral Care  Brushed Teeth  Teeth/Dentures     Shave     Nutrition Percentage Eaten  Lunch, Between % Consumed  Environmental Precautions  Treaded Slipper Socks on Patient, Transferred to Stronger Side, Personal Belongings, Wastebasket, Call Bell etc. in Easy Reach, Report Given to Other Health Care Providers Regarding Fall Risk, Bed in Low Position, Mobility Assessed & Appropriate Sign Placed, Communication Sign for Patients & Families  Patient Turns/Positioning  Patient turns self independently side to side without assistance, to offload sacral area  Patient Turns Assistance/Tolerance     Bed Positions  Bed Locked, Bed Controls On  Head of Bed Elevated  Self regulated      Psychosocial/Neurologic Assessment  Psychosocial Assessment  Psychosocial (WDL):  Within Defined Limits  Neurologic Assessment  Neuro (WDL): Within Defined Limits  Level of Consciousness: Alert  Orientation Level: Oriented X4  Cognition: Follows commands, Appropriate attention/concentration  Speech: Clear  EENT (WDL):  WDL Except    Cardio/Pulmonary Assessment  Edema   LLE Edema: 1+  Respiratory Breath Sounds     Cardiac Assessment   Cardiac (WDL):  WDL Except (HTN)

## 2025-03-28 NOTE — THERAPY
Physical Therapy   Daily Treatment     Patient Name:  Alyssa Juan  Age:  84 y.o., Sex:  female  Medical Record #:  [id number]  Today's Date: 3/27/2025     Precautions  Precautions: Fall Risk  Fall Risk: Poor balance, History of falls  Cognition/Communication: Hard of hearing  Orthopedic: Posterior Hip Precautions  Weight Bearing: WBAT LLE    Subjective: Patient feels ready for DC home tomorrow     Objective:    03/27/25 1431   PT Charge Group   PT Therapeutic Activities (Units) 2   PT Total Time Spent   PT Individual Total Time Spent (Mins) 30   Family Training   Training Completed   Date 03/27/25   Time 1430   Family Member(s) Present Son   Minutes 30 minutes   Training Items Completed Indoor ambulation;Car Transfers;Bed/Chair Transfers;Stairs;Curbs;Bed mobility;HEP;Fall prevention   Interdisciplinary Plan of Care Collaboration   Patient Position at End of Therapy In Bed;Family / Friend in Room         Discharge Interventions  Purpose: to review final discharge recommendations and education  Interventions:   completed patient passport  discussed home safety  reviewed HEP with handout provided  reviewed relevant precautions to maximize safety during home and community mobility, ADLs, and IADLs  Son Jae present and demonstrates understanding and competence with all aspects of ADL assist     Assessment: Patient and family are safe for DC home tomorrow     Strengths: Able to follow instructions, Alert and oriented, Independent prior level of function, Manages pain appropriately, Motivated for self care and independence, Pleasant and cooperative, Supportive family, Willingly participates in therapeutic activities  Barriers: Generalized weakness, Impaired activity tolerance, Decreased endurance, Fatigue, Impaired balance, Limited mobility    Plan:   DC    DME           Passport items to be completed:      Physical Therapy Problems (Active)       There are no active problems.

## 2025-03-28 NOTE — DISCHARGE PLANNING
Case management  Reviewed signed copy of IMM and answered all questions.    Dc date /disposition: 3/28/25 Home with family and home health.

## 2025-03-28 NOTE — PROGRESS NOTES
Discharge instructions provide to patient & son & demonstrated good understanding.  Left via own car at 123:45pm.

## 2025-03-28 NOTE — PROGRESS NOTES
PSYCHOLOGY PROGRESS NOTE     Treatment Time/Location: 0332-6502 in pt's room  Total Treatment Time: 22 Minutes  Service Type: 20607     Intervention: Alyssa Juan was seen in context of [facility] hospitalization for Psychology follow-up. Cognitive behavioral therapy (CBT) and Acceptance and Commitment Based Therapy (ACT) techniques applied in session with supportive and processing therapy was provided with normalization and validation of pt's experience emphasized.    Engaged in discussion with pt regarding hospitalization, adjustment, coping, and discharge planning. Pt reported improved mood and sleep the past several days. Reviewed and discussed aspects of pain management that may be helpful in the home setting; identified non-pharmacological pain mgmt strategies to include pleasurable activities, repositioning, movement, distraction. Encouraged pt to follow-up with medical providers should mood worsen, which she was open and receptive to. Pt’s progress, utilization of coping skills, and goals for rehab therapies were supported.     Behavioral Observations: Alyssa Juan was alert, oriented, and agreeable to encounter. Pt participated appropriately and cooperatively. Language functions, eye contact, and non-verbal behaviors were unremarkable. Thought processes were connected, logical, and goal oriented (negative for AH/VH/SI/HI); mood and affect were congruent and euthymic.     Plan:  Will continue to follow and assess cognition, mood, pain management, and coping.     Recommendations:  1) Previous recommendations remain needed as outlined in initial evaluation.      Brittany Rendon, PhD, [facility]-  Licensed Psychologist      The utility and purpose of this service was reviewed and pt agreed to participate.  Pt demonstrated ability and willingness to work towards goals, receptivity to psychological support and plan of care. Therapy goals/plans were reviewed and discussed with pt  and pt was in agreement.

## 2025-03-28 NOTE — PROGRESS NOTES
NURSING DAILY NOTE    Name: Alyssa Juan   Date of Admission: 3/18/2025   Admitting Diagnosis: Closed left hip fracture, initial encounter ([facility])  Attending Physician: Jacqui Alatorre D.o.  Allergies: Sulfa drugs and Codeine    Safety  Patient Assist  Mod assist  Patient Precautions     Precaution Comments     Bed Transfer Status     Toilet Transfer Status      Assistive Devices  Rails, Wheelchair  Oxygen  None - Room Air  Diet/Therapeutic Dining  Current Diet Order   Procedures    Diet Order Diet: Regular (total free water restriction 500mL, total fluids 1500mL per day.); Fluid modifications: (optional): 1800 ml Fluid Restriction     Pill Administration  whole  Agitated Behavioral Scale     ABS Level of Severity       Fall Risk  Has the patient had a fall this admission?   No  Blanka Pendleton Fall Risk Scoring  19, HIGH RISK  Fall Risk Safety Measures  bed alarm, chair alarm, poor balance, and low vision/ hearing+    Vitals  Temperature: 36.8 °C (98.3 °F)  Temp src: Temporal  Pulse: 73  Respiration: 16  Blood Pressure : 116/71  Blood Pressure MAP (Calculated): 86 MM HG  BP Location: Right, Upper Arm  Patient BP Position: Warner's Position     Oxygen  Pulse Oximetry: 91 %  O2 (LPM): 2  O2 Delivery Device: None - Room Air    Bowel and Bladder  Last Bowel Movement  03/27/25  Stool Type  Type 3: Like a sausage, but with cracks on its surface  Bowel Device  Bathroom  Continent  Bladder: Did not void   Bowel: No movement  Bladder Function  Urine Void (mL):  (large)  Number of Times Voided: 1  Urine Color: Yellow  Genitourinary Assessment   Bladder Assessment (WDL):  Within Defined Limits  Petersen Catheter: Not Applicable  Urine Color: Yellow  Bladder Device: Bathroom  Time Void: Yes  Bladder Scan: Post Void  $ Bladder Scan Results (mL): 7    Skin  Ovidio Score   19  Sensory Interventions   Bed Types: Standard/Trauma Mattress  Skin Preventative Measures:  Pillows in Use for Support / Positioning  Moisture Interventions         Pain  Pain Rating Scale  6 - Hard to ignore, avoid usual activities  Pain Location  Hip  Pain Location Orientation  Left  Pain Interventions   Medication (see MAR)    ADLs    Bathing   Shower, * * With Assistance from, Staff  Linen Change   Partial  Personal Hygiene  Change Monica Pads, Moist Monica Wipes, Perineal Care  Chlorhexidine Bath      Oral Care  Brushed Teeth  Teeth/Dentures     Shave     Nutrition Percentage Eaten  Lunch, Between % Consumed  Environmental Precautions  Treaded Slipper Socks on Patient, Transferred to Stronger Side, Personal Belongings, Wastebasket, Call Bell etc. in Easy Reach, Report Given to Other Health Care Providers Regarding Fall Risk, Bed in Low Position, Mobility Assessed & Appropriate Sign Placed, Communication Sign for Patients & Families  Patient Turns/Positioning  Patient turns self independently side to side without assistance, to offload sacral area  Patient Turns Assistance/Tolerance     Bed Positions  Bed Locked, Bed Controls On  Head of Bed Elevated  Self regulated      Psychosocial/Neurologic Assessment  Psychosocial Assessment  Psychosocial (WDL):  Within Defined Limits  Neurologic Assessment  Neuro (WDL): Within Defined Limits  Level of Consciousness: Alert  Orientation Level: Oriented X4  Cognition: Follows commands, Appropriate attention/concentration  Speech: Clear  EENT (WDL):  WDL Except    Cardio/Pulmonary Assessment  Edema   LLE Edema: 1+  Respiratory Breath Sounds     Cardiac Assessment   Cardiac (WDL):  WDL Except

## 2025-03-28 NOTE — PROGRESS NOTES
NURSING DAILY NOTE    Name: Alyssa Juan   Date of Admission: 3/18/2025   Admitting Diagnosis: Closed left hip fracture, initial encounter ([facility])  Attending Physician: KEVAN GRANADOS D.O.  Allergies: Sulfa drugs and Codeine    Safety  Patient Assist  mod x 1  Patient Precautions  Precautions: Fall Risk  Fall Risk: Poor balance, History of falls  Cognition/Communication: Hard of hearing  Orthopedic: Posterior Hip Precautions  Weight Bearing: WBAT LLE  Bed Transfer Status  Modified Independent  Toilet Transfer Status   Supervised  Assistive Devices  Rails, Wheelchair push  Oxygen  None - Room Air  Diet/Therapeutic Dining  Current Diet Order   Procedures    Diet Order Diet: Regular (total free water restriction 500mL, total fluids 1500mL per day.); Fluid modifications: (optional): 1800 ml Fluid Restriction     Pill Administration  whole  Agitated Behavioral Scale     ABS Level of Severity       Fall Risk  Has the patient had a fall this admission?      Blanka Pendleton Fall Risk Scoring  19, HIGH RISK  Fall Risk Safety Measures  bed alarm, chair alarm, and seatbelt alarm    Vitals  Temperature: 36.8 °C (98.3 °F)  Temp src: Temporal  Pulse: 69  Respiration: 18  Blood Pressure : 107/52  Blood Pressure MAP (Calculated): 70 MM HG  BP Location: Right, Upper Arm  Patient BP Position: Supine     Oxygen  Pulse Oximetry: 94 %  O2 (LPM): 2  O2 Delivery Device: None - Room Air    Bowel and Bladder  Last Bowel Movement  03/27/25  Stool Type  Type 3: Like a sausage, but with cracks on its surface  Bowel Device  Bathroom  Continent  Bladder: Non-stress incontinence   Bowel: Incontinent movement  Bladder Function  Urine Void (mL):  (large)  Number of Times Voided: 1  Urine Color: Yellow  Genitourinary Assessment   Bladder Assessment (WDL):  Within Defined Limits  Petersen Catheter: Not Applicable  Urine Color: Yellow  Bladder Device: Bathroom  Time Void: Yes  Bladder Scan: Post Void  $ Bladder Scan Results (mL): 7    Skin  Ovidio  Score   19  Sensory Interventions   Bed Types: Standard/Trauma Mattress  Skin Preventative Measures: Pillows in Use for Support / Positioning  Moisture Interventions         Pain  Pain Rating Scale  5 - Interrupts some activities  Pain Location  Hip  Pain Location Orientation  Left  Pain Interventions   Medication (see MAR)    ADLs    Bathing   Shower, * * With Assistance from, Staff  Linen Change   Partial  Personal Hygiene  Change Monica Pads, Moist Monica Wipes, Perineal Care  Chlorhexidine Bath      Oral Care  Brushed Teeth  Teeth/Dentures     Shave     Nutrition Percentage Eaten  Lunch, Between % Consumed  Environmental Precautions  Treaded Slipper Socks on Patient, Transferred to Stronger Side, Personal Belongings, Wastebasket, Call Bell etc. in Easy Reach, Report Given to Other Health Care Providers Regarding Fall Risk, Bed in Low Position, Mobility Assessed & Appropriate Sign Placed, Communication Sign for Patients & Families  Patient Turns/Positioning  Patient turns self independently side to side without assistance, to offload sacral area  Patient Turns Assistance/Tolerance     Bed Positions  Bed Locked, Bed Controls On  Head of Bed Elevated  Self regulated      Psychosocial/Neurologic Assessment  Psychosocial Assessment  Psychosocial (WDL):  Within Defined Limits  Neurologic Assessment  Neuro (WDL): Within Defined Limits  Level of Consciousness: Alert  Orientation Level: Oriented X4  Cognition: Follows commands, Appropriate attention/concentration  Speech: Clear  EENT (WDL):  WDL Except    Cardio/Pulmonary Assessment  Edema   LLE Edema: 1+  Respiratory Breath Sounds     Cardiac Assessment   Cardiac (WDL):  WDL Except (HTN)

## 2025-03-28 NOTE — CARE PLAN
Problem: Knowledge Deficit - Standard  Goal: Patient and family/care givers will demonstrate understanding of plan of care, disease process/condition, diagnostic tests and medications  Outcome: Progressing     Problem: Fall Risk - Rehab  Goal: Patient will remain free from falls  Outcome: Progressing  Note: Patient waits for assistance does not attempt self transfer. Patient is able to verbalize needs this shift.  Will continue to monitor.   The patient is Stable - Low risk of patient condition declining or worsening    Shift Goals  Clinical Goals: Safety  Patient Goals: Rest  Family Goals: family not present

## 2025-03-31 ENCOUNTER — HOME HEALTH ADMISSION (OUTPATIENT)
Dept: HOME HEALTH SERVICES | Facility: HOME HEALTHCARE | Age: 85
End: 2025-03-31
Payer: MEDICARE

## 2025-03-31 NOTE — DISCHARGE PLANNING
ATTN: Case Management  RE: Referral for Home Health    As of 3/31/2025, we have accepted the Home Health referral for the patient listed above.    [facility]  will contact the patient within 48 hours. If you have any questions or concerns regarding the patient's transition to Home Health, please do not hesitate to contact us at [phone].      We look forward to collaborating with you,  [facility] Team

## 2025-03-31 NOTE — DISCHARGE PLANNING
CM//Discharge :    The following has been ordered:   Home health:       [facility]               Disciplines ordered: RN, PT, OT, Aid  Status: sent

## 2025-04-11 ENCOUNTER — APPOINTMENT (OUTPATIENT)
Dept: RADIOLOGY | Facility: MEDICAL CENTER | Age: 85
End: 2025-04-11
Attending: OTOLARYNGOLOGY
Payer: MEDICARE

## 2025-04-28 ENCOUNTER — HOME HEALTH ADMISSION (OUTPATIENT)
Dept: HOME HEALTH SERVICES | Facility: HOME HEALTHCARE | Age: 85
End: 2025-04-28
Payer: MEDICARE

## 2025-04-29 ENCOUNTER — TELEPHONE (OUTPATIENT)
Dept: HOME HEALTH SERVICES | Facility: HOME HEALTHCARE | Age: 85
End: 2025-04-29
Payer: MEDICARE

## 2025-04-29 NOTE — TELEPHONE ENCOUNTER
Home Transitional Care Management (TCM) attempted to contact Alyssa by telephone. Voicemail left with request for call back.

## 2025-04-29 NOTE — TELEPHONE ENCOUNTER
Called and spoke to patient regarding home health scheduling. Scheduled start of care for 4/30 between 9-10am.

## 2025-04-29 NOTE — TELEPHONE ENCOUNTER
"Home Transitional Care Management (TCM) Initial Contact Within 48 Hours of Discharge From Inpatient Setting:    This RN verfied the patient has [facility] ([facility]) and [facility] ([facility]).    This RN contacted patient Alyssa via telephone  This RN introduced self and provided a brief discription of [facility] program and explained what services Home TCM provides.  This RN asked if they would like to utilize Home TCM for optimal care.   Alyssa declined Home TCM services.     Do you have a follow up appointment scheduled with your PCP and or specialists: yes  Are you able to get to your appointment(s): \"Yes and I have a   Assistance needed scheduling follow up appointments or establishing with PCP: no  Has [facility] contacted you: no SOC scheduled: TBD    Do you have and barriers to health care such as transportation, food, paying for medications, lack of durable medical equipment or lack of caregivers: no  Who is helping you at home: friends and Mariel caregivers    TCM home care contact information provided.    Current or previous attempts completed within 2 business days of discharge: 1                        "

## 2025-04-30 ENCOUNTER — HOME CARE VISIT (OUTPATIENT)
Dept: HOME HEALTH SERVICES | Facility: HOME HEALTHCARE | Age: 85
End: 2025-04-30
Payer: MEDICARE

## 2025-04-30 VITALS
SYSTOLIC BLOOD PRESSURE: 118 MMHG | OXYGEN SATURATION: 92 % | TEMPERATURE: 97.6 F | HEIGHT: 60 IN | HEART RATE: 60 BPM | BODY MASS INDEX: 20.93 KG/M2 | DIASTOLIC BLOOD PRESSURE: 54 MMHG | RESPIRATION RATE: 16 BRPM | WEIGHT: 106.6 LBS

## 2025-04-30 PROCEDURE — G0299 HHS/HOSPICE OF RN EA 15 MIN: HCPCS

## 2025-04-30 PROCEDURE — 665005 NO-PAY RAP - HOME HEALTH

## 2025-04-30 ASSESSMENT — FIBROSIS 4 INDEX: FIB4 SCORE: 1.42

## 2025-04-30 ASSESSMENT — ENCOUNTER SYMPTOMS
FATIGUE: 1
DENIES PAIN: 1
NAUSEA: DENIES
LAST BOWEL MOVEMENT: 67324
VOMITING: DENIES
FATIGUES EASILY: 1

## 2025-04-30 ASSESSMENT — ACTIVITIES OF DAILY LIVING (ADL): OASIS_M1830: 03

## 2025-05-01 ENCOUNTER — DOCUMENTATION (OUTPATIENT)
Dept: MEDICAL GROUP | Facility: PHYSICIAN GROUP | Age: 85
End: 2025-05-01
Payer: MEDICARE

## 2025-05-01 NOTE — PROGRESS NOTES
Medication chart review for [facility] services    Received referral from [facility].   Medications reviewed  compared with discharge summary if available.  Discharge summary date, if applicable:   3/28/25    Current medication list per [facility]     Medication list one, patient is currently taking    Current Outpatient Medications:     polyethylene glycol/lytes, 17 g, Oral, DAILY    One A Day Women 50 Plus, 2 Capsule, Oral, DAILY    Acetaminophen, 1,000 mg, Oral, Q8HRS PRN    oxyCODONE immediate-release, 5 mg, Oral, Q6HRS PRN    Lubiprostone, 1 Capsule, Oral, DAILY    Home Care Oxygen, 2 L/min, Inhalation, QHS    omeprazole, 20 mg, Oral, DAILY    alendronate, 70 mg, Oral, Q7 DAYS    flecainide, 25 mg, Oral, BID    gabapentin, 100 mg, Oral, TID PRN    levothyroxine, 50 mcg, Oral, AM ES    lisinopril, 2.5 mg, Oral, DAILY    magnesium chloride, 64 mg, Oral, DAILY WITH LUNCH    traZODone, 25 mg, Oral, QHS    Cholecalciferol (D3 PO), 1 Capsule, Oral, DAILY      Medication list two, drugs that the patient has been prescribed or recommended to take by their healthcare provider on discharge summary  ist          START taking these medications         Instructions   oxyCODONE immediate-release 5 MG Tabs  Commonly known as: Roxicodone    Take 1 Tablet by mouth every 6 hours as needed for Severe Pain for up to 7 days. Indications: Acute Pain  Dose: 5 mg      traZODone 50 MG Tabs  Commonly known as: Desyrel    Take 0.5 Tablets by mouth at bedtime.  Dose: 25 mg                CHANGE how you take these medications         Instructions   gabapentin 100 MG Caps  What changed: additional instructions  Commonly known as: Neurontin    Take 1 Capsule by mouth 3 times a day as needed (pain). Take 1 capsule morning and noon and 2 capsules at night  Indications: Neuropathic Pain  Dose: 100 mg      lisinopril 2.5 MG Tabs  What changed:   medication strength  how much to take  Commonly known as: Prinivil    Take 1 Tablet by  mouth every day. Indications: High Blood Pressure  Dose: 2.5 mg                CONTINUE taking these medications         Instructions   alendronate 70 MG Tabs  Commonly known as: Fosamax    Take 1 Tablet by mouth every 7 days. Indications: Osteopenia  Dose: 70 mg      aspirin 81 MG EC tablet    Take 1 Tablet by mouth 2 times a day for 30 days. Indications: DVT ppx  Dose: 81 mg      D3 PO    Take 1 Capsule by mouth every day. Indications: suppliment  Dose: 1 Capsule      flecainide 50 MG tablet  Commonly known as: Tambocor    Take 0.5 Tablets by mouth 2 times a day. Indications: Arrythmia  Dose: 25 mg      levothyroxine 50 MCG Tabs  Commonly known as: Synthroid    Take 1 Tablet by mouth every morning on an empty stomach. Indications: Underactive Thyroid  Dose: 50 mcg      magnesium chloride 64 MG Tbec  Commonly known as: Mag-64    Take 1 Tablet by mouth every day with lunch.  Dose: 64 mg      omeprazole 20 MG tablet  Commonly known as: PriLOSEC OTC    Take 1 Tablet by mouth every day. Take before breakfast  Indications: Heartburn  Dose: 20 mg                STOP taking these medications       ALIGN PREBIOTIC-PROBIOTIC PO      Bismuth Subsalicylate 525 MG/15ML Susp      docusate sodium 100 MG Caps  Commonly known as: Colace      FIBER PO      FISH OIL PO      Home Care Oxygen      Iberogast Caps      lidocaine 4 % Ptch  Commonly known as: Asperflex      Lubiprostone 8 MCG Caps      MULTIVITAMIN PO      polyethylene glycol/lytes Pack  Commonly known as: Miralax      TURMERIC PO      VITAMIN B COMPLEX PO           Allergies   Allergen Reactions    Sulfa Drugs Hives    Codeine Vomiting and Nausea       Labs     Lab Results   Component Value Date/Time    SODIUM 132 (L) 03/27/2025 05:32 AM    POTASSIUM 4.6 03/27/2025 05:32 AM    CHLORIDE 95 (L) 03/27/2025 05:32 AM    CO2 28 03/27/2025 05:32 AM    GLUCOSE 80 03/27/2025 05:32 AM    BUN 17 03/27/2025 05:32 AM    CREATININE 0.55 03/27/2025 05:32 AM     Lab Results   Component  Value Date/Time    ALKPHOSPHAT 80 03/19/2025 05:34 AM    ASTSGOT 25 03/19/2025 05:34 AM    ALTSGPT 12 03/19/2025 05:34 AM    TBILIRUBIN 0.4 03/19/2025 05:34 AM    INR 0.90 03/14/2025 03:35 AM    ALBUMIN 3.3 03/19/2025 05:34 AM        Assessment for clinically significant drug interactions, drug omissions/additions, duplicative therapies.            CC   Angela Trejo M.D.  [address] [address]  Fax: [phone]    [facility]  Phone [phone] fax [phone]    This note was created using voice recognition software (Dragon). The accuracy of the dictation is limited by the abilities of the software. I have reviewed the note prior to signing, however some errors in grammar and context are still possible. If you have any questions related to this note please do not hesitate to contact our office.       No follow-ups on file.

## 2025-05-05 ENCOUNTER — HOME CARE VISIT (OUTPATIENT)
Dept: HOME HEALTH SERVICES | Facility: HOME HEALTHCARE | Age: 85
End: 2025-05-05
Payer: MEDICARE

## 2025-05-05 PROCEDURE — G0152 HHCP-SERV OF OT,EA 15 MIN: HCPCS

## 2025-05-07 ENCOUNTER — HOME CARE VISIT (OUTPATIENT)
Dept: HOME HEALTH SERVICES | Facility: HOME HEALTHCARE | Age: 85
End: 2025-05-07
Payer: MEDICARE

## 2025-05-07 VITALS
HEART RATE: 57 BPM | RESPIRATION RATE: 16 BRPM | DIASTOLIC BLOOD PRESSURE: 58 MMHG | OXYGEN SATURATION: 91 % | TEMPERATURE: 97.6 F | SYSTOLIC BLOOD PRESSURE: 128 MMHG

## 2025-05-07 VITALS
SYSTOLIC BLOOD PRESSURE: 128 MMHG | RESPIRATION RATE: 16 BRPM | TEMPERATURE: 97.6 F | DIASTOLIC BLOOD PRESSURE: 58 MMHG | OXYGEN SATURATION: 92 % | HEART RATE: 63 BPM

## 2025-05-07 VITALS
TEMPERATURE: 97.8 F | RESPIRATION RATE: 16 BRPM | DIASTOLIC BLOOD PRESSURE: 50 MMHG | SYSTOLIC BLOOD PRESSURE: 98 MMHG | HEART RATE: 60 BPM | OXYGEN SATURATION: 93 %

## 2025-05-07 PROCEDURE — G0299 HHS/HOSPICE OF RN EA 15 MIN: HCPCS

## 2025-05-07 PROCEDURE — G0151 HHCP-SERV OF PT,EA 15 MIN: HCPCS

## 2025-05-07 ASSESSMENT — ACTIVITIES OF DAILY LIVING (ADL)
AMBULATION ASSISTANCE: SUPERVISION
FEEDING: INDEPENDENT
TOILETING: 1
AMBULATION ASSISTANCE: STAND BY ASSIST
CURRENT_FUNCTION: STAND BY ASSIST
BATHING_COMMENTS: SEE OT EVAL
GROOMING_WITHIN_DEFINED_LIMITS: 1
FEEDING_WITHIN_DEFINED_LIMITS: 1
PHYSICAL TRANSFERS ASSESSED: 1
HOUSEKEEPING ASSESSED: 1
AMBULATION ASSISTANCE: 1
AMBULATION ASSISTANCE: 1
DRESSING_LB_CURRENT_FUNCTION: MODERATE ASSIST
GROOMING_CURRENT_FUNCTION: INDEPENDENT
GROOMING_COMMENTS: SEE OT EVAL
ORAL_CARE_ASSESSED: 1
LIGHT HOUSEKEEPING: NEEDS ASSISTANCE
PHYSICAL_TRANSFERS_DEVICES: FWW
ORAL_CARE_CURRENT_FUNCTION: INDEPENDENT
PHYSICAL TRANSFERS ASSESSED: 1
FEEDING ASSESSED: 1
DRESSING_UB_CURRENT_FUNCTION: INDEPENDENT
FEEDING_WITHIN_DEFINED_LIMITS: 1
GROOMING ASSESSED: 1
CURRENT_FUNCTION: INDEPENDENT
TOILETING: STAND BY ASSIST

## 2025-05-07 ASSESSMENT — ENCOUNTER SYMPTOMS
PAIN SEVERITY GOAL: 0/10
PAIN LOCATION - RELIEVING FACTORS: REST/SITTING
LOWEST PAIN SEVERITY IN PAST 24 HOURS: 5/10
FATIGUE: 1
PAIN LOCATION - PAIN DURATION: SINCE SURGERY
PAIN LOCATION - EXACERBATING FACTORS: PHYSICAL ACTIVITY
PAIN SEVERITY GOAL: 0/10
FORGETFULNESS: 1
PAIN SEVERITY GOAL: 0/10
HIGHEST PAIN SEVERITY IN PAST 24 HOURS: 7/10
PAIN LOCATION - PAIN SEVERITY: 5/10
FATIGUES EASILY: 1
HIGHEST PAIN SEVERITY IN PAST 24 HOURS: 6/10
PAIN LOCATION - RELIEVING FACTORS: REST AND PAIN MEDS
PAIN LOCATION - PAIN QUALITY: ACHING
MUSCLE WEAKNESS: 1
PAIN LOCATION - PAIN DURATION: FEW MINS
SUBJECTIVE PAIN PROGRESSION: WAXING AND WANING
PAIN LOCATION - EXACERBATING FACTORS: MOVEMENT
PAIN: 1
LOWEST PAIN SEVERITY IN PAST 24 HOURS: 5/10
LIMITED RANGE OF MOTION: 1
PAIN LOCATION - PAIN FREQUENCY: WITH ACTIVITY
SUBJECTIVE PAIN PROGRESSION: UNCHANGED
PAIN LOCATION - PAIN QUALITY: ACHING
PAIN LOCATION - PAIN SEVERITY: 5/10
PAIN LOCATION - PAIN SEVERITY: 7/10
PAIN LOCATION - PAIN FREQUENCY: FREQUENT
PAIN: 1
PAIN LOCATION - PAIN FREQUENCY: WITH ACTIVITY
PAIN LOCATION - RELIEVING FACTORS: REST, MEDICATIONS
HIGHEST PAIN SEVERITY IN PAST 24 HOURS: 8/10
ARTHRALGIAS: 1
LAST BOWEL MOVEMENT: 67331
LOWEST PAIN SEVERITY IN PAST 24 HOURS: 5/10
PAIN: 1
SUBJECTIVE PAIN PROGRESSION: UNCHANGED
MUSCLE WEAKNESS: 1
PAIN LOCATION: LEFT HIP

## 2025-05-07 ASSESSMENT — PATIENT HEALTH QUESTIONNAIRE - PHQ9: CLINICAL INTERPRETATION OF PHQ2 SCORE: 0

## 2025-05-09 ENCOUNTER — HOME CARE VISIT (OUTPATIENT)
Dept: HOME HEALTH SERVICES | Facility: HOME HEALTHCARE | Age: 85
End: 2025-05-09
Payer: MEDICARE

## 2025-05-09 PROCEDURE — G0152 HHCP-SERV OF OT,EA 15 MIN: HCPCS

## 2025-05-12 ENCOUNTER — APPOINTMENT (OUTPATIENT)
Dept: URBAN - METROPOLITAN AREA CLINIC 15 | Facility: CLINIC | Age: 85
Setting detail: DERMATOLOGY
End: 2025-05-12

## 2025-05-12 VITALS
SYSTOLIC BLOOD PRESSURE: 102 MMHG | OXYGEN SATURATION: 94 % | HEART RATE: 72 BPM | DIASTOLIC BLOOD PRESSURE: 58 MMHG | RESPIRATION RATE: 16 BRPM | TEMPERATURE: 98.1 F

## 2025-05-12 DIAGNOSIS — L57.0 ACTINIC KERATOSIS: ICD-10-CM

## 2025-05-12 PROCEDURE — ? COUNSELING

## 2025-05-12 PROCEDURE — 96567 PDT DSTR PRMLG LES SKN: CPT

## 2025-05-12 PROCEDURE — ? PDT: RED

## 2025-05-12 PROCEDURE — ? PHOTODYNAMIC THERAPY COUNSELING

## 2025-05-12 PROCEDURE — ? PHOTO-DOCUMENTATION

## 2025-05-12 ASSESSMENT — ENCOUNTER SYMPTOMS
SUBJECTIVE PAIN PROGRESSION: UNCHANGED
PAIN LOCATION - PAIN FREQUENCY: FREQUENT
PAIN LOCATION - PAIN SEVERITY: 7/10
DEBILITATING PAIN: 1
PAIN LOCATION - EXACERBATING FACTORS: STANDING
HIGHEST PAIN SEVERITY IN PAST 24 HOURS: 8/10
PAIN LOCATION - RELIEVING FACTORS: REST
LOWEST PAIN SEVERITY IN PAST 24 HOURS: 6/10
PAIN LOCATION: BACK
PAIN SEVERITY GOAL: 0/10
PAIN LOCATION - PAIN DURATION: MONTHS
PAIN LOCATION - PAIN QUALITY: ACHING
PAIN: 1

## 2025-05-12 ASSESSMENT — LOCATION DETAILED DESCRIPTION DERM
LOCATION DETAILED: LEFT INFERIOR CENTRAL MALAR CHEEK
LOCATION DETAILED: SUPERIOR MID FOREHEAD
LOCATION DETAILED: RIGHT INFERIOR CENTRAL MALAR CHEEK

## 2025-05-12 ASSESSMENT — LOCATION ZONE DERM: LOCATION ZONE: FACE

## 2025-05-12 ASSESSMENT — LOCATION SIMPLE DESCRIPTION DERM
LOCATION SIMPLE: RIGHT CHEEK
LOCATION SIMPLE: SUPERIOR FOREHEAD
LOCATION SIMPLE: LEFT CHEEK

## 2025-05-12 NOTE — PROCEDURE: PDT: RED
Anesthesia Type: 1% lidocaine with epinephrine
Pre-Procedure Text: The treatment areas were cleaned and prepped in the usual fashion.
Incubation End Time: 1881
Treatment Number: 6
Incubation Time (Set To 00:00:00 If Not Wanted): 01:30:00
Application Method: without occlusion
Ndc# (Optional): 27979-375-30
Expiration Date (Optional): 10/2026
Total Number Of Aks Treated (Optional To Report): 0
History Of Hsv?: Yes
Who Performed The Pdt?: Performed by Nurse, MA or  with Pre-Procedure Debridement of Hyperkeratotic Lesions (75399)
Incubation Start Time: 2562
Number Of Kerasticks/Tubes Of Metvixia/Tubes Of Ameluz Used: 1
Detail Level: Zone
Photosensitizer: Ameluz
Lot # (Optional): [id number]
Was Levulan/Metvixia/Ameluz Applied On A Previous Day?: No
Comments: Each side of the face was was illuminated separately with a 633nm LED source for 7 min 7 sec.
Post-Care Instructions: I reviewed with the patient in detail post-care instructions. Patient is to avoid sunlight for the next 2 days, and wear sun protection. Patients may expect sunburn like redness, discomfort and scabbing.
Light Source: 635nm LED
Illumination Time: 7 min 7 sec
Consent: Written consent obtained.  The risks were reviewed with the patient including but not limited to: pigmentary changes, pain, blistering, scabbing, redness, and the remote possibility of scarring.
Debridement Text (Will Only Render In Visit Note If You Select Debridement Option Under Who Performed The Pdt Field): Prior to application of the photodynamic medication the hyperkeratotic lesions were curetted to make them more amenable to therapy.
Medical Necessity: Precancerous Lesions
Who Performed The Pdt (Optional): Sakina HINES MA

## 2025-05-12 NOTE — PROCEDURE: COUNSELING
Patient Specific Counseling (Will Not Stick From Patient To Patient): -States she hasn’t had a cold sore in a long time. Advised to call if she decides she would like an rx
Detail Level: Detailed

## 2025-05-12 NOTE — HPI: PHOTODYNAMIC THERAPY (PDT)
Have You Had Previous Treatments With Pdt Before?: has had previous treatments
When Outside In The Sun, Do You...: mostly burns, rarely tans
Number Of Treatments: 4
When Was Your Last Pdt Treatment?: 11/14/23

## 2025-05-13 ENCOUNTER — HOME CARE VISIT (OUTPATIENT)
Dept: HOME HEALTH SERVICES | Facility: HOME HEALTHCARE | Age: 85
End: 2025-05-13
Payer: MEDICARE

## 2025-05-13 PROCEDURE — G0152 HHCP-SERV OF OT,EA 15 MIN: HCPCS

## 2025-05-13 PROCEDURE — G0151 HHCP-SERV OF PT,EA 15 MIN: HCPCS

## 2025-05-14 ENCOUNTER — HOME CARE VISIT (OUTPATIENT)
Dept: HOME HEALTH SERVICES | Facility: HOME HEALTHCARE | Age: 85
End: 2025-05-14
Payer: MEDICARE

## 2025-05-14 VITALS
HEART RATE: 63 BPM | RESPIRATION RATE: 16 BRPM | SYSTOLIC BLOOD PRESSURE: 102 MMHG | OXYGEN SATURATION: 92 % | TEMPERATURE: 98.4 F | DIASTOLIC BLOOD PRESSURE: 58 MMHG

## 2025-05-14 VITALS
TEMPERATURE: 98.6 F | RESPIRATION RATE: 16 BRPM | DIASTOLIC BLOOD PRESSURE: 68 MMHG | OXYGEN SATURATION: 95 % | HEART RATE: 61 BPM | SYSTOLIC BLOOD PRESSURE: 116 MMHG

## 2025-05-14 VITALS
TEMPERATURE: 98.4 F | HEART RATE: 63 BPM | DIASTOLIC BLOOD PRESSURE: 58 MMHG | RESPIRATION RATE: 16 BRPM | SYSTOLIC BLOOD PRESSURE: 105 MMHG | OXYGEN SATURATION: 92 %

## 2025-05-14 PROCEDURE — G0299 HHS/HOSPICE OF RN EA 15 MIN: HCPCS

## 2025-05-14 ASSESSMENT — ENCOUNTER SYMPTOMS
PAIN LOCATION: BACK
LIMITED RANGE OF MOTION: 1
PAIN LOCATION - PAIN FREQUENCY: WITH ACTIVITY
PAIN LOCATION - PAIN DURATION: MONTHS
FATIGUES EASILY: 1
LOWEST PAIN SEVERITY IN PAST 24 HOURS: 3/10
PAIN: 1
HYPERTENSION: 1
PAIN LOCATION - RELIEVING FACTORS: REST
PAIN LOCATION - PAIN SEVERITY: 6/10
PAIN LOCATION - PAIN DURATION: FEW HOURS
PAIN LOCATION - EXACERBATING FACTORS: PHYSICAL ACTIVITY
FATIGUE: 1
PAIN LOCATION - PAIN FREQUENCY: FREQUENT
PAIN LOCATION - PAIN SEVERITY: 6/10
LAST BOWEL MOVEMENT: 67338
DESCRIPTION OF MEMORY LOSS: SHORT TERM
ARTHRALGIAS: 1
PAIN LOCATION - PAIN QUALITY: SHARP PAIN
PAIN LOCATION - EXACERBATING FACTORS: POSITIONING
LOWEST PAIN SEVERITY IN PAST 24 HOURS: 6/10
ASSOCIATED SYMPTOMS: DENIS
SUBJECTIVE PAIN PROGRESSION: UNCHANGED
MUSCLE WEAKNESS: 1
SUBJECTIVE PAIN PROGRESSION: GRADUALLY IMPROVING
HIGHEST PAIN SEVERITY IN PAST 24 HOURS: 6/10
PAIN LOCATION - RELIEVING FACTORS: REST AND PAIN MEDS
PAIN LOCATION - PAIN DURATION: DAILY
SUBJECTIVE PAIN PROGRESSION: UNCHANGED
PAIN LOCATION - RELIEVING FACTORS: PAIN MED
PAIN LOCATION - PAIN QUALITY: ACHING
BOWEL PATTERN NORMAL: 1
PAIN LOCATION: LOWER BACK
PAIN: 1
PAIN SEVERITY GOAL: 0/10
PAIN LOCATION - PAIN SEVERITY: 6/10
PAIN LOCATION - EXACERBATING FACTORS: MOVEMENT
PAIN: 1
DRY SKIN: 1
STOOL FREQUENCY: LESS THAN DAILY
HIGHEST PAIN SEVERITY IN PAST 24 HOURS: 7/10
PAIN SEVERITY GOAL: 0/10
PAIN LOCATION: LOWER BACK
PAIN LOCATION - PAIN FREQUENCY: CONSTANT
PAIN SEVERITY GOAL: 4/10
LOWEST PAIN SEVERITY IN PAST 24 HOURS: 4/10
HIGHEST PAIN SEVERITY IN PAST 24 HOURS: 8/10

## 2025-05-14 ASSESSMENT — PATIENT HEALTH QUESTIONNAIRE - PHQ9: CLINICAL INTERPRETATION OF PHQ2 SCORE: 0

## 2025-05-15 ENCOUNTER — TELEPHONE (OUTPATIENT)
Dept: HEALTH INFORMATION MANAGEMENT | Facility: OTHER | Age: 85
End: 2025-05-15
Payer: MEDICARE

## 2025-05-15 ENCOUNTER — HOME CARE VISIT (OUTPATIENT)
Dept: HOME HEALTH SERVICES | Facility: HOME HEALTHCARE | Age: 85
End: 2025-05-15
Payer: MEDICARE

## 2025-05-15 PROCEDURE — G0152 HHCP-SERV OF OT,EA 15 MIN: HCPCS

## 2025-05-16 VITALS
HEART RATE: 65 BPM | SYSTOLIC BLOOD PRESSURE: 100 MMHG | TEMPERATURE: 98.2 F | RESPIRATION RATE: 16 BRPM | DIASTOLIC BLOOD PRESSURE: 62 MMHG | OXYGEN SATURATION: 91 %

## 2025-05-16 ASSESSMENT — ENCOUNTER SYMPTOMS
PAIN LOCATION - PAIN DURATION: WEEKS
PAIN LOCATION - PAIN QUALITY: ACHING
PAIN: 1
HIGHEST PAIN SEVERITY IN PAST 24 HOURS: 8/10
LOWEST PAIN SEVERITY IN PAST 24 HOURS: 6/10
PAIN LOCATION - RELIEVING FACTORS: REST, POSITIONING
PAIN LOCATION - PAIN SEVERITY: 6/10
PAIN LOCATION: BACK
PAIN LOCATION - PAIN FREQUENCY: FREQUENT
PAIN SEVERITY GOAL: 0/10
PAIN LOCATION - EXACERBATING FACTORS: STANDING
SUBJECTIVE PAIN PROGRESSION: UNCHANGED

## 2025-05-20 ENCOUNTER — HOSPITAL ENCOUNTER (OUTPATIENT)
Dept: RADIOLOGY | Facility: MEDICAL CENTER | Age: 85
End: 2025-05-20
Attending: OTOLARYNGOLOGY
Payer: MEDICARE

## 2025-05-20 DIAGNOSIS — J32.4 CHRONIC PANSINUSITIS: ICD-10-CM

## 2025-05-20 PROCEDURE — 74220 X-RAY XM ESOPHAGUS 1CNTRST: CPT

## 2025-05-20 PROCEDURE — 700117 HCHG RX CONTRAST REV CODE 255: Performed by: OTOLARYNGOLOGY

## 2025-05-20 RX ADMIN — BARIUM SULFATE 700 MG: 700 TABLET ORAL at 13:40

## 2025-05-21 ENCOUNTER — HOME CARE VISIT (OUTPATIENT)
Dept: HOME HEALTH SERVICES | Facility: HOME HEALTHCARE | Age: 85
End: 2025-05-21
Payer: MEDICARE

## 2025-05-21 VITALS
HEART RATE: 64 BPM | RESPIRATION RATE: 16 BRPM | OXYGEN SATURATION: 92 % | SYSTOLIC BLOOD PRESSURE: 110 MMHG | TEMPERATURE: 97.6 F | DIASTOLIC BLOOD PRESSURE: 62 MMHG

## 2025-05-21 PROCEDURE — G0299 HHS/HOSPICE OF RN EA 15 MIN: HCPCS

## 2025-05-21 SDOH — ECONOMIC STABILITY: HOUSING INSECURITY: EVIDENCE OF SMOKING MATERIAL: 0

## 2025-05-21 ASSESSMENT — ENCOUNTER SYMPTOMS
LOWEST PAIN SEVERITY IN PAST 24 HOURS: 0/10
MUSCLE WEAKNESS: 1
LIMITED RANGE OF MOTION: 1
FATIGUES EASILY: 1
HIGHEST PAIN SEVERITY IN PAST 24 HOURS: 8/10
PAIN LOCATION - PAIN SEVERITY: 0/10
FATIGUE: 1
PAIN LOCATION: BACK
PAIN SEVERITY GOAL: 0/10
PAIN LOCATION - PAIN FREQUENCY: WITH ACTIVITY
PAIN LOCATION - RELIEVING FACTORS: REST/MEDS
PAIN: 1
SUBJECTIVE PAIN PROGRESSION: UNCHANGED
LAST BOWEL MOVEMENT: 67345

## 2025-05-21 NOTE — Clinical Note
[facility]  [address] [place] [address]    [id number]    Alyssa Juan  [address]  [place] [address]    May 21, 2025    Member Name: Alyssa Juan   Member Number: [id number]   Reference Number: [id number]   Approved Services: ENT Services   Approved Service Dates: 05/21/2025 - 08/19/2025   Requesting Provider: Seble Leiva   Requested Provider: Seble Leiva     Dear Alyssa Juan:    The following medical service(s) requested by Seble Leiva have been approved:    Procedure Code Procedure Code Name Requested Quantity Approved Quantity Status   36036 (CPT®) OR CT SCAN,MAXILLOFACIAL AREA,W/O CONTRAST 1 1 Authorized       Approved Quantity means the number of visits approved for medication treatments and/or medical services.    The services should be provided by Seble Leiva no later than 08/19/2025. Please contact the provider listed below with any questions.     Provider Information:  Seble Leiva  [phone]    Your plan benefit may require a deductible, co-payment or coinsurance for these services. This authorization does not guarantee [facility] will pay the claim for services that you receive. Payment by [facility] for these services is subject to the terms of your Evidence of Coverage, your eligibility at the time of service, and confirmation of benefit coverage.    For any questions or additional information, please contact Customer Service:    [facility] Toll Free: [phone]  [employer] users dial: 711   Call Center Hours:  Oct 1 - Mar 31, Mon - Fri 7 AM to 8 PM PST  Oct 1 - Mar 31, Sat - Sun 8 AM to 8 PM PST  Apr 1 - Sep 30, Mon - Fri 7 AM to 8 PM PST   Office Hours: Mon - Fri 8 AM to 5 PM PST   E-mail: [email or web address]   Website:  [email or web address]      This information is available for free in other languages. Please contact Customer Service at the phone number above for more information. [facility] complies with applicable Federal civil rights laws and does not discriminate on the basis of race, color, national origin, age, disability or sex.    Sincerely,     Healthcare Utilization Management Department     Cc: Seble Leiva    Multi-Language Insert  Multi- Services  English: We have free  services to answer any questions you may have about our health or drug plan.  To get an , just call us at [phone].  Someone who speaks English/Language can help you.  This is a free service.  Sierra Leonean: Tenemos servicios de intérprete sin costo alguno  para responder cualquier pregunta que pueda tener sobre nuestro plan de elaine o medicamentos. Para hablar con un intérprete, por favor llame al [phone]. Alguien que hable español le podrá ayudar. Sosa es un servicio gratuito.  Chinese Mandarin: ?????????????????????????????? ???????????????? [phone]????????????????? ?????????  Chinese Cantonese: ?????????????????????????????? ????????????? [phone]???????????????????? ????????  Tagalog:  Miri adan libreng serbisyo sa larisasasaling-kirsten viramontesa halima estrellanggisascha sa yolanda estrada panggamot.  Esthela reddy tagasaling-kirsten, seema cabrali  [phone]. Pablo reddy Tagalog.  Josué ay libreng serbisyo.  Georgian:  Nous proposons lucía services gratuits d'interprétation pour répondre à toutes marie questions relatives à notre régime de santé ou d'assurance-médicaments. Pour accéder au service d'interprétation, il vous suffit de nous appeler au [phone]. Un interlocuteur [facility] pourra vous aider. Ce service est gratuit.  Bulgarian:  Jamin medrano có d?ch v? thông d?ch mi?n phí ð? tr? l?i các câu h?i v? chýõng s?c kh?e và chýõng trìn thu?c men. N?u quí v? c?n thông d?ch viên javier g?i [phone] s? có nhân viên nói ti?ng Vi?t giúp ð?  quí v?. Ðây là d?ch v? mi?n phí .  Danish:  Unser kostenser Dolmetscherservice beantwortet Ihren Fragen zu unserem Gesundheits- und Arzneimittelplan. Unsere Dolmetscher erreichen Sie [phone]. Man wird Ihnen nabeel auf [facility]. Dieser Service ist alexis[facility]'s [facility].  Wolof:  ??? ?? ?? ?? ?? ??? ?? ??? ?? ???? ?? ?? ???? ???? ????. ?? ???? ????? ?? [phone] ??? ??? ????.  ???? ?? ???? ?? ?? ????. ? ???? ??? ?????.   [place]an: Åñëè ó âàñ âîçíèêíóò âîïðîñû îòíîñèòåëüíî ñòðàõîâîãî èëè ìåäèêàìåíòíîãî ïëàíà, âû ìîæåòå âîñïîëüçîâàòüñÿ íàøèìè áåñïëàòíûìè óñëóãàìè ïåðåâîä÷èêîâ. ×òîáû âîñïîëüçîâàòüñÿ óñëóãàìè ïåðåâîä÷èêà, ïîçâîíèòå íàì ïî òåëåôîíó [phone]. Âàì îêàæåò ïîìîùü ñîòðóäíèê, êîòîðûé ãîâîðèò ïî-póññêè. Äàííàÿ óñëóãà áåñïëàòíàÿ.  Portuguese: ÅääÇ äÞÏã ÎÏãÇÊ ÇáãÊÑÌã ÇáÝæÑí ÇáãÌÇäíÉ ááÅÌÇÈÉ Úä Ãí ÃÓÆáÉ ÊÊÚáÞ ÈÇáÕÍÉ Ãæ ÌÏæá ÇáÃÏæíÉ áÏíäÇ. ááÍÕæá Úáì ãÊÑÌã ÝæÑí¡ áíÓ Úáíß Óæì ÇáÇÊÕÇá ÈäÇ Úáì [phone] . ÓíÞæã ÔÎÕ ãÇ íÊÍÏË ÇáÚÑÈíÉ ÈãÓÇÚÏÊß. åÐå ÎÏãÉ ãÌÇäíÉ.  Desire: ????? ????????? ?? ??? ?? ????? ?? ???? ??? ???? ???? ?? ?????? ?? ???? ???? ?? ??? ????? ??? ????? ???????? ?????? ?????? ???. ?? ???????? ??????? ???? ?? ???, ?? ???? [phone] ?? ??? ????. ??? ??????? ?? ?????? ????? ?? ???? ??? ?? ???? ??. ?? ?? ????? ???? ??.   English:  È disponibile un servizio di interpretariato gratuito per rispondere a eventuali domande sul nostro piano sanitario e farmaceutico. Per un interprete, contattare il tiffanie [phone]. Un nostro incaricato lena parla Italianovi fornirà l'assistenza necessaria. È un servizio gratuito.  Portugués:  Dispomos de serviços de interpretação gratuitos para responder a qualquer questão que tenha acerca do nosso plano de saúde ou de medicação. Para obter um intérprete, contacte-nos através do número [phone]. Irá encontrar alguém que fale o idioma  Português para o ajudar. Sosa serviço é gratuito.  Pashto Creole:  Nou genyen carolis entèprèt gratis javy reponn tout  kesyon ou ta genyen konsènan plan medikal oswa dwòg nou an.  Cee jwenn yon entèprèt, jis rele nou nan [phone]. Yochidi chambers pale Kreyòl kapab jose w.  Sa a se yon sèvis ki gratis.  Polish:  Umo¿liwiamy bezp³atne skorzystanie z us³ug t³umacza ustnego, który pomo¿e w uzyskaniu odpowiedzi na temat planu zdrowotnego lub dawkowania leków. Samantha skorzystaæ z pomocy t³umacza znaj¹cego alex yoo¿y zadzwoniæ pod numer [phone]. Ta us³uga jest bezp³atna.  Vietnamese: ????? ??????? ????????????????????? ??????????????????????????????????[phone] ???????????????? ? ????????????????? ?????

## 2025-05-22 ENCOUNTER — HOME CARE VISIT (OUTPATIENT)
Dept: HOME HEALTH SERVICES | Facility: HOME HEALTHCARE | Age: 85
End: 2025-05-22
Payer: MEDICARE

## 2025-05-22 VITALS
DIASTOLIC BLOOD PRESSURE: 65 MMHG | HEART RATE: 70 BPM | OXYGEN SATURATION: 95 % | TEMPERATURE: 97.9 F | SYSTOLIC BLOOD PRESSURE: 115 MMHG | RESPIRATION RATE: 16 BRPM

## 2025-05-22 PROCEDURE — G0152 HHCP-SERV OF OT,EA 15 MIN: HCPCS

## 2025-05-22 PROCEDURE — G0151 HHCP-SERV OF PT,EA 15 MIN: HCPCS

## 2025-05-22 PROCEDURE — G0156 HHCP-SVS OF AIDE,EA 15 MIN: HCPCS

## 2025-05-22 ASSESSMENT — ENCOUNTER SYMPTOMS
PERSON REPORTING PAIN: PATIENT
PAIN: 1

## 2025-05-23 ENCOUNTER — HOSPITAL ENCOUNTER (OUTPATIENT)
Dept: RADIOLOGY | Facility: MEDICAL CENTER | Age: 85
End: 2025-05-23
Attending: STUDENT IN AN ORGANIZED HEALTH CARE EDUCATION/TRAINING PROGRAM
Payer: MEDICARE

## 2025-05-23 VITALS
RESPIRATION RATE: 16 BRPM | SYSTOLIC BLOOD PRESSURE: 115 MMHG | OXYGEN SATURATION: 95 % | HEART RATE: 96 BPM | DIASTOLIC BLOOD PRESSURE: 60 MMHG | TEMPERATURE: 98.2 F

## 2025-05-23 DIAGNOSIS — R23.0 CYANOSIS: ICD-10-CM

## 2025-05-23 PROCEDURE — 93922 UPR/L XTREMITY ART 2 LEVELS: CPT

## 2025-05-23 ASSESSMENT — ENCOUNTER SYMPTOMS
PAIN LOCATION - EXACERBATING FACTORS: PHYSICAL ACTIVITY
LOWEST PAIN SEVERITY IN PAST 24 HOURS: 7/10
PAIN LOCATION - PAIN FREQUENCY: INTERMITTENT
PAIN LOCATION - PAIN DURATION: FEW HOURS
PAIN LOCATION - PAIN SEVERITY: 7/10
PAIN: 1
PAIN SEVERITY GOAL: 0/10
SUBJECTIVE PAIN PROGRESSION: GRADUALLY IMPROVING
PAIN LOCATION - PAIN QUALITY: ACHING
PAIN LOCATION: LOWER BACK
HIGHEST PAIN SEVERITY IN PAST 24 HOURS: 9/10
PAIN LOCATION - RELIEVING FACTORS: REST AND PAIN MEDS

## 2025-05-24 ENCOUNTER — HOME CARE VISIT (OUTPATIENT)
Dept: HOME HEALTH SERVICES | Facility: HOME HEALTHCARE | Age: 85
End: 2025-05-24
Payer: MEDICARE

## 2025-05-24 PROCEDURE — G0151 HHCP-SERV OF PT,EA 15 MIN: HCPCS

## 2025-05-25 VITALS
TEMPERATURE: 97.7 F | OXYGEN SATURATION: 91 % | HEART RATE: 62 BPM | SYSTOLIC BLOOD PRESSURE: 105 MMHG | DIASTOLIC BLOOD PRESSURE: 62 MMHG | RESPIRATION RATE: 16 BRPM

## 2025-05-25 ASSESSMENT — ENCOUNTER SYMPTOMS
PAIN LOCATION: LOWER BACK
PAIN LOCATION - PAIN QUALITY: SHARP AND ACHING
PAIN LOCATION - EXACERBATING FACTORS: PHYSICAL ACTIVITY
PAIN SEVERITY GOAL: 0/10
PAIN LOCATION - PAIN DURATION: FEW HOURS
PAIN LOCATION - PAIN FREQUENCY: WITH ACTIVITY
PAIN LOCATION - PAIN SEVERITY: 6/10
SUBJECTIVE PAIN PROGRESSION: WAXING AND WANING
HIGHEST PAIN SEVERITY IN PAST 24 HOURS: 6/10
PAIN: 1
LOWEST PAIN SEVERITY IN PAST 24 HOURS: 0/10
PAIN LOCATION - RELIEVING FACTORS: REST AND PAIN MEDS

## 2025-05-26 ENCOUNTER — HOME CARE VISIT (OUTPATIENT)
Dept: HOME HEALTH SERVICES | Facility: HOME HEALTHCARE | Age: 85
End: 2025-05-26
Payer: MEDICARE

## 2025-05-26 PROCEDURE — G0151 HHCP-SERV OF PT,EA 15 MIN: HCPCS

## 2025-05-26 ASSESSMENT — ENCOUNTER SYMPTOMS
LOWEST PAIN SEVERITY IN PAST 24 HOURS: 6/10
PAIN LOCATION: BACK
SUBJECTIVE PAIN PROGRESSION: UNCHANGED
PAIN LOCATION - PAIN SEVERITY: 7/10
HIGHEST PAIN SEVERITY IN PAST 24 HOURS: 7/10
PAIN LOCATION - PAIN FREQUENCY: CONSTANT
PAIN LOCATION - PAIN DURATION: WEEKS
PAIN: 1
PAIN LOCATION - PAIN QUALITY: ACHING
PAIN LOCATION - EXACERBATING FACTORS: AMBULATION
PAIN SEVERITY GOAL: 5/10

## 2025-05-27 ENCOUNTER — HOME CARE VISIT (OUTPATIENT)
Dept: HOME HEALTH SERVICES | Facility: HOME HEALTHCARE | Age: 85
End: 2025-05-27
Payer: MEDICARE

## 2025-05-27 VITALS
TEMPERATURE: 97.7 F | HEART RATE: 79 BPM | SYSTOLIC BLOOD PRESSURE: 106 MMHG | DIASTOLIC BLOOD PRESSURE: 62 MMHG | RESPIRATION RATE: 17 BRPM | OXYGEN SATURATION: 94 %

## 2025-05-27 VITALS
RESPIRATION RATE: 16 BRPM | HEART RATE: 77 BPM | DIASTOLIC BLOOD PRESSURE: 65 MMHG | TEMPERATURE: 97.6 F | SYSTOLIC BLOOD PRESSURE: 110 MMHG | OXYGEN SATURATION: 94 %

## 2025-05-27 VITALS
TEMPERATURE: 97.7 F | DIASTOLIC BLOOD PRESSURE: 62 MMHG | HEART RATE: 79 BPM | SYSTOLIC BLOOD PRESSURE: 106 MMHG | RESPIRATION RATE: 17 BRPM

## 2025-05-27 PROCEDURE — G0152 HHCP-SERV OF OT,EA 15 MIN: HCPCS

## 2025-05-27 PROCEDURE — G0156 HHCP-SVS OF AIDE,EA 15 MIN: HCPCS

## 2025-05-27 ASSESSMENT — ENCOUNTER SYMPTOMS
HIGHEST PAIN SEVERITY IN PAST 24 HOURS: 8/10
PAIN LOCATION - RELIEVING FACTORS: REST AND PAIN MEDS
PAIN LOCATION: LOWER BACK
SUBJECTIVE PAIN PROGRESSION: UNCHANGED
PAIN LOCATION - PAIN QUALITY: ACHING
PAIN LOCATION - PAIN QUALITY: SHARP
PAIN LOCATION: BACK
PAIN: 1
PAIN LOCATION - EXACERBATING FACTORS: AMBULATION
SUBJECTIVE PAIN PROGRESSION: WAXING AND WANING
LOWEST PAIN SEVERITY IN PAST 24 HOURS: 6/10
PAIN LOCATION - PAIN DURATION: SIX MONTHS
PAIN LOCATION - PAIN SEVERITY: 6/10
DEBILITATING PAIN: 1
PAIN SEVERITY GOAL: 5/10
PAIN SEVERITY GOAL: 0/10
PAIN LOCATION - PAIN SEVERITY: 7/10
PAIN LOCATION - PAIN FREQUENCY: WITH ACTIVITY
PAIN LOCATION - PAIN FREQUENCY: CONSTANT
PAIN LOCATION - PAIN DURATION: FEW HOURS
HIGHEST PAIN SEVERITY IN PAST 24 HOURS: 8/10
PAIN: 1
PAIN LOCATION - EXACERBATING FACTORS: PHYSICAL ACTIVITY
LOWEST PAIN SEVERITY IN PAST 24 HOURS: 6/10

## 2025-05-28 ENCOUNTER — HOME CARE VISIT (OUTPATIENT)
Dept: HOME HEALTH SERVICES | Facility: HOME HEALTHCARE | Age: 85
End: 2025-05-28
Payer: MEDICARE

## 2025-05-28 VITALS
SYSTOLIC BLOOD PRESSURE: 102 MMHG | HEART RATE: 74 BPM | DIASTOLIC BLOOD PRESSURE: 50 MMHG | RESPIRATION RATE: 16 BRPM | TEMPERATURE: 98.2 F

## 2025-05-28 PROCEDURE — G0151 HHCP-SERV OF PT,EA 15 MIN: HCPCS

## 2025-05-28 PROCEDURE — G0299 HHS/HOSPICE OF RN EA 15 MIN: HCPCS

## 2025-05-28 SDOH — ECONOMIC STABILITY: HOUSING INSECURITY: EVIDENCE OF SMOKING MATERIAL: 0

## 2025-05-28 ASSESSMENT — ENCOUNTER SYMPTOMS
PAIN: 1
LAST BOWEL MOVEMENT: 67353
PAIN SEVERITY GOAL: 0/10
LOWEST PAIN SEVERITY IN PAST 24 HOURS: 7/10
PAIN LOCATION - PAIN SEVERITY: 7/10
MUSCLE WEAKNESS: 1
LIMITED RANGE OF MOTION: 1
PAIN LOCATION - RELIEVING FACTORS: TYLENOL
PAIN LOCATION: BACK
PAIN LOCATION - PAIN FREQUENCY: CONSTANT
PAIN LOCATION - PAIN DURATION: ALWAYS
SUBJECTIVE PAIN PROGRESSION: UNCHANGED
FATIGUE: 1
HIGHEST PAIN SEVERITY IN PAST 24 HOURS: 8/10
PAIN LOCATION - EXACERBATING FACTORS: AMBULATION/MOVEMENT
FATIGUES EASILY: 1

## 2025-05-29 VITALS
DIASTOLIC BLOOD PRESSURE: 60 MMHG | SYSTOLIC BLOOD PRESSURE: 102 MMHG | HEART RATE: 74 BPM | OXYGEN SATURATION: 95 % | RESPIRATION RATE: 16 BRPM | TEMPERATURE: 98.2 F

## 2025-05-29 ASSESSMENT — ENCOUNTER SYMPTOMS
LOWEST PAIN SEVERITY IN PAST 24 HOURS: 6/10
PAIN: 1
PAIN LOCATION: LOWER BACK
SUBJECTIVE PAIN PROGRESSION: WAXING AND WANING
PAIN LOCATION - PAIN FREQUENCY: CONSTANT
PAIN SEVERITY GOAL: 0/10
PAIN LOCATION - RELIEVING FACTORS: PAIN MEDS AND REST
PAIN LOCATION - EXACERBATING FACTORS: PHYSICAL ACTIVITY
HIGHEST PAIN SEVERITY IN PAST 24 HOURS: 8/10
PAIN LOCATION - PAIN QUALITY: SHARP
PAIN LOCATION - PAIN SEVERITY: 8/10

## 2025-06-02 ENCOUNTER — HOME CARE VISIT (OUTPATIENT)
Dept: HOME HEALTH SERVICES | Facility: HOME HEALTHCARE | Age: 85
End: 2025-06-02
Payer: MEDICARE

## 2025-06-02 VITALS
DIASTOLIC BLOOD PRESSURE: 70 MMHG | RESPIRATION RATE: 16 BRPM | TEMPERATURE: 98.2 F | OXYGEN SATURATION: 98 % | SYSTOLIC BLOOD PRESSURE: 120 MMHG | HEART RATE: 60 BPM

## 2025-06-02 PROCEDURE — G0156 HHCP-SVS OF AIDE,EA 15 MIN: HCPCS

## 2025-06-02 PROCEDURE — G0151 HHCP-SERV OF PT,EA 15 MIN: HCPCS

## 2025-06-02 ASSESSMENT — ENCOUNTER SYMPTOMS
PAIN LOCATION - PAIN FREQUENCY: CONSTANT
PAIN LOCATION - PAIN QUALITY: SHARP AND ACHING
PAIN LOCATION - PAIN DURATION: PERSISTENT
PAIN LOCATION - PAIN SEVERITY: 6/10
SUBJECTIVE PAIN PROGRESSION: WAXING AND WANING
PAIN LOCATION: LOWER BACK
PAIN: 1
HIGHEST PAIN SEVERITY IN PAST 24 HOURS: 7/10
PAIN LOCATION - RELIEVING FACTORS: REST, PAIN MEDS
LOWEST PAIN SEVERITY IN PAST 24 HOURS: 6/10
PAIN LOCATION - EXACERBATING FACTORS: PHYSICAL ACTIVITY
PAIN SEVERITY GOAL: 0/10

## 2025-06-03 ENCOUNTER — HOME CARE VISIT (OUTPATIENT)
Dept: HOME HEALTH SERVICES | Facility: HOME HEALTHCARE | Age: 85
End: 2025-06-03
Payer: MEDICARE

## 2025-06-03 VITALS
RESPIRATION RATE: 16 BRPM | SYSTOLIC BLOOD PRESSURE: 120 MMHG | TEMPERATURE: 98.2 F | HEART RATE: 60 BPM | DIASTOLIC BLOOD PRESSURE: 72 MMHG | OXYGEN SATURATION: 98 %

## 2025-06-03 PROCEDURE — G0152 HHCP-SERV OF OT,EA 15 MIN: HCPCS

## 2025-06-04 ENCOUNTER — HOME CARE VISIT (OUTPATIENT)
Dept: HOME HEALTH SERVICES | Facility: HOME HEALTHCARE | Age: 85
End: 2025-06-04
Payer: MEDICARE

## 2025-06-04 VITALS
SYSTOLIC BLOOD PRESSURE: 96 MMHG | DIASTOLIC BLOOD PRESSURE: 50 MMHG | RESPIRATION RATE: 16 BRPM | TEMPERATURE: 97.8 F | HEART RATE: 76 BPM | OXYGEN SATURATION: 98 %

## 2025-06-04 PROCEDURE — G0299 HHS/HOSPICE OF RN EA 15 MIN: HCPCS

## 2025-06-04 PROCEDURE — G0151 HHCP-SERV OF PT,EA 15 MIN: HCPCS

## 2025-06-04 SDOH — ECONOMIC STABILITY: HOUSING INSECURITY: EVIDENCE OF SMOKING MATERIAL: 0

## 2025-06-04 ASSESSMENT — ENCOUNTER SYMPTOMS
SUBJECTIVE PAIN PROGRESSION: UNCHANGED
MUSCLE WEAKNESS: 1
LOWEST PAIN SEVERITY IN PAST 24 HOURS: 6/10
PAIN SEVERITY GOAL: 0/10
PAIN LOCATION - PAIN DURATION: FEW MONTHS
PAIN LOCATION - PAIN SEVERITY: 7/10
PAIN LOCATION - PAIN QUALITY: ACHING
PAIN LOCATION - PAIN FREQUENCY: FREQUENT
PAIN SEVERITY GOAL: 0/10
PAIN: 1
FATIGUES EASILY: 1
PAIN LOCATION: BACK
LOWEST PAIN SEVERITY IN PAST 24 HOURS: 7/10
PAIN: 1
PAIN LOCATION - RELIEVING FACTORS: REST
LIMITED RANGE OF MOTION: 1
SUBJECTIVE PAIN PROGRESSION: UNCHANGED
PAIN LOCATION - EXACERBATING FACTORS: MOVEMENT
PAIN LOCATION - PAIN DURATION: OFTEN
PAIN LOCATION: BACK
PAIN LOCATION - PAIN SEVERITY: 6/10
HIGHEST PAIN SEVERITY IN PAST 24 HOURS: 7/10
PAIN LOCATION - RELIEVING FACTORS: REST
HIGHEST PAIN SEVERITY IN PAST 24 HOURS: 8/10
PAIN LOCATION - EXACERBATING FACTORS: MOVEMENT
LAST BOWEL MOVEMENT: 67360
FATIGUE: 1
PAIN LOCATION - PAIN FREQUENCY: CONSTANT

## 2025-06-05 VITALS
OXYGEN SATURATION: 97 % | HEART RATE: 16 BPM | RESPIRATION RATE: 16 BRPM | SYSTOLIC BLOOD PRESSURE: 110 MMHG | DIASTOLIC BLOOD PRESSURE: 80 MMHG | TEMPERATURE: 97.6 F

## 2025-06-05 ASSESSMENT — ENCOUNTER SYMPTOMS
PAIN LOCATION - PAIN DURATION: PERSISTENT
HIGHEST PAIN SEVERITY IN PAST 24 HOURS: 7/10
PAIN LOCATION: LOWER BACK
PAIN LOCATION - EXACERBATING FACTORS: PHYSICAL ACTIVITY
LOWEST PAIN SEVERITY IN PAST 24 HOURS: 6/10
PAIN SEVERITY GOAL: 0/10
PAIN LOCATION - PAIN QUALITY: SHARP AND ACHING
SUBJECTIVE PAIN PROGRESSION: UNCHANGED
PAIN LOCATION - PAIN FREQUENCY: CONSTANT
PAIN LOCATION - PAIN SEVERITY: 6/10
PAIN: 1
PAIN LOCATION - RELIEVING FACTORS: REST AND PAIN MEDS

## 2025-06-05 ASSESSMENT — PATIENT HEALTH QUESTIONNAIRE - PHQ9: CLINICAL INTERPRETATION OF PHQ2 SCORE: 0

## 2025-06-05 ASSESSMENT — ACTIVITIES OF DAILY LIVING (ADL)
AMBULATION ASSISTANCE: 1
AMBULATION ASSISTANCE: INDEPENDENT
CURRENT_FUNCTION: INDEPENDENT
PHYSICAL TRANSFERS ASSESSED: 1

## 2025-06-10 ENCOUNTER — HOME CARE VISIT (OUTPATIENT)
Dept: HOME HEALTH SERVICES | Facility: HOME HEALTHCARE | Age: 85
End: 2025-06-10
Payer: MEDICARE

## 2025-06-10 VITALS
SYSTOLIC BLOOD PRESSURE: 110 MMHG | RESPIRATION RATE: 16 BRPM | DIASTOLIC BLOOD PRESSURE: 60 MMHG | TEMPERATURE: 97.6 F | HEART RATE: 65 BPM | OXYGEN SATURATION: 90 %

## 2025-06-10 PROCEDURE — G0152 HHCP-SERV OF OT,EA 15 MIN: HCPCS

## 2025-06-10 PROCEDURE — G0156 HHCP-SVS OF AIDE,EA 15 MIN: HCPCS

## 2025-06-10 ASSESSMENT — ENCOUNTER SYMPTOMS
SUBJECTIVE PAIN PROGRESSION: UNCHANGED
PAIN LOCATION - EXACERBATING FACTORS: AMBULATION
PAIN LOCATION - PAIN QUALITY: ACHING
PAIN: 1
PAIN LOCATION - RELIEVING FACTORS: REST
PAIN LOCATION - PAIN SEVERITY: 6/10
PAIN SEVERITY GOAL: 0/10
PAIN LOCATION - PAIN FREQUENCY: FREQUENT
LOWEST PAIN SEVERITY IN PAST 24 HOURS: 0/10
PAIN LOCATION: BACK
HIGHEST PAIN SEVERITY IN PAST 24 HOURS: 8/10
PAIN LOCATION - PAIN DURATION: MONTHS
DENIES PAIN: 1
PERSON REPORTING PAIN: PATIENT

## 2025-06-10 ASSESSMENT — ACTIVITIES OF DAILY LIVING (ADL)
FEEDING_WITHIN_DEFINED_LIMITS: 1
GROOMING_WITHIN_DEFINED_LIMITS: 1

## 2025-06-12 ENCOUNTER — HOME CARE VISIT (OUTPATIENT)
Dept: HOME HEALTH SERVICES | Facility: HOME HEALTHCARE | Age: 85
End: 2025-06-12
Payer: MEDICARE

## 2025-06-12 PROCEDURE — G0299 HHS/HOSPICE OF RN EA 15 MIN: HCPCS

## 2025-06-13 VITALS
RESPIRATION RATE: 16 BRPM | SYSTOLIC BLOOD PRESSURE: 112 MMHG | HEART RATE: 56 BPM | DIASTOLIC BLOOD PRESSURE: 60 MMHG | TEMPERATURE: 97.5 F | OXYGEN SATURATION: 92 %

## 2025-06-13 SDOH — ECONOMIC STABILITY: HOUSING INSECURITY: EVIDENCE OF SMOKING MATERIAL: 0

## 2025-06-13 ASSESSMENT — ENCOUNTER SYMPTOMS
NAUSEA: DENIES
PAIN LOCATION - PAIN SEVERITY: 0/10
LOWEST PAIN SEVERITY IN PAST 24 HOURS: 0/10
SUBJECTIVE PAIN PROGRESSION: UNCHANGED
PAIN SEVERITY GOAL: 0/10
PAIN LOCATION - PAIN FREQUENCY: WITH ACTIVITY
PAIN LOCATION - RELIEVING FACTORS: REST
PAIN LOCATION - PAIN QUALITY: PAIN
VOMITING: DENIES
HIGHEST PAIN SEVERITY IN PAST 24 HOURS: 7/10
PAIN: 1
PAIN LOCATION - PAIN DURATION: WITH ACTIVITY
PAIN LOCATION: BACK
LAST BOWEL MOVEMENT: 67368
PAIN LOCATION - EXACERBATING FACTORS: ACTIVITY

## 2025-06-13 ASSESSMENT — ACTIVITIES OF DAILY LIVING (ADL)
OASIS_M1830: 00
HOME_HEALTH_OASIS: 00

## 2025-07-02 ENCOUNTER — HOSPITAL ENCOUNTER (OUTPATIENT)
Dept: LAB | Facility: MEDICAL CENTER | Age: 85
End: 2025-07-02
Attending: STUDENT IN AN ORGANIZED HEALTH CARE EDUCATION/TRAINING PROGRAM
Payer: MEDICARE

## 2025-07-02 LAB
25(OH)D3 SERPL-MCNC: 80 NG/ML (ref 30–100)
ALBUMIN SERPL BCP-MCNC: 4.1 G/DL (ref 3.2–4.9)
ALBUMIN/GLOB SERPL: 1.3 G/DL
ALP SERPL-CCNC: 63 U/L (ref 30–99)
ALT SERPL-CCNC: 17 U/L (ref 2–50)
ANION GAP SERPL CALC-SCNC: 12 MMOL/L (ref 7–16)
APPEARANCE UR: CLEAR
AST SERPL-CCNC: 27 U/L (ref 12–45)
BACTERIA #/AREA URNS HPF: NORMAL /HPF
BASOPHILS # BLD AUTO: 0.5 % (ref 0–1.8)
BASOPHILS # BLD: 0.02 K/UL (ref 0–0.12)
BILIRUB SERPL-MCNC: 0.3 MG/DL (ref 0.1–1.5)
BILIRUB UR QL STRIP.AUTO: NEGATIVE
BUN SERPL-MCNC: 19 MG/DL (ref 8–22)
CALCIUM ALBUM COR SERPL-MCNC: 9.6 MG/DL (ref 8.5–10.5)
CALCIUM SERPL-MCNC: 9.7 MG/DL (ref 8.5–10.5)
CASTS URNS QL MICRO: NORMAL /LPF (ref 0–2)
CHLORIDE SERPL-SCNC: 96 MMOL/L (ref 96–112)
CHOLEST SERPL-MCNC: 249 MG/DL (ref 100–199)
CO2 SERPL-SCNC: 26 MMOL/L (ref 20–33)
COLOR UR: YELLOW
CREAT SERPL-MCNC: 0.76 MG/DL (ref 0.5–1.4)
EOSINOPHIL # BLD AUTO: 0.11 K/UL (ref 0–0.51)
EOSINOPHIL NFR BLD: 2.8 % (ref 0–6.9)
EPITHELIAL CELLS 1715: NORMAL /HPF (ref 0–5)
ERYTHROCYTE [DISTWIDTH] IN BLOOD BY AUTOMATED COUNT: 47.7 FL (ref 35.9–50)
GFR SERPLBLD CREATININE-BSD FMLA CKD-EPI: 76 ML/MIN/1.73 M 2
GLOBULIN SER CALC-MCNC: 3.1 G/DL (ref 1.9–3.5)
GLUCOSE SERPL-MCNC: 87 MG/DL (ref 65–99)
GLUCOSE UR STRIP.AUTO-MCNC: NEGATIVE MG/DL
HCT VFR BLD AUTO: 38.1 % (ref 37–47)
HDLC SERPL-MCNC: 136 MG/DL
HGB BLD-MCNC: 12.3 G/DL (ref 12–16)
IMM GRANULOCYTES # BLD AUTO: 0.01 K/UL (ref 0–0.11)
IMM GRANULOCYTES NFR BLD AUTO: 0.3 % (ref 0–0.9)
KETONES UR STRIP.AUTO-MCNC: NEGATIVE MG/DL
LDLC SERPL CALC-MCNC: 98 MG/DL
LEUKOCYTE ESTERASE UR QL STRIP.AUTO: ABNORMAL
LYMPHOCYTES # BLD AUTO: 1.64 K/UL (ref 1–4.8)
LYMPHOCYTES NFR BLD: 42.2 % (ref 22–41)
MCH RBC QN AUTO: 31.2 PG (ref 27–33)
MCHC RBC AUTO-ENTMCNC: 32.3 G/DL (ref 32.2–35.5)
MCV RBC AUTO: 96.7 FL (ref 81.4–97.8)
MICRO URNS: ABNORMAL
MONOCYTES # BLD AUTO: 0.74 K/UL (ref 0–0.85)
MONOCYTES NFR BLD AUTO: 19 % (ref 0–13.4)
NEUTROPHILS # BLD AUTO: 1.37 K/UL (ref 1.82–7.42)
NEUTROPHILS NFR BLD: 35.2 % (ref 44–72)
NITRITE UR QL STRIP.AUTO: NEGATIVE
NRBC # BLD AUTO: 0 K/UL
NRBC BLD-RTO: 0 /100 WBC (ref 0–0.2)
PH UR STRIP.AUTO: 6 [PH] (ref 5–8)
PLATELET # BLD AUTO: 255 K/UL (ref 164–446)
PMV BLD AUTO: 11.4 FL (ref 9–12.9)
POTASSIUM SERPL-SCNC: 4.6 MMOL/L (ref 3.6–5.5)
PROT SERPL-MCNC: 7.2 G/DL (ref 6–8.2)
PROT UR QL STRIP: NEGATIVE MG/DL
RBC # BLD AUTO: 3.94 M/UL (ref 4.2–5.4)
RBC # URNS HPF: NORMAL /HPF (ref 0–2)
RBC UR QL AUTO: NEGATIVE
SODIUM SERPL-SCNC: 134 MMOL/L (ref 135–145)
SP GR UR STRIP.AUTO: 1.01
TRIGL SERPL-MCNC: 75 MG/DL (ref 0–149)
TSH SERPL-ACNC: 0.87 UIU/ML (ref 0.38–5.33)
UROBILINOGEN UR STRIP.AUTO-MCNC: 0.2 EU/DL
VIT B12 SERPL-MCNC: 2472 PG/ML (ref 211–911)
WBC # BLD AUTO: 3.9 K/UL (ref 4.8–10.8)
WBC #/AREA URNS HPF: NORMAL /HPF

## 2025-07-02 PROCEDURE — 82607 VITAMIN B-12: CPT

## 2025-07-02 PROCEDURE — 84443 ASSAY THYROID STIM HORMONE: CPT

## 2025-07-02 PROCEDURE — 85025 COMPLETE CBC W/AUTO DIFF WBC: CPT

## 2025-07-02 PROCEDURE — 80053 COMPREHEN METABOLIC PANEL: CPT

## 2025-07-02 PROCEDURE — 81001 URINALYSIS AUTO W/SCOPE: CPT

## 2025-07-02 PROCEDURE — 82306 VITAMIN D 25 HYDROXY: CPT

## 2025-07-02 PROCEDURE — 80061 LIPID PANEL: CPT

## 2025-07-02 PROCEDURE — 36415 COLL VENOUS BLD VENIPUNCTURE: CPT

## 2025-07-25 ENCOUNTER — HOSPITAL ENCOUNTER (OUTPATIENT)
Dept: RADIOLOGY | Facility: MEDICAL CENTER | Age: 85
End: 2025-07-25
Attending: STUDENT IN AN ORGANIZED HEALTH CARE EDUCATION/TRAINING PROGRAM
Payer: MEDICARE

## 2025-07-25 DIAGNOSIS — M81.0 SENILE OSTEOPOROSIS: ICD-10-CM

## 2025-07-25 PROCEDURE — 77080 DXA BONE DENSITY AXIAL: CPT

## 2025-08-01 ENCOUNTER — HOSPITAL ENCOUNTER (OUTPATIENT)
Facility: MEDICAL CENTER | Age: 85
End: 2025-08-01
Attending: OTOLARYNGOLOGY
Payer: MEDICARE

## 2025-08-01 PROCEDURE — 87075 CULTR BACTERIA EXCEPT BLOOD: CPT

## 2025-08-01 PROCEDURE — 87205 SMEAR GRAM STAIN: CPT

## 2025-08-01 PROCEDURE — 87077 CULTURE AEROBIC IDENTIFY: CPT

## 2025-08-01 PROCEDURE — 87070 CULTURE OTHR SPECIMN AEROBIC: CPT

## 2025-08-02 LAB
GRAM STN SPEC: NORMAL
SIGNIFICANT IND 70042: NORMAL
SITE SITE: NORMAL
SOURCE SOURCE: NORMAL

## 2025-08-10 LAB — TEST NAME 95000: NORMAL

## 2025-08-27 ENCOUNTER — APPOINTMENT (OUTPATIENT)
Dept: URBAN - METROPOLITAN AREA CLINIC 4 | Facility: CLINIC | Age: 85
Setting detail: DERMATOLOGY
End: 2025-08-27

## 2025-08-27 DIAGNOSIS — L57.0 ACTINIC KERATOSIS: ICD-10-CM | Status: INADEQUATELY CONTROLLED

## 2025-08-27 DIAGNOSIS — L57.8 OTHER SKIN CHANGES DUE TO CHRONIC EXPOSURE TO NONIONIZING RADIATION: ICD-10-CM

## 2025-08-27 DIAGNOSIS — L81.4 OTHER MELANIN HYPERPIGMENTATION: ICD-10-CM

## 2025-08-27 DIAGNOSIS — D22 MELANOCYTIC NEVI: ICD-10-CM

## 2025-08-27 DIAGNOSIS — L82.1 OTHER SEBORRHEIC KERATOSIS: ICD-10-CM

## 2025-08-27 DIAGNOSIS — D18.0 HEMANGIOMA: ICD-10-CM

## 2025-08-27 DIAGNOSIS — Z85.828 PERSONAL HISTORY OF OTHER MALIGNANT NEOPLASM OF SKIN: ICD-10-CM

## 2025-08-27 PROBLEM — D22.5 MELANOCYTIC NEVI OF TRUNK: Status: ACTIVE | Noted: 2025-08-27

## 2025-08-27 PROBLEM — D18.01 HEMANGIOMA OF SKIN AND SUBCUTANEOUS TISSUE: Status: ACTIVE | Noted: 2025-08-27

## 2025-08-27 PROBLEM — D48.5 NEOPLASM OF UNCERTAIN BEHAVIOR OF SKIN: Status: ACTIVE | Noted: 2025-08-27

## 2025-08-27 PROCEDURE — ? COUNSELING

## 2025-08-27 PROCEDURE — ? LIQUID NITROGEN

## 2025-08-27 PROCEDURE — ? BIOPSY BY SHAVE METHOD

## 2025-08-27 PROCEDURE — ? PRESCRIPTION

## 2025-08-27 PROCEDURE — ? MEDICATION COUNSELING

## 2025-08-27 RX ORDER — IMIQUIMOD 12.5 MG/.25G
CREAM TOPICAL
Qty: 12 | Refills: 0 | Status: ERX | COMMUNITY
Start: 2025-08-27

## 2025-08-27 RX ADMIN — IMIQUIMOD: 12.5 CREAM TOPICAL at 00:00

## 2025-08-27 ASSESSMENT — LOCATION SIMPLE DESCRIPTION DERM
LOCATION SIMPLE: RIGHT FOREARM
LOCATION SIMPLE: LEFT FOREARM
LOCATION SIMPLE: POSTERIOR SCALP
LOCATION SIMPLE: RIGHT UPPER BACK
LOCATION SIMPLE: CHEST
LOCATION SIMPLE: LEFT THIGH
LOCATION SIMPLE: RIGHT CHEEK
LOCATION SIMPLE: LEFT TEMPLE
LOCATION SIMPLE: LEFT EYEBROW
LOCATION SIMPLE: LEFT UPPER ARM
LOCATION SIMPLE: RIGHT THIGH
LOCATION SIMPLE: RIGHT UPPER ARM
LOCATION SIMPLE: LEFT CHEEK

## 2025-08-27 ASSESSMENT — LOCATION DETAILED DESCRIPTION DERM
LOCATION DETAILED: LEFT LATERAL EYEBROW
LOCATION DETAILED: RIGHT CENTRAL MALAR CHEEK
LOCATION DETAILED: RIGHT SUPERIOR MEDIAL UPPER BACK
LOCATION DETAILED: RIGHT INFERIOR UPPER BACK
LOCATION DETAILED: LEFT MEDIAL SUPERIOR CHEST
LOCATION DETAILED: RIGHT SUPERIOR OCCIPITAL SCALP
LOCATION DETAILED: LEFT ANTERIOR DISTAL THIGH
LOCATION DETAILED: RIGHT MID-UPPER BACK
LOCATION DETAILED: RIGHT ANTERIOR DISTAL THIGH
LOCATION DETAILED: LEFT CENTRAL TEMPLE
LOCATION DETAILED: RIGHT DISTAL DORSAL FOREARM
LOCATION DETAILED: LEFT INFERIOR CENTRAL MALAR CHEEK
LOCATION DETAILED: LEFT DISTAL DORSAL FOREARM
LOCATION DETAILED: MID-OCCIPITAL SCALP
LOCATION DETAILED: RIGHT ANTERIOR PROXIMAL UPPER ARM
LOCATION DETAILED: LEFT ANTERIOR PROXIMAL UPPER ARM

## 2025-08-27 ASSESSMENT — LOCATION ZONE DERM
LOCATION ZONE: LEG
LOCATION ZONE: SCALP
LOCATION ZONE: TRUNK
LOCATION ZONE: ARM
LOCATION ZONE: FACE

## (undated) DEVICE — PAD UNIVERSAL MULTI USE (1/EA)

## (undated) DEVICE — BLADE SAGITTAL SAW DUAL CUT 75.0 X 25.0MM (1/EA)

## (undated) DEVICE — SENSOR OXIMETER ADULT SPO2 RD SET (20EA/BX)

## (undated) DEVICE — PAD PREP 24 X 48 CUFFED - (100/CA)

## (undated) DEVICE — LENS/HOOD FOR SPACESUIT - (32/PK) PEEL AWAY FACE

## (undated) DEVICE — TIP INTPLS HFLO ML ORFC BTRY - (12/CS)  FOR SURGILAV

## (undated) DEVICE — SODIUM CHL IRRIGATION 0.9% 1000ML (12EA/CA)

## (undated) DEVICE — GLOVE BIOGEL SZ 7 SURGICAL PF LTX - (50PR/BX 4BX/CA)

## (undated) DEVICE — GLOVE BIOGEL SZ 7.5 SURGICAL PF LTX - (50PR/BX 4BX/CA)

## (undated) DEVICE — DRAPE IOBAN II 23 IN X 33 IN - (10/BX)

## (undated) DEVICE — HUMID-VENT HEAT AND MOISTURE EXCHANGE- (50/BX)

## (undated) DEVICE — TOWEL STOP TIMEOUT SAFETY FLAG (40EA/CA)

## (undated) DEVICE — TUBING CLEARLINK DUO-VENT - C-FLO (48EA/CA)

## (undated) DEVICE — SUTURE GENERAL

## (undated) DEVICE — SUTURE 2-0 VICRYL PLUS CT-1 - 8 X 18 INCH(12/BX)

## (undated) DEVICE — SLEEVE VASO DVT COMPRESSION CALF MED - (10PR/CA)

## (undated) DEVICE — Device

## (undated) DEVICE — TIP INTPLS HFLO ML ORFC BTRY - (12/CS) FOR SURGILAV

## (undated) DEVICE — PILLOW ABDUCTION HIP MEDIUM - (6EA/CA)

## (undated) DEVICE — PACK TOTAL HIP - (1/CA)

## (undated) DEVICE — SET EXTENSION WITH 2 PORTS (48EA/CA) ***PART #2C8610 IS A SUBSTITUTE*****

## (undated) DEVICE — SET LEADWIRE 5 LEAD BEDSIDE DISPOSABLE ECG (1SET OF 5/EA)

## (undated) DEVICE — SUTURE 3-0 MONOCRYL PLUS PS-1 - 27 INCH (36/BX)

## (undated) DEVICE — GLOVE BIOGEL PI INDICATOR SZ 6.5 SURGICAL PF LF - (50/BX 4BX/CA)

## (undated) DEVICE — SUCTION INSTRUMENT YANKAUER BULBOUS TIP W/O VENT (50EA/CA)

## (undated) DEVICE — HANDPIECE 10FT INTPLS SCT PLS IRRIGATION HAND CONTROL SET (6/PK)

## (undated) DEVICE — BOVIE BLADE COATED &INSULATED (50EA/PK)

## (undated) DEVICE — STOCKINET TUBULAR 6IN STERILE - 6 X 48YDS (25/CA)

## (undated) DEVICE — SODIUM CHL. IRRIGATION 0.9% 3000ML (4EA/CA 65CA/PF)

## (undated) DEVICE — BOVIE BLADE COATED &INSULATED - 25/PK

## (undated) DEVICE — TRAY CATHETER FOLEY URINE METER W/STATLOCK 350ML (10EA/CA)

## (undated) DEVICE — GLOVE BIOGEL PI INDICATOR SZ 7.0 SURGICAL PF LF - (50/BX 4BX/CA)

## (undated) DEVICE — BLADE SURGICAL #10 - (50/BX)

## (undated) DEVICE — DRESSING AQUACEL AG ADVANTAGE 3.5 X 10" (10EA/BX)"

## (undated) DEVICE — SUTURE 1 VICRYL PLUS CTX - 8 X 18 INCH (12/BX)

## (undated) DEVICE — LACTATED RINGERS INJ 1000 ML - (14EA/CA 60CA/PF)

## (undated) DEVICE — CLOSURE PRINEO SKIN - (2EA/BX)

## (undated) DEVICE — CANISTER SUCTION RIGID RED 1500CC (40EA/CA)

## (undated) DEVICE — PAD LAP STERILE 18 X 18 - (5/PK 40PK/CA)

## (undated) DEVICE — GLOVE BIOGEL SZ 6.5 SURGICAL PF LTX (50PR/BX 4BX/CA)

## (undated) DEVICE — ELECTRODE DUAL RETURN W/ CORD - (50/PK)

## (undated) DEVICE — GLOVE BIOGEL SZ 8 SURGICAL PF LTX - (50PR/BX 4BX/CA)

## (undated) DEVICE — DRAPE LARGE 3 QUARTER - (20/CA)

## (undated) DEVICE — GLOVE, LITE (PAIR)

## (undated) DEVICE — SUTURE RETRIEVER HEWSON LIGA - 6/BX

## (undated) DEVICE — GOWN WARMING STANDARD FLEX - (30/CA)

## (undated) DEVICE — TIP INTPLS FMCNL IRR PMP SCT - (12/PKG) FOR SURGILAV

## (undated) DEVICE — GLOVE BIOGEL PI INDICATOR SZ 8.0 SURGICAL PF LF -(50/BX 4BX/CA)

## (undated) DEVICE — GLOVE BIOGEL PI INDICATOR SZ 7.5 SURGICAL PF LF -(50/BX 4BX/CA)